# Patient Record
Sex: MALE | Race: WHITE | NOT HISPANIC OR LATINO | Employment: OTHER | URBAN - METROPOLITAN AREA
[De-identification: names, ages, dates, MRNs, and addresses within clinical notes are randomized per-mention and may not be internally consistent; named-entity substitution may affect disease eponyms.]

---

## 2017-01-03 ENCOUNTER — ALLSCRIPTS OFFICE VISIT (OUTPATIENT)
Dept: OTHER | Facility: OTHER | Age: 67
End: 2017-01-03

## 2017-01-03 DIAGNOSIS — N18.30 CHRONIC KIDNEY DISEASE, STAGE III (MODERATE) (HCC): ICD-10-CM

## 2017-01-13 ENCOUNTER — TRANSCRIBE ORDERS (OUTPATIENT)
Dept: LAB | Facility: CLINIC | Age: 67
End: 2017-01-13

## 2017-01-13 ENCOUNTER — APPOINTMENT (OUTPATIENT)
Dept: LAB | Facility: CLINIC | Age: 67
End: 2017-01-13
Payer: MEDICARE

## 2017-01-13 DIAGNOSIS — Z94.0 KIDNEY REPLACED BY TRANSPLANT: Primary | ICD-10-CM

## 2017-01-13 LAB
ANION GAP SERPL CALCULATED.3IONS-SCNC: 7 MMOL/L (ref 4–13)
BUN SERPL-MCNC: 30 MG/DL (ref 5–25)
CALCIUM SERPL-MCNC: 9.2 MG/DL (ref 8.3–10.1)
CHLORIDE SERPL-SCNC: 102 MMOL/L (ref 100–108)
CO2 SERPL-SCNC: 27 MMOL/L (ref 21–32)
CREAT SERPL-MCNC: 1.58 MG/DL (ref 0.6–1.3)
GFR SERPL CREATININE-BSD FRML MDRD: 44.2 ML/MIN/1.73SQ M
GLUCOSE SERPL-MCNC: 90 MG/DL (ref 65–140)
POTASSIUM SERPL-SCNC: 4.5 MMOL/L (ref 3.5–5.3)
SODIUM SERPL-SCNC: 136 MMOL/L (ref 136–145)

## 2017-01-13 PROCEDURE — 80048 BASIC METABOLIC PNL TOTAL CA: CPT

## 2017-01-13 PROCEDURE — 36415 COLL VENOUS BLD VENIPUNCTURE: CPT

## 2017-01-13 PROCEDURE — 80197 ASSAY OF TACROLIMUS: CPT

## 2017-01-15 LAB — TACROLIMUS BLD LC/MS/MS-MCNC: 6.5 NG/ML (ref 2–20)

## 2017-02-02 ENCOUNTER — ALLSCRIPTS OFFICE VISIT (OUTPATIENT)
Dept: OTHER | Facility: OTHER | Age: 67
End: 2017-02-02

## 2017-02-07 ENCOUNTER — GENERIC CONVERSION - ENCOUNTER (OUTPATIENT)
Dept: OTHER | Facility: OTHER | Age: 67
End: 2017-02-07

## 2017-02-07 LAB — COLOGUARD (HISTORICAL): NEGATIVE

## 2017-03-17 ENCOUNTER — GENERIC CONVERSION - ENCOUNTER (OUTPATIENT)
Dept: OTHER | Facility: OTHER | Age: 67
End: 2017-03-17

## 2017-03-30 ENCOUNTER — ALLSCRIPTS OFFICE VISIT (OUTPATIENT)
Dept: OTHER | Facility: OTHER | Age: 67
End: 2017-03-30

## 2017-03-30 DIAGNOSIS — Z94.0 HISTORY OF KIDNEY TRANSPLANT: ICD-10-CM

## 2017-03-30 DIAGNOSIS — N18.30 CHRONIC KIDNEY DISEASE, STAGE III (MODERATE) (HCC): ICD-10-CM

## 2017-03-31 ENCOUNTER — ALLSCRIPTS OFFICE VISIT (OUTPATIENT)
Dept: OTHER | Facility: OTHER | Age: 67
End: 2017-03-31

## 2017-04-03 ENCOUNTER — HOSPITAL ENCOUNTER (INPATIENT)
Facility: HOSPITAL | Age: 67
LOS: 4 days | Discharge: HOME | DRG: 178 | End: 2017-04-07
Attending: EMERGENCY MEDICINE | Admitting: INTERNAL MEDICINE
Payer: MEDICARE

## 2017-04-03 ENCOUNTER — APPOINTMENT (EMERGENCY)
Dept: RADIOLOGY | Facility: HOSPITAL | Age: 67
DRG: 178 | End: 2017-04-03
Payer: MEDICARE

## 2017-04-03 DIAGNOSIS — D84.9 IMMUNOSUPPRESSION (HCC): ICD-10-CM

## 2017-04-03 DIAGNOSIS — J18.9 PNEUMONIA: Primary | ICD-10-CM

## 2017-04-03 DIAGNOSIS — Z94.0 H/O KIDNEY TRANSPLANT: Chronic | ICD-10-CM

## 2017-04-03 DIAGNOSIS — N17.9 AKI (ACUTE KIDNEY INJURY) (HCC): ICD-10-CM

## 2017-04-03 LAB
ALBUMIN SERPL BCP-MCNC: 3.6 G/DL (ref 3.5–5)
ALP SERPL-CCNC: 72 U/L (ref 46–116)
ALT SERPL W P-5'-P-CCNC: 25 U/L (ref 12–78)
ANION GAP SERPL CALCULATED.3IONS-SCNC: 7 MMOL/L (ref 4–13)
APTT PPP: 25 SECONDS (ref 24–36)
AST SERPL W P-5'-P-CCNC: 26 U/L (ref 5–45)
BACTERIA UR QL AUTO: ABNORMAL /HPF
BASOPHILS # BLD AUTO: 0.04 THOUSANDS/ΜL (ref 0–0.1)
BASOPHILS NFR BLD AUTO: 1 % (ref 0–1)
BILIRUB SERPL-MCNC: 0.7 MG/DL (ref 0.2–1)
BILIRUB UR QL STRIP: NEGATIVE
BUN SERPL-MCNC: 23 MG/DL (ref 5–25)
CALCIUM SERPL-MCNC: 9.3 MG/DL (ref 8.3–10.1)
CHLORIDE SERPL-SCNC: 102 MMOL/L (ref 100–108)
CLARITY UR: CLEAR
CO2 SERPL-SCNC: 28 MMOL/L (ref 21–32)
COLOR UR: YELLOW
CREAT SERPL-MCNC: 1.39 MG/DL (ref 0.6–1.3)
EOSINOPHIL # BLD AUTO: 0.28 THOUSAND/ΜL (ref 0–0.61)
EOSINOPHIL NFR BLD AUTO: 4 % (ref 0–6)
ERYTHROCYTE [DISTWIDTH] IN BLOOD BY AUTOMATED COUNT: 13.2 % (ref 11.6–15.1)
GFR SERPL CREATININE-BSD FRML MDRD: 51.1 ML/MIN/1.73SQ M
GLUCOSE SERPL-MCNC: 137 MG/DL (ref 65–140)
GLUCOSE UR STRIP-MCNC: NEGATIVE MG/DL
HCT VFR BLD AUTO: 52.4 % (ref 36.5–49.3)
HGB BLD-MCNC: 16.8 G/DL (ref 12–17)
HGB UR QL STRIP.AUTO: ABNORMAL
INR PPP: 1.04 (ref 0.86–1.16)
KETONES UR STRIP-MCNC: NEGATIVE MG/DL
LACTATE SERPL-SCNC: 1.7 MMOL/L (ref 0.5–2)
LEUKOCYTE ESTERASE UR QL STRIP: NEGATIVE
LYMPHOCYTES # BLD AUTO: 1.53 THOUSANDS/ΜL (ref 0.6–4.47)
LYMPHOCYTES NFR BLD AUTO: 20 % (ref 14–44)
MCH RBC QN AUTO: 29.5 PG (ref 26.8–34.3)
MCHC RBC AUTO-ENTMCNC: 32.1 G/DL (ref 31.4–37.4)
MCV RBC AUTO: 92 FL (ref 82–98)
MONOCYTES # BLD AUTO: 0.8 THOUSAND/ΜL (ref 0.17–1.22)
MONOCYTES NFR BLD AUTO: 10 % (ref 4–12)
NEUTROPHILS # BLD AUTO: 4.92 THOUSANDS/ΜL (ref 1.85–7.62)
NEUTS SEG NFR BLD AUTO: 64 % (ref 43–75)
NITRITE UR QL STRIP: NEGATIVE
NON-SQ EPI CELLS URNS QL MICRO: ABNORMAL /HPF
NRBC BLD AUTO-RTO: 0 /100 WBCS
NT-PROBNP SERPL-MCNC: 265 PG/ML
PH UR STRIP.AUTO: 6 [PH] (ref 4.5–8)
PLATELET # BLD AUTO: 272 THOUSANDS/UL (ref 149–390)
PMV BLD AUTO: 9.1 FL (ref 8.9–12.7)
POTASSIUM SERPL-SCNC: 4.8 MMOL/L (ref 3.5–5.3)
PROT SERPL-MCNC: 8.7 G/DL (ref 6.4–8.2)
PROT UR STRIP-MCNC: NEGATIVE MG/DL
PROTHROMBIN TIME: 13.5 SECONDS (ref 12–14.3)
RBC # BLD AUTO: 5.7 MILLION/UL (ref 3.88–5.62)
RBC #/AREA URNS AUTO: ABNORMAL /HPF
SODIUM SERPL-SCNC: 137 MMOL/L (ref 136–145)
SP GR UR STRIP.AUTO: 1.02 (ref 1–1.03)
TROPONIN I SERPL-MCNC: <0.02 NG/ML
UROBILINOGEN UR QL STRIP.AUTO: 0.2 E.U./DL
WBC # BLD AUTO: 7.66 THOUSAND/UL (ref 4.31–10.16)
WBC #/AREA URNS AUTO: ABNORMAL /HPF

## 2017-04-03 PROCEDURE — 85025 COMPLETE CBC W/AUTO DIFF WBC: CPT | Performed by: EMERGENCY MEDICINE

## 2017-04-03 PROCEDURE — 85610 PROTHROMBIN TIME: CPT | Performed by: EMERGENCY MEDICINE

## 2017-04-03 PROCEDURE — 87040 BLOOD CULTURE FOR BACTERIA: CPT | Performed by: EMERGENCY MEDICINE

## 2017-04-03 PROCEDURE — 71020 HB CHEST X-RAY 2VW FRONTAL&LATL: CPT

## 2017-04-03 PROCEDURE — 80197 ASSAY OF TACROLIMUS: CPT | Performed by: EMERGENCY MEDICINE

## 2017-04-03 PROCEDURE — 93005 ELECTROCARDIOGRAM TRACING: CPT | Performed by: EMERGENCY MEDICINE

## 2017-04-03 PROCEDURE — 36415 COLL VENOUS BLD VENIPUNCTURE: CPT | Performed by: EMERGENCY MEDICINE

## 2017-04-03 PROCEDURE — 85730 THROMBOPLASTIN TIME PARTIAL: CPT | Performed by: EMERGENCY MEDICINE

## 2017-04-03 PROCEDURE — 84484 ASSAY OF TROPONIN QUANT: CPT | Performed by: EMERGENCY MEDICINE

## 2017-04-03 PROCEDURE — 81001 URINALYSIS AUTO W/SCOPE: CPT | Performed by: EMERGENCY MEDICINE

## 2017-04-03 PROCEDURE — 87086 URINE CULTURE/COLONY COUNT: CPT | Performed by: EMERGENCY MEDICINE

## 2017-04-03 PROCEDURE — 83880 ASSAY OF NATRIURETIC PEPTIDE: CPT | Performed by: EMERGENCY MEDICINE

## 2017-04-03 PROCEDURE — 83605 ASSAY OF LACTIC ACID: CPT | Performed by: EMERGENCY MEDICINE

## 2017-04-03 PROCEDURE — 80053 COMPREHEN METABOLIC PANEL: CPT | Performed by: EMERGENCY MEDICINE

## 2017-04-04 ENCOUNTER — APPOINTMENT (INPATIENT)
Dept: CT IMAGING | Facility: HOSPITAL | Age: 67
DRG: 178 | End: 2017-04-04
Payer: MEDICARE

## 2017-04-04 PROBLEM — Z94.0 RENAL TRANSPLANT RECIPIENT: Status: ACTIVE | Noted: 2017-04-04

## 2017-04-04 PROBLEM — J18.9 PNEUMONIA DUE TO INFECTIOUS ORGANISM: Status: ACTIVE | Noted: 2017-04-04

## 2017-04-04 PROBLEM — I10 ESSENTIAL HYPERTENSION: Status: ACTIVE | Noted: 2017-04-04

## 2017-04-04 PROBLEM — D84.9 IMMUNOSUPPRESSION (HCC): Status: ACTIVE | Noted: 2017-04-04

## 2017-04-04 PROBLEM — R79.89 ELEVATED BRAIN NATRIURETIC PEPTIDE (BNP) LEVEL: Status: ACTIVE | Noted: 2017-04-04

## 2017-04-04 LAB
ANION GAP SERPL CALCULATED.3IONS-SCNC: 8 MMOL/L (ref 4–13)
ATRIAL RATE: 74 BPM
BUN SERPL-MCNC: 23 MG/DL (ref 5–25)
CALCIUM SERPL-MCNC: 8.6 MG/DL (ref 8.3–10.1)
CHLORIDE SERPL-SCNC: 105 MMOL/L (ref 100–108)
CO2 SERPL-SCNC: 24 MMOL/L (ref 21–32)
CREAT SERPL-MCNC: 1.15 MG/DL (ref 0.6–1.3)
ERYTHROCYTE [DISTWIDTH] IN BLOOD BY AUTOMATED COUNT: 13.2 % (ref 11.6–15.1)
FLUAV AG SPEC QL: NORMAL
FLUBV AG SPEC QL: NORMAL
GFR SERPL CREATININE-BSD FRML MDRD: >60 ML/MIN/1.73SQ M
GLUCOSE SERPL-MCNC: 129 MG/DL (ref 65–140)
HCT VFR BLD AUTO: 46.2 % (ref 36.5–49.3)
HGB BLD-MCNC: 14.9 G/DL (ref 12–17)
L PNEUMO1 AG UR QL IA.RAPID: NEGATIVE
MAGNESIUM SERPL-MCNC: 1.8 MG/DL (ref 1.6–2.6)
MCH RBC QN AUTO: 29.6 PG (ref 26.8–34.3)
MCHC RBC AUTO-ENTMCNC: 32.3 G/DL (ref 31.4–37.4)
MCV RBC AUTO: 92 FL (ref 82–98)
P AXIS: 36 DEGREES
PLATELET # BLD AUTO: 229 THOUSANDS/UL (ref 149–390)
PLATELET # BLD AUTO: 242 THOUSANDS/UL (ref 149–390)
PMV BLD AUTO: 8.8 FL (ref 8.9–12.7)
PMV BLD AUTO: 9 FL (ref 8.9–12.7)
POTASSIUM SERPL-SCNC: 3.5 MMOL/L (ref 3.5–5.3)
PR INTERVAL: 184 MS
QRS AXIS: -2 DEGREES
QRSD INTERVAL: 142 MS
QT INTERVAL: 406 MS
QTC INTERVAL: 450 MS
RBC # BLD AUTO: 5.04 MILLION/UL (ref 3.88–5.62)
RSV B RNA SPEC QL NAA+PROBE: NORMAL
S PNEUM AG UR QL: NEGATIVE
SODIUM SERPL-SCNC: 137 MMOL/L (ref 136–145)
T WAVE AXIS: 47 DEGREES
VENTRICULAR RATE: 74 BPM
WBC # BLD AUTO: 7.68 THOUSAND/UL (ref 4.31–10.16)

## 2017-04-04 PROCEDURE — 99284 EMERGENCY DEPT VISIT MOD MDM: CPT

## 2017-04-04 PROCEDURE — 71250 CT THORAX DX C-: CPT

## 2017-04-04 PROCEDURE — 80048 BASIC METABOLIC PNL TOTAL CA: CPT | Performed by: PHYSICIAN ASSISTANT

## 2017-04-04 PROCEDURE — 87070 CULTURE OTHR SPECIMN AEROBIC: CPT | Performed by: PHYSICIAN ASSISTANT

## 2017-04-04 PROCEDURE — 83735 ASSAY OF MAGNESIUM: CPT | Performed by: PHYSICIAN ASSISTANT

## 2017-04-04 PROCEDURE — 87205 SMEAR GRAM STAIN: CPT | Performed by: PHYSICIAN ASSISTANT

## 2017-04-04 PROCEDURE — 85027 COMPLETE CBC AUTOMATED: CPT | Performed by: PHYSICIAN ASSISTANT

## 2017-04-04 PROCEDURE — 87449 NOS EACH ORGANISM AG IA: CPT | Performed by: PHYSICIAN ASSISTANT

## 2017-04-04 PROCEDURE — 94640 AIRWAY INHALATION TREATMENT: CPT

## 2017-04-04 PROCEDURE — 87798 DETECT AGENT NOS DNA AMP: CPT | Performed by: PHYSICIAN ASSISTANT

## 2017-04-04 PROCEDURE — 94760 N-INVAS EAR/PLS OXIMETRY 1: CPT

## 2017-04-04 PROCEDURE — 94664 DEMO&/EVAL PT USE INHALER: CPT

## 2017-04-04 PROCEDURE — 85049 AUTOMATED PLATELET COUNT: CPT | Performed by: PHYSICIAN ASSISTANT

## 2017-04-04 RX ORDER — MYCOPHENOLIC ACID 180 MG/1
360 TABLET, DELAYED RELEASE ORAL 2 TIMES DAILY
Status: DISCONTINUED | OUTPATIENT
Start: 2017-04-04 | End: 2017-04-07 | Stop reason: HOSPADM

## 2017-04-04 RX ORDER — VANCOMYCIN HYDROCHLORIDE 1 G/200ML
15 INJECTION, SOLUTION INTRAVENOUS EVERY 12 HOURS
Status: DISCONTINUED | OUTPATIENT
Start: 2017-04-04 | End: 2017-04-04

## 2017-04-04 RX ORDER — ATORVASTATIN CALCIUM 10 MG/1
10 TABLET, FILM COATED ORAL
Status: DISCONTINUED | OUTPATIENT
Start: 2017-04-04 | End: 2017-04-07 | Stop reason: HOSPADM

## 2017-04-04 RX ORDER — TAMSULOSIN HYDROCHLORIDE 0.4 MG/1
0.4 CAPSULE ORAL
Status: DISCONTINUED | OUTPATIENT
Start: 2017-04-04 | End: 2017-04-07 | Stop reason: HOSPADM

## 2017-04-04 RX ORDER — SODIUM CHLORIDE 9 MG/ML
75 INJECTION, SOLUTION INTRAVENOUS CONTINUOUS
Status: DISCONTINUED | OUTPATIENT
Start: 2017-04-04 | End: 2017-04-04

## 2017-04-04 RX ORDER — VANCOMYCIN HYDROCHLORIDE 1 G/200ML
15 INJECTION, SOLUTION INTRAVENOUS EVERY 12 HOURS
Status: DISCONTINUED | OUTPATIENT
Start: 2017-04-04 | End: 2017-04-04 | Stop reason: DRUGHIGH

## 2017-04-04 RX ORDER — HYDRALAZINE HYDROCHLORIDE 20 MG/ML
20 INJECTION INTRAMUSCULAR; INTRAVENOUS EVERY 6 HOURS PRN
Status: DISCONTINUED | OUTPATIENT
Start: 2017-04-04 | End: 2017-04-07 | Stop reason: HOSPADM

## 2017-04-04 RX ORDER — ACETAMINOPHEN 325 MG/1
650 TABLET ORAL EVERY 6 HOURS PRN
Status: DISCONTINUED | OUTPATIENT
Start: 2017-04-04 | End: 2017-04-07 | Stop reason: HOSPADM

## 2017-04-04 RX ORDER — HYDRALAZINE HYDROCHLORIDE 20 MG/ML
10 INJECTION INTRAMUSCULAR; INTRAVENOUS EVERY 6 HOURS PRN
Status: DISCONTINUED | OUTPATIENT
Start: 2017-04-04 | End: 2017-04-07 | Stop reason: HOSPADM

## 2017-04-04 RX ORDER — ECHINACEA PURPUREA EXTRACT 125 MG
1 TABLET ORAL AS NEEDED
Status: DISCONTINUED | OUTPATIENT
Start: 2017-04-04 | End: 2017-04-07 | Stop reason: HOSPADM

## 2017-04-04 RX ORDER — TACROLIMUS 0.5 MG/1
1 CAPSULE ORAL 2 TIMES DAILY
Status: DISCONTINUED | OUTPATIENT
Start: 2017-04-04 | End: 2017-04-07 | Stop reason: HOSPADM

## 2017-04-04 RX ORDER — AMIODARONE HYDROCHLORIDE 200 MG/1
200 TABLET ORAL
Status: DISCONTINUED | OUTPATIENT
Start: 2017-04-05 | End: 2017-04-04

## 2017-04-04 RX ORDER — VANCOMYCIN HYDROCHLORIDE 1 G/200ML
15 INJECTION, SOLUTION INTRAVENOUS EVERY 24 HOURS
Status: DISCONTINUED | OUTPATIENT
Start: 2017-04-04 | End: 2017-04-04 | Stop reason: DRUGHIGH

## 2017-04-04 RX ORDER — ONDANSETRON 2 MG/ML
4 INJECTION INTRAMUSCULAR; INTRAVENOUS EVERY 6 HOURS PRN
Status: DISCONTINUED | OUTPATIENT
Start: 2017-04-04 | End: 2017-04-07 | Stop reason: HOSPADM

## 2017-04-04 RX ORDER — HEPARIN SODIUM 5000 [USP'U]/ML
5000 INJECTION, SOLUTION INTRAVENOUS; SUBCUTANEOUS EVERY 8 HOURS SCHEDULED
Status: DISCONTINUED | OUTPATIENT
Start: 2017-04-04 | End: 2017-04-07 | Stop reason: HOSPADM

## 2017-04-04 RX ORDER — MAGNESIUM SULFATE HEPTAHYDRATE 40 MG/ML
2 INJECTION, SOLUTION INTRAVENOUS ONCE
Status: COMPLETED | OUTPATIENT
Start: 2017-04-04 | End: 2017-04-04

## 2017-04-04 RX ORDER — ALBUTEROL SULFATE 2.5 MG/3ML
2.5 SOLUTION RESPIRATORY (INHALATION) EVERY 6 HOURS PRN
Status: DISCONTINUED | OUTPATIENT
Start: 2017-04-04 | End: 2017-04-07 | Stop reason: HOSPADM

## 2017-04-04 RX ORDER — VANCOMYCIN HYDROCHLORIDE 1 G/200ML
15 INJECTION, SOLUTION INTRAVENOUS ONCE
Status: DISCONTINUED | OUTPATIENT
Start: 2017-04-04 | End: 2017-04-04

## 2017-04-04 RX ORDER — GUAIFENESIN 600 MG
600 TABLET, EXTENDED RELEASE 12 HR ORAL 2 TIMES DAILY PRN
Status: DISCONTINUED | OUTPATIENT
Start: 2017-04-04 | End: 2017-04-05

## 2017-04-04 RX ORDER — FAMOTIDINE 20 MG/1
20 TABLET, FILM COATED ORAL 2 TIMES DAILY
Status: DISCONTINUED | OUTPATIENT
Start: 2017-04-04 | End: 2017-04-07 | Stop reason: HOSPADM

## 2017-04-04 RX ORDER — ASPIRIN 81 MG/1
81 TABLET, CHEWABLE ORAL DAILY
Status: DISCONTINUED | OUTPATIENT
Start: 2017-04-04 | End: 2017-04-07 | Stop reason: HOSPADM

## 2017-04-04 RX ORDER — PREDNISONE 1 MG/1
5 TABLET ORAL DAILY
Status: DISCONTINUED | OUTPATIENT
Start: 2017-04-04 | End: 2017-04-07 | Stop reason: HOSPADM

## 2017-04-04 RX ADMIN — CEFEPIME 1000 MG: 1 INJECTION, POWDER, FOR SOLUTION INTRAMUSCULAR; INTRAVENOUS at 14:05

## 2017-04-04 RX ADMIN — MYCOPHENOLIC ACID 360 MG: 180 TABLET, DELAYED RELEASE ORAL at 09:29

## 2017-04-04 RX ADMIN — ASPIRIN 81 MG CHEWABLE TABLET 81 MG: 81 TABLET CHEWABLE at 09:22

## 2017-04-04 RX ADMIN — METOPROLOL TARTRATE 2.5 MG: 5 INJECTION, SOLUTION INTRAVENOUS at 06:18

## 2017-04-04 RX ADMIN — PREDNISONE 5 MG: 10 TABLET ORAL at 09:22

## 2017-04-04 RX ADMIN — HEPARIN SODIUM 5000 UNITS: 5000 INJECTION, SOLUTION INTRAVENOUS; SUBCUTANEOUS at 21:59

## 2017-04-04 RX ADMIN — ATORVASTATIN CALCIUM 10 MG: 10 TABLET, FILM COATED ORAL at 15:36

## 2017-04-04 RX ADMIN — MAGNESIUM SULFATE HEPTAHYDRATE 2 G: 40 INJECTION, SOLUTION INTRAVENOUS at 06:22

## 2017-04-04 RX ADMIN — TACROLIMUS 1 MG: 0.5 CAPSULE ORAL at 09:29

## 2017-04-04 RX ADMIN — METOPROLOL TARTRATE 25 MG: 25 TABLET ORAL at 21:59

## 2017-04-04 RX ADMIN — CEFEPIME 1000 MG: 1 INJECTION, POWDER, FOR SOLUTION INTRAMUSCULAR; INTRAVENOUS at 02:11

## 2017-04-04 RX ADMIN — MYCOPHENOLIC ACID 360 MG: 180 TABLET, DELAYED RELEASE ORAL at 17:27

## 2017-04-04 RX ADMIN — VANCOMYCIN HYDROCHLORIDE 1000 MG: 1 INJECTION, SOLUTION INTRAVENOUS at 03:01

## 2017-04-04 RX ADMIN — ALBUTEROL SULFATE 2.5 MG: 2.5 SOLUTION RESPIRATORY (INHALATION) at 21:09

## 2017-04-04 RX ADMIN — FAMOTIDINE 20 MG: 20 TABLET, FILM COATED ORAL at 09:22

## 2017-04-04 RX ADMIN — HEPARIN SODIUM 5000 UNITS: 5000 INJECTION, SOLUTION INTRAVENOUS; SUBCUTANEOUS at 17:27

## 2017-04-04 RX ADMIN — GUAIFENESIN 600 MG: 600 TABLET, EXTENDED RELEASE ORAL at 15:29

## 2017-04-04 RX ADMIN — ALBUTEROL SULFATE 2.5 MG: 2.5 SOLUTION RESPIRATORY (INHALATION) at 15:33

## 2017-04-04 RX ADMIN — FAMOTIDINE 20 MG: 20 TABLET, FILM COATED ORAL at 17:28

## 2017-04-04 RX ADMIN — TAMSULOSIN HYDROCHLORIDE 0.4 MG: 0.4 CAPSULE ORAL at 17:28

## 2017-04-04 RX ADMIN — TACROLIMUS 1 MG: 0.5 CAPSULE ORAL at 17:29

## 2017-04-04 RX ADMIN — ENOXAPARIN SODIUM 40 MG: 40 INJECTION SUBCUTANEOUS at 09:28

## 2017-04-04 RX ADMIN — VANCOMYCIN HYDROCHLORIDE 750 MG: 750 INJECTION, SOLUTION INTRAVENOUS at 14:34

## 2017-04-04 RX ADMIN — SODIUM CHLORIDE 75 ML/HR: 0.9 INJECTION, SOLUTION INTRAVENOUS at 02:13

## 2017-04-05 LAB
ANION GAP SERPL CALCULATED.3IONS-SCNC: 6 MMOL/L (ref 4–13)
BACTERIA UR CULT: NORMAL
BUN SERPL-MCNC: 22 MG/DL (ref 5–25)
CALCIUM SERPL-MCNC: 8.8 MG/DL (ref 8.3–10.1)
CHLORIDE SERPL-SCNC: 105 MMOL/L (ref 100–108)
CO2 SERPL-SCNC: 24 MMOL/L (ref 21–32)
CREAT SERPL-MCNC: 1.15 MG/DL (ref 0.6–1.3)
ERYTHROCYTE [DISTWIDTH] IN BLOOD BY AUTOMATED COUNT: 13.2 % (ref 11.6–15.1)
GFR SERPL CREATININE-BSD FRML MDRD: >60 ML/MIN/1.73SQ M
GLUCOSE SERPL-MCNC: 101 MG/DL (ref 65–140)
HCT VFR BLD AUTO: 45.9 % (ref 36.5–49.3)
HGB BLD-MCNC: 14.9 G/DL (ref 12–17)
MAGNESIUM SERPL-MCNC: 2.3 MG/DL (ref 1.6–2.6)
MCH RBC QN AUTO: 29.7 PG (ref 26.8–34.3)
MCHC RBC AUTO-ENTMCNC: 32.5 G/DL (ref 31.4–37.4)
MCV RBC AUTO: 92 FL (ref 82–98)
PHOSPHATE SERPL-MCNC: 3.3 MG/DL (ref 2.3–4.1)
PLATELET # BLD AUTO: 232 THOUSANDS/UL (ref 149–390)
PMV BLD AUTO: 8.9 FL (ref 8.9–12.7)
POTASSIUM SERPL-SCNC: 4.2 MMOL/L (ref 3.5–5.3)
RBC # BLD AUTO: 5.01 MILLION/UL (ref 3.88–5.62)
SODIUM SERPL-SCNC: 135 MMOL/L (ref 136–145)
WBC # BLD AUTO: 7.48 THOUSAND/UL (ref 4.31–10.16)

## 2017-04-05 PROCEDURE — 94640 AIRWAY INHALATION TREATMENT: CPT

## 2017-04-05 PROCEDURE — 83735 ASSAY OF MAGNESIUM: CPT | Performed by: NURSE PRACTITIONER

## 2017-04-05 PROCEDURE — 85027 COMPLETE CBC AUTOMATED: CPT | Performed by: NURSE PRACTITIONER

## 2017-04-05 PROCEDURE — 94760 N-INVAS EAR/PLS OXIMETRY 1: CPT

## 2017-04-05 PROCEDURE — 80048 BASIC METABOLIC PNL TOTAL CA: CPT | Performed by: NURSE PRACTITIONER

## 2017-04-05 PROCEDURE — 84100 ASSAY OF PHOSPHORUS: CPT | Performed by: NURSE PRACTITIONER

## 2017-04-05 RX ORDER — GUAIFENESIN 600 MG
600 TABLET, EXTENDED RELEASE 12 HR ORAL EVERY 12 HOURS SCHEDULED
Status: DISCONTINUED | OUTPATIENT
Start: 2017-04-05 | End: 2017-04-07 | Stop reason: HOSPADM

## 2017-04-05 RX ADMIN — MYCOPHENOLIC ACID 360 MG: 180 TABLET, DELAYED RELEASE ORAL at 17:22

## 2017-04-05 RX ADMIN — ALBUTEROL SULFATE 2.5 MG: 2.5 SOLUTION RESPIRATORY (INHALATION) at 21:36

## 2017-04-05 RX ADMIN — HEPARIN SODIUM 5000 UNITS: 5000 INJECTION, SOLUTION INTRAVENOUS; SUBCUTANEOUS at 06:35

## 2017-04-05 RX ADMIN — FAMOTIDINE 20 MG: 20 TABLET, FILM COATED ORAL at 09:11

## 2017-04-05 RX ADMIN — TAMSULOSIN HYDROCHLORIDE 0.4 MG: 0.4 CAPSULE ORAL at 15:31

## 2017-04-05 RX ADMIN — METOPROLOL TARTRATE 25 MG: 25 TABLET ORAL at 21:47

## 2017-04-05 RX ADMIN — MYCOPHENOLIC ACID 360 MG: 180 TABLET, DELAYED RELEASE ORAL at 09:12

## 2017-04-05 RX ADMIN — ALBUTEROL SULFATE 2.5 MG: 2.5 SOLUTION RESPIRATORY (INHALATION) at 08:16

## 2017-04-05 RX ADMIN — VANCOMYCIN HYDROCHLORIDE 750 MG: 750 INJECTION, SOLUTION INTRAVENOUS at 01:58

## 2017-04-05 RX ADMIN — FAMOTIDINE 20 MG: 20 TABLET, FILM COATED ORAL at 17:22

## 2017-04-05 RX ADMIN — PREDNISONE 5 MG: 10 TABLET ORAL at 09:11

## 2017-04-05 RX ADMIN — METOPROLOL TARTRATE 25 MG: 25 TABLET ORAL at 09:11

## 2017-04-05 RX ADMIN — CEFEPIME 1000 MG: 1 INJECTION, POWDER, FOR SOLUTION INTRAMUSCULAR; INTRAVENOUS at 01:25

## 2017-04-05 RX ADMIN — VANCOMYCIN HYDROCHLORIDE 750 MG: 750 INJECTION, SOLUTION INTRAVENOUS at 13:49

## 2017-04-05 RX ADMIN — ATORVASTATIN CALCIUM 10 MG: 10 TABLET, FILM COATED ORAL at 15:31

## 2017-04-05 RX ADMIN — HEPARIN SODIUM 5000 UNITS: 5000 INJECTION, SOLUTION INTRAVENOUS; SUBCUTANEOUS at 13:48

## 2017-04-05 RX ADMIN — HEPARIN SODIUM 5000 UNITS: 5000 INJECTION, SOLUTION INTRAVENOUS; SUBCUTANEOUS at 21:46

## 2017-04-05 RX ADMIN — TACROLIMUS 1 MG: 0.5 CAPSULE ORAL at 17:22

## 2017-04-05 RX ADMIN — ASPIRIN 81 MG CHEWABLE TABLET 81 MG: 81 TABLET CHEWABLE at 09:11

## 2017-04-05 RX ADMIN — TACROLIMUS 1 MG: 0.5 CAPSULE ORAL at 09:12

## 2017-04-05 RX ADMIN — GUAIFENESIN 600 MG: 600 TABLET, EXTENDED RELEASE ORAL at 21:47

## 2017-04-05 RX ADMIN — CEFEPIME 1000 MG: 1 INJECTION, POWDER, FOR SOLUTION INTRAMUSCULAR; INTRAVENOUS at 13:48

## 2017-04-06 LAB
ANION GAP SERPL CALCULATED.3IONS-SCNC: 7 MMOL/L (ref 4–13)
BACTERIA SPT RESP CULT: NORMAL
BASOPHILS # BLD AUTO: 0.04 THOUSANDS/ΜL (ref 0–0.1)
BASOPHILS NFR BLD AUTO: 1 % (ref 0–1)
BUN SERPL-MCNC: 19 MG/DL (ref 5–25)
CALCIUM SERPL-MCNC: 8.7 MG/DL (ref 8.3–10.1)
CHLORIDE SERPL-SCNC: 103 MMOL/L (ref 100–108)
CO2 SERPL-SCNC: 24 MMOL/L (ref 21–32)
CREAT SERPL-MCNC: 1.12 MG/DL (ref 0.6–1.3)
EOSINOPHIL # BLD AUTO: 0.37 THOUSAND/ΜL (ref 0–0.61)
EOSINOPHIL NFR BLD AUTO: 5 % (ref 0–6)
ERYTHROCYTE [DISTWIDTH] IN BLOOD BY AUTOMATED COUNT: 13.2 % (ref 11.6–15.1)
GFR SERPL CREATININE-BSD FRML MDRD: >60 ML/MIN/1.73SQ M
GLUCOSE SERPL-MCNC: 91 MG/DL (ref 65–140)
GRAM STN SPEC: NORMAL
HCT VFR BLD AUTO: 47.5 % (ref 36.5–49.3)
HGB BLD-MCNC: 15.2 G/DL (ref 12–17)
LYMPHOCYTES # BLD AUTO: 1.96 THOUSANDS/ΜL (ref 0.6–4.47)
LYMPHOCYTES NFR BLD AUTO: 29 % (ref 14–44)
MAGNESIUM SERPL-MCNC: 2 MG/DL (ref 1.6–2.6)
MCH RBC QN AUTO: 29.5 PG (ref 26.8–34.3)
MCHC RBC AUTO-ENTMCNC: 32 G/DL (ref 31.4–37.4)
MCV RBC AUTO: 92 FL (ref 82–98)
MONOCYTES # BLD AUTO: 0.76 THOUSAND/ΜL (ref 0.17–1.22)
MONOCYTES NFR BLD AUTO: 11 % (ref 4–12)
NEUTROPHILS # BLD AUTO: 3.61 THOUSANDS/ΜL (ref 1.85–7.62)
NEUTS SEG NFR BLD AUTO: 53 % (ref 43–75)
NRBC BLD AUTO-RTO: 0 /100 WBCS
PLATELET # BLD AUTO: 234 THOUSANDS/UL (ref 149–390)
PMV BLD AUTO: 9.1 FL (ref 8.9–12.7)
POTASSIUM SERPL-SCNC: 4.3 MMOL/L (ref 3.5–5.3)
RBC # BLD AUTO: 5.16 MILLION/UL (ref 3.88–5.62)
SODIUM SERPL-SCNC: 134 MMOL/L (ref 136–145)
VANCOMYCIN TROUGH SERPL-MCNC: 14.3 UG/ML (ref 10–20)
WBC # BLD AUTO: 6.86 THOUSAND/UL (ref 4.31–10.16)

## 2017-04-06 PROCEDURE — 83735 ASSAY OF MAGNESIUM: CPT | Performed by: PHYSICIAN ASSISTANT

## 2017-04-06 PROCEDURE — 94640 AIRWAY INHALATION TREATMENT: CPT

## 2017-04-06 PROCEDURE — 94760 N-INVAS EAR/PLS OXIMETRY 1: CPT

## 2017-04-06 PROCEDURE — 80202 ASSAY OF VANCOMYCIN: CPT | Performed by: NURSE PRACTITIONER

## 2017-04-06 PROCEDURE — 85025 COMPLETE CBC W/AUTO DIFF WBC: CPT | Performed by: PHYSICIAN ASSISTANT

## 2017-04-06 PROCEDURE — 94668 MNPJ CHEST WALL SBSQ: CPT

## 2017-04-06 PROCEDURE — 80048 BASIC METABOLIC PNL TOTAL CA: CPT | Performed by: PHYSICIAN ASSISTANT

## 2017-04-06 RX ORDER — VANCOMYCIN HYDROCHLORIDE 1 G/200ML
1000 INJECTION, SOLUTION INTRAVENOUS EVERY 12 HOURS
Status: DISCONTINUED | OUTPATIENT
Start: 2017-04-06 | End: 2017-04-07 | Stop reason: HOSPADM

## 2017-04-06 RX ADMIN — TACROLIMUS 1 MG: 0.5 CAPSULE ORAL at 08:21

## 2017-04-06 RX ADMIN — METOPROLOL TARTRATE 25 MG: 25 TABLET ORAL at 21:03

## 2017-04-06 RX ADMIN — PREDNISONE 5 MG: 10 TABLET ORAL at 08:21

## 2017-04-06 RX ADMIN — TACROLIMUS 1 MG: 0.5 CAPSULE ORAL at 17:10

## 2017-04-06 RX ADMIN — TAMSULOSIN HYDROCHLORIDE 0.4 MG: 0.4 CAPSULE ORAL at 16:25

## 2017-04-06 RX ADMIN — FAMOTIDINE 20 MG: 20 TABLET, FILM COATED ORAL at 17:10

## 2017-04-06 RX ADMIN — GUAIFENESIN 600 MG: 600 TABLET, EXTENDED RELEASE ORAL at 08:21

## 2017-04-06 RX ADMIN — CEFEPIME 1000 MG: 1 INJECTION, POWDER, FOR SOLUTION INTRAMUSCULAR; INTRAVENOUS at 00:43

## 2017-04-06 RX ADMIN — CEFEPIME 1000 MG: 1 INJECTION, POWDER, FOR SOLUTION INTRAMUSCULAR; INTRAVENOUS at 13:40

## 2017-04-06 RX ADMIN — ALBUTEROL SULFATE 2.5 MG: 2.5 SOLUTION RESPIRATORY (INHALATION) at 09:35

## 2017-04-06 RX ADMIN — VANCOMYCIN HYDROCHLORIDE 1000 MG: 1 INJECTION, SOLUTION INTRAVENOUS at 14:01

## 2017-04-06 RX ADMIN — HEPARIN SODIUM 5000 UNITS: 5000 INJECTION, SOLUTION INTRAVENOUS; SUBCUTANEOUS at 05:05

## 2017-04-06 RX ADMIN — ASPIRIN 81 MG CHEWABLE TABLET 81 MG: 81 TABLET CHEWABLE at 08:21

## 2017-04-06 RX ADMIN — METOPROLOL TARTRATE 25 MG: 25 TABLET ORAL at 08:21

## 2017-04-06 RX ADMIN — GUAIFENESIN 600 MG: 600 TABLET, EXTENDED RELEASE ORAL at 21:03

## 2017-04-06 RX ADMIN — MYCOPHENOLIC ACID 360 MG: 180 TABLET, DELAYED RELEASE ORAL at 09:23

## 2017-04-06 RX ADMIN — MYCOPHENOLIC ACID 360 MG: 180 TABLET, DELAYED RELEASE ORAL at 17:10

## 2017-04-06 RX ADMIN — VANCOMYCIN HYDROCHLORIDE 750 MG: 750 INJECTION, SOLUTION INTRAVENOUS at 03:26

## 2017-04-06 RX ADMIN — HEPARIN SODIUM 5000 UNITS: 5000 INJECTION, SOLUTION INTRAVENOUS; SUBCUTANEOUS at 14:01

## 2017-04-06 RX ADMIN — HEPARIN SODIUM 5000 UNITS: 5000 INJECTION, SOLUTION INTRAVENOUS; SUBCUTANEOUS at 21:03

## 2017-04-06 RX ADMIN — ATORVASTATIN CALCIUM 10 MG: 10 TABLET, FILM COATED ORAL at 16:25

## 2017-04-06 RX ADMIN — FAMOTIDINE 20 MG: 20 TABLET, FILM COATED ORAL at 08:21

## 2017-04-06 RX ADMIN — ALBUTEROL SULFATE 2.5 MG: 2.5 SOLUTION RESPIRATORY (INHALATION) at 20:43

## 2017-04-07 VITALS
HEIGHT: 64 IN | OXYGEN SATURATION: 93 % | HEART RATE: 63 BPM | WEIGHT: 155 LBS | SYSTOLIC BLOOD PRESSURE: 128 MMHG | RESPIRATION RATE: 19 BRPM | TEMPERATURE: 97.6 F | DIASTOLIC BLOOD PRESSURE: 69 MMHG | BODY MASS INDEX: 26.46 KG/M2

## 2017-04-07 PROBLEM — J15.6 GRAM-NEGATIVE PNEUMONIA (HCC): Status: ACTIVE | Noted: 2017-04-07

## 2017-04-07 LAB
ANION GAP SERPL CALCULATED.3IONS-SCNC: 7 MMOL/L (ref 4–13)
BUN SERPL-MCNC: 21 MG/DL (ref 5–25)
CALCIUM SERPL-MCNC: 9.2 MG/DL (ref 8.3–10.1)
CHLORIDE SERPL-SCNC: 102 MMOL/L (ref 100–108)
CO2 SERPL-SCNC: 24 MMOL/L (ref 21–32)
CREAT SERPL-MCNC: 1.12 MG/DL (ref 0.6–1.3)
GFR SERPL CREATININE-BSD FRML MDRD: >60 ML/MIN/1.73SQ M
GLUCOSE SERPL-MCNC: 89 MG/DL (ref 65–140)
POTASSIUM SERPL-SCNC: 4.3 MMOL/L (ref 3.5–5.3)
SODIUM SERPL-SCNC: 133 MMOL/L (ref 136–145)

## 2017-04-07 PROCEDURE — 80048 BASIC METABOLIC PNL TOTAL CA: CPT | Performed by: INTERNAL MEDICINE

## 2017-04-07 RX ORDER — BENZONATATE 100 MG/1
100 CAPSULE ORAL 3 TIMES DAILY
Qty: 30 CAPSULE | Refills: 0 | Status: SHIPPED | OUTPATIENT
Start: 2017-04-07 | End: 2017-04-17

## 2017-04-07 RX ORDER — GUAIFENESIN/DEXTROMETHORPHAN 100-10MG/5
10 SYRUP ORAL EVERY 4 HOURS PRN
Qty: 118 ML | Refills: 0 | Status: SHIPPED | OUTPATIENT
Start: 2017-04-07 | End: 2017-05-07

## 2017-04-07 RX ORDER — ECHINACEA PURPUREA EXTRACT 125 MG
1 TABLET ORAL AS NEEDED
Qty: 30 ML | Refills: 0 | Status: SHIPPED | OUTPATIENT
Start: 2017-04-07 | End: 2018-01-30 | Stop reason: CLARIF

## 2017-04-07 RX ORDER — SACCHAROMYCES BOULARDII 250 MG
250 CAPSULE ORAL 2 TIMES DAILY
Qty: 30 CAPSULE | Refills: 0 | Status: SHIPPED | OUTPATIENT
Start: 2017-04-07 | End: 2017-04-22

## 2017-04-07 RX ORDER — LEVOFLOXACIN 750 MG/1
750 TABLET ORAL DAILY
Qty: 7 TABLET | Refills: 0 | Status: SHIPPED | OUTPATIENT
Start: 2017-04-07 | End: 2017-04-14

## 2017-04-07 RX ORDER — ALBUTEROL SULFATE 90 UG/1
2 AEROSOL, METERED RESPIRATORY (INHALATION) EVERY 6 HOURS PRN
Qty: 1 INHALER | Refills: 0 | Status: SHIPPED | OUTPATIENT
Start: 2017-04-07 | End: 2017-05-07

## 2017-04-07 RX ORDER — DOXYCYCLINE 100 MG/1
100 TABLET ORAL 2 TIMES DAILY
Qty: 14 TABLET | Refills: 0 | Status: SHIPPED | OUTPATIENT
Start: 2017-04-07 | End: 2017-04-14

## 2017-04-07 RX ORDER — GUAIFENESIN/DEXTROMETHORPHAN 100-10MG/5
10 SYRUP ORAL EVERY 4 HOURS PRN
Status: DISCONTINUED | OUTPATIENT
Start: 2017-04-07 | End: 2017-04-07 | Stop reason: HOSPADM

## 2017-04-07 RX ADMIN — METOPROLOL TARTRATE 25 MG: 25 TABLET ORAL at 10:03

## 2017-04-07 RX ADMIN — ASPIRIN 81 MG CHEWABLE TABLET 81 MG: 81 TABLET CHEWABLE at 10:04

## 2017-04-07 RX ADMIN — CEFEPIME 1000 MG: 1 INJECTION, POWDER, FOR SOLUTION INTRAMUSCULAR; INTRAVENOUS at 13:07

## 2017-04-07 RX ADMIN — HEPARIN SODIUM 5000 UNITS: 5000 INJECTION, SOLUTION INTRAVENOUS; SUBCUTANEOUS at 05:49

## 2017-04-07 RX ADMIN — PREDNISONE 5 MG: 10 TABLET ORAL at 10:04

## 2017-04-07 RX ADMIN — FAMOTIDINE 20 MG: 20 TABLET, FILM COATED ORAL at 10:04

## 2017-04-07 RX ADMIN — MYCOPHENOLIC ACID 360 MG: 180 TABLET, DELAYED RELEASE ORAL at 10:05

## 2017-04-07 RX ADMIN — VANCOMYCIN HYDROCHLORIDE 1000 MG: 1 INJECTION, SOLUTION INTRAVENOUS at 02:24

## 2017-04-07 RX ADMIN — CEFEPIME 1000 MG: 1 INJECTION, POWDER, FOR SOLUTION INTRAMUSCULAR; INTRAVENOUS at 01:07

## 2017-04-07 RX ADMIN — TACROLIMUS 1 MG: 0.5 CAPSULE ORAL at 10:04

## 2017-04-07 RX ADMIN — GUAIFENESIN 600 MG: 600 TABLET, EXTENDED RELEASE ORAL at 10:04

## 2017-04-07 RX ADMIN — VANCOMYCIN HYDROCHLORIDE 1000 MG: 1 INJECTION, SOLUTION INTRAVENOUS at 14:03

## 2017-04-08 LAB — TACROLIMUS BLD LC/MS/MS-MCNC: 3.8 NG/ML (ref 2–20)

## 2017-04-09 LAB
BACTERIA BLD CULT: NORMAL
BACTERIA BLD CULT: NORMAL

## 2017-04-11 ENCOUNTER — GENERIC CONVERSION - ENCOUNTER (OUTPATIENT)
Dept: OTHER | Facility: OTHER | Age: 67
End: 2017-04-11

## 2017-04-13 ENCOUNTER — ALLSCRIPTS OFFICE VISIT (OUTPATIENT)
Dept: OTHER | Facility: OTHER | Age: 67
End: 2017-04-13

## 2017-04-27 ENCOUNTER — ALLSCRIPTS OFFICE VISIT (OUTPATIENT)
Dept: OTHER | Facility: OTHER | Age: 67
End: 2017-04-27

## 2017-05-15 DIAGNOSIS — J18.9 PNEUMONIA: ICD-10-CM

## 2017-05-16 ENCOUNTER — HOSPITAL ENCOUNTER (OUTPATIENT)
Dept: CT IMAGING | Facility: HOSPITAL | Age: 67
Discharge: HOME | End: 2017-05-16
Payer: MEDICARE

## 2017-05-16 DIAGNOSIS — J18.9 PNEUMONIA: ICD-10-CM

## 2017-05-16 PROCEDURE — 71250 CT THORAX DX C-: CPT

## 2017-05-23 ENCOUNTER — ALLSCRIPTS OFFICE VISIT (OUTPATIENT)
Dept: OTHER | Facility: OTHER | Age: 67
End: 2017-05-23

## 2017-05-23 LAB — S PYO AG THROAT QL: NEGATIVE

## 2017-06-19 ENCOUNTER — ALLSCRIPTS OFFICE VISIT (OUTPATIENT)
Dept: OTHER | Facility: OTHER | Age: 67
End: 2017-06-19

## 2017-06-19 DIAGNOSIS — Z94.0 HISTORY OF KIDNEY TRANSPLANT: ICD-10-CM

## 2017-06-19 DIAGNOSIS — R05.9 COUGH: ICD-10-CM

## 2017-06-19 DIAGNOSIS — N18.30 CHRONIC KIDNEY DISEASE, STAGE III (MODERATE) (HCC): ICD-10-CM

## 2017-06-20 ENCOUNTER — TRANSCRIBE ORDERS (OUTPATIENT)
Dept: ADMINISTRATIVE | Facility: HOSPITAL | Age: 67
End: 2017-06-20

## 2017-06-20 DIAGNOSIS — N18.30 CHRONIC KIDNEY DISEASE, STAGE III (MODERATE) (HCC): ICD-10-CM

## 2017-06-20 DIAGNOSIS — R05.9 COUGH: Primary | ICD-10-CM

## 2017-06-20 DIAGNOSIS — Z94.0 HISTORY OF KIDNEY TRANSPLANT: ICD-10-CM

## 2017-07-03 ENCOUNTER — APPOINTMENT (OUTPATIENT)
Dept: LAB | Facility: CLINIC | Age: 67
End: 2017-07-03
Payer: MEDICARE

## 2017-07-03 DIAGNOSIS — R05.9 COUGH: ICD-10-CM

## 2017-07-03 DIAGNOSIS — Z94.0 HISTORY OF KIDNEY TRANSPLANT: ICD-10-CM

## 2017-07-03 DIAGNOSIS — N18.30 CHRONIC KIDNEY DISEASE, STAGE III (MODERATE) (HCC): ICD-10-CM

## 2017-07-03 LAB
ANION GAP SERPL CALCULATED.3IONS-SCNC: 4 MMOL/L (ref 4–13)
BASOPHILS # BLD AUTO: 0.03 THOUSANDS/ΜL (ref 0–0.1)
BASOPHILS NFR BLD AUTO: 0 % (ref 0–1)
BILIRUB UR QL STRIP: NEGATIVE
BUN SERPL-MCNC: 35 MG/DL (ref 5–25)
CALCIUM SERPL-MCNC: 9.1 MG/DL (ref 8.3–10.1)
CHLORIDE SERPL-SCNC: 104 MMOL/L (ref 100–108)
CLARITY UR: CLEAR
CO2 SERPL-SCNC: 26 MMOL/L (ref 21–32)
COLOR UR: YELLOW
CREAT SERPL-MCNC: 1.37 MG/DL (ref 0.6–1.3)
CREAT UR-MCNC: 99 MG/DL
EOSINOPHIL # BLD AUTO: 0.23 THOUSAND/ΜL (ref 0–0.61)
EOSINOPHIL NFR BLD AUTO: 3 % (ref 0–6)
ERYTHROCYTE [DISTWIDTH] IN BLOOD BY AUTOMATED COUNT: 14.1 % (ref 11.6–15.1)
ERYTHROCYTE [DISTWIDTH] IN BLOOD BY AUTOMATED COUNT: 14.2 % (ref 11.6–15.1)
GFR SERPL CREATININE-BSD FRML MDRD: 52 ML/MIN/1.73SQ M
GLUCOSE P FAST SERPL-MCNC: 93 MG/DL (ref 65–99)
GLUCOSE UR STRIP-MCNC: NEGATIVE MG/DL
HCT VFR BLD AUTO: 51.2 % (ref 36.5–49.3)
HCT VFR BLD AUTO: 51.8 % (ref 36.5–49.3)
HGB BLD-MCNC: 16.4 G/DL (ref 12–17)
HGB BLD-MCNC: 16.5 G/DL (ref 12–17)
HGB UR QL STRIP.AUTO: NEGATIVE
KETONES UR STRIP-MCNC: NEGATIVE MG/DL
LEUKOCYTE ESTERASE UR QL STRIP: NEGATIVE
LYMPHOCYTES # BLD AUTO: 1.6 THOUSANDS/ΜL (ref 0.6–4.47)
LYMPHOCYTES NFR BLD AUTO: 24 % (ref 14–44)
MCH RBC QN AUTO: 29.1 PG (ref 26.8–34.3)
MCH RBC QN AUTO: 29.5 PG (ref 26.8–34.3)
MCHC RBC AUTO-ENTMCNC: 31.7 G/DL (ref 31.4–37.4)
MCHC RBC AUTO-ENTMCNC: 32.2 G/DL (ref 31.4–37.4)
MCV RBC AUTO: 91 FL (ref 82–98)
MCV RBC AUTO: 92 FL (ref 82–98)
MONOCYTES # BLD AUTO: 0.91 THOUSAND/ΜL (ref 0.17–1.22)
MONOCYTES NFR BLD AUTO: 14 % (ref 4–12)
NEUTROPHILS # BLD AUTO: 3.92 THOUSANDS/ΜL (ref 1.85–7.62)
NEUTS SEG NFR BLD AUTO: 59 % (ref 43–75)
NITRITE UR QL STRIP: NEGATIVE
NRBC BLD AUTO-RTO: 0 /100 WBCS
PH UR STRIP.AUTO: 6 [PH] (ref 4.5–8)
PHOSPHATE SERPL-MCNC: 2.7 MG/DL (ref 2.3–4.1)
PLATELET # BLD AUTO: 244 THOUSANDS/UL (ref 149–390)
PLATELET # BLD AUTO: 252 THOUSANDS/UL (ref 149–390)
PMV BLD AUTO: 9.7 FL (ref 8.9–12.7)
PMV BLD AUTO: 9.9 FL (ref 8.9–12.7)
POTASSIUM SERPL-SCNC: 4.6 MMOL/L (ref 3.5–5.3)
PROT UR STRIP-MCNC: NEGATIVE MG/DL
PROT UR-MCNC: 19 MG/DL
PROT/CREAT UR: 0.19 MG/G{CREAT} (ref 0–0.1)
RBC # BLD AUTO: 5.6 MILLION/UL (ref 3.88–5.62)
RBC # BLD AUTO: 5.63 MILLION/UL (ref 3.88–5.62)
SODIUM SERPL-SCNC: 134 MMOL/L (ref 136–145)
SP GR UR STRIP.AUTO: 1.02 (ref 1–1.03)
UROBILINOGEN UR QL STRIP.AUTO: 1 E.U./DL
WBC # BLD AUTO: 6.54 THOUSAND/UL (ref 4.31–10.16)
WBC # BLD AUTO: 6.74 THOUSAND/UL (ref 4.31–10.16)

## 2017-07-03 PROCEDURE — 82570 ASSAY OF URINE CREATININE: CPT | Performed by: PHYSICIAN ASSISTANT

## 2017-07-03 PROCEDURE — 84156 ASSAY OF PROTEIN URINE: CPT | Performed by: PHYSICIAN ASSISTANT

## 2017-07-03 PROCEDURE — 80048 BASIC METABOLIC PNL TOTAL CA: CPT | Performed by: PHYSICIAN ASSISTANT

## 2017-07-03 PROCEDURE — 81003 URINALYSIS AUTO W/O SCOPE: CPT | Performed by: PHYSICIAN ASSISTANT

## 2017-07-03 PROCEDURE — 36415 COLL VENOUS BLD VENIPUNCTURE: CPT | Performed by: PHYSICIAN ASSISTANT

## 2017-07-03 PROCEDURE — 85027 COMPLETE CBC AUTOMATED: CPT | Performed by: PHYSICIAN ASSISTANT

## 2017-07-03 PROCEDURE — 85025 COMPLETE CBC W/AUTO DIFF WBC: CPT

## 2017-07-03 PROCEDURE — 84100 ASSAY OF PHOSPHORUS: CPT | Performed by: PHYSICIAN ASSISTANT

## 2017-07-03 PROCEDURE — 80197 ASSAY OF TACROLIMUS: CPT | Performed by: PHYSICIAN ASSISTANT

## 2017-07-06 ENCOUNTER — ALLSCRIPTS OFFICE VISIT (OUTPATIENT)
Dept: OTHER | Facility: OTHER | Age: 67
End: 2017-07-06

## 2017-07-06 LAB — TACROLIMUS BLD LC/MS/MS-MCNC: 5.6 NG/ML (ref 2–20)

## 2017-07-25 ENCOUNTER — GENERIC CONVERSION - ENCOUNTER (OUTPATIENT)
Dept: OTHER | Facility: OTHER | Age: 67
End: 2017-07-25

## 2017-08-09 ENCOUNTER — GENERIC CONVERSION - ENCOUNTER (OUTPATIENT)
Dept: OTHER | Facility: OTHER | Age: 67
End: 2017-08-09

## 2017-08-11 ENCOUNTER — ALLSCRIPTS OFFICE VISIT (OUTPATIENT)
Dept: OTHER | Facility: OTHER | Age: 67
End: 2017-08-11

## 2017-08-21 ENCOUNTER — HOSPITAL ENCOUNTER (OUTPATIENT)
Dept: PULMONOLOGY | Facility: HOSPITAL | Age: 67
Discharge: HOME | End: 2017-08-21
Payer: MEDICARE

## 2017-08-21 DIAGNOSIS — R05.9 COUGH: ICD-10-CM

## 2017-08-21 PROCEDURE — 94727 GAS DIL/WSHOT DETER LNG VOL: CPT

## 2017-08-21 PROCEDURE — 94760 N-INVAS EAR/PLS OXIMETRY 1: CPT

## 2017-08-21 PROCEDURE — 94729 DIFFUSING CAPACITY: CPT

## 2017-08-21 PROCEDURE — 94060 EVALUATION OF WHEEZING: CPT

## 2017-08-21 RX ORDER — ALBUTEROL SULFATE 2.5 MG/3ML
2.5 SOLUTION RESPIRATORY (INHALATION) ONCE
Status: COMPLETED | OUTPATIENT
Start: 2017-08-21 | End: 2017-08-21

## 2017-08-21 RX ADMIN — ALBUTEROL SULFATE 2.5 MG: 2.5 SOLUTION RESPIRATORY (INHALATION) at 09:32

## 2017-08-31 ENCOUNTER — GENERIC CONVERSION - ENCOUNTER (OUTPATIENT)
Dept: OTHER | Facility: OTHER | Age: 67
End: 2017-08-31

## 2017-10-09 ENCOUNTER — GENERIC CONVERSION - ENCOUNTER (OUTPATIENT)
Dept: OTHER | Facility: OTHER | Age: 67
End: 2017-10-09

## 2017-10-25 ENCOUNTER — GENERIC CONVERSION - ENCOUNTER (OUTPATIENT)
Dept: OTHER | Facility: OTHER | Age: 67
End: 2017-10-25

## 2017-12-19 ENCOUNTER — TRANSCRIBE ORDERS (OUTPATIENT)
Dept: ADMINISTRATIVE | Facility: HOSPITAL | Age: 67
End: 2017-12-19

## 2017-12-19 ENCOUNTER — GENERIC CONVERSION - ENCOUNTER (OUTPATIENT)
Dept: OTHER | Facility: OTHER | Age: 67
End: 2017-12-19

## 2017-12-19 ENCOUNTER — ALLSCRIPTS OFFICE VISIT (OUTPATIENT)
Dept: OTHER | Facility: OTHER | Age: 67
End: 2017-12-19

## 2017-12-19 ENCOUNTER — HOSPITAL ENCOUNTER (OUTPATIENT)
Dept: RADIOLOGY | Facility: HOSPITAL | Age: 67
Discharge: HOME | End: 2017-12-22
Payer: MEDICARE

## 2017-12-19 DIAGNOSIS — R05.9 COUGH: ICD-10-CM

## 2017-12-19 DIAGNOSIS — R50.9 FEVER: ICD-10-CM

## 2017-12-19 DIAGNOSIS — D84.9 IMMUNODEFICIENCY (HCC): ICD-10-CM

## 2017-12-19 DIAGNOSIS — Z94.0 HISTORY OF KIDNEY TRANSPLANT: ICD-10-CM

## 2017-12-19 DIAGNOSIS — Z87.01 HISTORY OF RECURRENT PNEUMONIA: ICD-10-CM

## 2017-12-19 PROCEDURE — 71020 HB CHEST X-RAY 2VW FRONTAL&LATL: CPT

## 2017-12-20 NOTE — PROGRESS NOTES
Assessment  1  History of pneumonia (V12 61) (Z87 01)   2  Immunocompromised (279 3) (D84 9)   3  Fever and chills (780 60) (R50 9)   4  History of cough   5  Cough (786 2) (R05)    Plan  Cough, Fever and chills, History of pneumonia, Immunocompromised    · LevoFLOXacin 250 MG Oral Tablet; Take 1 tablet daily   · * XR CHEST PA & LATERAL; Status:Active; Requested for:29Sgr1778;   Fever and chills    · Follow-up visit in 1 week Evaluation and Treatment  Follow-up  Status: Hold For -Scheduling  Requested for: 38BOK5079    Discussion/Summary    Will have CXR done  Will start Levaquin  Discussed case with patient's nephrologist  Will discuss results when available  Follow up in one week,sooner as needed  Concern for this patient as he is immunocompromised and had difficulty last year with his pneumonia  The patient, patient's family was counseled regarding diagnostic results,-- instructions for management,-- risk factor reductions,-- impressions,-- risks and benefits of treatment options,-- importance of compliance with treatment  Possible side effects of new medications were reviewed with the patient/guardian today  The treatment plan was reviewed with the patient/guardian  The patient/guardian understands and agrees with the treatment plan     Self Referrals: No      Chief Complaint  Patient here for coughing, congestion, fever  History of Present Illness  HPI: Acute visitFor the past 48-72 hours patient has developed a cough for which he is bringing up some yellow sputum  He has had low-grade fever and chills  Last year up sleep this time he was hospitalized with a pneumonia, and rehospitalized in April with another pneumonia  He is immunocompromised with multiple medical problems  He states he is feeling tired, he admits he is not hydrating well  Review of Systems   Constitutional: fever,-- feeling poorly,-- chills-- and-- feeling tired    ENT: no complaints of earache, no loss of hearing, no nosebleeds or nasal discharge, no sore throat or hoarseness  Cardiovascular: no complaints of slow or fast heart rate, no chest pain, no palpitations, no leg claudication or lower extremity edema  Respiratory: cough-- and-- shortness of breath during exertion, but-- no orthopnea,-- no wheezing-- and-- no PND  Gastrointestinal: no abdominal pain  Genitourinary: no dysuria  Musculoskeletal: no arthralgias-- and-- no myalgias  Integumentary: no rashes  Neurological: no headache,-- no dizziness-- and-- no fainting  Active Problems  1  Acute non-recurrent maxillary sinusitis (461 0) (J01 00)   2  Anemia (285 9) (D64 9)   3  Anemia of chronic renal failure (285 21,585 9) (N18 9,D63 1)   4  BPH without urinary obstruction (600 00) (N40 0)   5  Contusion of great toe, right (924 3) (S90 111A)   6  Fracture of humerus, right, closed (812 20) (S42 301A)   7  History of pneumonia (V12 61) (Z87 01)   8  Hyperlipidemia (272 4) (E78 5)   9  Hypertension (401 9) (I10)   10  Immunocompromised (279 3) (D84 9)   11  Kidney transplanted (V42 0) (Z94 0)   12  Mononeuritis (355 9) (G58 9)   13  Multiple abrasions (919 0) (T07  XXXA)   14  Multiple nevi (216 9) (D22 9)   15  Need for pneumococcal vaccination (V03 82) (Z23)   16  Pain in toe (729 5) (M79 676)   17  Polycystic kidney (753 12) (Q61 3)   18  Screening for colon cancer (V76 51) (Z12 11)   19  Screening for skin condition (V82 0) (Z13 89)   20  Screening PSA (prostate specific antigen) (V76 44) (Z12 5)   21  Secondary polycythemia (289 0) (D75 1)   22  Shortness of breath (786 05) (R06 02)   23  Stage III chronic kidney disease (585 3) (N18 3)   24  Superficial phlebitis (451 9) (I80 9)   25  Vitamin D deficiency (268 9) (E55 9)    Past Medical History  Active Problems And Past Medical History Reviewed: The active problems and past medical history were reviewed and updated today  Surgical History  Surgical History Reviewed:    The surgical history was reviewed and updated today  Social History   · Active advance directive (V49 89) (Z78 9)   · Yes   · Employed   · No   · Exercises occasionally (V49 89) (Z78 9)   · Four children   · Denied: History of High risk sexual behavior   · Denied: History of Illicit drug use   ·    · Never a smoker   · Never used chewing tobacco (V49 89) (Z78 9)   · No alcohol use   · Retired   ·   The social history was reviewed and updated today  The social history was reviewed and is unchanged  Family History  Family History Reviewed: The family history was reviewed and updated today  Current Meds   1  5 Series BP Monitor Device; Therapy: 85KWH7678 to Recorded   2  Aspirin 81 MG Oral Tablet Chewable; Therapy: (MPNNTMZJ:29EYO5883) to Recorded   3  Atorvastatin Calcium 10 MG Oral Tablet; TAKE 1 TABLET DAILY  Requested for: 28HKG0374; Last Rx:24Apr2017 Ordered   4  Famotidine 20 MG Oral Tablet; TAKE 1 TABLET TWICE DAILY; Therapy: (VJTTKJDZ:77UBH6748) to Recorded   5  Fish Oil Concentrate 300 MG Oral Capsule; Take 1 capsule twice daily; Therapy: (LSATGEXT:13MXD8326) to Recorded   6  Flomax 0 4 MG Oral Capsule; take 1 capsule daily; Therapy: (HARSXUJU:88XMB3559) to Recorded   7  Metoprolol Tartrate 25 MG Oral Tablet; TAKE 1 TABLET DAILY; Therapy: 57IRB9662 to (Reida Ice)  Requested for: 17YIM4141; Last Rx:16Ype3174 Ordered   8  Mycophenolate Sodium 360 MG Oral Tablet Delayed Release; 1 PO BID; Therapy: 07PQA7033 to (Evaluate:08Jan2018)  Requested for: 82YEZ8712; Last Rx:09Nov2017 Ordered   9  PredniSONE 5 MG Oral Tablet; TAKE 1 TABLET AS DIRECTED; Therapy: 40GET8922 to (Evaluate:12Oct2018)  Requested for: 17Oct2017; Last Rx:17Oct2017 Ordered   10  Sulfamethoxazole-Trimethoprim 400-80 MG Oral Tablet; TAKE 1 TABLET DAILY   Requested for: 10RBW5480; Last Rx:08Oct2017 Ordered   11  Tacrolimus 1 MG Oral Capsule;  Take 1 capsule twice daily  Requested for: 80TTC4975;  Last Rx:13Nov2017 Ordered    The medication list was reviewed and updated today  Allergies  1  No Known Drug Allergies  2  Seasonal    Vitals   Recorded: 25MHC6181 10:13AM   Temperature 99 7 F   Heart Rate 80   Systolic 94 mm Hg   Diastolic 64 mm Hg   Height 5 ft 5 in   Weight 157 lb    BMI Calculated 26 13 kg/m2   BSA Calculated 1 78 m2   O2 Saturation 94     Physical Exam   Constitutional  General appearance: Abnormal   acutely ill  Eyes  Conjunctiva and lids: No swelling, erythema, or discharge  Pupils and irises: Equal, round and reactive to light  Ears, Nose, Mouth, and Throat  External inspection of ears and nose: Normal    Otoscopic examination: Tympanic membrance translucent with normal light reflex  Canals patent without erythema  Nasal mucosa, septum, and turbinates: Normal without edema or erythema  Oropharynx: Normal with no erythema, edema, exudate or lesions  Pulmonary  Respiratory effort: No increased work of breathing or signs of respiratory distress  Auscultation of lungs: Abnormal  -- Decreased breath sounds are present in the right base with coarse breath sounds  No definite crackles, no wheezes  Cardiovascular  Auscultation of heart: Normal rate and rhythm, normal S1 and S2, without murmurs  Examination of extremities for edema and/or varicosities: Normal    Carotid pulses: Normal    Abdomen  Abdomen: Non-tender, no masses  Liver and spleen: No hepatomegaly or splenomegaly  Lymphatic  Palpation of lymph nodes in neck: No lymphadenopathy  Musculoskeletal  Gait and station: Normal    Skin  Skin and subcutaneous tissue: Normal without rashes or lesions  Psychiatric  Mood and affect: Normal          Future Appointments    Date/Time Provider Specialty Site   01/08/2018 10:45 AM TAMIKO Morales  Nephrology [address]   02/15/2018 10:30 AM Loy Velarde [facility] Pulmonary Medicine [facility] Dina Last   09/10/2018 11:15 AM TAMIKO Gonzalez   Dermatology [facility]  [facility]     Signatures   Electronically signed by : John Anderson, [facility]; Dec 19 2017 10:58AM EST                       (Author)    Electronically signed by : Donald Mercer MD; Dec 19 2017 11:54AM EST

## 2017-12-28 ENCOUNTER — ALLSCRIPTS OFFICE VISIT (OUTPATIENT)
Dept: OTHER | Facility: OTHER | Age: 67
End: 2017-12-28

## 2018-01-05 ENCOUNTER — APPOINTMENT (OUTPATIENT)
Dept: LAB | Facility: HOSPITAL | Age: 68
End: 2018-01-05
Attending: INTERNAL MEDICINE
Payer: MEDICARE

## 2018-01-05 DIAGNOSIS — Z94.0 HISTORY OF KIDNEY TRANSPLANT: ICD-10-CM

## 2018-01-05 DIAGNOSIS — N18.30 CHRONIC KIDNEY DISEASE, STAGE III (MODERATE) (HCC): ICD-10-CM

## 2018-01-05 LAB
ANION GAP SERPL CALCULATED.3IONS-SCNC: 8 MMOL/L (ref 4–13)
BILIRUB UR QL STRIP: NEGATIVE
BUN SERPL-MCNC: 28 MG/DL (ref 5–25)
CALCIUM SERPL-MCNC: 8.9 MG/DL (ref 8.3–10.1)
CHLORIDE SERPL-SCNC: 104 MMOL/L (ref 100–108)
CLARITY UR: CLEAR
CO2 SERPL-SCNC: 26 MMOL/L (ref 21–32)
COLOR UR: YELLOW
CREAT SERPL-MCNC: 1.39 MG/DL (ref 0.6–1.3)
CREAT UR-MCNC: 48.2 MG/DL
ERYTHROCYTE [DISTWIDTH] IN BLOOD BY AUTOMATED COUNT: 13.3 % (ref 11.6–15.1)
GFR SERPL CREATININE-BSD FRML MDRD: 52 ML/MIN/1.73SQ M
GLUCOSE P FAST SERPL-MCNC: 87 MG/DL (ref 65–99)
GLUCOSE UR STRIP-MCNC: NEGATIVE MG/DL
HCT VFR BLD AUTO: 48.2 % (ref 36.5–49.3)
HGB BLD-MCNC: 14.8 G/DL (ref 12–17)
HGB UR QL STRIP.AUTO: NEGATIVE
KETONES UR STRIP-MCNC: NEGATIVE MG/DL
LEUKOCYTE ESTERASE UR QL STRIP: NEGATIVE
MCH RBC QN AUTO: 26.6 PG (ref 26.8–34.3)
MCHC RBC AUTO-ENTMCNC: 30.7 G/DL (ref 31.4–37.4)
MCV RBC AUTO: 87 FL (ref 82–98)
NITRITE UR QL STRIP: NEGATIVE
PH UR STRIP.AUTO: 6 [PH] (ref 4.5–8)
PHOSPHATE SERPL-MCNC: 3.2 MG/DL (ref 2.3–4.1)
PLATELET # BLD AUTO: 300 THOUSANDS/UL (ref 149–390)
PMV BLD AUTO: 9.2 FL (ref 8.9–12.7)
POTASSIUM SERPL-SCNC: 4.2 MMOL/L (ref 3.5–5.3)
PROT UR STRIP-MCNC: NEGATIVE MG/DL
PROT UR-MCNC: 7 MG/DL
PROT/CREAT UR: 0.15 MG/G{CREAT} (ref 0–0.1)
PTH-INTACT SERPL-MCNC: 99.9 PG/ML (ref 14–72)
RBC # BLD AUTO: 5.57 MILLION/UL (ref 3.88–5.62)
SODIUM SERPL-SCNC: 138 MMOL/L (ref 136–145)
SP GR UR STRIP.AUTO: <=1.005 (ref 1–1.03)
UROBILINOGEN UR QL STRIP.AUTO: 0.2 E.U./DL
WBC # BLD AUTO: 6.51 THOUSAND/UL (ref 4.31–10.16)

## 2018-01-05 PROCEDURE — 84100 ASSAY OF PHOSPHORUS: CPT

## 2018-01-05 PROCEDURE — 85027 COMPLETE CBC AUTOMATED: CPT

## 2018-01-05 PROCEDURE — 82570 ASSAY OF URINE CREATININE: CPT

## 2018-01-05 PROCEDURE — 81003 URINALYSIS AUTO W/O SCOPE: CPT

## 2018-01-05 PROCEDURE — 83970 ASSAY OF PARATHORMONE: CPT

## 2018-01-05 PROCEDURE — 84156 ASSAY OF PROTEIN URINE: CPT

## 2018-01-05 PROCEDURE — 80048 BASIC METABOLIC PNL TOTAL CA: CPT

## 2018-01-05 PROCEDURE — 80197 ASSAY OF TACROLIMUS: CPT

## 2018-01-05 PROCEDURE — 36415 COLL VENOUS BLD VENIPUNCTURE: CPT

## 2018-01-06 LAB — TACROLIMUS BLD-MCNC: 6.7 NG/ML (ref 2–20)

## 2018-01-08 ENCOUNTER — ALLSCRIPTS OFFICE VISIT (OUTPATIENT)
Dept: OTHER | Facility: OTHER | Age: 68
End: 2018-01-08

## 2018-01-09 NOTE — PROGRESS NOTES
Assessment   1  Kidney transplanted (V42 0) (Z94 0)   2  Stage III chronic kidney disease (585 3) (N18 3)   3  Hypertension (401 9) (I10)    Plan   Kidney transplanted    · (1) [place]; Status:Active; Requested BOC:04UER8020; Perform:[facility]; QS11CNR0048;WQVVLIP; For:Kidney transplanted; Ordered By:Kushal Wayne;   · (1) CBC/ PLT (NO DIFF); Status:Active; Requested WYJ:43YGW0310; Perform:[facility]; HXN:32WXR9468;YEEXZKS; For:Kidney transplanted; Ordered By:Kushal Wayne;   · (1)  TACROLIMUS; Status:Active; Requested WJR:29NUQ7141; Perform:[facility]; MDI:95TMJ6732;FRMLLBH; For:Kidney transplanted; Ordered By:Kushal Wayne;   · (1) PHOSPHORUS; Status:Active; Requested PFR:94OUB3833; Perform:[facility]; OSW:78VUK9093;NHWXXQW; For:Kidney transplanted; Ordered By:Kushal Wayne;   · (1) PTH N-TERMINAL (INTACT); Status:Active; Requested NQ31KDV5794; Perform:[facility]; RBR:66NLT8140;UEZEVWP; For:Kidney transplanted; Ordered By:Kushal Wayne;   · (1) URINALYSIS (will reflex a microscopy if leukocytes, occult blood, protein or nitrites are    not within normal limits); Status:Active; Requested RFB:56LZE8570; Perform:[facility]; TPO:38XKN9704;INGASFY; For:Kidney transplanted; Ordered By:Kushal Wayne;   · (1) URINE PROTEIN CREATININE RATIO; Status:Active; Requested ETT:35AZA5098; Perform:[facility]; NWK:83WFE6940;AMUQKAG; For:Kidney transplanted; Ordered By:Kushal Wayne;   · Follow-up visit in 6 months Evaluation and Treatment  Follow-up  Status: Hold For -    Scheduling  Requested for: 06IQK0055   Ordered; For: Kidney transplanted; Ordered By: Keya Contreras Performed:  Due: 31QBI6136    Discussion/Summary      Kidney transplant: Kidney function is stable at creatinine 1 35   Taking medicine regularly and still being followed by transplant center every 6 month   Does have recurrent problem and will be seeing pulmonologist   type 2: Being followed by primary care   will see him back in 6 month  The patient was counseled regarding diagnostic results,-- instructions for management,-- risk factor reductions,-- prognosis  The patient has the current Goals: Keep kidney function stable  The patent has the current Barriers: None  Patient is able to Self-Care  Possible side effects of new medications were reviewed with the patient/guardian today  Indication for Services: CKD Stage 3  CKD Teaching includes hypertension management, Avoid nephrotoxic medication, dietician counseling, sodium restriction and fluid management  Reason For Visit   Massiel Petersen came in today for follow-up is kidney transplant      History of Present Illness   He is feeling quite well  Recovering from bronchitis with antibiotic  No acute complaint today      Review of Systems        Constitutional: No complaints of fever, no chills, no anorexia, no tiredness, no recent weight gain or weight loss  Integumentary: No complaints of skin rash  Gastrointestinal: No complains of abdominal pain, no constipation or diarrhea, no nausea or vomiting  Respiratory: No complaints of shortness of breath, no cough, no productive sputum  Cardiovascular: No complaints of orthopnea, no PND, no chest pain, no palpitations, no lower extremity edema  Musculoskeletal: No complaints of joint pain or swelling  Neurological: No complaints of headache, no lightheadedness or dizziness  Genitourinary: No dysuria, no hematuria, no nocturia, no urinary frequency, no incomplete emptying of bladder, no foamy urine  Eyes: No complaints of eyesight problems or dryness of eyes  ENT: no complaints of hearing loss, no nasal discharge  Psychiatric: Not suicidal, no sleep disturbance, no anxiety or depression, no change in personality, no emotional problems  ROS reviewed        Past Medical History      The active problems and past medical history were reviewed and updated today  Surgical History      The surgical history was reviewed and updated today  Family History      The family history was reviewed and updated today  Social History   The social history was reviewed and updated today  Current Meds    1  5 Series BP Monitor Device; Therapy: 28HSG7937 to Recorded   2  Aspirin 81 MG Oral Tablet Chewable; Therapy: (TXOIKWQU:03BAX0632) to Recorded   3  Atorvastatin Calcium 10 MG Oral Tablet; TAKE 1 TABLET DAILY  Requested for:     62TJF1802; Last Rx:24Apr2017 Ordered   4  Famotidine 20 MG Oral Tablet; TAKE 1 TABLET TWICE DAILY; Therapy: (BICJITNY:87RYO5147) to Recorded   5  Fish Oil Concentrate 300 MG Oral Capsule; Take 1 capsule twice daily; Therapy: (PFDLWGQR:33AGM9206) to Recorded   6  Flomax 0 4 MG Oral Capsule; take 1 capsule daily; Therapy: (HNXTIVRU:65BKJ5451) to Recorded   7  Metoprolol Tartrate 25 MG Oral Tablet; TAKE 1 TABLET DAILY; Therapy: 72RSQ8493 to ([facility])  Requested for: 51INU3016; Last     Rx:91Rll3075 Ordered   8  Mycophenolate Sodium 360 MG Oral Tablet Delayed Release; 1 PO BID; Therapy: 89RRX5820 to (Evaluate:08Jan2018)  Requested for: 37VTR5715; Last     Rx:09Nov2017 Ordered   9  PredniSONE 5 MG Oral Tablet; TAKE 1 TABLET AS DIRECTED; Therapy: 50GOG3093 to (Evaluate:12Oct2018)  Requested for: 17Oct2017; Last     Rx:17Oct2017 Ordered   10  Sulfamethoxazole-Trimethoprim 400-80 MG Oral Tablet; TAKE 1 TABLET DAILY; Therapy: ([phone] 3477 4764) to  Requested for: 13ISF7427 Recorded   11  Tacrolimus 1 MG Oral Capsule; Take 1 capsule twice daily  Requested for: 52GIS4406;      Last Rx:13Nov2017 Ordered     The medication list was reviewed and updated today  Allergies   1  No Known Drug Allergies  2   Seasonal    Vitals   Vital Signs    Recorded: 12EAK4131 10:56AM Recorded: 18RGQ2897 10:40AM   Temperature  97 5 F   Heart Rate 80, Apical    Pulse Quality Normal, Apical    Respiration Quality Normal    Respiration 16    Systolic 227, RUE, Sitting    Diastolic 70, RUE, Sitting    Weight  156 lb    BMI Calculated  25 96   BSA Calculated  1 78     Physical Exam        Constitutional: General appearance: No acute distress, well appearing and well nourished  ENT: External ears and nose appear normal          Eyes: Anicteric sclerae  Neck: No bruit heard over either carotid  JVD:  No JVD present  Pulmonary: Respiratory effort: No increased work of breathing or signs of respiratory distress  -- Auscultation of lungs: Clear to auscultation  Cardiovascular: Auscultation of heart: Normal rate and rhythm, normal S1 and S2, without murmurs  Abdomen: Non-tender, no masses  Extremities: No cyanosis, clubbing or edema  Pulses: Dorsalis Pedis and Posterior Tibial pulses normal       Neurologic: Non Focal          Psychiatric: Orientation to person, place, and time: Normal        Results/Data   (1) BASIC METABOLIC PROFILE 93SII2594 09:19AM Buffy Montgomery    Order Number: [id number]      Test Name Result Flag Reference   SODIUM 138 mmol/L  136-145   POTASSIUM 4 2 mmol/L  3 5-5 3   CHLORIDE 104 mmol/L  100-108   CARBON DIOXIDE 26 mmol/L  21-32   ANION GAP (CALC) 8 mmol/L  4-13   BLOOD UREA NITROGEN 28 mg/dL H 5-25   CREATININE 1 39 mg/dL H 0 60-1 30   Standardized to IDMS reference method   CALCIUM 8 9 mg/dL  8 3-10 1   eGFR 52 ml/min/1 73sq m     National Kidney Disease Education Program recommendations are as follows:     GFR calculation is accurate only with a steady state creatinine     Chronic Kidney disease less than 60 ml/min/1 73 sq  meters     Kidney failure less than 15 ml/min/1 73 sq  meters  GLUCOSE FASTING 87 mg/dL  65-99   Specimen collection should occur prior to Sulfasalazine administration due to the potential for falsely depressed results   Specimen collection should occur prior to Sulfapyridine administration due to the potential for falsely elevated results        (1) CBC/ PLT (NO DIFF) 29MSF0869 09:19AM Reina Clamp Order Number: [id number]      Test Name Result Flag Reference   HEMATOCRIT 48 2 %  36 5-49 3   HEMOGLOBIN 14 8 g/dL  12 0-17 0   MCHC 30 7 g/dL L 31 4-37 4   MCH 26 6 pg L 26 8-34 3   MCV 87 fL  82-98   PLATELET COUNT 413 Thousands/uL  149-390   RBC COUNT 5 57 Million/uL  3 88-5 62   RDW 13 3 %  11 6-15 1   WBC COUNT 6 51 Thousand/uL  4 31-10 16   MPV 9 2 fL  8 9-12 7      (1) PHOSPHORUS 46PLU5263 09:19AM Reina Clamp Order Number: [id number]      Test Name Result Flag Reference   PHOSPHORUS 3 2 mg/dL  2 3-4 1      (1) PTH N-TERMINAL (INTACT) 45YHX3729 09:19AM Buffy Montgomery   TW Order Number: [id number]      Test Name Result Flag Reference   PARATHYROID HORMONE INTACT 99 9 pg/mL H 14 0-72 0      (1) URINALYSIS (will reflex a microscopy if leukocytes, occult blood, protein or nitrites are not within normal limits) 39GIB8284 09:19AM Reina Clamp Order Number: [id number]      Test Name Result Flag Reference   COLOR Yellow     CLARITY Clear     SPECIFIC GRAVITY UA <=1 005  1 003-1 030   PH UA 6 0  4 5-8 0   LEUKOCYTE ESTERASE UA Negative  Negative   NITRITE UA Negative  Negative   PROTEIN UA Negative mg/dl  Negative   GLUCOSE UA Negative mg/dl  Negative   KETONES UA Negative mg/dl  Negative   UROBILINOGEN UA 0 2 E U /dl  0 2, 1 0 E U /dl   BILIRUBIN UA Negative  Negative   BLOOD UA Negative  Negative      (1) URINE PROTEIN CREATININE RATIO 05Jan2018 09:19AM Buffy Valentina   TW Order Number: [id number]      Test Name Result Flag Reference   CREATININE URINE 48 2 mg/dL     URINE PROTEIN:CREATININE RATIO 0 15 H 0 00-0 10   URINE PROTEIN 7 mg/dL        (1)  TACROLIMUS 32GKT5069 09:19AM Reina Clamp Order Number: [id number]      Test Name Result Flag Reference    6 7 ng/mL  2 0-20 0   Trough: Immediately following transplant 15 0 ng/mL     Trough: Steady state, 2 weeks or more after transplant 3 0-8 0 ng/mL      *VB - Urinary Incontinence Screen (Dx Z13 89 Screen for UI) 14JQH8857 12:00AM Deborah Records      Test Name Result Flag Reference   Urinary Incontinence Assessment 20Gjy1466          Health Management   Screening PSA (prostate specific antigen)   (1) PSA (SCREEN) (Dx V76 44 Screen for Prostate Cancer); every 1 year; Last 17MNE2025; Next    Due: 45OTP2823; Overdue    Future Appointments      Date/Time Provider Specialty Site   07/12/2018 10:30 AM TAMIKO Nur  Nephrology [address]   02/15/2018 10:30 AM Santiago Rivas [facility] Pulmonary Medicine [facility]   09/10/2018 11:15 AM TAMIKO Lewis   Dermatology [facility]     Signatures    Electronically signed by : TAMIKO Gordon ; Jan 8 2018 11:29AM EST                       (Author)

## 2018-01-10 ENCOUNTER — GENERIC CONVERSION - ENCOUNTER (OUTPATIENT)
Dept: OTHER | Facility: OTHER | Age: 68
End: 2018-01-10

## 2018-01-11 NOTE — MISCELLANEOUS
Message   Recorded as Task   Date: 12/21/2016 11:49 AM, Created By: Karma White   Task Name: Call Back   Assigned To: Kushal Wayne   Regarding Patient: Fletcher Dutton, Status: Active   Comment:    Tamia Rucker - 21 Dec 2016 11:49 AM     TASK CREATED  Caller: Self; [phone] (Mobile Phone)  HOA, CALLED SAID [facility] IN [place] STOP HOA FROM TAKING BACTRIM WHICH HE HAS BEEN ON IT FOR 5 YEARS  HOA CALLED [facility] IN [place] AND THEY WANT TO KNOW WHY THE BACTRIM WAS STOP THEY TOLD HOA THAT [facility] SHOULD NOT HAVE STOP THE BACTRIM BECAUSE THEY DON'T KNOW [address] [place] [address]  HOA WANTS YOU TO CALL HIM  Message Free Text Note Form:  Advise to resume Bactrim      Signatures   Electronically signed by : TAMIKO Brown ; Dec 21 2016  1:36PM EST                       (Author)

## 2018-01-11 NOTE — PROCEDURES
Procedures by Norm Liz MD at 12/8/2016  11:28 PM      Author:  Norm Liz MD Service:  Critical Care/ICU Author Type:  Resident    Filed:  12/8/2016 11:31 PM Date of Service:  12/8/2016 11:28 PM Status:  Attested    :  Norm Liz MD (Resident)  Cosigner:  Juanpablo Carmona DO at 12/9/2016 12:03 AM      Procedure Orders:       1  CENTRAL LINE [[id number]] ordered by Norm Liz MD at 12/08/16 2328                 Post-procedure Diagnoses:       1  Bilateral pneumonia [J18 9]       2  Respiratory failure with hypoxia [J96 91]              Attestation signed by Juanpablo Carmona DO at 12/9/2016 12:03 AM           As the critical care attending, I was present and supervised the entire procedure  Time out called and procedure excludes critical care time  Date of service 12/8/2016                                           Central Line Insertion  Date/Time: 12/8/2016 11:28 PM  Performed by: Allie Souza by: Marcie Parham     Patient location:  Bedside  Consent:     Consent obtained:  Emergent situation    Consent given by:  Spouse    Risks discussed:  Arterial puncture and bleeding    Alternatives discussed:  No treatment and delayed treatment  Universal protocol:     Procedure explained and questions answered to patient or proxy's satisfaction: yes      Relevant documents present and verified: yes      Test results available and properly labeled: yes       Imaging studies available: yes      Required blood products, implants, devices, and special equipment available: yes      Site/side marked: yes      Immediately prior to procedure, a time out was called: yes      Patient identity confirmed:  Arm band and hospital-assigned identification number  Pre-procedure details:     Hand hygiene: Hand hygiene performed prior to insertion      Sterile barrier technique:  All elements of maximal sterile technique followed      Skin preparation:  2% chlorhexidine    Skin preparation agent: Skin preparation agent completely dried prior to procedure    Sedation:     Sedation type: Moderate (conscious) sedation (See separate ED Procedural Sedation form) (propofol)  Anesthesia (see MAR for exact dosages): Anesthesia method:  Local infiltration    Local anesthetic:  Lidocaine 1% w/o epi  Procedure details:     Location:  Right internal jugular    Vessel type: vein      Laterality:  Right    Approach: percutaneous technique used      Patient position:  Flat    Catheter type:  Triple lumen 16cm    Catheter size:  7 Fr    Landmarks identified: yes      Ultrasound guidance: yes      Sterile ultrasound techniques: Sterile gel and sterile probe covers were used      Number of attempts:  1  Post-procedure details:     Post-procedure:  Dressing applied and line sutured    Assessment:  Blood return through all ports    Post-procedure complications: none      Patient tolerance of procedure:   Tolerated well, no immediate complications                     Received for:Provider  EPIC   Dec  9 2016 12:12AM [facility] Standard Time

## 2018-01-12 VITALS
HEIGHT: 65 IN | WEIGHT: 157.13 LBS | SYSTOLIC BLOOD PRESSURE: 98 MMHG | BODY MASS INDEX: 26.18 KG/M2 | OXYGEN SATURATION: 98 % | DIASTOLIC BLOOD PRESSURE: 60 MMHG | TEMPERATURE: 98.7 F | HEART RATE: 66 BPM

## 2018-01-13 VITALS
SYSTOLIC BLOOD PRESSURE: 80 MMHG | BODY MASS INDEX: 25.39 KG/M2 | WEIGHT: 152.38 LBS | RESPIRATION RATE: 16 BRPM | DIASTOLIC BLOOD PRESSURE: 60 MMHG | TEMPERATURE: 97.8 F | HEIGHT: 65 IN | HEART RATE: 60 BPM

## 2018-01-13 VITALS
HEIGHT: 65 IN | TEMPERATURE: 97.4 F | HEART RATE: 88 BPM | SYSTOLIC BLOOD PRESSURE: 120 MMHG | DIASTOLIC BLOOD PRESSURE: 70 MMHG | RESPIRATION RATE: 16 BRPM | BODY MASS INDEX: 26.53 KG/M2 | WEIGHT: 159.25 LBS

## 2018-01-13 VITALS
OXYGEN SATURATION: 99 % | SYSTOLIC BLOOD PRESSURE: 106 MMHG | WEIGHT: 154.25 LBS | HEIGHT: 65 IN | BODY MASS INDEX: 25.7 KG/M2 | HEART RATE: 95 BPM | DIASTOLIC BLOOD PRESSURE: 66 MMHG

## 2018-01-13 VITALS
WEIGHT: 157.13 LBS | RESPIRATION RATE: 16 BRPM | HEIGHT: 65 IN | BODY MASS INDEX: 26.18 KG/M2 | DIASTOLIC BLOOD PRESSURE: 70 MMHG | HEART RATE: 80 BPM | TEMPERATURE: 97.7 F | SYSTOLIC BLOOD PRESSURE: 120 MMHG

## 2018-01-14 VITALS
HEIGHT: 65 IN | SYSTOLIC BLOOD PRESSURE: 112 MMHG | DIASTOLIC BLOOD PRESSURE: 78 MMHG | TEMPERATURE: 98.7 F | HEART RATE: 55 BPM | BODY MASS INDEX: 26.16 KG/M2 | WEIGHT: 157 LBS | OXYGEN SATURATION: 97 %

## 2018-01-14 VITALS
HEART RATE: 68 BPM | BODY MASS INDEX: 27.33 KG/M2 | WEIGHT: 159.25 LBS | DIASTOLIC BLOOD PRESSURE: 62 MMHG | SYSTOLIC BLOOD PRESSURE: 112 MMHG | OXYGEN SATURATION: 97 %

## 2018-01-14 VITALS
HEART RATE: 70 BPM | OXYGEN SATURATION: 94 % | DIASTOLIC BLOOD PRESSURE: 68 MMHG | HEIGHT: 65 IN | TEMPERATURE: 99.1 F | SYSTOLIC BLOOD PRESSURE: 110 MMHG

## 2018-01-14 VITALS
RESPIRATION RATE: 18 BRPM | SYSTOLIC BLOOD PRESSURE: 112 MMHG | OXYGEN SATURATION: 97 % | DIASTOLIC BLOOD PRESSURE: 62 MMHG | HEIGHT: 65 IN | HEART RATE: 74 BPM | BODY MASS INDEX: 26.16 KG/M2 | WEIGHT: 157 LBS

## 2018-01-14 NOTE — PROCEDURES
Procedures by Kim Cox DO at 12/9/2016 12:03 AM      Author:  Kim Cox DO  Service:  Critical Care/ICU Author Type:  Physician     Filed:  12/9/2016 12:10 AM  Date of Service:  12/9/2016 12:03 AM Status:  Addendum     :  Kim Cox DO (Physician)        Related Notes: Original Note by Kim Cox DO (Physician) filed at 12/9/2016 12:09 AM         Procedure Orders:       1  Bronchoscopy [[id number]] ordered by Kim Cox DO at 12/09/16 0003                 Post-procedure Diagnoses:       1  CAP (community acquired pneumonia) [J18 9]                    Bronchoscopy  Date/Time: 12/8/2016 10:03 PM  Performed by: Holly Swanson  Authorized by: Holly Swanson     Patient location:  Bedside  Consent:     Consent obtained:  Verbal    Consent given by:  Spouse    Alternatives discussed:  Observation  Universal protocol:     Procedure explained and questions answered to patient or proxy's satisfaction: yes      Immediately prior to procedure a time out was called: yes      Patient identity confirmed:  Arm band  Indications:     Procedure Purpose: diagnostic and therapeutic      Indications: pneumonia/infiltrate    Sedation:     Sedation type: Propofol and fentanyl on ventilator  Upper Airway:     Trachea comment:  Large amount of thick yellow secretions and endotracheal tube and trachea    : Sharp demarcation large amount of thick yellow secretions visualized  Procedure details:     Description:  Bronchoscope introduced into patient's 8 endotracheal tube for which a large amount of thick secretions are immediately visualized and aspirated  Bronchoscope advanced forward to visualize ponce and again a large amount of secretions  were visualized  Secretions were aspirated and multiple flushes were administered to assist with aspiration of secretions  Right lung appeared to have moderate amount of secretions right lower lobe and right middle lobe   Left mainstem bronchus as well  as left lower lobe with significant amount of thick secretions requiring Mucomyst administration to assist with aspiration  Secretions sent for culture and sensitivity  Post-procedure details:     Chest x-ray performed: yes      Patient tolerance of procedure: Tolerated well, no immediate complications  Final Diagnosis/Findings:      Procedure does not include critical care time    Significant amount of thick yellow secretions with mucus plugging throughout left lung and right lower lobe                         Received for:Provider  EPIC   Dec  9 2016 12:12AM [facility] Standard Time

## 2018-01-14 NOTE — MISCELLANEOUS
Chief Complaint  Chief Complaint Free Text Note Form: Patient cancelled      History of Present Illness  TCM Communication  Luke: The patient is being contacted for follow-up after hospitalization  He was hospitalized at [facility]  The dates of hospitalization: 4/3/2017-4/7/2017, date of admission: 4/3/2017, date of discharge: 4/7/2017  Diagnosis: PNEUMONIA  He was discharged to home  Medications reviewed and updated today  He scheduled a follow up appointment  Symptoms: cough, but no fever, no weakness, no dizziness, no headache, no shortness of breath, no chest pain, no back pain on left side, no back pain on right side, no arm pain left side, no arm pain on right side, no leg pain on left side, no leg pain on right side, no upper abdominal pain, no middle abdominal pain, no lower abdominal pain, no rash:, no anorexia, no nausea, no vomiting, no loose stools, no constipation, no pain with urinating, no incisional pain, no wound drainage and no swelling  Counseling was provided to the patient  Communication performed and completed by DHRUV CHAMPAGNE      Active Problems     1  Anemia (285 9) (D64 9)   2  Anemia of chronic renal failure (285 21,585 9) (N18 9,D63 1)   3  BPH without urinary obstruction (600 00) (N40 0)   4  CKD (chronic kidney disease), stage 3 (moderate) (585 3) (N18 3)   5  Contusion of great toe, right (924 3) (S90 111A)   6  Fracture of humerus, right, closed (812 20) (S42 301A)   7  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) (T82 868A)   8  Hyperlipidemia (272 4) (E78 5)   9  Kidney transplanted (V42 0) (Z94 0)   10  Mononeuritis (355 9) (G58 9)   11  Multiple abrasions (919 0) (T14 8)   12  Multiple nevi (216 9) (D22 9)   13  Need for pneumococcal vaccination (V03 82) (Z23)   14  Pain in toe (729 5) (M79 676)   15  Polycystic kidney (753 12) (Q61 3)   16  Screening for colon cancer (V76 51) (Z12 11)   17  Screening for skin condition (V82 0) (Z13 89)   18   Screening PSA (prostate specific antigen) (V76 44) (Z12 5)   19  Secondary polycythemia (289 0) (D75 1)   20  Shortness of breath (786 05) (R06 02)   21  Superficial phlebitis (451 9) (I80 9)   22  Vitamin D deficiency (268 9) (E55 9)    Immunocompromised (279 3) (D84 9)          Past Medical History    1  Acute bronchitis (466 0) (J20 9)   2  History of Acute sinus infection (461 9) (J01 90)   3  Bacterial pneumonia (482 9) (J15 9)   4  Cough (786 2) (R05)   5  History of Cough (786 2) (R05)   6  Gross hematuria (599 71) (R31 0)   7  H/O immunosuppressive therapy (V87 46) (Z92 25)   8  History of acute bronchitis (V12 69) (Z87 09)   9  History of benign neoplasm of skin (V13 3) (Z87 2)   10  History of cardiac disorder (V12 50) (Z86 79)   11  History of hypercholesterolemia (V12 29) (Z86 39)   12  History of hypertension (V12 59) (Z86 79)   13  History of kidney disease (V13 09) (Z87 448)   14  Denied: History of mental disorder   15  History of osteoporosis (V13 59) (Z87 39)   16  History of pneumonia (V12 61) (Z87 01)   17  History of seborrheic keratosis (V13 3) (Z87 2)   18  History of viral warts (V12 09) (Z86 19)   19  History of Need for influenza vaccination (V04 81) (Z23)   20  Personal history of immunosupression therapy (V87 46) (Z92 25)   21  Sepsis (038 9,995 91) (A41 9)    Surgical History    1  History of Arteriovenous Surgery Creation Of A-V Fistula   2  History of Cardiac Cath Procedure Outcome:   3  History of Colon Surgery   4  History of Hemodialysis Access Type Arteriovenous Fistula Left Arm   5  History Of Prior Surgery   6  History of Mitral Valve Repair   7  History of Renal Transplant   8  History of Renal Transplant   9  History of Shaving Of Lesion Shoulders   10  History of Tonsillectomy    Family History  Mother    1  Family history of   Father    2  Family history of kidney disease (V18 69) (Z84 1)   3  Family history of Polycystic kidney disease  Family History    4   Denied: Family history of mental disorder   5  Denied: Family history of substance abuse    Social History    · Active advance directive (V49 89) (Z78 9)   · Employed   · Exercises occasionally (V49 89) (Z78 9)   · Four children   · Denied: History of High risk sexual behavior   · Denied: History of Illicit drug use   ·    · Never a smoker   · Never used chewing tobacco (V49 89) (Z78 9)   · No alcohol use   · Retired    Current Meds   1  5 Series BP Monitor Device; Therapy: 25EGX1837 to Recorded   2  Acetaminophen 325 MG Oral Tablet; TAKE 2 TABLET Every 4 hours PRN; Therapy: (ZGWQZCWS:94GXV3055) to Recorded   3  Aspirin 81 MG Oral Tablet Chewable; Therapy: (YEOSHMLH:90VVS2822) to Recorded   4  Atorvastatin Calcium 10 MG Oral Tablet; TAKE 1 TABLET DAILY; Therapy: (KREORQRL:11BQO3349) to Recorded   5  Bactrim 400-80 MG Oral Tablet; TAKE 1 TABLET DAILY; Therapy: (Recorded:53Cdi8484) to Recorded   6  Famotidine 20 MG Oral Tablet; TAKE 1 TABLET TWICE DAILY; Therapy: (UGTGKNUS:34DFA6336) to Recorded   7  Fish Oil Concentrate 300 MG Oral Capsule; Take 1 capsule twice daily; Therapy: (RUIEWFGI:81XXW7150) to Recorded   8  Flomax 0 4 MG Oral Capsule; take 1 capsule daily; Therapy: (BIGFIEOF:17TYD7438) to Recorded   9  Mycophenolate Sodium 360 MG Oral Tablet Delayed Release; TAKE 2 TABLETS TWICE   DAILY ON AN EMPTY STOMACH  Requested for: 20VAC1068; Last Rx:04Jan2017   Ordered   10  PredniSONE 5 MG Oral Tablet; TAKE 1 TABLET AS DIRECTED; Therapy: 99LVS1186 to (GAXWGRSY:41UHB3675)  Requested for: 44NJW2113; Last    Rx:16Jan2017 Ordered   11  Prograf 1 MG Oral Capsule; Take 1 capsule twice daily; Therapy: 32ZKJ3558 to (Last Rx:03Jan2017)  Requested for: 41CBI2790 Ordered    Allergies    1  No Known Drug Allergies    2  Seasonal    Health Management  Screening PSA (prostate specific antigen)   (1) PSA (SCREEN) (Dx V76 44 Screen for Prostate Cancer); every 1 year; Last 26GDP1918; Next  Due: 22NWW3647;  Overdue    Future Appointments    Date/Time Provider Specialty Site   07/06/2017 09:15 AM TAMIKO Staley  Nephrology ST [address] [place] [address]   06/13/2017 09:00 AM Ren Reyes MD Internal Medicine [facility] INTERNAL MED   06/19/2017 11:00 AM Janis Bergman [facility] Pulmonary Medicine [address]     Signatures   Electronically signed by : Nan Manley [facility];  Apr 27 2017 12:08PM EST                       (Author)    Electronically signed by : Chiqui Mccarty MD; Apr 27 2017 12:13PM EST

## 2018-01-14 NOTE — MISCELLANEOUS
Assessment    1  Bacterial pneumonia (482 9) (J15 9)   2  Sepsis (038 9,995 91) (A41 9)    Plan  Sepsis    · Follow-up visit in 1 month Evaluation and Treatment  Follow-up  Status: Complete  Done:  43GJR3729   Ordered; For: Sepsis; Ordered By: Rahul Luis Performed:  Due: 55KIZ1423; Last Updated By: Lenka Van; 12/28/2016 11:34:45 AM    Discussion/Summary  Discussion Summary:   Sepsis and pneumonia are resolved  The atrial fibrillation is being assessed by his cardiologist  Continue present medications as planned  Continue follow-up with his transplant specialist and nephrologist  Hospital records were reviewed  Chief Complaint  Chief Complaint Free Text Note Form: Patient seen in office today for a recent discharge from [facility] with Pneumonia and AFIB  Patient stated that he is feeling better since he has been home, discharged on the 19th of December  History of Present Illness  TCM Communication [facility]: Diagnosis: PNEUMONIA  He was discharged to home  The patient is currently asymptomatic  Counseling was provided to the patient  Communication performed and completed by Adry Adair 12/20/2016   HPI: PATIENT WAS DISCHARGED FROM [facility] AFTER BEING TRANSPORT FROM [facility]  PATIENT WAS IN THE HOSPITAL FOR PNEUMONIA AND FOR NEW ON SET A-FIB  PATIENT STATES THAT THEY STOP HIS BACTRIM THAT HE HAS BEEN ON FOR 5 YEARS  PATIENT ALSO HAD SOME CHANGES TO HIS MEDICATION LIST  HE IS NOW ON ALBUTEROL, AMIODARONE 200 MG BID, METOPROLOL TARTRATE 50 BID, XARELTO 20 MG, PROGRAF 0 5 MG ONE DAY AND 1 MG THE NEXT DAY  MEDICATION LIST WILL BE UPDATED  APPT MADE FOR NEXT WEEK  Patient was in today for hospital follow-up  He has done well since discharge  We reviewed his records from the hospitalization  He was very sick this time  Intubated for a few days  He developed atrial fibrillation while in the hospital  He has already seen his cardiologist  Holter monitor has been ordered   He's presently on amiodarone but that is scheduled to be stopped after 14 days  There was also question of his Bactrim  They contacted his nephrologist and his transplant team  That medicine has been resumed  It was suggested that he have home visiting nurses and physical therapy but he is doing very well  Ambulating without any difficulty  No assist devices required  Review of Systems  Complete-Male:   Cardiovascular: no chest pain  Respiratory: no shortness of breath  Active Problems    1  Abrasion of arm, right (912 0) (S40 811A)   2  Abrasion of hand, right (914 0) (S60 511A)   3  Abrasion of left hand (914 0) (S60 512A)   4  Anemia (285 9) (D64 9)   5  Anemia of chronic renal failure (285 21,585 9) (N18 9,D63 1)   6  BPH without urinary obstruction (600 00) (N40 0)   7  Chronic kidney disease, stage 4 (severe) (585 4) (N18 4)   8  Chronic kidney disease, stage 5 (585 5) (N18 5)   9  Contusion of great toe, right (924 3) (S90 111A)   10  Fracture of humerus, right, closed (812 20) (S42 301A)   11  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) (T82 868A)   12  Hyperlipidemia (272 4) (E78 5)   13  Jaundice (782 4) (R17)   14  Kidney transplanted (V42 0) (Z94 0)   15  Laceration of eyebrow (873 42) (S01 119A)   16  Mononeuritis (355 9) (G58 9)   17  Multiple abrasions (919 0) (T14 8)   18  Multiple contusions (924 8) (T14 8)   19  Multiple nevi (216 9) (D22 9)   20  Need for influenza vaccination (V04 81) (Z23)   21  Need for pneumococcal vaccination (V03 82) (Z23)   22  Polycystic kidney (753 12) (Q61 3)   23  Screening for skin condition (V82 0) (Z13 89)   24  Screening PSA (prostate specific antigen) (V76 44) (Z12 5)   25  Secondary polycythemia (289 0) (D75 1)   26  Superficial phlebitis (451 9) (I80 9)   27  Vitamin D deficiency (268 9) (E55 9)    Past Medical History    1  Acute bronchitis (466 0) (J20 9)   2  History of Acute sinus infection (461 9) (J01 90)   3  Cough (786 2) (R05)   4   History of Cough (786 2) (R05)   5  Gross hematuria (599 71) (R31 0)   6  H/O immunosuppressive therapy (V87 46) (Z92 25)   7  History of acute bronchitis (V12 69) (Z87 09)   8  History of benign neoplasm of skin (V13 3) (Z87 2)   9  History of cardiac disorder (V12 50) (Z86 79)   10  History of hypercholesterolemia (V12 29) (Z86 39)   11  History of hypertension (V12 59) (Z86 79)   12  History of kidney disease (V13 09) (Z87 448)   13  Denied: History of mental disorder   14  History of osteoporosis (V13 59) (Z87 39)   15  History of pneumonia (V12 61) (Z87 01)   16  History of seborrheic keratosis (V13 3) (Z87 2)   17  History of viral warts (V12 09) (Z86 19)   18  History of Need for influenza vaccination (V04 81) (Z23)   19  Personal history of immunosupression therapy (V87 46) (Z92 25)    Surgical History    1  History of Arteriovenous Surgery Creation Of A-V Fistula   2  History of Cardiac Cath Procedure Outcome:   3  History of Colon Surgery   4  History of Hemodialysis Access Type Arteriovenous Fistula Left Arm   5  History Of Prior Surgery   6  History of Mitral Valve Repair   7  History of Renal Transplant   8  History of Renal Transplant   9  History of Shaving Of Lesion Shoulders   10  History of Tonsillectomy    Family History  Mother    1  Family history of   Father    2  Family history of kidney disease (V18 69) (Z84 1)   3  Family history of Polycystic kidney disease  Family History    4  Denied: Family history of mental disorder   5  Denied: Family history of substance abuse    Social History    · Active advance directive (V49 89) (Z78 9)   · Employed   · Exercises occasionally (V49 89) (Z78 9)   · Four children   · Denied: History of High risk sexual behavior   · Denied: History of Illicit drug use   ·    · Never a smoker   · Never used chewing tobacco (V49 89) (Z78 9)   · No alcohol use   · Retired    Current Meds   1  5 Series BP Monitor Device; Therapy: 73RKW5966 to Recorded   2  Amiodarone HCl - 200 MG Oral Tablet; TAKE 1 TABLET TWICE DAILY; Therapy: (Recorded:92Tie3813) to Recorded   3  Aspirin 325 MG Oral Tablet; Therapy: (Recorded:04Jun2014) to Recorded   4  Atorvastatin Calcium 10 MG Oral Tablet; TAKE 1 TABLET DAILY; Therapy: (Recorded:17Oct2016) to Recorded   5  Bactrim 400-80 MG Oral Tablet; Therapy: (Recorded:04Jun2014) to Recorded   6  Famotidine 20 MG Oral Tablet; Therapy: (Recorded:85Eam5116) to Recorded   7  Fish Oil 300 MG CAPS; Therapy: (Recorded:04Jun2014) to Recorded   8  Flomax 0 4 MG Oral Capsule; Therapy: (Recorded:04Jun2014) to Recorded   9  Lipitor 10 MG Oral Tablet; Therapy: (Recorded:04Jun2014) to Recorded   10  Myfortic 360 MG Oral Tablet Delayed Release; Therapy: (Recorded:04Jun2014) to Recorded   11  Nitrostat 0 4 MG Sublingual Tablet Sublingual;    Therapy: (Recorded:17Oct2016) to Recorded   12  Omega 3 1000 MG Oral Capsule; Take 1 capsule twice daily; Therapy: (Recorded:40Gic5877) to Recorded   13  Pepcid 20 MG Oral Tablet; Therapy: (Recorded:04Jun2014) to Recorded   14  PredniSONE 5 MG Oral Tablet; TAKE 1 TABLET AS DIRECTED; Therapy: 36NGJ1960 to (Evaluate:44Blj5024)  Requested for: 74WYE8234; Last    Rx:66Zyi0713 Ordered   15  ProAir  (90 Base) MCG/ACT Inhalation Aerosol Solution; INHALE 1 PUFF EVERY    4 HOURS AS NEEDED; Therapy: 02KJH1056 to ([facility])  Requested for: 69DIP1781; Last    Rx:81Ktu8153 Ordered   16  Prograf 0 5 MG Oral Capsule; Therapy: (Recorded:04Jun2014) to Recorded   17  Prograf 1 MG Oral Capsule; Therapy: 32WKO6826 to ([phone]) Recorded   18  Proventil 90 MCG/ACT AERS; Therapy: (Recorded:17Oct2016) to Recorded   19  Sulfamethoxazole-Trimethoprim 400-80 MG Oral Tablet; TAKE 1 TABLET DAILY; Therapy: (Recorded:17Oct2016) to Recorded   20  Vitamin D (Ergocalciferol) 55643 UNIT Oral Capsule; Therapy: 86DFC1797 to ([phone]) Recorded   21   Voltaren 1 % Transdermal Gel;    Therapy: (Recorded:73Ejb0019) to Recorded   22  Xarelto 20 MG Oral Tablet; Therapy: (Recorded:00Cwd7115) to Recorded  Medication List Reviewed: The medication list was reviewed and updated today  Allergies    1  No Known Drug Allergies    2  Seasonal    Vitals  Signs   Recorded: 34Ene3102 10:56AM   Temperature: 97 9 F  Heart Rate: 55  Systolic: 98  Diastolic: 62  Height: 5 ft 5 in  Weight: 147 lb 6 08 oz  BMI Calculated: 24 53  BSA Calculated: 1 74  O2 Saturation: 98    Physical Exam    Constitutional   General appearance: No acute distress, well appearing and well nourished  Pulmonary   Respiratory effort: No increased work of breathing or signs of respiratory distress  Auscultation of lungs: Clear to auscultation, equal breath sounds bilaterally, no wheezes, no rales, no rhonci  Cardiovascular   Auscultation of heart: Normal rate and rhythm, normal S1 and S2, without murmurs  Examination of extremities for edema and/or varicosities: Normal     Psychiatric   Orientation to person, place and time: Normal     Mood and affect: Normal          Health Management  Screening PSA (prostate specific antigen)   (1) PSA (SCREEN) (Dx V76 44 Screen for Prostate Cancer); every 1 year; Last 11AQS2014; Next  Due: 70RNL6335; Overdue    Future Appointments    Date/Time Provider Specialty Site   01/09/2017 09:45 AM TAMIKO Dominguez   Nephrology [address]   02/02/2017 11:00 AM Bonita Guardado MD Internal Medicine [facility] INTERNAL MED     Signatures   Electronically signed by : Vincent Rock MD; Dec 28 2016 11:54AM EST                       (Author)

## 2018-01-15 ENCOUNTER — GENERIC CONVERSION - ENCOUNTER (OUTPATIENT)
Dept: INTERNAL MEDICINE CLINIC | Facility: CLINIC | Age: 68
End: 2018-01-15

## 2018-01-17 NOTE — PROCEDURES
Procedures by Je Neri MD at 12/8/2016   9:45 PM      Author:  Je Neri MD Service:  Critical Care/ICU Author Type:  Resident    Filed:  12/8/2016  9:46 PM Date of Service:  12/8/2016  9:45 PM Status:  Attested    :  Je Neri MD (Resident)  Cosigner:  Smita Barboza DO at 12/9/2016 12:03 AM      Procedure Orders:       1  INTUBATION [[id number]] ordered by Je Neri MD at 12/08/16 2145                 Post-procedure Diagnoses:       1  Bilateral pneumonia [J18 9]              Attestation signed by Smita Barboza DO at 12/9/2016 12:03 AM (Updated)           As the critical care attending, I was present and supervised the entire procedure  Time out called and procedure excludes critical care time                                               Intubation  Date/Time: 12/8/2016 9:45 PM  Performed by: Samantha Sesay by: [employer]      Patient location:  Bedside  Consent:     Consent obtained:  Emergent situation    Consent given by:  Spouse and patient    Risks discussed:  Bleeding, death and brain injury    Alternatives discussed:  No treatment  Universal protocol:     Procedure explained and questions answered to patient or proxy's satisfaction: yes      Relevant documents present and verified: yes      Test results available and properly labeled: yes       Imaging studies available: yes      Required blood products, implants, devices, and special equipment available: yes      Site/side marked: yes      Immediately prior to procedure, a time out was called: yes      Patient identity confirmed:  Arm band  Pre-procedure details:     Patient status:  Altered mental status    Mallampati score:  2    Pretreatment medications:  Etomidate    Paralytics:  Succinylcholine  Indications:     Indications for intubation: respiratory distress and hypercapnia    Procedure details:     Preoxygenation:  BiPAP    CPR in progress: no      Intubation method:  Oral    Oral intubation technique: Glidescope    Laryngoscope blade: Mac 4    Tube size (mm):  8 0    Tube type:  Cuffed    Number of attempts:  1    Ventilation between attempts: no      Cricoid pressure: no      Tube visualized through cords: yes    Placement assessment:     ETT to lip:  24    Tube secured with:  ETT lizama    Breath sounds:  Equal    Placement verification: chest rise      Placement verification comment:  Bronch  Post-procedure details:     Patient tolerance of procedure:   Tolerated well, no immediate complications                     Received for:Provider  EPIC   Dec  9 2016 12:12AM [facility] Standard Time

## 2018-01-22 VITALS
HEIGHT: 65 IN | DIASTOLIC BLOOD PRESSURE: 70 MMHG | HEART RATE: 86 BPM | BODY MASS INDEX: 26.16 KG/M2 | SYSTOLIC BLOOD PRESSURE: 108 MMHG | WEIGHT: 157 LBS | OXYGEN SATURATION: 95 %

## 2018-01-22 VITALS
SYSTOLIC BLOOD PRESSURE: 102 MMHG | WEIGHT: 155 LBS | TEMPERATURE: 98.4 F | HEIGHT: 65 IN | DIASTOLIC BLOOD PRESSURE: 62 MMHG | OXYGEN SATURATION: 98 % | BODY MASS INDEX: 25.83 KG/M2 | HEART RATE: 84 BPM

## 2018-01-22 VITALS
TEMPERATURE: 99.7 F | HEART RATE: 80 BPM | BODY MASS INDEX: 26.16 KG/M2 | HEIGHT: 65 IN | SYSTOLIC BLOOD PRESSURE: 94 MMHG | WEIGHT: 157 LBS | OXYGEN SATURATION: 94 % | DIASTOLIC BLOOD PRESSURE: 64 MMHG

## 2018-01-23 VITALS
WEIGHT: 156 LBS | TEMPERATURE: 97.5 F | BODY MASS INDEX: 25.96 KG/M2 | SYSTOLIC BLOOD PRESSURE: 100 MMHG | HEART RATE: 80 BPM | DIASTOLIC BLOOD PRESSURE: 70 MMHG | RESPIRATION RATE: 16 BRPM

## 2018-01-23 NOTE — RESULT NOTES
Verified Results  * XR CHEST PA & LATERAL 03XZQ4748 11:32AM Ari Werner Order Number: [id number]     Test Name Result Flag Reference   XR CHEST PA & LATERAL (Report)     CHEST      INDICATION: Immunodeficiency     COMPARISON: April 3, 2017     VIEWS: Frontal and lateral projections     IMAGES: 2     FINDINGS:        Mild cardiomegaly seen  A mitral annular ring is seen     No acute consolidation seen   No congestion seen   Mild blunting of the right CP angle noted   Visualized osseous structures appear within normal limits for the patient's age         IMPRESSION:     No acute consolidation   No congestion seen   Mild blunting of the right CP angle may be due to pleural thickening or minimal effusion       Workstation performed: [id number]     Signed by:   Sherry Houser MD   12/19/17

## 2018-01-30 ENCOUNTER — OFFICE VISIT (OUTPATIENT)
Dept: INTERNAL MEDICINE CLINIC | Facility: CLINIC | Age: 68
End: 2018-01-30
Payer: MEDICARE

## 2018-01-30 VITALS
OXYGEN SATURATION: 95 % | SYSTOLIC BLOOD PRESSURE: 118 MMHG | TEMPERATURE: 98.9 F | HEART RATE: 82 BPM | HEIGHT: 64 IN | WEIGHT: 154 LBS | BODY MASS INDEX: 26.29 KG/M2 | DIASTOLIC BLOOD PRESSURE: 82 MMHG

## 2018-01-30 DIAGNOSIS — D84.9 IMMUNOSUPPRESSION (HCC): ICD-10-CM

## 2018-01-30 DIAGNOSIS — J20.9 ACUTE BRONCHITIS, UNSPECIFIED ORGANISM: Primary | ICD-10-CM

## 2018-01-30 PROBLEM — J18.9 PNEUMONIA DUE TO INFECTIOUS ORGANISM: Status: RESOLVED | Noted: 2017-04-04 | Resolved: 2018-01-30

## 2018-01-30 PROBLEM — J15.6 GRAM-NEGATIVE PNEUMONIA (HCC): Status: RESOLVED | Noted: 2017-04-07 | Resolved: 2018-01-30

## 2018-01-30 PROBLEM — I10 ESSENTIAL HYPERTENSION: Chronic | Status: ACTIVE | Noted: 2017-04-04

## 2018-01-30 PROCEDURE — 99213 OFFICE O/P EST LOW 20 MIN: CPT | Performed by: INTERNAL MEDICINE

## 2018-01-30 RX ORDER — CEFUROXIME AXETIL 500 MG/1
500 TABLET ORAL EVERY 12 HOURS SCHEDULED
Qty: 20 TABLET | Refills: 0 | Status: SHIPPED | OUTPATIENT
Start: 2018-01-30 | End: 2018-02-09

## 2018-01-30 RX ORDER — METOPROLOL SUCCINATE 25 MG/1
25 TABLET, EXTENDED RELEASE ORAL
COMMUNITY
Start: 2017-08-30 | End: 2018-08-29

## 2018-01-30 NOTE — PROGRESS NOTES
Assessment/Plan:    Patient is relatively immunosuppressed because of his kidney transplant medication  Will start antibiotics  Continue over-the-counter remedies  His wife will watch him closely because he has developed pneumonia quite quickly for us recently  No problem-specific Assessment & Plan notes found for this encounter  Diagnoses and all orders for this visit:    Acute bronchitis, unspecified organism  -     cefuroxime (CEFTIN) 500 mg tablet; Take 1 tablet (500 mg total) by mouth every 12 (twelve) hours for 10 days    Immunosuppression ([address] )    Other orders  -     metoprolol succinate (TOPROL-XL) 25 mg 24 hr tablet; Take 25 mg by mouth          Subjective:      Patient ID: Bre Smith is a 78 y o  male  Patient comes in today with his wife complaining of 3 days of worsening respiratory symptoms  Productive cough  No definite fevers  Fatigue  No chills  The following portions of the patient's history were reviewed and updated as appropriate: allergies, current medications, past family history, past medical history, past social history, past surgical history and problem list     Review of Systems   Constitutional: Negative for fever  HENT: Positive for congestion  Respiratory: Positive for cough  Negative for shortness of breath  Cardiovascular: Negative for chest pain  Gastrointestinal: Negative for abdominal pain  Objective:     Physical Exam   Constitutional: He is oriented to person, place, and time  He appears well-developed and well-nourished  HENT:   Right Ear: External ear normal    Left Ear: External ear normal    Nose: Nose normal    Mouth/Throat: Oropharynx is clear and moist  No oropharyngeal exudate  Eyes: Conjunctivae are normal    Cardiovascular: Normal rate, regular rhythm and normal heart sounds  Pulmonary/Chest: Effort normal and breath sounds normal    Lymphadenopathy:     He has no cervical adenopathy     Neurological: He is alert and oriented to person, place, and time  Skin: He is not diaphoretic  Nursing note and vitals reviewed

## 2018-02-09 ENCOUNTER — TELEPHONE (OUTPATIENT)
Dept: INTERNAL MEDICINE CLINIC | Facility: CLINIC | Age: 68
End: 2018-02-09

## 2018-02-09 NOTE — TELEPHONE ENCOUNTER
Patient called and said that he was done with the 10 day supply of Cefuroxime-axetio 500mg  He is still bringing up yellow mucus, not coughing or bringing up as much as before but still has mucus  He had this same medication from last year March 31st 2017  He wondered if he could just take that or see what you think

## 2018-02-15 ENCOUNTER — OFFICE VISIT (OUTPATIENT)
Dept: PULMONOLOGY | Facility: CLINIC | Age: 68
End: 2018-02-15
Payer: MEDICARE

## 2018-02-15 VITALS
WEIGHT: 157 LBS | BODY MASS INDEX: 26.8 KG/M2 | SYSTOLIC BLOOD PRESSURE: 126 MMHG | HEIGHT: 64 IN | DIASTOLIC BLOOD PRESSURE: 72 MMHG | OXYGEN SATURATION: 98 % | HEART RATE: 76 BPM

## 2018-02-15 DIAGNOSIS — Z87.01 H/O RECURRENT PNEUMONIA: ICD-10-CM

## 2018-02-15 DIAGNOSIS — D84.9 IMMUNOSUPPRESSION (HCC): ICD-10-CM

## 2018-02-15 DIAGNOSIS — J01.00 ACUTE NON-RECURRENT MAXILLARY SINUSITIS: Primary | ICD-10-CM

## 2018-02-15 PROCEDURE — 99213 OFFICE O/P EST LOW 20 MIN: CPT | Performed by: PHYSICIAN ASSISTANT

## 2018-02-15 NOTE — PROGRESS NOTES
Assessment:    1  Acute non-recurrent maxillary sinusitis     2  H/O recurrent pneumonia     3  Immunosuppression ([address] )           Plan:     Patient just completed treatment for sinusitis still with nasal congestion but overall improved  No need for further antibiotics  Can try using saline nasal spray to help with congestion  History of recurrent pneumonia due to immunosuppression from kidney transplant, has been doing well  Up to date with vaccinations, needs pneumovax next year  Follow up in 4 months or sooner if necessary  Subjective:     Patient ID: Renetta Saunders is a 78 y o  male  Chief Complaint:  Patient is a 78year old male with PMH of renal transplant x 2 on immunosuppressants, pneumonia in December 2016 and April 2017  He had PFTs done which showed some evidence of air trapping with normal spirometry  Allergy testing showed allergy to [place] grass  He is here today for follow up  He is currently recovering from a sinus infection  He was treated with cefuroxime by Dr Elena Laird  He still has some sinus congestion but has less cough, less nasal discharge  No fever or chills, no shortness of breath  Review of Systems   Constitutional: Negative  HENT: Negative  Respiratory: Negative  Cardiovascular: Negative  Gastrointestinal: Negative  Genitourinary: Negative  Musculoskeletal: Negative  Skin: Negative  Allergic/Immunologic: Negative  Neurological: Negative  Psychiatric/Behavioral: Negative  Objective:    Physical Exam   Constitutional: He is oriented to person, place, and time  He appears well-developed and well-nourished  No distress  HENT:   Mouth/Throat: Oropharynx is clear and moist    Neck: Normal range of motion  Neck supple  Cardiovascular: Normal rate, regular rhythm and normal heart sounds  Pulmonary/Chest: Effort normal and breath sounds normal  No respiratory distress  He has no wheezes  He has no rales     Musculoskeletal: Normal range of motion  Neurological: He is alert and oriented to person, place, and time  Psychiatric: He has a normal mood and affect   His behavior is normal  Judgment and thought content normal

## 2018-02-18 DIAGNOSIS — Z94.0 KIDNEY TRANSPLANTED: Primary | ICD-10-CM

## 2018-02-19 DIAGNOSIS — Z94.0 KIDNEY TRANSPLANTED: ICD-10-CM

## 2018-02-19 RX ORDER — TACROLIMUS 1 MG/1
1 CAPSULE ORAL 2 TIMES DAILY
Qty: 180 CAPSULE | Refills: 0 | Status: SHIPPED | OUTPATIENT
Start: 2018-02-19 | End: 2018-05-18 | Stop reason: SDUPTHER

## 2018-02-19 RX ORDER — TACROLIMUS 1 MG/1
CAPSULE ORAL
Qty: 180 CAPSULE | Refills: 0 | Status: SHIPPED | OUTPATIENT
Start: 2018-02-19 | End: 2018-02-19 | Stop reason: SDUPTHER

## 2018-03-05 DIAGNOSIS — Z94.0 KIDNEY TRANSPLANTED: Primary | ICD-10-CM

## 2018-03-06 DIAGNOSIS — Z94.0 KIDNEY TRANSPLANTED: ICD-10-CM

## 2018-03-06 RX ORDER — MYCOPHENOLIC ACID 360 MG/1
360 TABLET, DELAYED RELEASE ORAL 2 TIMES DAILY
Qty: 90 TABLET | Refills: 0 | Status: SHIPPED | OUTPATIENT
Start: 2018-03-06 | End: 2018-03-08 | Stop reason: SDUPTHER

## 2018-03-06 RX ORDER — MYCOPHENOLIC ACID 360 MG/1
360 TABLET, DELAYED RELEASE ORAL 2 TIMES DAILY
Qty: 90 TABLET | Refills: 1 | Status: SHIPPED | OUTPATIENT
Start: 2018-03-06 | End: 2018-03-06 | Stop reason: SDUPTHER

## 2018-03-08 DIAGNOSIS — Z94.0 KIDNEY TRANSPLANTED: ICD-10-CM

## 2018-03-09 RX ORDER — MYCOPHENOLIC ACID 360 MG/1
360 TABLET, DELAYED RELEASE ORAL 2 TIMES DAILY
Qty: 90 TABLET | Refills: 0 | Status: SHIPPED | OUTPATIENT
Start: 2018-03-09 | End: 2019-01-25 | Stop reason: SDUPTHER

## 2018-04-16 ENCOUNTER — TELEPHONE (OUTPATIENT)
Dept: NEPHROLOGY | Facility: CLINIC | Age: 68
End: 2018-04-16

## 2018-04-16 DIAGNOSIS — Z94.0 RENAL TRANSPLANT, STATUS POST: Primary | ICD-10-CM

## 2018-04-16 RX ORDER — MYCOPHENOLIC ACID 360 MG/1
TABLET, DELAYED RELEASE ORAL 2 TIMES DAILY
COMMUNITY
Start: 2017-08-16 | End: 2018-06-21 | Stop reason: SDUPTHER

## 2018-04-16 RX ORDER — MYCOPHENOLIC ACID 360 MG/1
360 TABLET, DELAYED RELEASE ORAL 2 TIMES DAILY
Qty: 180 TABLET | Refills: 2 | Status: SHIPPED | OUTPATIENT
Start: 2018-04-16 | End: 2018-06-21 | Stop reason: SDUPTHER

## 2018-04-16 RX ORDER — SULFAMETHOXAZOLE AND TRIMETHOPRIM 400; 80 MG/1; MG/1
1 TABLET ORAL EVERY 12 HOURS SCHEDULED
Qty: 90 TABLET | Refills: 2 | Status: SHIPPED | OUTPATIENT
Start: 2018-04-16 | End: 2018-04-30 | Stop reason: SDUPTHER

## 2018-04-16 NOTE — TELEPHONE ENCOUNTER
Called and left a message on Tomás's cell phone stating that Dr Carlos Herrera called in the RX for his Mycophenolate Sodium, and if he had any question, that he can give us a call  back

## 2018-04-30 DIAGNOSIS — Z94.0 RENAL TRANSPLANT, STATUS POST: ICD-10-CM

## 2018-04-30 RX ORDER — SULFAMETHOXAZOLE AND TRIMETHOPRIM 400; 80 MG/1; MG/1
1 TABLET ORAL EVERY 12 HOURS SCHEDULED
Qty: 180 TABLET | Refills: 0 | Status: SHIPPED | OUTPATIENT
Start: 2018-04-30 | End: 2018-07-29

## 2018-04-30 NOTE — TELEPHONE ENCOUNTER
[facility] NEEDS A REFILL FOR; SULFAMETHOXAZOLE-TRIMETHOPRIM 400-80 MG ORAL TABLET; TAKE 1 TABLET DAILY; 90 DAY SUPPLY; SEND TO [facility]

## 2018-05-18 DIAGNOSIS — Z94.0 KIDNEY TRANSPLANTED: ICD-10-CM

## 2018-05-18 NOTE — TELEPHONE ENCOUNTER
[facility]A NEEDS A REFILL FOR; TACROLIMUS 1 MG TAKE 1 CAPSULE TWICE DAILY   DAY 30;  #60; SEND TO [facility]

## 2018-05-19 RX ORDER — TACROLIMUS 1 MG/1
1 CAPSULE ORAL 2 TIMES DAILY
Qty: 180 CAPSULE | Refills: 0 | Status: SHIPPED | OUTPATIENT
Start: 2018-05-19 | End: 2018-08-16 | Stop reason: SDUPTHER

## 2018-06-19 ENCOUNTER — OFFICE VISIT (OUTPATIENT)
Dept: INTERNAL MEDICINE CLINIC | Facility: CLINIC | Age: 68
End: 2018-06-19
Payer: MEDICARE

## 2018-06-19 ENCOUNTER — HOSPITAL ENCOUNTER (OUTPATIENT)
Dept: RADIOLOGY | Facility: HOSPITAL | Age: 68
Discharge: HOME | End: 2018-06-19
Attending: INTERNAL MEDICINE
Payer: MEDICARE

## 2018-06-19 VITALS
WEIGHT: 152.4 LBS | OXYGEN SATURATION: 96 % | HEART RATE: 74 BPM | HEIGHT: 64 IN | SYSTOLIC BLOOD PRESSURE: 124 MMHG | TEMPERATURE: 97.2 F | BODY MASS INDEX: 26.02 KG/M2 | RESPIRATION RATE: 20 BRPM | DIASTOLIC BLOOD PRESSURE: 82 MMHG

## 2018-06-19 DIAGNOSIS — S61.209A FINGER WOUND, SIMPLE, OPEN, INITIAL ENCOUNTER: Primary | ICD-10-CM

## 2018-06-19 DIAGNOSIS — S61.209A FINGER WOUND, SIMPLE, OPEN, INITIAL ENCOUNTER: ICD-10-CM

## 2018-06-19 DIAGNOSIS — D84.9 IMMUNOSUPPRESSION (HCC): ICD-10-CM

## 2018-06-19 PROCEDURE — 99213 OFFICE O/P EST LOW 20 MIN: CPT | Performed by: INTERNAL MEDICINE

## 2018-06-19 PROCEDURE — 90714 TD VACC NO PRESV 7 YRS+ IM: CPT

## 2018-06-19 PROCEDURE — 90471 IMMUNIZATION ADMIN: CPT

## 2018-06-19 PROCEDURE — 73140 X-RAY EXAM OF FINGER(S): CPT

## 2018-06-19 RX ORDER — CEPHALEXIN 250 MG/1
250 CAPSULE ORAL EVERY 8 HOURS SCHEDULED
Qty: 28 CAPSULE | Refills: 0 | Status: SHIPPED | OUTPATIENT
Start: 2018-06-19 | End: 2018-06-26

## 2018-06-19 NOTE — PROGRESS NOTES
Assessment/Plan:      I could not see any foreign body  Will order an x-ray to make sure there is nothing still in there  Tetanus shot given today  Also will empirically place on an antibiotic given his immunosuppressed state  No Follow-up on file  No problem-specific Assessment & Plan notes found for this encounter  Diagnoses and all orders for this visit:    Finger wound, simple, open, initial encounter  -     XR finger right second digit-index; Future  -     cephalexin (KEFLEX) 250 mg capsule; Take 1 capsule (250 mg total) by mouth every 8 (eight) hours for 7 days  -     TD VACCINE GREATER THAN OR EQUAL TO 8YO PRESERVATIVE FREE IM          Subjective:      Patient ID: Roxana Jones is a 80 y o  male  Patient comes in today because he injured his right index finger when he was cleaning the pool  He states he tripped while using the skin her pole and sliced his finger with a screw in the deck  He did not see any wooden splinters  His wife had done wound care as a nurse's aide in the past so she removed what she could see but she feels there is still a more in there  No pain          ALLERGIES:  No Known Allergies    CURRENT MEDICATIONS:    Current Outpatient Prescriptions:     aspirin 81 mg chewable tablet, Chew, Disp: , Rfl:     atorvastatin (LIPITOR) 10 mg tablet, Take 10 mg by mouth daily, Disp: , Rfl:     cephalexin (KEFLEX) 250 mg capsule, Take 1 capsule (250 mg total) by mouth every 8 (eight) hours for 7 days, Disp: 28 capsule, Rfl: 0    famotidine (PEPCID) 20 mg tablet, Take 1 tablet by mouth 2 (two) times a day for 30 days, Disp: 60 tablet, Rfl: 0    metoprolol succinate (TOPROL-XL) 25 mg 24 hr tablet, Take 25 mg by mouth, Disp: , Rfl:     mycophenolate (MYFORTIC) 360 MG TBEC, Take 1 tablet (360 mg total) by mouth 2 (two) times a day, Disp: 90 tablet, Rfl: 0    mycophenolate (MYFORTIC) 360 MG TBEC, Take by mouth 2 (two) times a day, Disp: , Rfl:     mycophenolate (MYFORTIC) 360 MG TBEC, Take 1 tablet (360 mg total) by mouth 2 (two) times a day, Disp: 180 tablet, Rfl: 2    Omega-3 Fatty Acids (FISH OIL CONCENTRATE) 300 MG CAPS, Take 1 capsule by mouth 2 (two) times a day, Disp: , Rfl:     predniSONE 5 mg tablet, Take 5 mg by mouth daily, Disp: , Rfl:     sulfamethoxazole-trimethoprim (BACTRIM) 400-80 mg per tablet, Take 1 tablet by mouth every 12 (twelve) hours for 90 days, Disp: 180 tablet, Rfl: 0    tacrolimus (PROGRAF) 1 mg capsule, Take 1 capsule (1 mg total) by mouth 2 (two) times a day, Disp: 180 capsule, Rfl: 0    tamsulosin (FLOMAX) 0 4 mg, Take by mouth daily, Disp: , Rfl:     ACTIVE PROBLEM LIST:  Patient Active Problem List   Diagnosis    [facility] ([facility]    Hypoalbuminemia    CAD (coronary artery disease)    H/O kidney transplant    [facility]    HLD (hyperlipidemia)    S/P mitral valve repair    Polycystic kidney disease    CKD (chronic kidney disease)    Essential hypertension    Elevated brain natriuretic peptide (BNP) level    Immunosuppression (HCC)    Renal transplant recipient    Acute non-recurrent maxillary sinusitis    H/O recurrent pneumonia       PAST MEDICAL HISTORY:  Past Medical History:   Diagnosis Date    GERD (gastroesophageal reflux disease)     Kidney transplanted     x 2, 2001 and 2012    Pneumonia     Sinusitis        PAST SURGICAL HISTORY:  Past Surgical History:   Procedure Laterality Date    COLON SURGERY      CORONARY STENT PLACEMENT      MITRAL VALVE REPAIR  2006    NEPHRECTOMY TRANSPLANTED ORGAN         FAMILY HISTORY:  Family History   Problem Relation Age of Onset    Polycystic kidney disease Father        SOCIAL HISTORY:  Social History     Social History    Marital status: /Civil Union     Spouse name: N/A    Number of children: N/A    Years of education: N/A     Occupational History    Not on file       Social History Main Topics    Smoking status: Never Smoker    Smokeless tobacco: Never Used    Alcohol use No    Drug use: No    Sexual activity: Yes     Partners: Female     Other Topics Concern    Not on file     Social History Narrative    No narrative on file       Review of Systems   Constitutional: Negative for fever  Objective:  Vitals:    06/19/18 1520   BP: 124/82   BP Location: Right arm   Patient Position: Sitting   Cuff Size: Adult   Pulse: 74   Resp: 20   Temp: (!) 97 2 °F (36 2 °C)   TempSrc: Temporal   SpO2: 96%   Weight: 69 1 kg (152 lb 6 4 oz)   Height: 5' 4" (1 626 m)        Physical Exam   Constitutional: He appears well-developed and well-nourished  Skin:   There is a puncture wound on the lateral aspect of the right index finger between the DIP and MCP joint  Wound appears clean at this point  No surrounding erythema  No obvious foreign body  Transillumination revealed no obvious foreign body  Nursing note and vitals reviewed  RESULTS:    No results found for this or any previous visit (from the past 1008 hour(s))  This note was created with voice recognition software  Phonic, grammatical and spelling errors may be present within the note as a result

## 2018-06-20 ENCOUNTER — TELEPHONE (OUTPATIENT)
Dept: INTERNAL MEDICINE CLINIC | Facility: CLINIC | Age: 68
End: 2018-06-20

## 2018-06-21 ENCOUNTER — OFFICE VISIT (OUTPATIENT)
Dept: PULMONOLOGY | Facility: CLINIC | Age: 68
End: 2018-06-21
Payer: MEDICARE

## 2018-06-21 VITALS
WEIGHT: 153 LBS | DIASTOLIC BLOOD PRESSURE: 68 MMHG | HEART RATE: 72 BPM | OXYGEN SATURATION: 98 % | HEIGHT: 64 IN | SYSTOLIC BLOOD PRESSURE: 118 MMHG | BODY MASS INDEX: 26.12 KG/M2

## 2018-06-21 DIAGNOSIS — Z87.01 H/O RECURRENT PNEUMONIA: Primary | ICD-10-CM

## 2018-06-21 DIAGNOSIS — Z94.0 H/O KIDNEY TRANSPLANT: Chronic | ICD-10-CM

## 2018-06-21 PROCEDURE — 99213 OFFICE O/P EST LOW 20 MIN: CPT | Performed by: PHYSICIAN ASSISTANT

## 2018-06-21 NOTE — PROGRESS NOTES
Assessment:    1  H/O recurrent pneumonia     2  H/O kidney transplant         Plan:     Patient has been doing well, had pneumonia 12/2016 requiring intubation and 4/2017, has been doing well since  Last CT scan 5/17 showed resolution of infiltrates  PFTs showed some air trapping with normal spirometry  Follow up in 1 year or sooner if necessary  Subjective:     Patient ID: Colletta Au is a 80 y o  male  Chief Complaint:  Patient is a 80year old male with PMH of renal transplant x 2 on immunosuppressants, pneumonia in December 2016 and April 2017  He had PFTs done which showed some evidence of air trapping with normal spirometry  Allergy testing showed allergy to [place] grass  He is here today for follow up  He has been feeling well  Has not had any episodes of pneumonia or bronchitis since his last visit  The following portions of the patient's history were reviewed in this encounter and updated as appropriate:   Review of Systems   Constitutional: Negative  HENT: Negative  Respiratory: Negative  Cardiovascular: Negative  Gastrointestinal: Negative  Genitourinary: Negative  Musculoskeletal: Negative  Skin: Negative  Allergic/Immunologic: Negative  Neurological: Negative  Psychiatric/Behavioral: Negative  Objective:    Physical Exam   Constitutional: He is oriented to person, place, and time  He appears well-developed and well-nourished  No distress  HENT:   Mouth/Throat: Oropharynx is clear and moist    Eyes: Pupils are equal, round, and reactive to light  Cardiovascular: Normal rate and regular rhythm  No murmur heard  Pulmonary/Chest: Effort normal and breath sounds normal  No respiratory distress  He has no wheezes  He has no rales  Abdominal: Soft  Musculoskeletal: Normal range of motion  Neurological: He is alert and oriented to person, place, and time  Skin: Skin is warm and dry  Psychiatric: He has a normal mood and affect  His behavior is normal  Judgment and thought content normal

## 2018-07-08 DIAGNOSIS — E78.2 MIXED HYPERLIPIDEMIA: Primary | ICD-10-CM

## 2018-07-09 RX ORDER — ATORVASTATIN CALCIUM 10 MG/1
TABLET, FILM COATED ORAL
Qty: 90 TABLET | Refills: 1 | Status: SHIPPED | OUTPATIENT
Start: 2018-07-09 | End: 2018-07-14 | Stop reason: SDUPTHER

## 2018-07-10 ENCOUNTER — APPOINTMENT (OUTPATIENT)
Dept: LAB | Facility: HOSPITAL | Age: 68
End: 2018-07-10
Attending: INTERNAL MEDICINE
Payer: MEDICARE

## 2018-07-10 DIAGNOSIS — Z94.0 HISTORY OF KIDNEY TRANSPLANT: ICD-10-CM

## 2018-07-10 LAB
ANION GAP SERPL CALCULATED.3IONS-SCNC: 9 MMOL/L (ref 4–13)
BILIRUB UR QL STRIP: NEGATIVE
BUN SERPL-MCNC: 24 MG/DL (ref 5–25)
CALCIUM SERPL-MCNC: 8.7 MG/DL (ref 8.3–10.1)
CHLORIDE SERPL-SCNC: 104 MMOL/L (ref 100–108)
CLARITY UR: CLEAR
CO2 SERPL-SCNC: 26 MMOL/L (ref 21–32)
COLOR UR: YELLOW
CREAT SERPL-MCNC: 1.49 MG/DL (ref 0.6–1.3)
CREAT UR-MCNC: 80.7 MG/DL
ERYTHROCYTE [DISTWIDTH] IN BLOOD BY AUTOMATED COUNT: 13.4 % (ref 11.6–15.1)
GFR SERPL CREATININE-BSD FRML MDRD: 48 ML/MIN/1.73SQ M
GLUCOSE P FAST SERPL-MCNC: 91 MG/DL (ref 65–99)
GLUCOSE UR STRIP-MCNC: NEGATIVE MG/DL
HCT VFR BLD AUTO: 50.2 % (ref 36.5–49.3)
HGB BLD-MCNC: 16 G/DL (ref 12–17)
HGB UR QL STRIP.AUTO: NEGATIVE
KETONES UR STRIP-MCNC: NEGATIVE MG/DL
LEUKOCYTE ESTERASE UR QL STRIP: NEGATIVE
MCH RBC QN AUTO: 28.5 PG (ref 26.8–34.3)
MCHC RBC AUTO-ENTMCNC: 31.9 G/DL (ref 31.4–37.4)
MCV RBC AUTO: 89 FL (ref 82–98)
NITRITE UR QL STRIP: NEGATIVE
PH UR STRIP.AUTO: 6 [PH] (ref 4.5–8)
PHOSPHATE SERPL-MCNC: 3.3 MG/DL (ref 2.3–4.1)
PLATELET # BLD AUTO: 243 THOUSANDS/UL (ref 149–390)
PMV BLD AUTO: 8.9 FL (ref 8.9–12.7)
POTASSIUM SERPL-SCNC: 4.8 MMOL/L (ref 3.5–5.3)
PROT UR STRIP-MCNC: NEGATIVE MG/DL
PROT UR-MCNC: 10 MG/DL
PROT/CREAT UR: 0.12 MG/G{CREAT} (ref 0–0.1)
PTH-INTACT SERPL-MCNC: 88.9 PG/ML (ref 18.4–80.1)
RBC # BLD AUTO: 5.62 MILLION/UL (ref 3.88–5.62)
SODIUM SERPL-SCNC: 139 MMOL/L (ref 136–145)
SP GR UR STRIP.AUTO: 1.01 (ref 1–1.03)
TACROLIMUS BLD-MCNC: 7.6 NG/ML (ref 2–20)
UROBILINOGEN UR QL STRIP.AUTO: 0.2 E.U./DL
WBC # BLD AUTO: 6.07 THOUSAND/UL (ref 4.31–10.16)

## 2018-07-10 PROCEDURE — 83970 ASSAY OF PARATHORMONE: CPT

## 2018-07-10 PROCEDURE — 36415 COLL VENOUS BLD VENIPUNCTURE: CPT

## 2018-07-10 PROCEDURE — 81003 URINALYSIS AUTO W/O SCOPE: CPT

## 2018-07-10 PROCEDURE — 82570 ASSAY OF URINE CREATININE: CPT

## 2018-07-10 PROCEDURE — 84100 ASSAY OF PHOSPHORUS: CPT

## 2018-07-10 PROCEDURE — 84156 ASSAY OF PROTEIN URINE: CPT

## 2018-07-10 PROCEDURE — 85027 COMPLETE CBC AUTOMATED: CPT

## 2018-07-10 PROCEDURE — 80048 BASIC METABOLIC PNL TOTAL CA: CPT

## 2018-07-10 PROCEDURE — 80197 ASSAY OF TACROLIMUS: CPT

## 2018-07-12 ENCOUNTER — OFFICE VISIT (OUTPATIENT)
Dept: NEPHROLOGY | Facility: CLINIC | Age: 68
End: 2018-07-12
Payer: MEDICARE

## 2018-07-12 VITALS
WEIGHT: 153.6 LBS | HEART RATE: 80 BPM | DIASTOLIC BLOOD PRESSURE: 70 MMHG | BODY MASS INDEX: 26.37 KG/M2 | TEMPERATURE: 97.7 F | SYSTOLIC BLOOD PRESSURE: 100 MMHG | RESPIRATION RATE: 16 BRPM

## 2018-07-12 DIAGNOSIS — I25.10 CORONARY ARTERY DISEASE INVOLVING NATIVE CORONARY ARTERY OF NATIVE HEART WITHOUT ANGINA PECTORIS: Chronic | ICD-10-CM

## 2018-07-12 DIAGNOSIS — Z87.01 H/O RECURRENT PNEUMONIA: ICD-10-CM

## 2018-07-12 DIAGNOSIS — I10 ESSENTIAL HYPERTENSION: Chronic | ICD-10-CM

## 2018-07-12 DIAGNOSIS — Z94.0 H/O KIDNEY TRANSPLANT: Primary | Chronic | ICD-10-CM

## 2018-07-12 DIAGNOSIS — N18.5 STAGE 5 CHRONIC KIDNEY DISEASE NOT ON CHRONIC DIALYSIS (HCC): Chronic | ICD-10-CM

## 2018-07-12 PROCEDURE — 99213 OFFICE O/P EST LOW 20 MIN: CPT | Performed by: INTERNAL MEDICINE

## 2018-07-12 NOTE — PROGRESS NOTES
NEPHROLOGY OFFICE FOLLOW UP  Lindsey Ruiz 80 y o  male MRN: [id number]    Encounter: [id number] 7/12/2018    REASON FOR VISIT: Lindsey Ruiz is a 80 y o  male who is here on 7/12/2018 for Follow-up and Chronic Kidney Disease    HPI:    Bhavesh Aguirre came in today for follow-up of his kidney transplant  He is feeling quite well  No acute complaint  REVIEW OF SYSTEMS:    Review of Systems   Constitutional: Negative for activity change and fatigue  HENT: Negative for congestion and ear discharge  Eyes: Negative for photophobia and pain  Respiratory: Negative for apnea and choking  Cardiovascular: Negative for chest pain and palpitations  Gastrointestinal: Negative for abdominal distention and blood in stool  Endocrine: Negative for heat intolerance and polyphagia  Genitourinary: Negative for flank pain and urgency  Musculoskeletal: Negative for neck pain and neck stiffness  Skin: Negative for color change and wound  Allergic/Immunologic: Negative for food allergies and immunocompromised state  Neurological: Negative for seizures and facial asymmetry  Hematological: Negative for adenopathy  Does not bruise/bleed easily  Psychiatric/Behavioral: Negative for self-injury and suicidal ideas           PAST MEDICAL HISTORY:  Past Medical History:   Diagnosis Date    Bacterial pneumonia     last assessed: 2/2/2017    Benign neoplasm of skin     Cardiac disorder     GERD (gastroesophageal reflux disease)     Gross hematuria     last assessed: 12/5/2016    Hypercholesterolemia     Hypertension     Kidney disease     Kidney transplanted     x 2, 2001 and 2012    Osteoporosis     Pneumonia     last assessed: 10/9/2017    Seborrheic keratosis     Sinusitis     Viral warts        PAST SURGICAL HISTORY:  Past Surgical History:   Procedure Laterality Date    AV FISTULA PLACEMENT Right 2001    arteriovenous surgery creation of A-V fistula, right arm radiocephalic-Dr Gibran Flanagan  AV FISTULA PLACEMENT Left 2011    hemodialysis acess type arteriovenous fistula, left brachiocepalic AVF [id number]  [facility]  2012    CORONARY STENT PLACEMENT  09/15/2011    PTA stenting-[facility]/M Dr Arthur Moctezuma  2006    10/[facility]   [place] OTHER SURGICAL HISTORY  01/2012    fluids from transplant    OTHER SURGICAL HISTORY Left 07/08/2013    shaving of lesion shoulders, skin left shoulder combined compound and blue nevus    TONSILLECTOMY      TRANSPLANTATION RENAL Left [facility], [facility] [address] TRANSPLANTATION RENAL Right 12/08/2011    [place], [place]       SOCIAL HISTORY:  History   Alcohol Use No     History   Drug Use No     History   Smoking Status    Never Smoker   Smokeless Tobacco    Never Used       FAMILY HISTORY:  Family History   Problem Relation Age of Onset    Polycystic kidney disease Father     Kidney disease Father        MEDICATIONS:    Current Outpatient Prescriptions:     aspirin 81 mg chewable tablet, Chew, Disp: , Rfl:     atorvastatin (LIPITOR) 10 mg tablet, TAKE 1 TABLET BY MOUTH EVERY DAY, Disp: 90 tablet, Rfl: 1    metoprolol succinate (TOPROL-XL) 25 mg 24 hr tablet, Take 25 mg by mouth, Disp: , Rfl:     mycophenolate (MYFORTIC) 360 MG TBEC, Take 1 tablet (360 mg total) by mouth 2 (two) times a day, Disp: 90 tablet, Rfl: 0    Omega-3 Fatty Acids (FISH OIL CONCENTRATE) 300 MG CAPS, Take 1 capsule by mouth 2 (two) times a day, Disp: , Rfl:     predniSONE 5 mg tablet, Take 5 mg by mouth daily, Disp: , Rfl:     sulfamethoxazole-trimethoprim (BACTRIM) 400-80 mg per tablet, Take 1 tablet by mouth every 12 (twelve) hours for 90 days, Disp: 180 tablet, Rfl: 0    tacrolimus (PROGRAF) 1 mg capsule, Take 1 capsule (1 mg total) by mouth 2 (two) times a day, Disp: 180 capsule, Rfl: 0    tamsulosin (FLOMAX) 0 4 mg, Take by mouth daily, Disp: , Rfl:     famotidine (PEPCID) 20 mg tablet, Take 1 tablet by mouth 2 (two) times a day for 30 days, Disp: 60 tablet, Rfl: 0    PHYSICAL EXAM:  Vitals:    07/12/18 1128   BP: 100/70   BP Location: Right arm   Patient Position: Sitting   Pulse: 80   Resp: 16   Temp: 97 7 °F (36 5 °C)   TempSrc: Oral   Weight: 69 7 kg (153 lb 9 6 oz)     Body mass index is 26 37 kg/m²  Physical Exam   Constitutional: He is oriented to person, place, and time  He appears well-developed and well-nourished  No distress  HENT:   Head: Normocephalic and atraumatic  Mouth/Throat: Oropharynx is clear and moist    Eyes: Conjunctivae and EOM are normal  Pupils are equal, round, and reactive to light  No scleral icterus  Neck: Normal range of motion  Neck supple  No JVD present  Cardiovascular: Normal rate, regular rhythm, normal heart sounds and intact distal pulses  No murmur heard  Pulmonary/Chest: Effort normal and breath sounds normal  No respiratory distress  He has no wheezes  Abdominal: Soft  Bowel sounds are normal  He exhibits no distension  There is no tenderness  Musculoskeletal: Normal range of motion  He exhibits no edema  Neurological: He is alert and oriented to person, place, and time  Skin: Skin is warm  No rash noted  Psychiatric: He has a normal mood and affect   His behavior is normal        LAB RESULTS:  Results for orders placed or performed in visit on 94/52/10   Basic metabolic panel   Result Value Ref Range    Sodium 139 136 - 145 mmol/L    Potassium 4 8 3 5 - 5 3 mmol/L    Chloride 104 100 - 108 mmol/L    CO2 26 21 - 32 mmol/L    Anion Gap 9 4 - 13 mmol/L    BUN 24 5 - 25 mg/dL    Creatinine 1 49 (H) 0 60 - 1 30 mg/dL    Glucose, Fasting 91 65 - 99 mg/dL    Calcium 8 7 8 3 - 10 1 mg/dL    eGFR 48 ml/min/1 73sq m   CBC   Result Value Ref Range    WBC 6 07 4 31 - 10 16 Thousand/uL    RBC 5 62 3 88 - 5 62 Million/uL    Hemoglobin 16 0 12 0 - 17 0 g/dL    Hematocrit 50 2 (H) 36 5 - 49 3 %    MCV 89 82 - 98 fL    MCH 28 5 26 8 - 34 3 pg    MCHC 31 9 31 4 - 37 4 g/dL    RDW 13 4 11 6 - 15 1 %    Platelets 707 227 - 613 Thousands/uL    MPV 8 9 8 9 - 12 7 fL   Phosphorus   Result Value Ref Range    Phosphorus 3 3 2 3 - 4 1 mg/dL   PTH, intact   Result Value Ref Range    PTH 88 9 (H) 18 4 - 80 1 pg/mL   Urinalysis with reflex to microscopic   Result Value Ref Range    Color, UA Yellow     Clarity, UA Clear     Specific [place], UA 1 010 1 003 - 1 030    pH, UA 6 0 4 5 - 8 0    Leukocytes, UA Negative Negative    Nitrite, UA Negative Negative    Protein, UA Negative Negative mg/dl    Glucose, UA Negative Negative mg/dl    Ketones, UA Negative Negative mg/dl    Urobilinogen, UA 0 2 0 2, 1 0 E U /dl E U /dl    Bilirubin, UA Negative Negative    Blood, UA Negative Negative   Protein / creatinine ratio, urine   Result Value Ref Range    Creatinine, Ur 80 7 mg/dL    Protein Urine Random 10 mg/dL    Prot/Creat Ratio, Ur 0 12 (H) 0 00 - 0 10   Tacrolimus level   Result Value Ref Range    TACROLIMUS 7 6 2 0 - 20 0 ng/mL       ASSESSMENT and PLAN:      H/O kidney transplant  Kidney function is slightly worse at creatinine 1 34  I discussed with him  Advised to drink lots of liquid  I will repeat blood work in 1 month and if it continued to deteriorate will advised to see transplant doctor immediately  He has appointment to see them in October any way    Essential hypertension  Very well control    CAD (coronary artery disease)  Being monitored by cardiologist      I will see him back in 11 month  In between he will be seen by transplant nephrologist   He will get blood test in 1 month and in 6 month        Portions of the record may have been created with voice recognition software  Occasional wrong word or "sound a like" substitutions may have occurred due to the inherent limitations of voice recognition software  Read the chart carefully and recognize, using context, where substitutions have occurred  If you have any questions, please contact the dictating provider

## 2018-07-12 NOTE — LETTER
July 12, 2018     Dona Millan MD  [address]  [address] [address]    Patient: Laurel Bustamante   YOB: 1950   Date of Visit: 7/12/2018       Dear Dr Jaleesa Fay: Thank you for referring Rosa Morel to me for evaluation  Below are my notes for this consultation  If you have questions, please do not hesitate to call me  I look forward to following your patient along with you  Sincerely,        Gio Ward MD        CC: No Recipients  Gio Ward MD  7/12/2018  5:00 PM  Sign at close encounter  [address] [place] [address] 80 y o  male MRN: [id number]    Encounter: [id number] 7/12/2018    REASON FOR VISIT: Laurel Bustamante is a 80 y o  male who is here on 7/12/2018 for Follow-up and Chronic Kidney Disease    HPI:    Louann Christie came in today for follow-up of his kidney transplant  He is feeling quite well  No acute complaint  REVIEW OF SYSTEMS:    Review of Systems   Constitutional: Negative for activity change and fatigue  HENT: Negative for congestion and ear discharge  Eyes: Negative for photophobia and pain  Respiratory: Negative for apnea and choking  Cardiovascular: Negative for chest pain and palpitations  Gastrointestinal: Negative for abdominal distention and blood in stool  Endocrine: Negative for heat intolerance and polyphagia  Genitourinary: Negative for flank pain and urgency  Musculoskeletal: Negative for neck pain and neck stiffness  Skin: Negative for color change and wound  Allergic/Immunologic: Negative for food allergies and immunocompromised state  Neurological: Negative for seizures and facial asymmetry  Hematological: Negative for adenopathy  Does not bruise/bleed easily  Psychiatric/Behavioral: Negative for self-injury and suicidal ideas           PAST MEDICAL HISTORY:  Past Medical History:   Diagnosis Date    Bacterial pneumonia     last assessed: 2/2/2017    Benign neoplasm of skin     Cardiac disorder     GERD (gastroesophageal reflux disease)     Gross hematuria     last assessed: 12/5/2016    Hypercholesterolemia     Hypertension     Kidney disease     Kidney transplanted     x 2, 2001 and 2012    Osteoporosis     Pneumonia     last assessed: 10/9/2017    Seborrheic keratosis     Sinusitis     Viral warts        PAST SURGICAL HISTORY:  Past Surgical History:   Procedure Laterality Date    AV FISTULA PLACEMENT Right 2001    arteriovenous surgery creation of A-V fistula, right arm radiocephalic-Dr Kristi Ha    AV FISTULA PLACEMENT Left 2011    hemodialysis acess type arteriovenous fistula, left brachiocepalic AVF [id number]  [facility]  2012    CORONARY STENT PLACEMENT  09/15/2011    PTA stenting-[facility]/M Dr Ml Banerjee  2006    [facility] OTHER SURGICAL HISTORY  01/2012    fluids from transplant    OTHER SURGICAL HISTORY Left 07/08/2013    shaving of lesion shoulders, skin left shoulder combined compound and blue nevus    TONSILLECTOMY      TRANSPLANTATION RENAL Left [facility], [facility] [address] TRANSPLANTATION RENAL Right 12/08/2011    [facility] [place], [place]       SOCIAL HISTORY:  History   Alcohol Use No     History   Drug Use No     History   Smoking Status    Never Smoker   Smokeless Tobacco    Never Used       FAMILY HISTORY:  Family History   Problem Relation Age of Onset    Polycystic kidney disease Father     Kidney disease Father        MEDICATIONS:    Current Outpatient Prescriptions:     aspirin 81 mg chewable tablet, Chew, Disp: , Rfl:     atorvastatin (LIPITOR) 10 mg tablet, TAKE 1 TABLET BY MOUTH EVERY DAY, Disp: 90 tablet, Rfl: 1    metoprolol succinate (TOPROL-XL) 25 mg 24 hr tablet, Take 25 mg by mouth, Disp: , Rfl:     mycophenolate (MYFORTIC) 360 MG TBEC, Take 1 tablet (360 mg total) by mouth 2 (two) times a day, Disp: 90 tablet, Rfl: 0    Omega-3 Fatty Acids (FISH OIL CONCENTRATE) 300 MG CAPS, Take 1 capsule by mouth 2 (two) times a day, Disp: , Rfl:     predniSONE 5 mg tablet, Take 5 mg by mouth daily, Disp: , Rfl:     sulfamethoxazole-trimethoprim (BACTRIM) 400-80 mg per tablet, Take 1 tablet by mouth every 12 (twelve) hours for 90 days, Disp: 180 tablet, Rfl: 0    tacrolimus (PROGRAF) 1 mg capsule, Take 1 capsule (1 mg total) by mouth 2 (two) times a day, Disp: 180 capsule, Rfl: 0    tamsulosin (FLOMAX) 0 4 mg, Take by mouth daily, Disp: , Rfl:     famotidine (PEPCID) 20 mg tablet, Take 1 tablet by mouth 2 (two) times a day for 30 days, Disp: 60 tablet, Rfl: 0    PHYSICAL EXAM:  Vitals:    07/12/18 1128   BP: 100/70   BP Location: Right arm   Patient Position: Sitting   Pulse: 80   Resp: 16   Temp: 97 7 °F (36 5 °C)   TempSrc: Oral   Weight: 69 7 kg (153 lb 9 6 oz)     Body mass index is 26 37 kg/m²  Physical Exam   Constitutional: He is oriented to person, place, and time  He appears well-developed and well-nourished  No distress  HENT:   Head: Normocephalic and atraumatic  Mouth/Throat: Oropharynx is clear and moist    Eyes: Conjunctivae and EOM are normal  Pupils are equal, round, and reactive to light  No scleral icterus  Neck: Normal range of motion  Neck supple  No JVD present  Cardiovascular: Normal rate, regular rhythm, normal heart sounds and intact distal pulses  No murmur heard  Pulmonary/Chest: Effort normal and breath sounds normal  No respiratory distress  He has no wheezes  Abdominal: Soft  Bowel sounds are normal  He exhibits no distension  There is no tenderness  Musculoskeletal: Normal range of motion  He exhibits no edema  Neurological: He is alert and oriented to person, place, and time  Skin: Skin is warm  No rash noted  Psychiatric: He has a normal mood and affect   His behavior is normal        LAB RESULTS:  Results for orders placed or performed in visit on 41/12/83   Basic metabolic panel   Result Value Ref Range    Sodium 139 136 - 145 mmol/L    Potassium 4 8 3 5 - 5 3 mmol/L    Chloride 104 100 - 108 mmol/L    CO2 26 21 - 32 mmol/L    Anion Gap 9 4 - 13 mmol/L    BUN 24 5 - 25 mg/dL    Creatinine 1 49 (H) 0 60 - 1 30 mg/dL    Glucose, Fasting 91 65 - 99 mg/dL    Calcium 8 7 8 3 - 10 1 mg/dL    eGFR 48 ml/min/1 73sq m   CBC   Result Value Ref Range    WBC 6 07 4 31 - 10 16 Thousand/uL    RBC 5 62 3 88 - 5 62 Million/uL    Hemoglobin 16 0 12 0 - 17 0 g/dL    Hematocrit 50 2 (H) 36 5 - 49 3 %    MCV 89 82 - 98 fL    MCH 28 5 26 8 - 34 3 pg    MCHC 31 9 31 4 - 37 4 g/dL    RDW 13 4 11 6 - 15 1 %    Platelets 968 239 - 770 Thousands/uL    MPV 8 9 8 9 - 12 7 fL   Phosphorus   Result Value Ref Range    Phosphorus 3 3 2 3 - 4 1 mg/dL   PTH, intact   Result Value Ref Range    PTH 88 9 (H) 18 4 - 80 1 pg/mL   Urinalysis with reflex to microscopic   Result Value Ref Range    Color, UA Yellow     Clarity, UA Clear     Specific Craig, UA 1 010 1 003 - 1 030    pH, UA 6 0 4 5 - 8 0    Leukocytes, UA Negative Negative    Nitrite, UA Negative Negative    Protein, UA Negative Negative mg/dl    Glucose, UA Negative Negative mg/dl    Ketones, UA Negative Negative mg/dl    Urobilinogen, UA 0 2 0 2, 1 0 E U /dl E U /dl    Bilirubin, UA Negative Negative    Blood, UA Negative Negative   Protein / creatinine ratio, urine   Result Value Ref Range    Creatinine, Ur 80 7 mg/dL    Protein Urine Random 10 mg/dL    Prot/Creat Ratio, Ur 0 12 (H) 0 00 - 0 10   Tacrolimus level   Result Value Ref Range    TACROLIMUS 7 6 2 0 - 20 0 ng/mL       ASSESSMENT and PLAN:      H/O kidney transplant  Kidney function is slightly worse at creatinine 1 34  I discussed with him  Advised to drink lots of liquid  I will repeat blood work in 1 month and if it continued to deteriorate will advised to see transplant doctor immediately    He has appointment to see them in October any way    Essential hypertension  Very well control    CAD (coronary artery disease)  Being monitored by cardiologist      I will see him back in 6 month  In between he will be seen by transplant nephrologist   He will get blood test in 1 month and in 6 month        Portions of the record may have been created with voice recognition software  Occasional wrong word or "sound a like" substitutions may have occurred due to the inherent limitations of voice recognition software  Read the chart carefully and recognize, using context, where substitutions have occurred  If you have any questions, please contact the dictating provider

## 2018-07-12 NOTE — ASSESSMENT & PLAN NOTE
Kidney function is slightly worse at creatinine 1 34  I discussed with him  Advised to drink lots of liquid  I will repeat blood work in 1 month and if it continued to deteriorate will advised to see transplant doctor immediately    He has appointment to see them in October any way

## 2018-07-12 NOTE — PATIENT INSTRUCTIONS
Chronic Kidney Disease   AMBULATORY CARE:   Chronic kidney disease (CKD)  is the gradual and permanent loss of kidney function  Normally, the kidneys remove fluid, chemicals, and waste from your blood  These wastes are turned into urine by your kidneys  CKD may worsen over time and lead to kidney failure  Common symptoms include the following:   · Changes in how often you need to urinate    · Swelling in your arms, legs, or feet    · Shortness of breath    · Fatigue or weakness    · Bad or bitter taste in your mouth    · Nausea, vomiting, or loss of appetite  Seek care immediately if:   · You are confused and very drowsy  · You have a seizure  · You have shortness of breath  Contact your healthcare provider if:   · You suddenly gain or lose more weight than your healthcare provider has told you is okay  · You have itchy skin or a rash  · You urinate more or less than you normally do  · You have blood in your urine  · You have nausea and repeated vomiting  · You have fatigue or muscle weakness  · You have hiccups that will not stop  · You have questions or concerns about your condition or care  Treatment for CKD:  Medicines may be given to decrease blood pressure and get rid of extra fluid  You may also receive medicine to manage health conditions that may occur with CKD  Dialysis is a treatment to remove chemicals and waste from your blood when your kidneys can no longer do this  Surgery may be needed to create an arteriovenous fistula (AVF) in your arm or insert a catheter into your abdomen so that you can receive dialysis  A kidney transplant may be done if your CKD becomes severe  Manage CKD:   · Maintain a healthy weight  Ask your healthcare provider how much you should weigh  Ask him to help you create a weight loss plan if you are overweight  · Exercise 30 to 60 minutes a day, 4 to 7 times a week, or as directed  Ask about the best exercise plan for you   Regular exercise can help you manage CKD, high blood pressure, and diabetes  · Follow your healthcare provider's advice about what to eat and drink  He may tell you to eat food low in sodium (salt), potassium, phosphorus, or protein  You may need to see a dietitian if you need help planning meals  Ask how much liquid to drink each day and which liquids are best for you  · Limit alcohol  Ask how much alcohol is safe for you to drink  A drink of alcohol is 12 ounces of beer, 5 ounces of wine, or 1½ ounces of liquor  · Do not smoke  Nicotine and other chemicals in cigarettes and cigars can cause lung and kidney damage  Ask your healthcare provider for information if you currently smoke and need help to quit  E-cigarettes or smokeless tobacco still contain nicotine  Talk to your healthcare provider before you use these products  · Ask your healthcare provider if you need vaccines  Infections such as pneumonia, influenza, and hepatitis can be more harmful or more likely to occur in a person who has CKD  Vaccines reduce your risk of infection with these viruses  Follow up with your healthcare provider as directed:  Write down your questions so you remember to ask them during your visits  © 2017 [address] [employer] Information is for End User's use only and may not be sold, redistributed or otherwise used for commercial purposes  All illustrations and images included in CareNotes® are the copyrighted property of [employer]  or Russell Curry  The above information is an  only  It is not intended as medical advice for individual conditions or treatments  Talk to your doctor, nurse or pharmacist before following any medical regimen to see if it is safe and effective for you

## 2018-07-14 DIAGNOSIS — E78.2 MIXED HYPERLIPIDEMIA: ICD-10-CM

## 2018-07-16 RX ORDER — ATORVASTATIN CALCIUM 10 MG/1
TABLET, FILM COATED ORAL
Qty: 90 TABLET | Refills: 1 | Status: SHIPPED | OUTPATIENT
Start: 2018-07-16 | End: 2019-06-14 | Stop reason: SDUPTHER

## 2018-08-15 LAB
BUN SERPL-MCNC: 29 MG/DL (ref 5–25)
CO2 SERPL-SCNC: 22 MMOL/L (ref 21–32)
CREAT SERPL-MCNC: 1.38 MG/DL (ref 0.6–1.3)
EXT GLUCOSE BLD: 78
EXTERNAL ANION GAP: 9
EXTERNAL CALCIUM: 8.8
EXTERNAL CHLORIDE: 106
EXTERNAL EGFR: 53
EXTERNAL POTASSIUM: 4.4
EXTERNAL SODIUM: 137

## 2018-08-16 DIAGNOSIS — Z94.0 KIDNEY TRANSPLANTED: ICD-10-CM

## 2018-08-16 RX ORDER — TACROLIMUS 1 MG/1
1 CAPSULE ORAL 2 TIMES DAILY
Qty: 180 CAPSULE | Refills: 0 | Status: SHIPPED | OUTPATIENT
Start: 2018-08-16 | End: 2018-08-17 | Stop reason: SDUPTHER

## 2018-08-16 RX ORDER — TACROLIMUS 1 MG/1
1 CAPSULE ORAL 2 TIMES DAILY
Qty: 180 CAPSULE | Refills: 1 | Status: SHIPPED | OUTPATIENT
Start: 2018-08-16 | End: 2018-08-16 | Stop reason: SDUPTHER

## 2018-08-16 NOTE — TELEPHONE ENCOUNTER
Emily Schwartz needs a refill for Tacrolimus 1 mg; take 1 capsule 2 times a day; qty 61; Emily Foleyreji also wants the results of his creatinine level he had done on 8/13/18

## 2018-08-17 DIAGNOSIS — Z94.0 KIDNEY TRANSPLANTED: ICD-10-CM

## 2018-08-17 RX ORDER — TACROLIMUS 1 MG/1
1 CAPSULE ORAL 2 TIMES DAILY
Qty: 180 CAPSULE | Refills: 0 | Status: SHIPPED | OUTPATIENT
Start: 2018-08-17 | End: 2018-11-14 | Stop reason: SDUPTHER

## 2018-08-17 NOTE — TELEPHONE ENCOUNTER
Patient called and he actually needed his Tacrol[facility]mg [facility] [place], it was sent to the wrong pharmacy

## 2018-08-29 DIAGNOSIS — I10 ESSENTIAL HYPERTENSION: ICD-10-CM

## 2018-08-29 NOTE — TELEPHONE ENCOUNTER
Please contact patient, his chart lists metoprolol succinate but this is requesting metoprolol tartrate

## 2018-09-05 ENCOUNTER — OFFICE VISIT (OUTPATIENT)
Dept: DERMATOLOGY | Facility: CLINIC | Age: 68
End: 2018-09-05
Payer: MEDICARE

## 2018-09-05 DIAGNOSIS — D22.9 MULTIPLE NEVI: ICD-10-CM

## 2018-09-05 DIAGNOSIS — Z13.89 SCREENING FOR SKIN CONDITION: ICD-10-CM

## 2018-09-05 DIAGNOSIS — Z92.25 HISTORY OF IMMUNOSUPPRESSIVE THERAPY: Primary | ICD-10-CM

## 2018-09-05 PROCEDURE — 99213 OFFICE O/P EST LOW 20 MIN: CPT | Performed by: DERMATOLOGY

## 2018-09-05 NOTE — PROGRESS NOTES
[facility]  [address]  [facility] [address]  [phone]  [phone]     MRN: [id number] : 1950  Encounter: [id number]  Patient Information: Mai Velasquez  Chief complaint:Yearly checkup    History of present illness:  46-year-old male with history of renal transplant presents for overall checkup and concerned regarding potential skin cancer no specific concerns noted  Past Medical History:   Diagnosis Date    Bacterial pneumonia     last assessed: 2017    Benign neoplasm of skin     Cardiac disorder     GERD (gastroesophageal reflux disease)     Gross hematuria     last assessed: 2016    Hypercholesterolemia     Hypertension     Kidney disease     Kidney transplanted     x 2001 and     Osteoporosis     Pneumonia     last assessed: 10/9/2017    Seborrheic keratosis     Sinusitis     Viral warts      Past Surgical History:   Procedure Laterality Date    AV FISTULA PLACEMENT Right     arteriovenous surgery creation of A-V fistula, right arm radiocephalic-Dr Haney Power    AV FISTULA PLACEMENT Left     hemodialysis acess type arteriovenous fistula, left brachiocepalic AVF [id number]  [facility]      CORONARY STENT PLACEMENT  09/15/2011    PTA stenting-[facility]H/M Dr Freddie Hemphill  2006    10/2006-[facility]   [place] OTHER SURGICAL HISTORY  2012    fluids from transplant    OTHER SURGICAL HISTORY Left 2013    shaving of lesion shoulders, skin left shoulder combined compound and blue nevus    TONSILLECTOMY      TRANSPLANTATION RENAL Left [place]    TRANSPLANTATION RENAL Right 2011    [facility]     Social History   History   Alcohol Use No     History   Drug Use No     History   Smoking Status    Never Smoker   Smokeless Tobacco    Never Used     Family History   Problem Relation Age of Onset  Polycystic kidney disease Father     Kidney disease Father      Meds/Allergies   No Known Allergies    Meds:  Prior to Admission medications    Medication Sig Start Date End Date Taking?  Authorizing Provider   aspirin 81 mg chewable tablet Chew   Yes Historical Provider, MD   atorvastatin (LIPITOR) 10 mg tablet TAKE 1 TABLET BY MOUTH EVERY DAY 7/16/18  Yes Irineo Lawler PA-C   metoprolol tartrate (LOPRESSOR) 25 mg tablet TAKE 1 TABLET BY MOUTH ONCE DAILY 8/29/18  Yes Heide Granados MD   mycophenolate (MYFORTIC) 360 MG TBEC Take 1 tablet (360 mg total) by mouth 2 (two) times a day 3/9/18  Yes Angel Simeon MD   Omega-3 Fatty Acids (FISH OIL CONCENTRATE) 300 MG CAPS Take 1 capsule by mouth 2 (two) times a day   Yes Historical Provider, MD   predniSONE 5 mg tablet Take 5 mg by mouth daily 3/29/14  Yes Historical Provider, MD   tacrolimus (PROGRAF) 1 mg capsule Take 1 capsule (1 mg total) by mouth 2 (two) times a day 8/17/18  Yes Angel Simeon MD   tamsulosin (FLOMAX) 0 4 mg Take by mouth daily   Yes Historical Provider, MD   famotidine (PEPCID) 20 mg tablet Take 1 tablet by mouth 2 (two) times a day for 30 days 12/19/16 1/30/18  Andres Mcarthur DO       Subjective:     Review of Systems:    General: negative for - chills, fatigue, fever,  weight gain or weight loss  Psychological: negative for - anxiety, behavioral disorder, concentration difficulties, decreased libido, depression, irritability, memory difficulties, mood swings, sleep disturbances or suicidal ideation  ENT: negative for - hearing difficulties , nasal congestion, nasal discharge, oral lesions, sinus pain, sneezing, sore throat  Allergy and Immunology: negative for - hives, insect bite sensitivity,  Hematological and Lymphatic: negative for - bleeding problems, blood clots,bruising, swollen lymph nodes  Endocrine: negative for - hair pattern changes, hot flashes, malaise/lethargy, mood swings, palpitations, polydipsia/polyuria, skin changes, temperature intolerance or unexpected weight change  Respiratory: negative for - cough, hemoptysis, orthopnea, shortness of breath, or wheezing  Cardiovascular: negative for - chest pain, dyspnea on exertion, edema,  Gastrointestinal: negative for - abdominal pain, nausea/vomiting  Genito-Urinary: negative for - dysuria, incontinence, irregular/heavy menses or urinary frequency/urgency  Musculoskeletal: negative for - gait disturbance, joint pain, joint stiffness, joint swelling, muscle pain, muscular weakness  Dermatological:  As in HPI  Neurological: negative for confusion, dizziness, headaches, impaired coordination/balance, memory loss, numbness/tingling, seizures, speech problems, tremors or weakness       Objective: There were no vitals taken for this visit  Physical Exam:    General Appearance:    Alert, cooperative, no distress   Head:    Normocephalic, without obvious abnormality, atraumatic           Skin:   A full skin exam was performed including scalp, head scalp, eyes, ears, nose, lips, neck, chest, axilla, abdomen, back, buttocks, bilateral upper extremities, bilateral lower extremities, hands, feet, fingers, toes, fingernails, and toenails  Normal pigmented lesions regular shape and color nothing else remarkable noted on complete exam     Assessment:     1  History of immunosuppressive therapy     2  Screening for skin condition     3  Multiple nevi           Plan:   Nevi reviewed the concept of ABCDE and ugly duckling nothing markedly atypical patient reassured  History of immunosuppressive therapy patient advised cause of the medications that he is on he is at higher risk for potential skin cancers  If any changes growths have occurred patient should notify us  Also patient should be extremely careful with sun exposure  Yearly follow-up recommended    Screening for Dermatologic Disorders: Nothing else of concern noted on complete exam follow up in 1 year       Liana Gitelman, MD  9/5/2018,1:06 PM    Portions of the record may have been created with voice recognition software   Occasional wrong word or "sound a like" substitutions may have occurred due to the inherent limitations of voice recognition software   Read the chart carefully and recognize, using context, where substitutions have occurred

## 2018-09-05 NOTE — PATIENT INSTRUCTIONS
Nevi reviewed the concept of ABCDE and ugly duckling nothing markedly atypical patient reassured  History of immunosuppressive therapy patient advised cause of the medications that he is on he is at higher risk for potential skin cancers  If any changes growths have occurred patient should notify us  Also patient should be extremely careful with sun exposure  Yearly follow-up recommended    Screening for Dermatologic Disorders: Nothing else of concern noted on complete exam follow up in 1 year

## 2018-09-28 ENCOUNTER — TELEPHONE (OUTPATIENT)
Dept: NEPHROLOGY | Facility: CLINIC | Age: 68
End: 2018-09-28

## 2018-11-13 ENCOUNTER — IMMUNIZATION (OUTPATIENT)
Dept: INTERNAL MEDICINE CLINIC | Facility: CLINIC | Age: 68
End: 2018-11-13
Payer: MEDICARE

## 2018-11-13 DIAGNOSIS — Z23 ENCOUNTER FOR IMMUNIZATION: ICD-10-CM

## 2018-11-13 PROCEDURE — 90471 IMMUNIZATION ADMIN: CPT

## 2018-11-13 PROCEDURE — 90682 RIV4 VACC RECOMBINANT DNA IM: CPT

## 2018-11-14 DIAGNOSIS — Z94.0 KIDNEY TRANSPLANTED: ICD-10-CM

## 2018-11-14 RX ORDER — TACROLIMUS 1 MG/1
1 CAPSULE ORAL 2 TIMES DAILY
Qty: 180 CAPSULE | Refills: 3 | Status: SHIPPED | OUTPATIENT
Start: 2018-11-14 | End: 2019-12-11 | Stop reason: SDUPTHER

## 2018-11-14 NOTE — TELEPHONE ENCOUNTER
Isi Ortiz called stating that Sabrina Plummer needs a refill for his Tacrolimus 1mg he take it 2x's a day w/180qu  Please call [facility] @ [phone]

## 2018-11-17 NOTE — PROGRESS NOTES
Problem List Items Addressed This Visit     Benign localized prostatic hyperplasia with lower urinary tract symptoms (LUTS) - Primary     Anirudh and I had a productive consultation today regarding his lower urinary tract symptoms  We discussed the possible sources of these symptoms including failure to properly store urine and failure to properly empty urine  We discussed the normal anatomy of the bladder, prostate, bladder neck, and urethra, as well as the sphincter mechanism and the normal sequence of relaxation of the external urinary sphincter followed by contraction of the detrusor muscle and evacuation of urinary bladder  We discussed use of the AUA symptom score as a validated benefit of tracking lower urinary tract symptoms over time  We also discussed the role of a uroflow determination as well as a postvoid residual determination as well as the normal findings of both of these tests  We then discussed treatment of lower urinary tract symptoms in the form of medical therapy, behavioral changes and fluid modification to decrease symptoms, the use of minimally invasive therapies for bladder outlet obstruction such as the Urolift procedure, and the use of Transurethral resection of prostate and simple prostatectomy in patients with severe bladder outlet obstruction  He tends to prefer nonsurgical approaches given his kidney disease history  With regard to medical therapies we discussed alpha blockers such as tamsulosin as well as 5 alpha reductase inhibitors such as finasteride  We discussed the mechanism of action of each and the potential risks and side effects including dizziness, weakness, hypotension, retrograde ejaculation, potential for allergic reaction, decreased in semen volume, decreased male pattern baldness for 5 alpha reductase inhibitors, and potential for decrease in libido or erectile dysfunction in less than 10% of men taking 5 alpha reductase inhibitors    We talked about the to decrease in the AUA symptom score for medical therapy in the range of 5-7 units on the AUA symptom score  In men who wish to not take medical therapy and in those men who desire a more definitive procedure while avoiding the risks of a more invasive therapy for bladder outlet obstruction, the Urolift procedure is a useful option  The mechanics of this operation and the pre, vinita, and postoperative care were discussed with the patient and the patient was given literature on this procedure  The expected decrease in AUA symptom score of around 11 units after this procedure was also discussed with them  Finally, we reviewed the indications for more invasive therapies such as TURP and simple prostatectomy including gross hematuria from benign prostatic enlargement, recurrent urinary tract infections from bladder outlet obstruction, bladder stone formation, urinary retention from benign prostatic enlargement, and on managed symptoms in men taking medical therapy for their LUTS  The role of transrectal ultrasound and potential prostate biopsy for determination of prostatic volume and risk for prostate cancer prior to the above therapies was also discussed with the patient  Plan:  He is interested Uro lift, we will proceed to cystoscopy and transrectal ultrasound with uroflow PVR at his next visit    If he does wish to undergo Uro lift, he will need transplant team clearance prior to undergoing this operation as well as cardiac clearance               Incomplete bladder emptying     Patient with difficulty initiating and maintaining urinary flow,  milliliters, AUA symptom score is 28 with a bother score of 5 indicating high bother and severe urinary symptoms                     Assessment and plan:     Please see problem oriented charting for the assessment plan of today's urological complaints    Jose Tidwell MD      Chief Complaint     Chief Complaint   Patient presents with   Clara Cm Urinary Frequency    Urinary Urgency    Incomplete bladder emptying      History of Present Illness     Narda Moctezuma is a 80 y o  gentleman with a history of polycystic kidney disease, he is status post a  donor renal transplant in , he is then status post a 2nd, living donor renal transplant in 2011  He is doing well from a transplant surgery perspective  The patient is referred in consultation by Dr Ar Patton against this urologic background  A copy of today's consultation will be sent to the referring provider in the name of continuity of care    For the last few years he has been noting increasing lower urinary tract symptoms, currently his AUA symptom score is 28 with a bother score of 5  He has been taking Flomax, with poor resolution of his symptoms up to this point  He previously saw Dr Christiana Xiao for his lower urinary tract symptoms and dysfunction  His postvoid residual urine volume today is 333 milliliters indicating incomplete bladder emptying  On urinalysis today he has no leukocytes, nitrites, blood, protein, he has trace ketones, and no glucose  On review of systems he denies dysuria, he does have hesitancy of urination along with intermittency, he has urgency, frequency, and he gets up 2-3 times at night to void  He is interested in further therapies to get him urinating better as he has severe bother from his symptoms  The following portions of the patient's history were reviewed and updated as appropriate: allergies, current medications, past family history, past medical history, past social history, past surgical history and problem list       Detailed Urologic History     - please refer to HPI    Review of Systems     Review of Systems   Constitutional: Negative  HENT: Negative  Eyes: Negative  Respiratory: Negative  Cardiovascular: Negative  Gastrointestinal: Negative  Endocrine: Negative      Genitourinary: Positive for decreased urine volume, difficulty urinating and frequency  Musculoskeletal: Negative  Skin: Negative  Allergic/Immunologic: Negative  Neurological: Negative  Hematological: Negative  Psychiatric/Behavioral: Negative  Allergies     Allergies   Allergen Reactions    Other        Physical Exam     Physical Exam   Constitutional: He is oriented to person, place, and time  He appears well-developed and well-nourished  No distress  HENT:   Head: Normocephalic and atraumatic  Right Ear: External ear normal    Left Ear: External ear normal    Nose: Nose normal    Eyes: Pupils are equal, round, and reactive to light  Conjunctivae and EOM are normal  Right eye exhibits no discharge  Left eye exhibits no discharge  No scleral icterus  Neck: Normal range of motion  Neck supple  Cardiovascular: Regular rhythm and intact distal pulses  Pulmonary/Chest: Effort normal  No stridor  No respiratory distress  He has no wheezes  Abdominal: Soft  He exhibits no distension and no mass  There is no tenderness  There is no rebound and no guarding  No hernia  Hernia confirmed negative in the right inguinal area and confirmed negative in the left inguinal area  Scars from previous surgeries are noted   Genitourinary: Rectal exam shows no external hemorrhoid, no internal hemorrhoid, no fissure, no mass, no tenderness and anal tone normal  Prostate is enlarged (55 grams, smooth, no nodules)  Prostate is not tender  Right testis shows no mass, no swelling and no tenderness  Right testis is descended  Cremasteric reflex is not absent on the right side  Left testis shows no mass, no swelling and no tenderness  Left testis is descended  Cremasteric reflex is not absent on the left side  Circumcised  No hypospadias, penile erythema or penile tenderness  No discharge found  Musculoskeletal: Normal range of motion  He exhibits no edema, tenderness or deformity     Lymphadenopathy: No inguinal adenopathy noted on the right or left side  Neurological: He is alert and oriented to person, place, and time  No cranial nerve deficit or sensory deficit  He exhibits normal muscle tone  Coordination normal    Skin: Skin is warm and dry  No rash noted  He is not diaphoretic  No erythema  No pallor  Psychiatric: He has a normal mood and affect  His behavior is normal  Judgment and thought content normal    Nursing note and vitals reviewed            Vital Signs  Vitals:    11/20/18 1005   BP: 118/78   BP Location: Right arm   Patient Position: Sitting   Cuff Size: Adult   Pulse: 76   Weight: 71 kg (156 lb 9 6 oz)   Height: 5' 4" (1 626 m)         Current Medications       Current Outpatient Prescriptions:     aspirin 81 mg chewable tablet, Chew, Disp: , Rfl:     atorvastatin (LIPITOR) 10 mg tablet, TAKE 1 TABLET BY MOUTH EVERY DAY, Disp: 90 tablet, Rfl: 1    metoprolol tartrate (LOPRESSOR) 25 mg tablet, TAKE 1 TABLET BY MOUTH ONCE DAILY, Disp: 90 tablet, Rfl: 1    mycophenolate (MYFORTIC) 360 MG TBEC, Take 1 tablet (360 mg total) by mouth 2 (two) times a day, Disp: 90 tablet, Rfl: 0    Omega-3 Fatty Acids (FISH OIL CONCENTRATE) 300 MG CAPS, Take 1 capsule by mouth 2 (two) times a day, Disp: , Rfl:     predniSONE 5 mg tablet, Take 5 mg by mouth daily, Disp: , Rfl:     tacrolimus (PROGRAF) 1 mg capsule, Take 1 capsule (1 mg total) by mouth 2 (two) times a day, Disp: 180 capsule, Rfl: 3    tamsulosin (FLOMAX) 0 4 mg, Take by mouth daily, Disp: , Rfl:     famotidine (PEPCID) 20 mg tablet, Take 1 tablet by mouth 2 (two) times a day for 30 days, Disp: 60 tablet, Rfl: 0      Active Problems     Patient Active Problem List   Diagnosis    [facility]    Hypoalbuminemia    CAD (coronary artery disease)    H/O kidney transplant    [facility]    HLD (hyperlipidemia)    S/P mitral valve repair    Polycystic kidney disease    CKD (chronic kidney disease)    Essential hypertension    Elevated brain natriuretic peptide (BNP) level    Immunosuppression (HCC)    Renal transplant recipient    Acute non-recurrent maxillary sinusitis    H/O recurrent pneumonia    Multiple nevi    Benign localized prostatic hyperplasia with lower urinary tract symptoms (LUTS)    Incomplete bladder emptying         Past Medical History     Past Medical History:   Diagnosis Date    Bacterial pneumonia     last assessed: 2/2/2017    Benign neoplasm of skin     Cardiac disorder     GERD (gastroesophageal reflux disease)     Gross hematuria     last assessed: 12/5/2016    Hypercholesterolemia     Hypertension     Kidney disease     Kidney transplanted     x 2, 2001 and 2012    Osteoporosis     Pneumonia     last assessed: 10/9/2017    Seborrheic keratosis     Sinusitis     Viral warts          Surgical History     Past Surgical History:   Procedure Laterality Date    AV FISTULA PLACEMENT Right 2001    arteriovenous surgery creation of A-V fistula, right arm radiocephalic-Dr Moraima Harden    AV FISTULA PLACEMENT Left 2011    hemodialysis acess type arteriovenous fistula, left brachiocepalic AVF [id number]  [address] [place] [address]  2012    CORONARY STENT PLACEMENT  09/15/2011    PTA stenting-[facility]/M Dr Shira Chawla  2006    10/2006-[facility]    NEPHRECTOMY TRANSPLANTED ORGAN      OTHER SURGICAL HISTORY  01/2012    fluids from transplant    OTHER SURGICAL HISTORY Left 07/08/2013    shaving of lesion shoulders, skin left shoulder combined compound and blue nevus    TONSILLECTOMY      TRANSPLANTATION RENAL Left [facility] [place]    TRANSPLANTATION RENAL Right 12/08/2011    [facility] Cu[place]         Family History     Family History   Problem Relation Age of Onset    Polycystic kidney disease Father     Kidney disease Father          Social History     Social History     History   Smoking Status    Never Smoker   Smokeless Tobacco    Never Used         Pertinent Lab Values     Lab Results   Component Value Date    CREATININE 1 38 (A) 08/13/2018       Lab Results   Component Value Date    PSA 2 1 12/29/2014     The patient's PSA is 3 71 as of March 2018   Based on examination this is likely due to benign prostatic enlargement        Pertinent Imaging      There is no pertinent urological imaging for my review

## 2018-11-17 NOTE — PATIENT INSTRUCTIONS
Decision Aid for Benign Prostatic Hyperplasia   WHAT YOU NEED TO KNOW:   What do I need to know about decisions for benign prostatic hyperplasia (BPH)? You can work with your healthcare provider to make decisions about being screened or treated for BPH  Screening is a test done to find BPH early  Screening is different from diagnosis because screening is used before you first start to have signs or symptoms  This means management or treatment can start early  You can also help plan treatment if BPH is found with screening, or you develop it later on  Your treatment choices include nonsurgical options and surgery  Your healthcare provider may recommend nonsurgical treatments first  Learn about the benefits and risks of nonsurgical treatment and surgery so you can make an informed choice  What do I need to know about BPH?   · BPH is an enlarged prostate  The prostate is a small gland that is part of the reproductive system  It sits around your urethra (tube that carries urine out of your bladder)  The prostate is usually about the size of a walnut  An enlarged prostate will press on the urethra  This can cause problems with storing urine or emptying your bladder completely  · BPH is common in men older than 40 years  The risk increases with age  · Benign means it is not cancer  BPH is not a life-threatening condition, but it can cause problems with your daily activities  BPH usually gets worse over time  Left untreated, BPH can also lead to blood in your urine, bladder stones, or kidney failure  · Talk to your healthcare provider if you think you have signs or symptoms of BPH  Examples include problems starting your urine to flow, or an urgent need to urinate  You may be woken from sleep by the need to urinate  Am I a good candidate for BPH screening? Screening may be helpful for you if any of the following is true:  · You are 40 years or older      · You have a family history of BPH or other prostate problems  · You have symptoms of BPH that cause problems with your daily activities or quality of life  · You want to have treatment as early as possible if needed  · You have heart disease or take a beta-blocker medicine  How is screening done? · The International Prostate Symptom Score  is a set of questions about your ability to urinate over the past month  You will be asked how often you have any of the following:     ¨ A feeling of not fully emptying your bladder when you urinate    ¨ A need to urinate again within 2 hours after you last urinated    ¨ Urine that stops and starts several times when you urinate    ¨ An urgent need to urinate that you could not put off    ¨ A weak urine stream    ¨ Trouble starting your urine flow, or a need to push or strain to get it to start    ¨ Being woken from sleep because you needed to urinate    · A digital rectal exam  is used to check the size of your prostate  Your healthcare provider will insert a gloved finger into your rectum  The provider will be able to feel your prostate  The exam may be repeated over time to check the prostate size  · A PSA test  is used to measure the amount of a protein made by your prostate gland  A blood sample is taken for this test  A high PSA level can increase your risk for more severe urination problems or the need for surgery  What are the benefits and risks of screening? Talk with your healthcare provider about the risks and benefits of screening:  · Benefits  include finding BPH early  This means you can make more decisions about treatments  The PSA test can also find prostate cancer early  Treatment of prostate cancer is more successful when it starts early  · Risks  include a false belief that you will not develop BPH if your screening result is negative  Even though your screening result is negative, you may still develop it later on   You may also need more tests if you have problems urinating but screening shows you do not have BPH  What questions should I ask my healthcare provider to help me make decisions about screening? · How high is my risk for BPH? · How often do I need to have screening? · Where is the screening done? · Do I need to do anything to get ready to have screening? What happens after I have screening? You will meet with your healthcare provider to go over the results of your screening  You may need more tests to diagnose anything that showed up on the screening test  Common tests include a urine test to check for an infection or a biopsy (tissue sample) to check for cancer  You may also need tests to measure the amount of urine left in your bladder after you urinate  The force of your urine flow may also be measured  You and your healthcare provider can talk about your treatment options  Together you can decide which treatment is right for you  How is BPH treated, and what are the benefits of treatment? · Watchful waiting  means you do not receive treatment right away  Your signs and symptoms will be monitored over time to see if they get worse  Your healthcare provider may recommend ways to improve or track your symptoms during watchful waiting  Examples include drinking less liquid or urinating on a regular schedule  Your provider may also recommend lifestyle changes  For example, your symptoms may improve if you lose weight or have less caffeine if needed  You may be asked to keep a record of your symptoms and when you urinate  The record will include when you urinate, how easy or difficult it was, and any changes in urination  You will bring the record to follow-up visits to help you and your provider decide on treatment you may want to try  · Medicines  may be given to help your symptoms and to prevent BPH from getting worse  Medicines may help relax certain muscles to make it easier for you to urinate   You may also need medicine to make your prostate smaller or to relieve an overactive bladder  Medicines may start to relieve your symptoms quickly  Medicines can improve your quality of life  You may also be able to manage your symptoms without surgery if medicine keeps your BPH from getting worse  · Surgery  may be used to relieve your symptoms if other treatments do not work  An ablation is surgery that uses a needle to destroy extra tissue that is causing your symptoms  A laser may instead be used to destroy the tissue  Your prostate may be heated  A tool that gives off heat is inserted into your urethra  Prostate tissue is destroyed, and no other tissues are damaged  Parts of your prostate may be removed during another type of surgery  What are the risks of BPH treatment? · Watchful waiting  may allow BPH to get worse and be more difficult to treat than at an early stage  If you have a high PSA level, you may need to have BPH treated right away  · Medicines  can cause certain side effects, such as erectile dysfunction (ED) or a lowered sex drive  You may also develop urinary retention (trouble starting your urine to flow)  Some medicines can cause hypotension (low blood pressure) when you stand, or dizziness  · Surgery  can damage tissue around your prostate  Surgery can also increase your risk for trouble urinating, incontinence (leaking), or urinary retention  You may bleed more than expected during surgery or develop an infection  You may also need to be treated again if the surgery you have does not relieve your symptoms  What questions should I ask my provider to help me make decisions about treatment? · How will I feel if I continue to have these symptoms for the rest of my life? · Which medicines may work best to treat my symptoms? · What other steps can I take to decrease my symptoms? · Will I have to take medicine for the rest of my life? · What side effects might happen with each medicine I can try?     · Am I a good candidate for surgery? · Which surgery may work best to treat my symptoms? · Where is the surgery done? · How long is recovery after surgery? · What are the possible side effects of surgery? CARE AGREEMENT:   You have the right to help plan your care  Learn about your health condition and how it may be treated  Discuss treatment options with your caregivers to decide what care you want to receive  You always have the right to refuse treatment  The above information is an  only  It is not intended as medical advice for individual conditions or treatments  Talk to your doctor, nurse or pharmacist before following any medical regimen to see if it is safe and effective for you  © 2017 [facility] Information is for End User's use only and may not be sold, redistributed or otherwise used for commercial purposes  All illustrations and images included in CareNotes® are the copyrighted property of [employer]  or Russell Curry  Prostatic Urethral Lift Procedure: Urolift    Benign Prostatic enlargement, also sometimes called benign prostatic hyperplasia with lower urinary tract symptoms (BPH with LUTS), is a condition in which the prostate enlarges as men age  This disease process is very common, affecting nearly 40 million Americans and 500 million men worldwide  More than 40% of men in their 46s and more than 70% of men in their 62s have an enlarged prostate  While enlargement of the prostate is often a benign condition and unrelated to prostate cancer, it can greatly affect a man's quality of life  Just because the man has a larger prostate does not necessarily mean he has more symptoms than someone with a smaller prostate  The opposite is also true  The UroLift® System offers rapid, lasting relief from benign prostatic enlargement causing lower urinary tract symptoms  This procedure is also known is a prostatic urethral lift procedure    Small implants are used to hold open the obstructed pathway that blocks urine flow  This channel is made through the anterior prostate and essentially acts as a mold of a Manrique catheter creating a lasting passage through the prostate and decreasing bladder outlet resistance  Unlike medications, the UroLift System treatment addresses the blockage directly, offering a mechanical solution to a mechanical problem  In such a fashion, men that are taking medications for benign prostatic enlargement with lower urinary tract symptoms can often be taken off of these medicines  Decreasing medication burden has real benefits in terms of decreasing polypharmacy, and decreasing the burden of having to take multiple pills, every day  Many men living with BPH symptoms take prescription medications after they are diagnosed, although these often do not provide adequate relief and may cause dizziness, fatigue, and sexual dysfunction  The [address] is a treatment option for men looking for relief from BPH symptoms  It does not require ongoing medication, heating, cutting or removal of prostate tissue, and the [address] treatment typically takes less than one hour  In fact, placement of the device within the urethra usually takes around 10 minutes (with the patient requiring some time at the beginning and the end of the procedure to undergo sedation or anesthesia and to emerge from this)  Finally, placement of the Uro lift system does not preclude a patient from undergoing further therapies for prostatic enlargement in the event that the prostate continues to grow and obstruct urine thai over time  The long-term data (5 year results) suggests a lasting treatment benefit after placement of the Urolift device (most men receive 4 implants, sometimes more and sometimes less depending on prostatic anatomy)      To ensure that you are a good candidate for Uro lift you will undergo some testing as the Urolift procedure does not work particularly well for men with very large prostates (greater than 80 grams in size), or in men with a median lobe (a 3rd part of the prostate that grows into the lumen of the urinary bladder like a cobra head or ball valve that blocks the bladder outlet)  These tests include determination of a postvoid residual urine volume (the urine remaining in your bladder after you urinate), completion of a symptom score survey (this helps us to track your symptoms over time as well as to look at your symptomatic improvement after the procedure), and cystoscopy to look inside your prostate and your bladder, as well as a transrectal ultrasound of the prostate for volume determination purposes  If your prostate specific antigen (or PSA) is elevated, you may require prostate biopsy prior to proceeding to the Urolift procedure  One final benefit of the Urolift procedure is that in men with prostate cancer that also have lower urinary tract symptoms or benign prostatic enlargement, the metal end-pieces of the implant also work as fiducial markers to viviane the location of the prostate such that the radiation oncologist may effectively radiate it for treatment of prostate cancer  Side effect of this procedure are usually mild and include blood in the urine, frequency of urination, urgency of urination, sensation of incomplete bladder emptying directly after placement of the device, and pain at the tip of the penis and in the pelvis  The symptoms are, admittedly, annoying in the vinita procedural time frame, but they do tend to improve and then disappear over a number of days to weeks  These symptoms tend to be less than in other prostate surgeries for benign prostatic enlargement with LUTS  You will be seen in follow-up in 4 weeks after the procedure at which time discussion is had about stopping prostate medications and at which time your postprocedural symptoms are re-evaluated      In summary, the Urolift system offers lasting results for men seeking a minimally invasive approach to the treatment of their lower urinary tract symptoms and in men desiring a long-term solution to the symptoms without the need for multiple medical therapies going forward  Dr Ebony Dillon is experienced in the placement of the Urolift system and is endorsed by the [employer] (developers of the Urolift system) in the completion of this minimally invasive surgery  Dr Ebony Dillon does not have a financial relationship with this company and he also offers other treatments for benign prostatic enlargement in men that are not good candidates for the Urolift procedure  Thank you for trusting your urologic health and care with the [facility]

## 2018-11-20 ENCOUNTER — OFFICE VISIT (OUTPATIENT)
Dept: UROLOGY | Facility: CLINIC | Age: 68
End: 2018-11-20
Payer: MEDICARE

## 2018-11-20 ENCOUNTER — TELEPHONE (OUTPATIENT)
Dept: UROLOGY | Facility: CLINIC | Age: 68
End: 2018-11-20

## 2018-11-20 VITALS
SYSTOLIC BLOOD PRESSURE: 118 MMHG | WEIGHT: 156.6 LBS | DIASTOLIC BLOOD PRESSURE: 78 MMHG | BODY MASS INDEX: 26.73 KG/M2 | HEART RATE: 76 BPM | HEIGHT: 64 IN

## 2018-11-20 DIAGNOSIS — N40.1 BENIGN LOCALIZED PROSTATIC HYPERPLASIA WITH LOWER URINARY TRACT SYMPTOMS (LUTS): Primary | ICD-10-CM

## 2018-11-20 DIAGNOSIS — R33.9 INCOMPLETE BLADDER EMPTYING: ICD-10-CM

## 2018-11-20 PROCEDURE — 51798 US URINE CAPACITY MEASURE: CPT | Performed by: UROLOGY

## 2018-11-20 PROCEDURE — 99204 OFFICE O/P NEW MOD 45 MIN: CPT | Performed by: UROLOGY

## 2018-11-20 NOTE — ASSESSMENT & PLAN NOTE
Patient with difficulty initiating and maintaining urinary flow,  milliliters, AUA symptom score is 28 with a bother score of 5 indicating high bother and severe urinary symptoms

## 2018-11-20 NOTE — LETTER
November 20, 2018     Tracy Bravo MD  [address]  [address] [employer]  [address] [place] [address]    Patient: Karol Watts   YOB: 1950   Date of Visit: 11/20/2018       Dear Dr Paul Villalobos: Thank you for referring Sarah Fall to me for evaluation  Below are my notes for this consultation  If you have questions, please do not hesitate to call me  I look forward to following your patient along with you  Sincerely,        Luz Elena Long MD        CC: MD Luz Elena Muñoz MD  11/20/2018 10:44 AM  Sign at close encounter       Problem List Items Addressed This Visit     Benign localized prostatic hyperplasia with lower urinary tract symptoms (LUTS) - Primary     Kristy Valdez and I had a productive consultation today regarding his lower urinary tract symptoms  We discussed the possible sources of these symptoms including failure to properly store urine and failure to properly empty urine  We discussed the normal anatomy of the bladder, prostate, bladder neck, and urethra, as well as the sphincter mechanism and the normal sequence of relaxation of the external urinary sphincter followed by contraction of the detrusor muscle and evacuation of urinary bladder  We discussed use of the AUA symptom score as a validated benefit of tracking lower urinary tract symptoms over time  We also discussed the role of a uroflow determination as well as a postvoid residual determination as well as the normal findings of both of these tests  We then discussed treatment of lower urinary tract symptoms in the form of medical therapy, behavioral changes and fluid modification to decrease symptoms, the use of minimally invasive therapies for bladder outlet obstruction such as the Urolift procedure, and the use of Transurethral resection of prostate and simple prostatectomy in patients with severe bladder outlet obstruction    He tends to prefer nonsurgical approaches given his kidney disease history  With regard to medical therapies we discussed alpha blockers such as tamsulosin as well as 5 alpha reductase inhibitors such as finasteride  We discussed the mechanism of action of each and the potential risks and side effects including dizziness, weakness, hypotension, retrograde ejaculation, potential for allergic reaction, decreased in semen volume, decreased male pattern baldness for 5 alpha reductase inhibitors, and potential for decrease in libido or erectile dysfunction in less than 10% of men taking 5 alpha reductase inhibitors  We talked about the to decrease in the AUA symptom score for medical therapy in the range of 5-7 units on the AUA symptom score  In men who wish to not take medical therapy and in those men who desire a more definitive procedure while avoiding the risks of a more invasive therapy for bladder outlet obstruction, the Urolift procedure is a useful option  The mechanics of this operation and the pre, vinita, and postoperative care were discussed with the patient and the patient was given literature on this procedure  The expected decrease in AUA symptom score of around 11 units after this procedure was also discussed with them  Finally, we reviewed the indications for more invasive therapies such as TURP and simple prostatectomy including gross hematuria from benign prostatic enlargement, recurrent urinary tract infections from bladder outlet obstruction, bladder stone formation, urinary retention from benign prostatic enlargement, and on managed symptoms in men taking medical therapy for their LUTS  The role of transrectal ultrasound and potential prostate biopsy for determination of prostatic volume and risk for prostate cancer prior to the above therapies was also discussed with the patient  Plan:  He is interested Uro lift, we will proceed to cystoscopy and transrectal ultrasound with uroflow PVR at his next visit    If he does wish to undergo Uro lift, he will need transplant team clearance prior to undergoing this operation as well as cardiac clearance               Incomplete bladder emptying     Patient with difficulty initiating and maintaining urinary flow,  milliliters, AUA symptom score is 28 with a bother score of 5 indicating high bother and severe urinary symptoms                     Assessment and plan:     Please see problem oriented charting for the assessment plan of today's urological complaints    Madelyn Crabtree MD      Chief Complaint     Chief Complaint   Patient presents with    Urinary Frequency    Urinary Urgency    Incomplete bladder emptying      History of Present Illness     Star Rodgers is a 80 y o  gentleman with a history of polycystic kidney disease, he is status post a  donor renal transplant in , he is then status post a 2nd, living donor renal transplant in 2011  He is doing well from a transplant surgery perspective  The patient is referred in consultation by Dr Helio Kramer against this urologic background  A copy of today's consultation will be sent to the referring provider in the name of continuity of care    For the last few years he has been noting increasing lower urinary tract symptoms, currently his AUA symptom score is 28 with a bother score of 5  He has been taking Flomax, with poor resolution of his symptoms up to this point  He previously saw Dr Gage Dixon for his lower urinary tract symptoms and dysfunction  His postvoid residual urine volume today is 333 milliliters indicating incomplete bladder emptying  On urinalysis today he has no leukocytes, nitrites, blood, protein, he has trace ketones, and no glucose  On review of systems he denies dysuria, he does have hesitancy of urination along with intermittency, he has urgency, frequency, and he gets up 2-3 times at night to void      He is interested in further therapies to get him urinating better as he has severe bother from his symptoms  The following portions of the patient's history were reviewed and updated as appropriate: allergies, current medications, past family history, past medical history, past social history, past surgical history and problem list       Detailed Urologic History     - please refer to HPI    Review of Systems     Review of Systems   Constitutional: Negative  HENT: Negative  Eyes: Negative  Respiratory: Negative  Cardiovascular: Negative  Gastrointestinal: Negative  Endocrine: Negative  Genitourinary: Positive for decreased urine volume, difficulty urinating and frequency  Musculoskeletal: Negative  Skin: Negative  Allergic/Immunologic: Negative  Neurological: Negative  Hematological: Negative  Psychiatric/Behavioral: Negative  Allergies     Allergies   Allergen Reactions    Other        Physical Exam     Physical Exam   Constitutional: He is oriented to person, place, and time  He appears well-developed and well-nourished  No distress  HENT:   Head: Normocephalic and atraumatic  Right Ear: External ear normal    Left Ear: External ear normal    Nose: Nose normal    Eyes: Pupils are equal, round, and reactive to light  Conjunctivae and EOM are normal  Right eye exhibits no discharge  Left eye exhibits no discharge  No scleral icterus  Neck: Normal range of motion  Neck supple  Cardiovascular: Regular rhythm and intact distal pulses  Pulmonary/Chest: Effort normal  No stridor  No respiratory distress  He has no wheezes  Abdominal: Soft  He exhibits no distension and no mass  There is no tenderness  There is no rebound and no guarding  No hernia  Hernia confirmed negative in the right inguinal area and confirmed negative in the left inguinal area     Scars from previous surgeries are noted   Genitourinary: Rectal exam shows no external hemorrhoid, no internal hemorrhoid, no fissure, no mass, no tenderness and anal tone normal  Prostate is enlarged (55 grams, smooth, no nodules)  Prostate is not tender  Right testis shows no mass, no swelling and no tenderness  Right testis is descended  Cremasteric reflex is not absent on the right side  Left testis shows no mass, no swelling and no tenderness  Left testis is descended  Cremasteric reflex is not absent on the left side  Circumcised  No hypospadias, penile erythema or penile tenderness  No discharge found  Musculoskeletal: Normal range of motion  He exhibits no edema, tenderness or deformity  Lymphadenopathy: No inguinal adenopathy noted on the right or left side  Neurological: He is alert and oriented to person, place, and time  No cranial nerve deficit or sensory deficit  He exhibits normal muscle tone  Coordination normal    Skin: Skin is warm and dry  No rash noted  He is not diaphoretic  No erythema  No pallor  Psychiatric: He has a normal mood and affect  His behavior is normal  Judgment and thought content normal    Nursing note and vitals reviewed            Vital Signs  Vitals:    11/20/18 1005   BP: 118/78   BP Location: Right arm   Patient Position: Sitting   Cuff Size: Adult   Pulse: 76   Weight: 71 kg (156 lb 9 6 oz)   Height: 5' 4" (1 626 m)         Current Medications       Current Outpatient Prescriptions:     aspirin 81 mg chewable tablet, Chew, Disp: , Rfl:     atorvastatin (LIPITOR) 10 mg tablet, TAKE 1 TABLET BY MOUTH EVERY DAY, Disp: 90 tablet, Rfl: 1    metoprolol tartrate (LOPRESSOR) 25 mg tablet, TAKE 1 TABLET BY MOUTH ONCE DAILY, Disp: 90 tablet, Rfl: 1    mycophenolate (MYFORTIC) 360 MG TBEC, Take 1 tablet (360 mg total) by mouth 2 (two) times a day, Disp: 90 tablet, Rfl: 0    Omega-3 Fatty Acids (FISH OIL CONCENTRATE) 300 MG CAPS, Take 1 capsule by mouth 2 (two) times a day, Disp: , Rfl:     predniSONE 5 mg tablet, Take 5 mg by mouth daily, Disp: , Rfl:     tacrolimus (PROGRAF) 1 mg capsule, Take 1 capsule (1 mg total) by mouth 2 (two) times a day, Disp: 180 capsule, Rfl: 3    tamsulosin (FLOMAX) 0 4 mg, Take by mouth daily, Disp: , Rfl:     famotidine (PEPCID) 20 mg tablet, Take 1 tablet by mouth 2 (two) times a day for 30 days, Disp: 60 tablet, Rfl: 0      Active Problems     Patient Active Problem List   Diagnosis    [facility] ([facility] )    Hypoalbuminemia    CAD (coronary artery disease)    H/O kidney transplant    New onset a-fib ([facility] )    HLD (hyperlipidemia)    S/P mitral valve repair    Polycystic kidney disease    CKD (chronic kidney disease)    Essential hypertension    Elevated brain natriuretic peptide (BNP) level    Immunosuppression ([place] [address] )    Renal transplant recipient    Acute non-recurrent maxillary sinusitis    H/O recurrent pneumonia    Multiple nevi    Benign localized prostatic hyperplasia with lower urinary tract symptoms (LUTS)    Incomplete bladder emptying         Past Medical History     Past Medical History:   Diagnosis Date    Bacterial pneumonia     last assessed: 2/2/2017    Benign neoplasm of skin     Cardiac disorder     GERD (gastroesophageal reflux disease)     Gross hematuria     last assessed: 12/5/2016    Hypercholesterolemia     Hypertension     Kidney disease     Kidney transplanted     x 2, 2001 and 2012    Osteoporosis     Pneumonia     last assessed: 10/9/2017    Seborrheic keratosis     Sinusitis     Viral warts          Surgical History     Past Surgical History:   Procedure Laterality Date    AV FISTULA PLACEMENT Right 2001    arteriovenous surgery creation of A-V fistula, right arm radiocephalic-Dr Laurita Hay    AV FISTULA PLACEMENT Left 2011    hemodialysis acess type arteriovenous fistula, left brachiocepalic AVF 4522-  [facility]  2012    CORONARY STENT PLACEMENT  09/15/2011    PTA stenting-[facility]/M Dr Elsi Calzada  2006    [facility]    NEPHRECTOMY TRANSPLANTED ORGAN      OTHER SURGICAL HISTORY  01/2012    fluids from transplant    OTHER SURGICAL HISTORY Left 07/08/2013    shaving of lesion shoulders, skin left shoulder combined compound and blue nevus    TONSILLECTOMY      TRANSPLANTATION RENAL Left [facility] YenPineda, [facility] [address] TRANSPLANTATION RENAL Right 12/08/2011    Pineda Carbajal, [place]         Family History     Family History   Problem Relation Age of Onset    Polycystic kidney disease Father     Kidney disease Father          Social History     Social History     History   Smoking Status    Never Smoker   Smokeless Tobacco    Never Used         Pertinent Lab Values     Lab Results   Component Value Date    CREATININE 1 38 (A) 08/13/2018       Lab Results   Component Value Date    PSA 2 1 12/29/2014     The patient's PSA is 3 71 as of March 2018   Based on examination this is likely due to benign prostatic enlargement        Pertinent Imaging      There is no pertinent urological imaging for my review

## 2018-11-20 NOTE — ASSESSMENT & PLAN NOTE
Patrice Catalan and I had a productive consultation today regarding his lower urinary tract symptoms  We discussed the possible sources of these symptoms including failure to properly store urine and failure to properly empty urine  We discussed the normal anatomy of the bladder, prostate, bladder neck, and urethra, as well as the sphincter mechanism and the normal sequence of relaxation of the external urinary sphincter followed by contraction of the detrusor muscle and evacuation of urinary bladder  We discussed use of the AUA symptom score as a validated benefit of tracking lower urinary tract symptoms over time  We also discussed the role of a uroflow determination as well as a postvoid residual determination as well as the normal findings of both of these tests  We then discussed treatment of lower urinary tract symptoms in the form of medical therapy, behavioral changes and fluid modification to decrease symptoms, the use of minimally invasive therapies for bladder outlet obstruction such as the Urolift procedure, and the use of Transurethral resection of prostate and simple prostatectomy in patients with severe bladder outlet obstruction  He tends to prefer nonsurgical approaches given his kidney disease history  With regard to medical therapies we discussed alpha blockers such as tamsulosin as well as 5 alpha reductase inhibitors such as finasteride  We discussed the mechanism of action of each and the potential risks and side effects including dizziness, weakness, hypotension, retrograde ejaculation, potential for allergic reaction, decreased in semen volume, decreased male pattern baldness for 5 alpha reductase inhibitors, and potential for decrease in libido or erectile dysfunction in less than 10% of men taking 5 alpha reductase inhibitors  We talked about the to decrease in the AUA symptom score for medical therapy in the range of 5-7 units on the AUA symptom score      In men who wish to not take medical therapy and in those men who desire a more definitive procedure while avoiding the risks of a more invasive therapy for bladder outlet obstruction, the Urolift procedure is a useful option  The mechanics of this operation and the pre, vinita, and postoperative care were discussed with the patient and the patient was given literature on this procedure  The expected decrease in AUA symptom score of around 11 units after this procedure was also discussed with them  Finally, we reviewed the indications for more invasive therapies such as TURP and simple prostatectomy including gross hematuria from benign prostatic enlargement, recurrent urinary tract infections from bladder outlet obstruction, bladder stone formation, urinary retention from benign prostatic enlargement, and on managed symptoms in men taking medical therapy for their LUTS  The role of transrectal ultrasound and potential prostate biopsy for determination of prostatic volume and risk for prostate cancer prior to the above therapies was also discussed with the patient  Plan:  He is interested Uro lift, we will proceed to cystoscopy and transrectal ultrasound with uroflow PVR at his next visit    If he does wish to undergo Uro lift, he will need transplant team clearance prior to undergoing this operation as well as cardiac clearance

## 2018-11-20 NOTE — TELEPHONE ENCOUNTER
Return in about 4 weeks (around 12/18/2018) for schedule cysto and TRUS, [place] office  Patient prefers after the holidays middle of January or later

## 2018-11-26 ENCOUNTER — TELEPHONE (OUTPATIENT)
Dept: UROLOGY | Facility: CLINIC | Age: 68
End: 2018-11-26

## 2018-11-26 NOTE — TELEPHONE ENCOUNTER
Spoke with patient  He wants to hold off on scheduling the procedure  States he is not sure about the urolift  He cancelled the cysto/trus stating he will call back

## 2018-11-27 DIAGNOSIS — I10 ESSENTIAL HYPERTENSION: ICD-10-CM

## 2018-11-27 NOTE — TELEPHONE ENCOUNTER
Dr Crys Romero,    Please send to [facility] on file  Patient is requesting a 90 day supply  RX has been prepped

## 2018-12-03 ENCOUNTER — OFFICE VISIT (OUTPATIENT)
Dept: PULMONOLOGY | Facility: CLINIC | Age: 68
End: 2018-12-03
Payer: MEDICARE

## 2018-12-03 VITALS
DIASTOLIC BLOOD PRESSURE: 70 MMHG | OXYGEN SATURATION: 96 % | SYSTOLIC BLOOD PRESSURE: 100 MMHG | WEIGHT: 156 LBS | HEIGHT: 64 IN | BODY MASS INDEX: 26.63 KG/M2 | HEART RATE: 67 BPM

## 2018-12-03 DIAGNOSIS — Z94.0 H/O KIDNEY TRANSPLANT: ICD-10-CM

## 2018-12-03 DIAGNOSIS — D84.9 IMMUNOSUPPRESSION (HCC): ICD-10-CM

## 2018-12-03 DIAGNOSIS — Z87.01 H/O RECURRENT PNEUMONIA: Primary | ICD-10-CM

## 2018-12-03 PROCEDURE — 99214 OFFICE O/P EST MOD 30 MIN: CPT | Performed by: INTERNAL MEDICINE

## 2018-12-03 NOTE — PROGRESS NOTES
Assessment/Plan:   Diagnoses and all orders for this visit:    H/O recurrent pneumonia    H/O kidney transplant    Immunosuppression ([facility] )        History of recurrent pneumonias in 2016 and 2017, CT of the chest in May 2017 with resolution of the infiltrates  PFTs in 2017 normal spirometry, mild air trapping is present  Currently doing well no recent ER visits or need for antibiotics or patient  He is currently up-to-date with all his vaccinations  Booster dose for the pneumococcal vaccine next year October/November 2019  Follow-up in 6 months or when necessary earlier as needed  Return in about 6 months (around 6/3/2019)  All questions are answered to the patient's satisfaction and understanding  He verbalizes understanding  He is encouraged to call with any further questions or concerns  Portions of the record may have been created with voice recognition software  Occasional wrong word or "sound a like" substitutions may have occurred due to the inherent limitations of voice recognition software  Read the chart carefully and recognize, using context, where substitutions have occurred  Electronically Signed by Sierra Perdue MD    ______________________________________________________________________    Chief Complaint:   Chief Complaint   Patient presents with    Recurrent Pneumonia     fup       Patient ID: Pablito Vera is a 80 y o  y o  male has a past medical history of Bacterial pneumonia; Benign neoplasm of skin; Cardiac disorder; GERD (gastroesophageal reflux disease); Gross hematuria; Hypercholesterolemia; Hypertension; Kidney disease; Kidney transplanted; Osteoporosis; Pneumonia; Seborrheic keratosis; Sinusitis; and Viral warts  12/3/2018  Patient presents today for follow-up visit  Patient is a 80year old male with PMH of renal transplant x 2 on immunosuppressants, pneumonia in December 2016 and April 2017   He had PFTs done which showed some evidence of air trapping with normal spirometry  Allergy testing showed allergy to [place] grass            Review of Systems   Constitutional: Negative for appetite change, chills, diaphoresis, fatigue, fever and unexpected weight change  HENT: Negative for congestion, ear discharge, ear pain, nosebleeds, postnasal drip, rhinorrhea, sinus pain, sore throat and voice change  Eyes: Negative for pain, discharge and visual disturbance  Respiratory: Negative for apnea, cough, choking, chest tightness, shortness of breath, wheezing and stridor  Cardiovascular: Negative for chest pain, palpitations and leg swelling  Gastrointestinal: Negative for abdominal pain, blood in stool, constipation, diarrhea and vomiting  Endocrine: Negative for cold intolerance, heat intolerance, polydipsia, polyphagia and polyuria  Genitourinary: Negative for difficulty urinating and dysuria  Musculoskeletal: Negative for arthralgias and neck pain  Skin: Negative for pallor and rash  Allergic/Immunologic: Negative for environmental allergies and food allergies  Neurological: Negative for dizziness, speech difficulty, weakness and light-headedness  Hematological: Negative for adenopathy  Does not bruise/bleed easily  Psychiatric/Behavioral: Negative for agitation, confusion and sleep disturbance  The patient is not nervous/anxious  Smoking history: He reports that he has never smoked   He has never used smokeless tobacco     The following portions of the patient's history were reviewed and updated as appropriate: allergies, current medications, past family history, past medical history, past social history, past surgical history and problem list     Immunization History   Administered Date(s) Administered    Influenza 11/22/2011, 10/05/2015, 10/27/2016, 10/05/2017    Influenza Quadrivalent, 6-35 Months IM 10/05/2015    Influenza Split High Dose Preservative Free IM 10/27/2016    Influenza TIV (IM) 11/22/2011, 10/28/2014, 10/05/2015, 10/05/2017  Influenza, recombinant, quadrivalent,injectable, preservative free 11/13/2018, 11/13/2018    Pneumococcal Conjugate 13-Valent 10/09/2017    Pneumococcal Polysaccharide PPV23 10/28/2014    TD (adult) Preservative Free 06/19/2018     Current Outpatient Prescriptions   Medication Sig Dispense Refill    aspirin 81 mg chewable tablet Chew      atorvastatin (LIPITOR) 10 mg tablet TAKE 1 TABLET BY MOUTH EVERY DAY 90 tablet 1    metoprolol tartrate (LOPRESSOR) 25 mg tablet Take 1 tablet (25 mg total) by mouth daily 90 tablet 3    mycophenolate (MYFORTIC) 360 MG TBEC Take 1 tablet (360 mg total) by mouth 2 (two) times a day 90 tablet 0    Omega-3 Fatty Acids (FISH OIL CONCENTRATE) 300 MG CAPS Take 1 capsule by mouth 2 (two) times a day      predniSONE 5 mg tablet Take 5 mg by mouth daily      tacrolimus (PROGRAF) 1 mg capsule Take 1 capsule (1 mg total) by mouth 2 (two) times a day 180 capsule 3    tamsulosin (FLOMAX) 0 4 mg Take by mouth daily      famotidine (PEPCID) 20 mg tablet Take 1 tablet by mouth 2 (two) times a day for 30 days 60 tablet 0     No current facility-administered medications for this visit  Allergies: Other    Objective:  Vitals:    12/03/18 1106 12/03/18 1112   BP:  100/70   Pulse:  67   SpO2:  96%   Weight:  70 8 kg (156 lb)   Height: 5' 4" (1 626 m) 5' 4" (1 626 m)   Oxygen Therapy  SpO2: 96 %    Wt Readings from Last 3 Encounters:   12/03/18 70 8 kg (156 lb)   11/20/18 71 kg (156 lb 9 6 oz)   07/12/18 69 7 kg (153 lb 9 6 oz)     Body mass index is 26 78 kg/m²  Physical Exam   Constitutional: He is oriented to person, place, and time  He appears well-developed and well-nourished  HENT:   Head: Normocephalic and atraumatic  Eyes: Pupils are equal, round, and reactive to light  Conjunctivae are normal    Neck: Normal range of motion  Neck supple  No JVD present  No thyromegaly present  Cardiovascular: Normal rate, regular rhythm and normal heart sounds    Exam reveals no gallop and no friction rub  No murmur heard  Pulmonary/Chest: Effort normal and breath sounds normal  No respiratory distress  He has no wheezes  He has no rales  He exhibits no tenderness  Abdominal: Soft  Bowel sounds are normal    Musculoskeletal: Normal range of motion  He exhibits no edema, tenderness or deformity  Lymphadenopathy:     He has no cervical adenopathy  Neurological: He is alert and oriented to person, place, and time  Skin: Skin is warm and dry  Psychiatric: He has a normal mood and affect  Nursing note and vitals reviewed

## 2018-12-31 ENCOUNTER — TELEPHONE (OUTPATIENT)
Dept: INTERNAL MEDICINE CLINIC | Facility: CLINIC | Age: 68
End: 2018-12-31

## 2018-12-31 DIAGNOSIS — R05.9 COUGH: Primary | ICD-10-CM

## 2018-12-31 RX ORDER — CEPHALEXIN 500 MG/1
500 CAPSULE ORAL 3 TIMES DAILY
Qty: 21 CAPSULE | Refills: 0 | Status: SHIPPED | OUTPATIENT
Start: 2018-12-31 | End: 2019-01-07

## 2018-12-31 NOTE — TELEPHONE ENCOUNTER
PT would like medication sent to his pharmacy thinks he has croup cough     Pt wanted to notify Has had a kidney transplant

## 2019-01-08 ENCOUNTER — TELEPHONE (OUTPATIENT)
Dept: INTERNAL MEDICINE CLINIC | Facility: CLINIC | Age: 69
End: 2019-01-08

## 2019-01-08 NOTE — TELEPHONE ENCOUNTER
PT took medication cephalexin (KEFLEX) 500 mg for 7 days, he stated he still has a bad cough and has mucus  He would like to know if he can extend the medication a few more days and see if he feels better

## 2019-01-09 ENCOUNTER — OFFICE VISIT (OUTPATIENT)
Dept: INTERNAL MEDICINE CLINIC | Facility: CLINIC | Age: 69
End: 2019-01-09
Payer: MEDICARE

## 2019-01-09 VITALS
RESPIRATION RATE: 22 BRPM | SYSTOLIC BLOOD PRESSURE: 102 MMHG | HEIGHT: 64 IN | BODY MASS INDEX: 26.56 KG/M2 | HEART RATE: 70 BPM | WEIGHT: 155.6 LBS | OXYGEN SATURATION: 96 % | DIASTOLIC BLOOD PRESSURE: 60 MMHG

## 2019-01-09 DIAGNOSIS — Z94.0 RENAL TRANSPLANT RECIPIENT: Primary | ICD-10-CM

## 2019-01-09 DIAGNOSIS — D84.9 IMMUNOSUPPRESSION (HCC): ICD-10-CM

## 2019-01-09 DIAGNOSIS — Z94.0 H/O KIDNEY TRANSPLANT: Chronic | ICD-10-CM

## 2019-01-09 DIAGNOSIS — J20.9 ACUTE BRONCHITIS, UNSPECIFIED ORGANISM: Primary | ICD-10-CM

## 2019-01-09 PROBLEM — R05.9 COUGH: Status: RESOLVED | Noted: 2018-12-31 | Resolved: 2019-01-09

## 2019-01-09 PROCEDURE — 99213 OFFICE O/P EST LOW 20 MIN: CPT | Performed by: INTERNAL MEDICINE

## 2019-01-09 RX ORDER — CEPHALEXIN 250 MG/1
500 CAPSULE ORAL 3 TIMES DAILY
Qty: 21 CAPSULE | Refills: 0 | Status: SHIPPED | OUTPATIENT
Start: 2019-01-09 | End: 2019-01-16

## 2019-01-09 RX ORDER — PREDNISONE 1 MG/1
5 TABLET ORAL DAILY
Qty: 90 TABLET | Refills: 0 | Status: SHIPPED | OUTPATIENT
Start: 2019-01-09 | End: 2019-04-07 | Stop reason: SDUPTHER

## 2019-01-09 NOTE — PROGRESS NOTES
Assessment/Plan:     No evidence of pneumonia  We can extend the antibiotic course to 10 days, 14 if needed  BMI Counseling: Body mass index is 26 71 kg/m²  Discussed the patient's BMI with him  The BMI is above average  BMI counseling and education was provided to the patient  Exercise recommendations include exercising 3-5 times per week  Return if symptoms worsen or fail to improve  No problem-specific Assessment & Plan notes found for this encounter  Diagnoses and all orders for this visit:    Acute bronchitis, unspecified organism  -     cephalexin (KEFLEX) 250 mg capsule; Take 2 capsules (500 mg total) by mouth 3 (three) times a day for 7 days    Immunosuppression ([address] )    H/O kidney transplant          Subjective:      Patient ID: Nahomi Adler is a 80 y o  male  Patient comes in today because he had called stating that his cough was still lingering and actually becoming more productive  He is on immunosuppressants because of his kidney transplant and has had pneumonia before so we wanted to check him  He has had his flu shot  Numerous family members were sick and he saw his grandchildren  This is how it usually starts          ALLERGIES:  Allergies   Allergen Reactions    Other        CURRENT MEDICATIONS:    Current Outpatient Prescriptions:     aspirin 81 mg chewable tablet, Chew, Disp: , Rfl:     atorvastatin (LIPITOR) 10 mg tablet, TAKE 1 TABLET BY MOUTH EVERY DAY, Disp: 90 tablet, Rfl: 1    famotidine (PEPCID) 20 mg tablet, Take 1 tablet by mouth 2 (two) times a day for 30 days, Disp: 60 tablet, Rfl: 0    metoprolol tartrate (LOPRESSOR) 25 mg tablet, Take 1 tablet (25 mg total) by mouth daily, Disp: 90 tablet, Rfl: 3    mycophenolate (MYFORTIC) 360 MG TBEC, Take 1 tablet (360 mg total) by mouth 2 (two) times a day, Disp: 90 tablet, Rfl: 0    Omega-3 Fatty Acids (FISH OIL CONCENTRATE) 300 MG CAPS, Take 1 capsule by mouth 2 (two) times a day, Disp: , Rfl:     predniSONE 5 mg tablet, Take 5 mg by mouth daily, Disp: , Rfl:     tacrolimus (PROGRAF) 1 mg capsule, Take 1 capsule (1 mg total) by mouth 2 (two) times a day, Disp: 180 capsule, Rfl: 3    tamsulosin (FLOMAX) 0 4 mg, Take by mouth daily, Disp: , Rfl:     cephalexin (KEFLEX) 250 mg capsule, Take 2 capsules (500 mg total) by mouth 3 (three) times a day for 7 days, Disp: 21 capsule, Rfl: 0    ACTIVE PROBLEM LIST:  Patient Active Problem List   Diagnosis    Hypoalbuminemia    CAD (coronary artery disease)    H/O kidney transplant    New onset a-fib ([address] )    HLD (hyperlipidemia)    S/P mitral valve repair    Polycystic kidney disease    CKD (chronic kidney disease)    Essential hypertension    Elevated brain natriuretic peptide (BNP) level    Immunosuppression (HCC)    Renal transplant recipient    Acute non-recurrent maxillary sinusitis    H/O recurrent pneumonia    Multiple nevi    Benign localized prostatic hyperplasia with lower urinary tract symptoms (LUTS)    Incomplete bladder emptying       PAST MEDICAL HISTORY:  Past Medical History:   Diagnosis Date    Bacterial pneumonia     last assessed: 2/2/2017    Benign neoplasm of skin     Cardiac disorder     GERD (gastroesophageal reflux disease)     Gross hematuria     last assessed: 12/5/2016    Hypercholesterolemia     Hypertension     Kidney disease     Kidney transplanted     x 2, 2001 and 2012    Osteoporosis     Pneumonia     last assessed: 10/9/2017    Seborrheic keratosis     Sinusitis     Viral warts        PAST SURGICAL HISTORY:  Past Surgical History:   Procedure Laterality Date    AV FISTULA PLACEMENT Right 2001    arteriovenous surgery creation of A-V fistula, right arm radiocephalic-Dr Ozuna Sensing    AV FISTULA PLACEMENT Left 2011    hemodialysis acess type arteriovenous fistula, left brachiocepalic AVF 3497-CF  Sulma Chinchilla   [facility] COLON SURGERY  2012    CORONARY STENT PLACEMENT  09/15/2011    PTA stenting-[facility]/M Dr Madhuri Greer VALVE REPAIR  2006    10/2006-[facility]    NEPHRECTOMY TRANSPLANTED ORGAN      OTHER SURGICAL HISTORY  01/2012    fluids from transplant    OTHER SURGICAL HISTORY Left 07/08/2013    shaving of lesion shoulders, skin left shoulder combined compound and blue nevus    TONSILLECTOMY      TRANSPLANTATION RENAL Left [facility] Pined[facility] JOSE, [address] TRANSPLANTATION RENAL Right 12/08/2011    Pineda [place], [facility]       FAMILY HISTORY:  Family History   Problem Relation Age of Onset    Polycystic kidney disease Father     Kidney disease Father        SOCIAL HISTORY:  Social History     Social History    Marital status: /Civil Union     Spouse name: N/A    Number of children: 4    Years of education: N/A     Occupational History    retired      , not employed     Social History Main Topics    Smoking status: Never Smoker    Smokeless tobacco: Never Used    Alcohol use No    Drug use: No    Sexual activity: Yes     Partners: Female      Comment: denied: history of high risk sexual behavior     Other Topics Concern    Not on file     Social History Narrative    Active advance directive-yes    Exercises occasionally           Review of Systems   Constitutional: Negative for fever  Respiratory: Positive for cough  Negative for shortness of breath  Cardiovascular: Negative for chest pain  Gastrointestinal: Negative for abdominal pain  Objective:  Vitals:    01/09/19 0835   BP: 102/60   BP Location: Left arm   Patient Position: Sitting   Pulse: 70   Resp: 22   SpO2: 96%   Weight: 70 6 kg (155 lb 9 6 oz)   Height: 5' 4" (1 626 m)     Body mass index is 26 71 kg/m²  Physical Exam   Constitutional: He is oriented to person, place, and time  He appears well-developed and well-nourished  HENT:   Right Ear: External ear normal    Left Ear: External ear normal    Nose: Nose normal    Mouth/Throat: Oropharynx is clear and moist  No oropharyngeal exudate     Eyes: Conjunctivae are normal    Cardiovascular: Normal rate, regular rhythm and normal heart sounds  Pulmonary/Chest: Effort normal and breath sounds normal    Lymphadenopathy:     He has no cervical adenopathy  Neurological: He is alert and oriented to person, place, and time  Skin: He is not diaphoretic  Nursing note and vitals reviewed  RESULTS:    No results found for this or any previous visit (from the past 1008 hour(s))  This note was created with voice recognition software  Phonic, grammatical and spelling errors may be present within the note as a result

## 2019-01-25 DIAGNOSIS — Z94.0 KIDNEY TRANSPLANTED: ICD-10-CM

## 2019-01-25 RX ORDER — MYCOPHENOLIC ACID 360 MG/1
360 TABLET, DELAYED RELEASE ORAL 2 TIMES DAILY
Qty: 90 TABLET | Refills: 3 | Status: SHIPPED | OUTPATIENT
Start: 2019-01-25 | End: 2019-07-27 | Stop reason: SDUPTHER

## 2019-02-26 ENCOUNTER — OFFICE VISIT (OUTPATIENT)
Dept: NEPHROLOGY | Facility: CLINIC | Age: 69
End: 2019-02-26
Payer: MEDICARE

## 2019-02-26 VITALS
TEMPERATURE: 98.1 F | WEIGHT: 156 LBS | DIASTOLIC BLOOD PRESSURE: 70 MMHG | RESPIRATION RATE: 16 BRPM | BODY MASS INDEX: 26.63 KG/M2 | SYSTOLIC BLOOD PRESSURE: 130 MMHG | HEIGHT: 64 IN | HEART RATE: 78 BPM

## 2019-02-26 DIAGNOSIS — N18.30 STAGE 3 CHRONIC KIDNEY DISEASE (HCC): Primary | Chronic | ICD-10-CM

## 2019-02-26 DIAGNOSIS — N40.1 BENIGN LOCALIZED PROSTATIC HYPERPLASIA WITH LOWER URINARY TRACT SYMPTOMS (LUTS): ICD-10-CM

## 2019-02-26 DIAGNOSIS — Z94.0 RENAL TRANSPLANT RECIPIENT: ICD-10-CM

## 2019-02-26 DIAGNOSIS — I10 ESSENTIAL HYPERTENSION: Chronic | ICD-10-CM

## 2019-02-26 DIAGNOSIS — I25.10 CORONARY ARTERY DISEASE INVOLVING NATIVE CORONARY ARTERY OF NATIVE HEART WITHOUT ANGINA PECTORIS: Chronic | ICD-10-CM

## 2019-02-26 PROCEDURE — 99213 OFFICE O/P EST LOW 20 MIN: CPT | Performed by: INTERNAL MEDICINE

## 2019-02-26 NOTE — PATIENT INSTRUCTIONS
Chronic Kidney Disease   AMBULATORY CARE:   Chronic kidney disease (CKD)  is the gradual and permanent loss of kidney function  Normally, the kidneys remove fluid, chemicals, and waste from your blood  These wastes are turned into urine by your kidneys  CKD may worsen over time and lead to kidney failure  Common symptoms include the following:   · Changes in how often you need to urinate    · Swelling in your arms, legs, or feet    · Shortness of breath    · Fatigue or weakness    · Bad or bitter taste in your mouth    · Nausea, vomiting, or loss of appetite  Seek care immediately if:   · You are confused and very drowsy  · You have a seizure  · You have shortness of breath  Contact your healthcare provider if:   · You suddenly gain or lose more weight than your healthcare provider has told you is okay  · You have itchy skin or a rash  · You urinate more or less than you normally do  · You have blood in your urine  · You have nausea and repeated vomiting  · You have fatigue or muscle weakness  · You have hiccups that will not stop  · You have questions or concerns about your condition or care  Treatment for CKD:  Medicines may be given to decrease blood pressure and get rid of extra fluid  You may also receive medicine to manage health conditions that may occur with CKD  Dialysis is a treatment to remove chemicals and waste from your blood when your kidneys can no longer do this  Surgery may be needed to create an arteriovenous fistula (AVF) in your arm or insert a catheter into your abdomen so that you can receive dialysis  A kidney transplant may be done if your CKD becomes severe  Manage CKD:   · Maintain a healthy weight  Ask your healthcare provider how much you should weigh  Ask him to help you create a weight loss plan if you are overweight  · Exercise 30 to 60 minutes a day, 4 to 7 times a week, or as directed  Ask about the best exercise plan for you   Regular exercise can help you manage CKD, high blood pressure, and diabetes  · Follow your healthcare provider's advice about what to eat and drink  He may tell you to eat food low in sodium (salt), potassium, phosphorus, or protein  You may need to see a dietitian if you need help planning meals  Ask how much liquid to drink each day and which liquids are best for you  · Limit alcohol  Ask how much alcohol is safe for you to drink  A drink of alcohol is 12 ounces of beer, 5 ounces of wine, or 1½ ounces of liquor  · Do not smoke  Nicotine and other chemicals in cigarettes and cigars can cause lung and kidney damage  Ask your healthcare provider for information if you currently smoke and need help to quit  E-cigarettes or smokeless tobacco still contain nicotine  Talk to your healthcare provider before you use these products  · Ask your healthcare provider if you need vaccines  Infections such as pneumonia, influenza, and hepatitis can be more harmful or more likely to occur in a person who has CKD  Vaccines reduce your risk of infection with these viruses  Follow up with your healthcare provider as directed:  Write down your questions so you remember to ask them during your visits  © 2017 [address] Information is for End User's use only and may not be sold, redistributed or otherwise used for commercial purposes  All illustrations and images included in CareNotes® are the copyrighted property of [employer]  or [employer]  The above information is an  only  It is not intended as medical advice for individual conditions or treatments  Talk to your doctor, nurse or pharmacist before following any medical regimen to see if it is safe and effective for you

## 2019-02-26 NOTE — ASSESSMENT & PLAN NOTE
Kidney function is stable  Tacrolimus level is also on therapeutic range  Again like I mentioned before he still being monitored by transplant center

## 2019-02-26 NOTE — PROGRESS NOTES
NEPHROLOGY OFFICE FOLLOW UP  Nahun Wyman 80 y o  male MRN: [id number]    Encounter: [id number] 2/26/2019    REASON FOR VISIT: Nahun Wyman is a 80 y o  male who is here on 2/26/2019 for Kidney Transplant    HPI:    Andres Hutton came in today for follow-up of kidney transplant  He is doing quite well  Denies any complaint  Nothing much new happen since his last visit  He still being monitored by Brianna Fernández:    Review of Systems   Constitutional: Negative for activity change and fatigue  HENT: Negative for congestion and ear discharge  Eyes: Negative for photophobia, pain and visual disturbance  Respiratory: Negative for apnea, cough, choking, chest tightness and wheezing  Cardiovascular: Negative for chest pain, palpitations and leg swelling  Gastrointestinal: Negative for abdominal distention, abdominal pain, blood in stool and diarrhea  Endocrine: Negative for heat intolerance and polyphagia  Genitourinary: Negative for difficulty urinating, flank pain and urgency  Musculoskeletal: Negative for neck pain and neck stiffness  Skin: Negative for color change and wound  Allergic/Immunologic: Negative for food allergies and immunocompromised state  Neurological: Negative for dizziness, seizures, facial asymmetry and weakness  Hematological: Negative for adenopathy  Does not bruise/bleed easily  Psychiatric/Behavioral: Negative for self-injury and suicidal ideas           PAST MEDICAL HISTORY:  Past Medical History:   Diagnosis Date    Bacterial pneumonia     last assessed: 2/2/2017    Benign neoplasm of skin     Benign prostatic hyperplasia     Cardiac disorder     GERD (gastroesophageal reflux disease)     Gross hematuria     last assessed: 12/5/2016    Hypercholesterolemia     Hypertension     Kidney disease     Kidney transplanted     x 2, 2001 and 2012    Osteoporosis     Pneumonia     last assessed: 10/9/2017    Seborrheic keratosis     Sinusitis     Viral warts        PAST SURGICAL HISTORY:  Past Surgical History:   Procedure Laterality Date    AV FISTULA PLACEMENT Right 2001    arteriovenous surgery creation of A-V fistula, right arm radiocephalic-Dr Karyn Fall    AV FISTULA PLACEMENT Left 2011    hemodialysis acess type arteriovenous fistula, left brachiocepalic AVF 6102-NA  [facility]  2012    CORONARY STENT PLACEMENT  09/15/2011    PTA stenting-[facility]/M Dr Edgar Price  2006    10/[facility]   [place] OTHER SURGICAL HISTORY  01/2012    fluids from transplant    OTHER SURGICAL HISTORY Left 07/08/2013    shaving of lesion shoulders, skin left shoulder combined compound and blue nevus    TONSILLECTOMY      TRANSPLANTATION RENAL Left Pineda Higgins, [facility] [address] TRANSPLANTATION RENAL Right 12/08/2011    Pineda Moon, [place]       SOCIAL HISTORY:  Social History     Substance and Sexual Activity   Alcohol Use No     Social History     Substance and Sexual Activity   Drug Use No     Social History     Tobacco Use   Smoking Status Never Smoker   Smokeless Tobacco Never Used       FAMILY HISTORY:  Family History   Problem Relation Age of Onset    Polycystic kidney disease Father     Kidney disease Father        MEDICATIONS:    Current Outpatient Medications:     aspirin 81 mg chewable tablet, Chew 81 mg daily , Disp: , Rfl:     atorvastatin (LIPITOR) 10 mg tablet, TAKE 1 TABLET BY MOUTH EVERY DAY, Disp: 90 tablet, Rfl: 1    metoprolol tartrate (LOPRESSOR) 25 mg tablet, Take 1 tablet (25 mg total) by mouth daily, Disp: 90 tablet, Rfl: 3    mycophenolate (MYFORTIC) 360 MG TBEC, Take 1 tablet (360 mg total) by mouth 2 (two) times a day, Disp: 90 tablet, Rfl: 3    Omega-3 Fatty Acids (FISH OIL CONCENTRATE) 300 MG CAPS, Take 1 capsule by mouth daily , Disp: , Rfl:     predniSONE 5 mg tablet, Take 1 tablet (5 mg total) by mouth daily, Disp: 90 tablet, Rfl: 0    tacrolimus (PROGRAF) 1 mg capsule, Take 1 capsule (1 mg total) by mouth 2 (two) times a day, Disp: 180 capsule, Rfl: 3    tamsulosin (FLOMAX) 0 4 mg, Take by mouth daily, Disp: , Rfl:     PHYSICAL EXAM:  Vitals:    02/26/19 0957   BP: 130/70   BP Location: Right arm   Patient Position: Sitting   Pulse: 78   Resp: 16   Temp: 98 1 °F (36 7 °C)   TempSrc: Oral   Weight: 70 8 kg (156 lb)   Height: 5' 4" (1 626 m)     Body mass index is 26 78 kg/m²  Physical Exam   Constitutional: He is oriented to person, place, and time  He appears well-developed  No distress  HENT:   Head: Normocephalic  Mouth/Throat: Oropharynx is clear and moist    Eyes: Pupils are equal, round, and reactive to light  Conjunctivae are normal  No scleral icterus  Neck: Normal range of motion  Neck supple  No JVD present  Cardiovascular: Normal rate, normal heart sounds and intact distal pulses  Pulmonary/Chest: Effort normal and breath sounds normal  He has no wheezes  Abdominal: Soft  Bowel sounds are normal  There is no tenderness  Musculoskeletal: Normal range of motion  He exhibits no edema  Neurological: He is alert and oriented to person, place, and time  Skin: Skin is warm  No rash noted  Psychiatric: He has a normal mood and affect  His behavior is normal        LAB RESULTS:  Results for orders placed or performed in visit on 77/50/91   Basic metabolic panel   Result Value Ref Range    SODIUM 137     POTASSIUM 4 4     CHLORIDE 106     CO2 22 21 - 32 mmol/L    BUN 29 (A) 5 - 25 mg/dL    Creatinine 1 38 (A) 0 60 - 1 30 mg/dL    Glucose 78     EXTERNAL CALCIUM 8 8     ANION GAP 9     EXTERNAL EGFR 53        ASSESSMENT and PLAN:      CKD (chronic kidney disease)  Creatinine is quite 1 38  Advised to continue what is doing with good hydration and avoidance of any nephrotoxic medicine    Renal transplant recipient  Kidney function is stable  Tacrolimus level is also on therapeutic range    Again like I mentioned before he still being monitored by transplant center  I will see him back in 6 months again in between he will be seen by transplant center        Portions of the record may have been created with voice recognition software  Occasional wrong word or "sound a like" substitutions may have occurred due to the inherent limitations of voice recognition software  Read the chart carefully and recognize, using context, where substitutions have occurred  If you have any questions, please contact the dictating provider

## 2019-02-26 NOTE — ASSESSMENT & PLAN NOTE
Creatinine is quite 1 38    Advised to continue what is doing with good hydration and avoidance of any nephrotoxic medicine

## 2019-04-07 DIAGNOSIS — Z94.0 RENAL TRANSPLANT RECIPIENT: ICD-10-CM

## 2019-04-07 RX ORDER — PREDNISONE 1 MG/1
TABLET ORAL
Qty: 90 TABLET | Refills: 0 | Status: SHIPPED | OUTPATIENT
Start: 2019-04-07 | End: 2019-06-25 | Stop reason: SDUPTHER

## 2019-05-24 DIAGNOSIS — N40.0 BENIGN PROSTATIC HYPERPLASIA WITHOUT LOWER URINARY TRACT SYMPTOMS: Primary | ICD-10-CM

## 2019-05-24 DIAGNOSIS — N40.0 BENIGN PROSTATIC HYPERPLASIA WITHOUT LOWER URINARY TRACT SYMPTOMS: ICD-10-CM

## 2019-05-24 RX ORDER — TAMSULOSIN HYDROCHLORIDE 0.4 MG/1
0.4 CAPSULE ORAL DAILY
Qty: 30 CAPSULE | Refills: 0 | Status: SHIPPED | OUTPATIENT
Start: 2019-05-24 | End: 2019-05-28 | Stop reason: SDUPTHER

## 2019-05-28 RX ORDER — TAMSULOSIN HYDROCHLORIDE 0.4 MG/1
0.4 CAPSULE ORAL DAILY
Qty: 30 CAPSULE | Refills: 0 | Status: SHIPPED | OUTPATIENT
Start: 2019-05-28 | End: 2019-06-07 | Stop reason: SDUPTHER

## 2019-06-04 ENCOUNTER — HOSPITAL ENCOUNTER (OUTPATIENT)
Dept: RADIOLOGY | Facility: HOSPITAL | Age: 69
Discharge: HOME | End: 2019-06-04
Payer: MEDICARE

## 2019-06-04 ENCOUNTER — OFFICE VISIT (OUTPATIENT)
Dept: PULMONOLOGY | Facility: CLINIC | Age: 69
End: 2019-06-04
Payer: MEDICARE

## 2019-06-04 VITALS
WEIGHT: 157 LBS | HEART RATE: 68 BPM | SYSTOLIC BLOOD PRESSURE: 110 MMHG | DIASTOLIC BLOOD PRESSURE: 70 MMHG | BODY MASS INDEX: 26.8 KG/M2 | OXYGEN SATURATION: 98 % | HEIGHT: 64 IN

## 2019-06-04 DIAGNOSIS — R05.9 COUGH: ICD-10-CM

## 2019-06-04 DIAGNOSIS — Z87.01 H/O RECURRENT PNEUMONIA: ICD-10-CM

## 2019-06-04 DIAGNOSIS — R05.9 COUGH: Primary | ICD-10-CM

## 2019-06-04 PROCEDURE — 71046 X-RAY EXAM CHEST 2 VIEWS: CPT

## 2019-06-04 PROCEDURE — 99213 OFFICE O/P EST LOW 20 MIN: CPT | Performed by: PHYSICIAN ASSISTANT

## 2019-06-07 DIAGNOSIS — N40.0 BENIGN PROSTATIC HYPERPLASIA WITHOUT LOWER URINARY TRACT SYMPTOMS: ICD-10-CM

## 2019-06-07 RX ORDER — TAMSULOSIN HYDROCHLORIDE 0.4 MG/1
0.4 CAPSULE ORAL DAILY
Qty: 90 CAPSULE | Refills: 1 | Status: SHIPPED | OUTPATIENT
Start: 2019-06-07 | End: 2019-12-27 | Stop reason: SDUPTHER

## 2019-06-14 DIAGNOSIS — E78.2 MIXED HYPERLIPIDEMIA: ICD-10-CM

## 2019-06-17 RX ORDER — ATORVASTATIN CALCIUM 10 MG/1
TABLET, FILM COATED ORAL
Qty: 90 TABLET | Refills: 1 | Status: SHIPPED | OUTPATIENT
Start: 2019-06-17 | End: 2020-01-09

## 2019-06-25 DIAGNOSIS — Z94.0 RENAL TRANSPLANT RECIPIENT: ICD-10-CM

## 2019-06-25 RX ORDER — PREDNISONE 1 MG/1
5 TABLET ORAL DAILY
Qty: 90 TABLET | Refills: 0 | Status: SHIPPED | OUTPATIENT
Start: 2019-06-25 | End: 2019-09-23 | Stop reason: SDUPTHER

## 2019-07-27 DIAGNOSIS — Z94.0 KIDNEY TRANSPLANTED: ICD-10-CM

## 2019-07-30 RX ORDER — MYCOPHENOLIC ACID 360 MG/1
360 TABLET, DELAYED RELEASE ORAL 2 TIMES DAILY
Qty: 60 TABLET | Refills: 5 | Status: SHIPPED | OUTPATIENT
Start: 2019-07-30 | End: 2020-02-05

## 2019-07-31 ENCOUNTER — OFFICE VISIT (OUTPATIENT)
Dept: INTERNAL MEDICINE CLINIC | Facility: CLINIC | Age: 69
End: 2019-07-31
Payer: MEDICARE

## 2019-07-31 ENCOUNTER — APPOINTMENT (EMERGENCY)
Dept: CT IMAGING | Facility: HOSPITAL | Age: 69
DRG: 414 | End: 2019-07-31
Payer: MEDICARE

## 2019-07-31 ENCOUNTER — APPOINTMENT (EMERGENCY)
Dept: ULTRASOUND IMAGING | Facility: HOSPITAL | Age: 69
DRG: 414 | End: 2019-07-31
Payer: MEDICARE

## 2019-07-31 ENCOUNTER — APPOINTMENT (OUTPATIENT)
Dept: LAB | Facility: HOSPITAL | Age: 69
DRG: 414 | End: 2019-07-31
Attending: INTERNAL MEDICINE
Payer: MEDICARE

## 2019-07-31 ENCOUNTER — HOSPITAL ENCOUNTER (OUTPATIENT)
Dept: RADIOLOGY | Facility: HOSPITAL | Age: 69
Discharge: HOME | DRG: 414 | End: 2019-07-31
Attending: INTERNAL MEDICINE
Payer: MEDICARE

## 2019-07-31 ENCOUNTER — HOSPITAL ENCOUNTER (INPATIENT)
Facility: HOSPITAL | Age: 69
LOS: 7 days | Discharge: ANOTHER HOSPITAL | DRG: 414 | End: 2019-08-07
Attending: EMERGENCY MEDICINE | Admitting: ANESTHESIOLOGY
Payer: MEDICARE

## 2019-07-31 VITALS
SYSTOLIC BLOOD PRESSURE: 114 MMHG | HEART RATE: 72 BPM | DIASTOLIC BLOOD PRESSURE: 62 MMHG | OXYGEN SATURATION: 96 % | TEMPERATURE: 98.5 F | RESPIRATION RATE: 18 BRPM | HEIGHT: 64 IN | BODY MASS INDEX: 25.61 KG/M2 | WEIGHT: 150 LBS

## 2019-07-31 DIAGNOSIS — I48.91 NEW ONSET A-FIB (HCC): Chronic | ICD-10-CM

## 2019-07-31 DIAGNOSIS — I34.2 NON-RHEUMATIC MITRAL VALVE STENOSIS: ICD-10-CM

## 2019-07-31 DIAGNOSIS — R33.9 URINARY RETENTION: ICD-10-CM

## 2019-07-31 DIAGNOSIS — Z94.0 HISTORY OF RENAL TRANSPLANT: ICD-10-CM

## 2019-07-31 DIAGNOSIS — R10.84 GENERALIZED ABDOMINAL PAIN: ICD-10-CM

## 2019-07-31 DIAGNOSIS — I05.0 MITRAL VALVE STENOSIS, UNSPECIFIED ETIOLOGY: ICD-10-CM

## 2019-07-31 DIAGNOSIS — I10 ESSENTIAL HYPERTENSION: Chronic | ICD-10-CM

## 2019-07-31 DIAGNOSIS — I25.10 CORONARY ARTERY DISEASE INVOLVING NATIVE CORONARY ARTERY OF NATIVE HEART WITHOUT ANGINA PECTORIS: Chronic | ICD-10-CM

## 2019-07-31 DIAGNOSIS — Z94.0 H/O KIDNEY TRANSPLANT: Chronic | ICD-10-CM

## 2019-07-31 DIAGNOSIS — R10.84 GENERALIZED ABDOMINAL PAIN: Primary | ICD-10-CM

## 2019-07-31 DIAGNOSIS — J98.4 ACUTE PULMONARY INSUFFICIENCY: ICD-10-CM

## 2019-07-31 DIAGNOSIS — N18.30 STAGE 3 CHRONIC KIDNEY DISEASE (HCC): Chronic | ICD-10-CM

## 2019-07-31 DIAGNOSIS — K81.0 ACUTE CHOLECYSTITIS: Primary | ICD-10-CM

## 2019-07-31 DIAGNOSIS — Z98.890 S/P MITRAL VALVE REPAIR: Chronic | ICD-10-CM

## 2019-07-31 PROBLEM — K80.00 ACUTE CALCULOUS CHOLECYSTITIS: Status: ACTIVE | Noted: 2019-07-31

## 2019-07-31 LAB
ALBUMIN SERPL BCP-MCNC: 3.8 G/DL (ref 3.5–5)
ALP SERPL-CCNC: 61 U/L (ref 46–116)
ALT SERPL W P-5'-P-CCNC: 19 U/L (ref 12–78)
AMYLASE SERPL-CCNC: 115 IU/L (ref 25–115)
ANION GAP SERPL CALCULATED.3IONS-SCNC: 8 MMOL/L (ref 4–13)
AST SERPL W P-5'-P-CCNC: 17 U/L (ref 5–45)
BASOPHILS # BLD AUTO: 0.02 THOUSANDS/ΜL (ref 0–0.1)
BASOPHILS NFR BLD AUTO: 0 % (ref 0–1)
BILIRUB SERPL-MCNC: 1.9 MG/DL (ref 0.2–1)
BUN SERPL-MCNC: 27 MG/DL (ref 5–25)
CALCIUM SERPL-MCNC: 9.5 MG/DL (ref 8.3–10.1)
CHLORIDE SERPL-SCNC: 103 MMOL/L (ref 100–108)
CO2 SERPL-SCNC: 26 MMOL/L (ref 21–32)
CREAT SERPL-MCNC: 1.36 MG/DL (ref 0.6–1.3)
EOSINOPHIL # BLD AUTO: 0 THOUSAND/ΜL (ref 0–0.61)
EOSINOPHIL NFR BLD AUTO: 0 % (ref 0–6)
ERYTHROCYTE [DISTWIDTH] IN BLOOD BY AUTOMATED COUNT: 13.3 % (ref 11.6–15.1)
GFR SERPL CREATININE-BSD FRML MDRD: 53 ML/MIN/1.73SQ M
GLUCOSE SERPL-MCNC: 135 MG/DL (ref 65–140)
HCT VFR BLD AUTO: 53.2 % (ref 36.5–49.3)
HGB BLD-MCNC: 16.8 G/DL (ref 12–17)
IMM GRANULOCYTES # BLD AUTO: 0.08 THOUSAND/UL (ref 0–0.2)
IMM GRANULOCYTES NFR BLD AUTO: 1 % (ref 0–2)
LIPASE SERPL-CCNC: 453 U/L (ref 73–393)
LYMPHOCYTES # BLD AUTO: 0.44 THOUSANDS/ΜL (ref 0.6–4.47)
LYMPHOCYTES NFR BLD AUTO: 3 % (ref 14–44)
MCH RBC QN AUTO: 28.9 PG (ref 26.8–34.3)
MCHC RBC AUTO-ENTMCNC: 31.6 G/DL (ref 31.4–37.4)
MCV RBC AUTO: 91 FL (ref 82–98)
MONOCYTES # BLD AUTO: 1.39 THOUSAND/ΜL (ref 0.17–1.22)
MONOCYTES NFR BLD AUTO: 9 % (ref 4–12)
NEUTROPHILS # BLD AUTO: 14.28 THOUSANDS/ΜL (ref 1.85–7.62)
NEUTS SEG NFR BLD AUTO: 87 % (ref 43–75)
NRBC BLD AUTO-RTO: 0 /100 WBCS
PLATELET # BLD AUTO: 252 THOUSANDS/UL (ref 149–390)
PMV BLD AUTO: 9.1 FL (ref 8.9–12.7)
POTASSIUM SERPL-SCNC: 4.6 MMOL/L (ref 3.5–5.3)
PROT SERPL-MCNC: 8.7 G/DL (ref 6.4–8.2)
RBC # BLD AUTO: 5.82 MILLION/UL (ref 3.88–5.62)
SODIUM SERPL-SCNC: 137 MMOL/L (ref 136–145)
TROPONIN I SERPL-MCNC: <0.02 NG/ML
WBC # BLD AUTO: 16.21 THOUSAND/UL (ref 4.31–10.16)

## 2019-07-31 PROCEDURE — 1123F ACP DISCUSS/DSCN MKR DOCD: CPT | Performed by: PHYSICIAN ASSISTANT

## 2019-07-31 PROCEDURE — 87040 BLOOD CULTURE FOR BACTERIA: CPT | Performed by: EMERGENCY MEDICINE

## 2019-07-31 PROCEDURE — 82150 ASSAY OF AMYLASE: CPT

## 2019-07-31 PROCEDURE — 76705 ECHO EXAM OF ABDOMEN: CPT

## 2019-07-31 PROCEDURE — 99214 OFFICE O/P EST MOD 30 MIN: CPT | Performed by: INTERNAL MEDICINE

## 2019-07-31 PROCEDURE — 80053 COMPREHEN METABOLIC PANEL: CPT

## 2019-07-31 PROCEDURE — 74176 CT ABD & PELVIS W/O CONTRAST: CPT

## 2019-07-31 PROCEDURE — 99285 EMERGENCY DEPT VISIT HI MDM: CPT

## 2019-07-31 PROCEDURE — 85025 COMPLETE CBC W/AUTO DIFF WBC: CPT

## 2019-07-31 PROCEDURE — 99284 EMERGENCY DEPT VISIT MOD MDM: CPT | Performed by: EMERGENCY MEDICINE

## 2019-07-31 PROCEDURE — 93005 ELECTROCARDIOGRAM TRACING: CPT

## 2019-07-31 PROCEDURE — 36415 COLL VENOUS BLD VENIPUNCTURE: CPT

## 2019-07-31 PROCEDURE — 84484 ASSAY OF TROPONIN QUANT: CPT | Performed by: EMERGENCY MEDICINE

## 2019-07-31 PROCEDURE — 96374 THER/PROPH/DIAG INJ IV PUSH: CPT

## 2019-07-31 PROCEDURE — 74019 RADEX ABDOMEN 2 VIEWS: CPT

## 2019-07-31 PROCEDURE — 83690 ASSAY OF LIPASE: CPT

## 2019-07-31 PROCEDURE — 80197 ASSAY OF TACROLIMUS: CPT | Performed by: EMERGENCY MEDICINE

## 2019-07-31 RX ORDER — TACROLIMUS 1 MG/1
1 CAPSULE ORAL 2 TIMES DAILY
Status: DISCONTINUED | OUTPATIENT
Start: 2019-07-31 | End: 2019-08-06

## 2019-07-31 RX ORDER — MYCOPHENOLIC ACID 180 MG/1
360 TABLET, DELAYED RELEASE ORAL 2 TIMES DAILY
Status: DISCONTINUED | OUTPATIENT
Start: 2019-07-31 | End: 2019-08-07 | Stop reason: HOSPADM

## 2019-07-31 RX ORDER — HEPARIN SODIUM 5000 [USP'U]/ML
5000 INJECTION, SOLUTION INTRAVENOUS; SUBCUTANEOUS EVERY 8 HOURS SCHEDULED
Status: DISCONTINUED | OUTPATIENT
Start: 2019-07-31 | End: 2019-08-06 | Stop reason: SDUPTHER

## 2019-07-31 RX ORDER — ONDANSETRON 2 MG/ML
4 INJECTION INTRAMUSCULAR; INTRAVENOUS EVERY 6 HOURS PRN
Status: DISCONTINUED | OUTPATIENT
Start: 2019-07-31 | End: 2019-08-07 | Stop reason: HOSPADM

## 2019-07-31 RX ORDER — CEFAZOLIN SODIUM 2 G/50ML
2000 SOLUTION INTRAVENOUS EVERY 8 HOURS
Status: DISCONTINUED | OUTPATIENT
Start: 2019-08-01 | End: 2019-08-05

## 2019-07-31 RX ORDER — SULFAMETHOXAZOLE AND TRIMETHOPRIM 400; 80 MG/1; MG/1
1 TABLET ORAL DAILY
Status: DISCONTINUED | OUTPATIENT
Start: 2019-08-01 | End: 2019-08-07 | Stop reason: HOSPADM

## 2019-07-31 RX ORDER — SULFAMETHOXAZOLE AND TRIMETHOPRIM 400; 80 MG/1; MG/1
1 TABLET ORAL DAILY
COMMUNITY
End: 2019-11-06 | Stop reason: SDUPTHER

## 2019-07-31 RX ORDER — SODIUM CHLORIDE 9 MG/ML
100 INJECTION, SOLUTION INTRAVENOUS CONTINUOUS
Status: DISCONTINUED | OUTPATIENT
Start: 2019-07-31 | End: 2019-08-04

## 2019-07-31 RX ADMIN — TACROLIMUS 1 MG: 1 CAPSULE ORAL at 23:07

## 2019-07-31 RX ADMIN — SODIUM CHLORIDE 100 ML/HR: 0.9 INJECTION, SOLUTION INTRAVENOUS at 23:21

## 2019-07-31 RX ADMIN — ONDANSETRON 4 MG: 2 INJECTION INTRAMUSCULAR; INTRAVENOUS at 23:33

## 2019-07-31 RX ADMIN — MORPHINE SULFATE 2 MG: 2 INJECTION, SOLUTION INTRAMUSCULAR; INTRAVENOUS at 23:27

## 2019-07-31 RX ADMIN — MYCOPHENOLIC ACID 360 MG: 180 TABLET, DELAYED RELEASE ORAL at 23:07

## 2019-07-31 RX ADMIN — SODIUM CHLORIDE 1000 ML: 0.9 INJECTION, SOLUTION INTRAVENOUS at 21:44

## 2019-07-31 RX ADMIN — PIPERACILLIN SODIUM,TAZOBACTAM SODIUM 3.38 G: 3; .375 INJECTION, POWDER, FOR SOLUTION INTRAVENOUS at 21:43

## 2019-07-31 NOTE — ED PROVIDER NOTES
History  Chief Complaint   Patient presents with    Abnormal Lab     Pt is having pain across the abdomen  Pt vomitied today twice  Pt reports his WBC is elevated  Pt has a kidney transplant  Sent to ED by PCP for leukocytosis  Pt reports 1-2 days of constant aching fullness and distension in his upper b/l abdomen which is associated w 2 episodes of nonbloody nonbilious emesis  It occurs in context of chronic immunosuppression and prior renal transplant, on tacrolimus  He denies fever, dyspnea, cough, presyncope, weakness, and CP  He denies back pain  He denies weakness  He denies recent trauma  He does have a h/o CAD s/p cardiac stent but denies anginal sxs  He had a partial colectomy previously but denies h/o bowel obstruction  He denies urinary sxs  His wife provides additional history  I reviewed labs in Epic today which are significant for mildly elevated lipase in the 400's and wbc of 16 with left shift  Prior to Admission Medications   Prescriptions Last Dose Informant Patient Reported? Taking?    Omega-3 Fatty Acids (FISH OIL CONCENTRATE) 300 MG CAPS  Self Yes No   Sig: Take 1 capsule by mouth daily    aspirin 81 mg chewable tablet  Self Yes No   Sig: Chew 81 mg daily    atorvastatin (LIPITOR) 10 mg tablet   No No   Sig: TAKE 1 TABLET BY MOUTH EVERY DAY   metoprolol tartrate (LOPRESSOR) 25 mg tablet  Self No No   Sig: Take 1 tablet (25 mg total) by mouth daily   Patient taking differently: Take 25 mg by mouth daily at bedtime    mycophenolate (MYFORTIC) 360 MG TBEC   No No   Sig: TAKE 1 TABLET (360 MG TOTAL) BY MOUTH 2 (TWO) TIMES A DAY   predniSONE 5 mg tablet   No No   Sig: Take 1 tablet (5 mg total) by mouth daily   tacrolimus (PROGRAF) 1 mg capsule  Self No No   Sig: Take 1 capsule (1 mg total) by mouth 2 (two) times a day   tamsulosin (FLOMAX) 0 4 mg   No No   Sig: Take 1 capsule (0 4 mg total) by mouth daily      Facility-Administered Medications: None       Past Medical History: Diagnosis Date    Bacterial pneumonia     last assessed: 2/2/2017    Benign neoplasm of skin     Benign prostatic hyperplasia     Cardiac disorder     GERD (gastroesophageal reflux disease)     Gross hematuria     last assessed: 12/5/2016    Hypercholesterolemia     Hypertension     Kidney disease     Kidney transplanted     x 2, 2001 and 2012    Osteoporosis     Pneumonia     last assessed: 10/9/2017    Seborrheic keratosis     Sinusitis     Viral warts        Past Surgical History:   Procedure Laterality Date    AV FISTULA PLACEMENT Right 2001    arteriovenous surgery creation of A-V fistula, right arm radiocephalic-  Heriberto Diver    AV FISTULA PLACEMENT Left 2011    hemodialysis acess type arteriovenous fistula, left brachiocepalic AVF [id number]  [facility]  2012    CORONARY STENT PLACEMENT  09/15/2011    PTA stenting-[facility]/M Dr Ronnie Ruby  2006    10/2006-[facility]   [place] OTHER SURGICAL HISTORY  01/2012    fluids from transplant    OTHER SURGICAL HISTORY Left 07/08/2013    shaving of lesion shoulders, skin left shoulder combined compound and blue nevus    TONSILLECTOMY      TRANSPLANTATION RENAL Left Pineda Higgins, [address] TRANSPLANTATION RENAL Right 12/08/2011    Pined[place], [place]       Family History   Problem Relation Age of Onset    Polycystic kidney disease Father     Kidney disease Father      I have reviewed and agree with the history as documented  Social History     Tobacco Use    Smoking status: Never Smoker    Smokeless tobacco: Never Used   Substance Use Topics    Alcohol use: No    Drug use: No        Review of Systems   Constitutional: Negative for chills and fever  Respiratory: Negative for chest tightness and shortness of breath  Gastrointestinal: Positive for abdominal distention, abdominal pain and vomiting  Negative for constipation, diarrhea and rectal pain     Genitourinary: Negative for dysuria, flank pain and frequency  All other systems reviewed and are negative  Physical Exam  Physical Exam   Constitutional: He is oriented to person, place, and time  He appears well-developed and well-nourished  No distress  HENT:   Head: Normocephalic and atraumatic  Eyes: Pupils are equal, round, and reactive to light  Conjunctivae and EOM are normal    Neck: Normal range of motion  Neck supple  No JVD present  Cardiovascular: Normal rate, regular rhythm, normal heart sounds and intact distal pulses  Exam reveals no gallop and no friction rub  No murmur heard  Pulmonary/Chest: Effort normal and breath sounds normal  No stridor  No respiratory distress  He has no wheezes  He has no rales  Abdominal: Soft  He exhibits distension  He exhibits no mass  There is no tenderness  There is no guarding  Musculoskeletal: Normal range of motion  He exhibits no edema, tenderness or deformity  Neurological: He is alert and oriented to person, place, and time  No cranial nerve deficit or sensory deficit  He exhibits normal muscle tone  Coordination normal    Skin: Skin is warm and dry  Capillary refill takes less than 2 seconds  He is not diaphoretic  Nursing note and vitals reviewed        Vital Signs  ED Triage Vitals [07/31/19 1955]   Temperature Pulse Respirations Blood Pressure SpO2   99 3 °F (37 4 °C) 83 19 127/64 96 %      Temp Source Heart Rate Source Patient Position - Orthostatic VS BP Location FiO2 (%)   Oral Monitor Lying Right arm --      Pain Score       8           Vitals:    07/31/19 1955 07/31/19 2030   BP: 127/64 116/65   Pulse: 83 78   Patient Position - Orthostatic VS: Lying Lying         Visual Acuity      ED Medications  Medications   sodium chloride 0 9 % bolus 1,000 mL (1,000 mL Intravenous New Bag 7/31/19 2141)   sodium chloride 0 9 % infusion (has no administration in time range)   ondansetron (ZOFRAN) injection 4 mg (has no administration in time range)   heparin (porcine) subcutaneous injection 5,000 Units (has no administration in time range)   ceFAZolin (ANCEF) IVPB (premix) 2,000 mg (has no administration in time range)   famotidine (PEPCID) injection 20 mg (has no administration in time range)   morphine injection 2 mg (has no administration in time range)   tacrolimus (PROGRAF) capsule 1 mg (has no administration in time range)   mycophenolic acid (MYFORTIC) EC tablet 360 mg (has no administration in time range)   piperacillin-tazobactam (ZOSYN) 3 375 g in sodium chloride 0 9 % 50 mL IVPB (0 g Intravenous Stopped 7/31/19 2240)       Diagnostic Studies  Results Reviewed     Procedure Component Value Units Date/Time    Platelet count [[id number]]     Lab Status:  No result Specimen:  Blood     Troponin I [[id number]]  (Normal) Collected:  07/31/19 2016    Lab Status:  Final result Specimen:  Blood from Arm, Right Updated:  07/31/19 2050     Troponin I <0 02 ng/mL     Blood culture #1 [[id number]] Collected:  07/31/19 2016    Lab Status: In process Specimen:  Blood from Arm, Right Updated:  07/31/19 2030    Blood culture #2 [[id number]] Collected:  07/31/19 2016    Lab Status: In process Specimen:  Blood from Hand, Right Updated:  07/31/19 2030    Tacrolimus level [[id number]] Collected:  07/31/19 2024    Lab Status: In process Specimen:  Blood from Arm, Right Updated:  07/31/19 2027                 US gallbladder   Final Result by Max Mcfarlane DO (07/31 2230)      Gallbladder sludge with mild to moderate gallbladder distention, gallbladder wall thickening as well as positive sonographic Randall sign, and pericholecystic fluid; findings taken together are suspicious for acute cholecystitis in the appropriate    clinical setting  Correlation with the patient's symptoms and laboratory values recommended  Pancreas essentially obscured by overlying bowel gas  Severe right renal atrophy  Other findings as above        Workstation performed: [id number] CT abdomen pelvis wo contrast   Final Result by Lopez Prabhakar MD (07/31 2050)      Distended gallbladder with wall thickening and surrounding fat stranding may indicate cholecystitis  No radiopaque gallstones  Follow-up with right upper quadrant ultrasound  The study was marked in [facility] for immediate notification  Workstation performed: [id number]                    Procedures  ECG 12 Lead Documentation Only  Date/Time: 7/31/2019 8:29 PM  Performed by: Samir Mendez MD  Authorized by: Samir Mendez MD     Indications / Diagnosis:  Epigastric pain  ECG reviewed by me, the ED Provider: yes    Patient location:  ED  Previous ECG:     Previous ECG:  Compared to current    Comparison ECG info:  3 apr 2017    Similarity:  No change  Interpretation:     Interpretation: normal    Rate:     ECG rate:  81    ECG rate assessment: normal    Rhythm:     Rhythm: sinus rhythm             ED Course           Identification of Seniors at Risk      Most Recent Value   (ISAR) Identification of Seniors at Risk   Before the illness or injury that brought you to the Emergency, did you need someone to help you on a regular basis? 0 Filed at: 07/31/2019 2002   In the last 24 hours, have you needed more help than usual?  0 Filed at: 07/31/2019 2002   Have you been hospitalized for one or more nights during the past 6 months? 0 Filed at: 07/31/2019 2002   In general, do you see well? 1 Filed at: 07/31/2019 2002   In general, do you have serious problems with your memory? 0 Filed at: 07/31/2019 2002   Do you take more than three different medications every day?   1 Filed at: 07/31/2019 2002   ISAR Score  2 Filed at: 07/31/2019 2002                          [facility]    Disposition  Final diagnoses:   Acute cholecystitis   History of renal transplant     Time reflects when diagnosis was documented in both MDM as applicable and the Disposition within this note     Time User Action Codes Description Comment    7/31/2019  9:49 PM Mark Thrasher Add [K81 0] Acute cholecystitis     7/31/2019  9:49 PM Oleksandr Montoya Add [Z94 0] History of renal transplant     7/31/2019 10:18 PM Mary Martinet Add [I25 10] Coronary artery disease involving native coronary artery of native heart without angina pectoris     7/31/2019 10:18 PM Mary Martinet Modify [I25 10] Coronary artery disease involving native coronary artery of native heart without angina pectoris     7/31/2019 10:18 PM Mary Martinet Add [I10] Essential hypertension     7/31/2019 10:18 PM Valeriy [address] Essential hypertension     7/31/2019 10:24 PM Mary Martinet Add [Z94 0] H/O kidney transplant     7/31/2019 10:24 PM Mary Martinet Modify [Z94 0] H/O kidney transplant     7/31/2019 10:24 PM Ismael Frankel M Add [Q61 3] Polycystic kidney disease     7/31/2019 10:24 PM Mary Martinet Remove [Q61 3] Polycystic kidney disease     7/31/2019 10:24 PM Mary Martinet Add [N18 3] Stage 3 chronic kidney disease ([facility] [address] )     7/31/2019 10:24 PM Mary Martinet Modify [N18 3] Stage 3 chronic kidney disease [facility])       ED Disposition     ED Disposition Condition Date/Time Comment    Admit Stable Wed Jul 31, 2019  9:49 PM Case was discussed with Dr Ruiz Saunders and the patient's admission status was agreed to be Admission Status: inpatient status to the service of Dr Ruiz Saunders            Follow-up Information    None       Date, Time and Cause of Death    Preliminary Cause of Death:  Septic shock ([facility] [address] )       Current Discharge Medication List      CONTINUE these medications which have NOT CHANGED    Details   aspirin 81 mg chewable tablet Chew 81 mg daily       atorvastatin (LIPITOR) 10 mg tablet TAKE 1 TABLET BY MOUTH EVERY DAY  Qty: 90 tablet, Refills: 1    Associated Diagnoses: Mixed hyperlipidemia      metoprolol tartrate (LOPRESSOR) 25 mg tablet Take 1 tablet (25 mg total) by mouth daily  Qty: 90 tablet, Refills: 3    Associated Diagnoses: Essential hypertension mycophenolate (MYFORTIC) 360 MG TBEC TAKE 1 TABLET (360 MG TOTAL) BY MOUTH 2 (TWO) TIMES A DAY  Qty: 60 tablet, Refills: 5    Associated Diagnoses: Kidney transplanted      Omega-3 Fatty Acids (FISH OIL CONCENTRATE) 300 MG CAPS Take 1 capsule by mouth daily       predniSONE 5 mg tablet Take 1 tablet (5 mg total) by mouth daily  Qty: 90 tablet, Refills: 0    Associated Diagnoses: Renal transplant recipient      tacrolimus (PROGRAF) 1 mg capsule Take 1 capsule (1 mg total) by mouth 2 (two) times a day  Qty: 180 capsule, Refills: 3    Associated Diagnoses: Kidney transplanted      tamsulosin (FLOMAX) 0 4 mg Take 1 capsule (0 4 mg total) by mouth daily  Qty: 90 capsule, Refills: 1    Associated Diagnoses: Benign prostatic hyperplasia without lower urinary tract symptoms           No discharge procedures on file      ED Provider  Electronically Signed by           Thelma Cain MD  07/31/19 7182

## 2019-07-31 NOTE — PROGRESS NOTES
Assessment/Plan:     Unclear etiology  Could just be the constipation  Could be gastroenteritis  Ordered labs and a flat plate of the abdomen  Told the patient and his wife if he has any worsening, especially if his abdomen becomes distended or hard, she should take him to the emergency room  They may try and take MiraLax for the constipation  BMI Counseling: Body mass index is 25 75 kg/m²  Discussed the patient's BMI with him  The BMI is above average  No BMI follow-up plan is appropriate  Patient is in an urgent or emergent medical situation  Return if symptoms worsen or fail to improve  No problem-specific Assessment & Plan notes found for this encounter  Diagnoses and all orders for this visit:    Generalized abdominal pain  -     CBC and differential; Future  -     Comprehensive metabolic panel; Future  -     Amylase; Future  -     Lipase; Future  -     XR abdomen 1 view kub; Future          Subjective:      Patient ID: Mary Mcadams is a 77 y o  male  Patient comes in today with his wife complaining of 2 days of abdominal pain  His wife states that he had some trouble with constipation and then diarrhea last week  But then she thought he was better  Yesterday, he had abdominal pain  One episode of vomiting  Started to move his bowels and felt a little better but the pain returned  No one else is sick at home  No blood        ALLERGIES:  Allergies   Allergen Reactions    Other        CURRENT MEDICATIONS:    Current Outpatient Medications:     aspirin 81 mg chewable tablet, Chew 81 mg daily , Disp: , Rfl:     atorvastatin (LIPITOR) 10 mg tablet, TAKE 1 TABLET BY MOUTH EVERY DAY, Disp: 90 tablet, Rfl: 1    metoprolol tartrate (LOPRESSOR) 25 mg tablet, Take 1 tablet (25 mg total) by mouth daily, Disp: 90 tablet, Rfl: 3    mycophenolate (MYFORTIC) 360 MG TBEC, TAKE 1 TABLET (360 MG TOTAL) BY MOUTH 2 (TWO) TIMES A DAY, Disp: 60 tablet, Rfl: 5    Omega-3 Fatty Acids (FISH OIL CONCENTRATE) 300 MG CAPS, Take 1 capsule by mouth daily , Disp: , Rfl:     predniSONE 5 mg tablet, Take 1 tablet (5 mg total) by mouth daily, Disp: 90 tablet, Rfl: 0    tacrolimus (PROGRAF) 1 mg capsule, Take 1 capsule (1 mg total) by mouth 2 (two) times a day, Disp: 180 capsule, Rfl: 3    tamsulosin (FLOMAX) 0 4 mg, Take 1 capsule (0 4 mg total) by mouth daily, Disp: 90 capsule, Rfl: 1    ACTIVE PROBLEM LIST:  Patient Active Problem List   Diagnosis    Hypoalbuminemia    CAD (coronary artery disease)    H/O kidney transplant    [facility] [address] [facility]    HLD (hyperlipidemia)    S/P mitral valve repair    Polycystic kidney disease    CKD (chronic kidney disease)    Essential hypertension    Elevated brain natriuretic peptide (BNP) level    Immunosuppression (HCC)    Renal transplant recipient    Acute non-recurrent maxillary sinusitis    H/O recurrent pneumonia    Multiple nevi    Benign localized prostatic hyperplasia with lower urinary tract symptoms (LUTS)    Incomplete bladder emptying       PAST MEDICAL HISTORY:  Past Medical History:   Diagnosis Date    Bacterial pneumonia     last assessed: 2/2/2017    Benign neoplasm of skin     Benign prostatic hyperplasia     Cardiac disorder     GERD (gastroesophageal reflux disease)     Gross hematuria     last assessed: 12/5/2016    Hypercholesterolemia     Hypertension     Kidney disease     Kidney transplanted     x 2, 2001 and 2012    Osteoporosis     Pneumonia     last assessed: 10/9/2017    Seborrheic keratosis     Sinusitis     Viral warts        PAST SURGICAL HISTORY:  Past Surgical History:   Procedure Laterality Date    AV FISTULA PLACEMENT Right 2001    arteriovenous surgery creation of A-V fistula, right arm radiocephalic-Dr Duke Renteria    AV FISTULA PLACEMENT Left 2011    hemodialysis acess type arteriovenous fistula, left brachiocepalic AVF 7310-[id number]  [facility]  2012   [address] [place] [address] 09/15/2011    PTA stenting-LVH/M Dr Violeta Jefferson  2006    10/2006[facility]   [place] OTHER SURGICAL HISTORY  01/2012    fluids from transplant    OTHER SURGICAL HISTORY Left 07/08/2013    shaving of lesion shoulders, skin left shoulder combined compound and blue nevus    TONSILLECTOMY      TRANSPLANTATION RENAL Left Allen Pineda Yen, [address] TRANSPLANTATION RENAL Right 12/08/2011    [place] Pined[place] Yen, [place]       FAMILY HISTORY:  Family History   Problem Relation Age of Onset    Polycystic kidney disease Father     Kidney disease Father        SOCIAL HISTORY:  Social History     Socioeconomic History    Marital status: /Civil Union     Spouse name: Not on file    Number of children: 3    Years of education: Not on file    Highest education level: Not on file   Occupational History    Occupation: retired     Comment: , not employed   Social Needs    Financial resource strain: Not on file    Food insecurity:     Worry: Not on file     Inability: Not on file   Gojimo needs:     Medical: Not on file     Non-medical: Not on file   Tobacco Use    Smoking status: Never Smoker    Smokeless tobacco: Never Used   Substance and Sexual Activity    Alcohol use: No    Drug use: No    Sexual activity: Yes     Partners: Female     Comment: denied: history of high risk sexual behavior   Lifestyle    Physical activity:     Days per week: Not on file     Minutes per session: Not on file    Stress: Not on file   Relationships    Social connections:     Talks on phone: Not on file     Gets together: Not on file     Attends Religion service: Not on file     Active member of club or organization: Not on file     Attends meetings of clubs or organizations: Not on file     Relationship status: Not on file    Intimate partner violence:     Fear of current or ex partner: Not on file     Emotionally abused: Not on file     Physically abused: Not on file     Forced sexual activity: Not on file   Other Topics Concern    Not on file   Social History Narrative    Active advance directive-yes    Exercises occasionally       Review of Systems   Constitutional: Negative for fever  Gastrointestinal: Positive for abdominal pain and constipation  Negative for blood in stool  Objective:  Vitals:    07/31/19 1454   BP: 114/62   BP Location: Left arm   Patient Position: Sitting   Cuff Size: Adult   Pulse: 72   Resp: 18   Temp: 98 5 °F (36 9 °C)   SpO2: 96%   Weight: 68 kg (150 lb)   Height: 5' 4" (1 626 m)     Body mass index is 25 75 kg/m²  Physical Exam   Constitutional:  Non-toxic appearance  Looks uncomfortable   Abdominal: Soft  Normal appearance and bowel sounds are normal  He exhibits no distension  There is no tenderness  There is no rigidity, no rebound and no guarding  Nursing note and vitals reviewed  RESULTS:    No results found for this or any previous visit (from the past 1008 hour(s))  This note was created with voice recognition software  Phonic, grammatical and spelling errors may be present within the note as a result

## 2019-08-01 ENCOUNTER — APPOINTMENT (INPATIENT)
Dept: RADIOLOGY | Facility: HOSPITAL | Age: 69
DRG: 414 | End: 2019-08-01
Payer: MEDICARE

## 2019-08-01 PROBLEM — K81.0 ACUTE CHOLECYSTITIS: Status: ACTIVE | Noted: 2019-07-31

## 2019-08-01 LAB
ABO GROUP BLD: NORMAL
ALBUMIN SERPL BCP-MCNC: 2.7 G/DL (ref 3.5–5)
ALP SERPL-CCNC: 52 U/L (ref 46–116)
ALT SERPL W P-5'-P-CCNC: 48 U/L (ref 12–78)
ANION GAP SERPL CALCULATED.3IONS-SCNC: 11 MMOL/L (ref 4–13)
AST SERPL W P-5'-P-CCNC: 42 U/L (ref 5–45)
ATRIAL RATE: 71 BPM
ATRIAL RATE: 81 BPM
BASOPHILS # BLD AUTO: 0.02 THOUSANDS/ΜL (ref 0–0.1)
BASOPHILS NFR BLD AUTO: 0 % (ref 0–1)
BILIRUB SERPL-MCNC: 2.2 MG/DL (ref 0.2–1)
BLD GP AB SCN SERPL QL: NEGATIVE
BUN SERPL-MCNC: 21 MG/DL (ref 5–25)
CALCIUM SERPL-MCNC: 8.5 MG/DL (ref 8.3–10.1)
CHLORIDE SERPL-SCNC: 107 MMOL/L (ref 100–108)
CO2 SERPL-SCNC: 21 MMOL/L (ref 21–32)
CREAT SERPL-MCNC: 1.25 MG/DL (ref 0.6–1.3)
EOSINOPHIL # BLD AUTO: 0.02 THOUSAND/ΜL (ref 0–0.61)
EOSINOPHIL NFR BLD AUTO: 0 % (ref 0–6)
ERYTHROCYTE [DISTWIDTH] IN BLOOD BY AUTOMATED COUNT: 13.5 % (ref 11.6–15.1)
GFR SERPL CREATININE-BSD FRML MDRD: 59 ML/MIN/1.73SQ M
GLUCOSE SERPL-MCNC: 99 MG/DL (ref 65–140)
HCT VFR BLD AUTO: 46.5 % (ref 36.5–49.3)
HGB BLD-MCNC: 14.7 G/DL (ref 12–17)
IMM GRANULOCYTES # BLD AUTO: 0.07 THOUSAND/UL (ref 0–0.2)
IMM GRANULOCYTES NFR BLD AUTO: 1 % (ref 0–2)
LYMPHOCYTES # BLD AUTO: 0.91 THOUSANDS/ΜL (ref 0.6–4.47)
LYMPHOCYTES NFR BLD AUTO: 7 % (ref 14–44)
MAGNESIUM SERPL-MCNC: 1.9 MG/DL (ref 1.6–2.6)
MCH RBC QN AUTO: 29.3 PG (ref 26.8–34.3)
MCHC RBC AUTO-ENTMCNC: 31.6 G/DL (ref 31.4–37.4)
MCV RBC AUTO: 93 FL (ref 82–98)
MONOCYTES # BLD AUTO: 1.2 THOUSAND/ΜL (ref 0.17–1.22)
MONOCYTES NFR BLD AUTO: 9 % (ref 4–12)
NEUTROPHILS # BLD AUTO: 10.94 THOUSANDS/ΜL (ref 1.85–7.62)
NEUTS SEG NFR BLD AUTO: 83 % (ref 43–75)
NRBC BLD AUTO-RTO: 0 /100 WBCS
P AXIS: -13 DEGREES
P AXIS: -2 DEGREES
PHOSPHATE SERPL-MCNC: 3 MG/DL (ref 2.3–4.1)
PLATELET # BLD AUTO: 204 THOUSANDS/UL (ref 149–390)
PMV BLD AUTO: 9.1 FL (ref 8.9–12.7)
POTASSIUM SERPL-SCNC: 4.1 MMOL/L (ref 3.5–5.3)
PR INTERVAL: 170 MS
PR INTERVAL: 174 MS
PROT SERPL-MCNC: 6.8 G/DL (ref 6.4–8.2)
QRS AXIS: 102 DEGREES
QRS AXIS: 105 DEGREES
QRSD INTERVAL: 132 MS
QRSD INTERVAL: 140 MS
QT INTERVAL: 398 MS
QT INTERVAL: 434 MS
QTC INTERVAL: 462 MS
QTC INTERVAL: 471 MS
RBC # BLD AUTO: 5.02 MILLION/UL (ref 3.88–5.62)
RH BLD: POSITIVE
SODIUM SERPL-SCNC: 139 MMOL/L (ref 136–145)
SPECIMEN EXPIRATION DATE: NORMAL
T WAVE AXIS: 29 DEGREES
T WAVE AXIS: 35 DEGREES
TACROLIMUS BLD-MCNC: 2.8 NG/ML (ref 2–20)
VENTRICULAR RATE: 71 BPM
VENTRICULAR RATE: 81 BPM
WBC # BLD AUTO: 13.16 THOUSAND/UL (ref 4.31–10.16)

## 2019-08-01 PROCEDURE — 86900 BLOOD TYPING SEROLOGIC ABO: CPT | Performed by: PHYSICIAN ASSISTANT

## 2019-08-01 PROCEDURE — 93005 ELECTROCARDIOGRAM TRACING: CPT

## 2019-08-01 PROCEDURE — 83735 ASSAY OF MAGNESIUM: CPT | Performed by: SURGERY

## 2019-08-01 PROCEDURE — 86850 RBC ANTIBODY SCREEN: CPT | Performed by: PHYSICIAN ASSISTANT

## 2019-08-01 PROCEDURE — 99223 1ST HOSP IP/OBS HIGH 75: CPT | Performed by: INTERNAL MEDICINE

## 2019-08-01 PROCEDURE — 93010 ELECTROCARDIOGRAM REPORT: CPT | Performed by: INTERNAL MEDICINE

## 2019-08-01 PROCEDURE — 86901 BLOOD TYPING SEROLOGIC RH(D): CPT | Performed by: PHYSICIAN ASSISTANT

## 2019-08-01 PROCEDURE — 84100 ASSAY OF PHOSPHORUS: CPT | Performed by: SURGERY

## 2019-08-01 PROCEDURE — 99222 1ST HOSP IP/OBS MODERATE 55: CPT | Performed by: SURGERY

## 2019-08-01 PROCEDURE — 71046 X-RAY EXAM CHEST 2 VIEWS: CPT

## 2019-08-01 PROCEDURE — 85025 COMPLETE CBC W/AUTO DIFF WBC: CPT | Performed by: SURGERY

## 2019-08-01 PROCEDURE — 99223 1ST HOSP IP/OBS HIGH 75: CPT | Performed by: PHYSICIAN ASSISTANT

## 2019-08-01 PROCEDURE — 80053 COMPREHEN METABOLIC PANEL: CPT | Performed by: SURGERY

## 2019-08-01 RX ORDER — PREDNISONE 10 MG/1
10 TABLET ORAL DAILY
Status: DISCONTINUED | OUTPATIENT
Start: 2019-08-01 | End: 2019-08-01

## 2019-08-01 RX ORDER — ATORVASTATIN CALCIUM 10 MG/1
10 TABLET, FILM COATED ORAL
Status: DISCONTINUED | OUTPATIENT
Start: 2019-08-01 | End: 2019-08-07 | Stop reason: HOSPADM

## 2019-08-01 RX ORDER — METHYLPREDNISOLONE SODIUM SUCCINATE 40 MG/ML
40 INJECTION, POWDER, LYOPHILIZED, FOR SOLUTION INTRAMUSCULAR; INTRAVENOUS EVERY 12 HOURS SCHEDULED
Status: DISCONTINUED | OUTPATIENT
Start: 2019-08-01 | End: 2019-08-03

## 2019-08-01 RX ORDER — TAMSULOSIN HYDROCHLORIDE 0.4 MG/1
0.4 CAPSULE ORAL
Status: DISCONTINUED | OUTPATIENT
Start: 2019-08-01 | End: 2019-08-07 | Stop reason: HOSPADM

## 2019-08-01 RX ORDER — FAMOTIDINE 20 MG/1
20 TABLET, FILM COATED ORAL DAILY
Status: DISCONTINUED | OUTPATIENT
Start: 2019-08-01 | End: 2019-08-07

## 2019-08-01 RX ADMIN — HEPARIN SODIUM 5000 UNITS: 5000 INJECTION INTRAVENOUS; SUBCUTANEOUS at 22:17

## 2019-08-01 RX ADMIN — CEFAZOLIN SODIUM 2000 MG: 2 SOLUTION INTRAVENOUS at 13:53

## 2019-08-01 RX ADMIN — MYCOPHENOLIC ACID 360 MG: 180 TABLET, DELAYED RELEASE ORAL at 08:59

## 2019-08-01 RX ADMIN — MYCOPHENOLIC ACID 360 MG: 180 TABLET, DELAYED RELEASE ORAL at 18:07

## 2019-08-01 RX ADMIN — CEFAZOLIN SODIUM 2000 MG: 2 SOLUTION INTRAVENOUS at 22:15

## 2019-08-01 RX ADMIN — METOPROLOL TARTRATE 25 MG: 25 TABLET, FILM COATED ORAL at 22:16

## 2019-08-01 RX ADMIN — TACROLIMUS 1 MG: 1 CAPSULE ORAL at 08:58

## 2019-08-01 RX ADMIN — SODIUM CHLORIDE 100 ML/HR: 0.9 INJECTION, SOLUTION INTRAVENOUS at 13:51

## 2019-08-01 RX ADMIN — PREDNISONE 10 MG: 10 TABLET ORAL at 08:58

## 2019-08-01 RX ADMIN — CEFAZOLIN SODIUM 2000 MG: 2 SOLUTION INTRAVENOUS at 06:00

## 2019-08-01 RX ADMIN — FAMOTIDINE 20 MG: 20 TABLET ORAL at 08:58

## 2019-08-01 RX ADMIN — SULFAMETHOXAZOLE AND TRIMETHOPRIM 1 TABLET: 400; 80 TABLET ORAL at 08:58

## 2019-08-01 RX ADMIN — HEPARIN SODIUM 5000 UNITS: 5000 INJECTION INTRAVENOUS; SUBCUTANEOUS at 13:54

## 2019-08-01 RX ADMIN — METHYLPREDNISOLONE SODIUM SUCCINATE 40 MG: 40 INJECTION, POWDER, FOR SOLUTION INTRAMUSCULAR; INTRAVENOUS at 22:15

## 2019-08-01 RX ADMIN — TACROLIMUS 1 MG: 1 CAPSULE ORAL at 18:07

## 2019-08-01 RX ADMIN — TAMSULOSIN HYDROCHLORIDE 0.4 MG: 0.4 CAPSULE ORAL at 18:07

## 2019-08-01 RX ADMIN — ATORVASTATIN CALCIUM 10 MG: 10 TABLET, FILM COATED ORAL at 18:07

## 2019-08-01 NOTE — ASSESSMENT & PLAN NOTE
Postop day 3  Still not passing a whole lot of flatus  As per patient, he had couple of episodes today  Continue with current other treatment  He is burping

## 2019-08-01 NOTE — ASSESSMENT & PLAN NOTE
· Patient has a history of CAD and cardiac stenting in 2010  He also has a history of a MVR in 2006  · He is on Asa 81mg which is on hold at present  · He is also on Metoprolol 25mg po daily and Statin  Continue BB perioperatively  · Follow up EKG  Cardiology consult regarding cardiac clearance  He follows with a cardiologist at [facility] and did have a Echo in April through [facility] which showed an EF of 70% and at least moderate mitral stenosis

## 2019-08-01 NOTE — CONSULTS
Consultation - Cardiology   Laurel Jerez 77 y o  male MRN: [id number]  Unit/Bed#: -01 Encounter: [id number]  08/01/19  9:13 AM    Assessment/ Plan:  Abdominal pain likely acute cholecystitis  Coronary artery disease history of PCI  Mitral valve repair  History of renal transplant for possible polycystic kidney disorder   Hyperlipidemia  Recommendations; From cardiac standpoint patient is completely asymptomatic, he has been fairly active riding his bike regularly several miles without any symptoms  He had a recent echocardiogram done no alarming findings were noted  I will check EKG  Reviewed the echocardiogram done recently at [facility]  The patient is to go for surgery he is okay to proceed with surgery at intermediate risk from cardiac standpoint no additional cardiac testing is required and a unless patient starts having cardiac symptoms    History of Present Illness      Physician Requesting Consult: Maury Ruiz MD  Reason for Consult / Principal Problem: CAD  HPI: Laurel Jerez is a 77y o  year old male who presents with abdominal pain    Patient's past medical history includes coronary artery disease history of PCI in 2011 history of kidney transplant for possible polycystic kidney disease in 2011 subsequently in 2012 he had surgery for fluid around the kidney transplant which was drained in 2012 he also had colon surgery for colon perforation history of mitral valve repair in the hospital CAG in 2006 followed by cardiologist at [facility] urine system most recent echocardiogram was in April for 2019 she normal ejection fraction speech coming to the hospital because of abdominal pain which is located in the upper part of the abdomen associated nausea and vomiting going on for 2 days history since he received morphine his chest pain has got better cardiology consultation is requested because of his cardiac history and possible need for preoperative clearance patient denies any chest pain shortness of palpitation and he was very active until this month when he went on vacation since then he has not tried his bike normally he rides his bike several miles without any chest pain shortness of breath I have reviewed all the imaging and laboratory studies  The remarkable elevated WBC sodium is also slightly elevated 40 minutes 30 minutes all bowel ultrasound of the gallbladder suggestive of acute cholecystitis multiple findings including Randall's sign       EKG:  Sinus rhythm nonspecific ST-T changes no acute ST-T changes no arrhythmias    Review of Systems: all other systems reviewed and are negative    Historical Information   Past Medical History:   Diagnosis Date    Bacterial pneumonia     last assessed: 2/2/2017    Benign neoplasm of skin     Benign prostatic hyperplasia     Cardiac disorder     GERD (gastroesophageal reflux disease)     Gross hematuria     last assessed: 12/5/2016    Hypercholesterolemia     Hypertension     Kidney disease     Kidney transplanted     x 2, 2001 and 2012    Osteoporosis     Pneumonia     last assessed: 10/9/2017    Seborrheic keratosis     Sinusitis     Viral warts      Past Surgical History:   Procedure Laterality Date    AV FISTULA PLACEMENT Right 2001    arteriovenous surgery creation of A-V fistula, right arm radiocephalic-Dr Gibran Flanagan    AV FISTULA PLACEMENT Left 2011    hemodialysis acess type arteriovenous fistula, left brachiocepalic AVF [id number]  [facility]  2012    CORONARY STENT PLACEMENT  09/15/2011    PTA stenting-[facility]/M Dr Ra Rajan  2006    10/2006-[facility]   [place] OTHER SURGICAL HISTORY  01/2012    fluids from transplant    OTHER SURGICAL HISTORY Left 07/08/2013    shaving of lesion shoulders, skin left shoulder combined compound and blue nevus    TONSILLECTOMY      TRANSPLANTATION RENAL Left [place]    TRANSPLANTATION RENAL Right 12/08/2011 [address], [place], [place]     Social History     Substance and Sexual Activity   Alcohol Use Never    Frequency: Never     Social History     Substance and Sexual Activity   Drug Use No     Social History     Tobacco Use   Smoking Status Never Smoker   Smokeless Tobacco Never Used       Family History:   Family History   Problem Relation Age of Onset    Polycystic kidney disease Father     Kidney disease Father        Meds/Allergies   current meds:   Current Facility-Administered Medications   Medication Dose Route Frequency    atorvastatin (LIPITOR) tablet 10 mg  10 mg Oral Daily With Dinner    ceFAZolin (ANCEF) IVPB (premix) 2,000 mg  2,000 mg Intravenous Q8H    famotidine (PEPCID) tablet 20 mg  20 mg Oral Daily    heparin (porcine) subcutaneous injection 5,000 Units  5,000 Units Subcutaneous Q8H [address]    metoprolol tartrate (LOPRESSOR) tablet 25 mg  25 mg Oral HS    morphine injection 2 mg  2 mg Intravenous Q1H PRN    mycophenolic acid (MYFORTIC) EC tablet 360 mg  360 mg Oral BID    ondansetron (ZOFRAN) injection 4 mg  4 mg Intravenous Q6H PRN    predniSONE tablet 10 mg  10 mg Oral Daily    sodium chloride 0 9 % infusion  100 mL/hr Intravenous Continuous    sulfamethoxazole-trimethoprim (BACTRIM) 400-80 mg per tablet 1 tablet  1 tablet Oral Daily    tacrolimus (PROGRAF) capsule 1 mg  1 mg Oral BID    tamsulosin (FLOMAX) capsule 0 4 mg  0 4 mg Oral Daily With Dinner     Allergies   Allergen Reactions    Other        Objective   Vitals: Blood pressure 104/57, pulse 70, temperature 98 2 °F (36 8 °C), resp   rate 18, height 5' 4" (1 626 m), weight 68 4 kg (150 lb 12 7 oz), SpO2 91 % , Body mass index is 25 88 kg/m² ,   Orthostatic Blood Pressures      Most Recent Value   Blood Pressure  104/57 filed at 08/01/2019 0655   Patient Position - Orthostatic VS  Lying filed at 07/31/2019 0901          Systolic (95KKK), BRO:683 , Min:96 , CPJ:594     Diastolic (64NPU), QOO:31, Min:55, Max:65        Intake/Output Summary (Last 24 hours) at 8/1/2019 0913  Last data filed at 7/31/2019 2321  Gross per 24 hour   Intake 1000 ml   Output    Net 1000 ml       Invasive Devices     Peripheral Intravenous Line            Peripheral IV 04/03/17 Right Antecubital 849 days    Peripheral IV 07/31/19 Right Antecubital less than 1 day                    Physical Exam:  GEN: Alert and oriented x 3, in no acute distress  Well appearing and well nourished  HEENT: Sclera anicteric, conjunctivae pink, mucous membranes moist  Oropharynx clear  NECK: Supple, no carotid bruits, no significant JVD  Trachea midline, no thyromegaly  HEART: Regular rhythm, normal S1 and S2, no murmurs, clicks, gallops or rubs  PMI nondisplaced, no thrills  LUNGS: Clear to auscultation bilaterally; no wheezes, rales, or rhonchi  No increased work of breathing or signs of respiratory distress  ABDOMEN: Soft, nontender, nondistended, normoactive bowel sounds  EXTREMITIES: Skin warm and well perfused, no clubbing, cyanosis, or edema  NEURO: No focal findings  Normal speech  Mood and affect normal    SKIN: Normal without suspicious lesions on exposed skin        Lab Results:     Troponins:   Results from last 7 days   Lab Units 07/31/19  2016   TROPONIN I ng/mL <0 02       CBC with diff:   Results from last 7 days   Lab Units 08/01/19  0554 07/31/19  1542   WBC Thousand/uL 13 16* 16 21*   HEMOGLOBIN g/dL 14 7 16 8   HEMATOCRIT % 46 5 53 2*   MCV fL 93 91   PLATELETS Thousands/uL 204 252   MCH pg 29 3 28 9   MCHC g/dL 31 6 31 6   RDW % 13 5 13 3   MPV fL 9 1 9 1   NRBC AUTO /100 WBCs 0 0         CMP:   Results from last 7 days   Lab Units 08/01/19  0554 07/31/19  1542   POTASSIUM mmol/L 4 1 4 6   CHLORIDE mmol/L 107 103   CO2 mmol/L 21 26   BUN mg/dL 21 27*   CREATININE mg/dL 1 25 1 36*   CALCIUM mg/dL 8 5 9 5   AST U/L 42 17   ALT U/L 48 19   ALK PHOS U/L 52 61   EGFR ml/min/1 73sq m 59 48    [place]une for elective report:  The chest immediately after

## 2019-08-01 NOTE — ASSESSMENT & PLAN NOTE
· Patient has a history of Polycystic Kidney Disease and had a renal transplant in 2001 and again in 2011  Cr 1 25 today  · He follows with Dr Karlene Segal   · He is on Myfortic, Tacrolimus, Prednisone, and Bactrim  Stress dosing of Prednisone recommended at this time and dose increased to 10mg  · Check Tacrolimus level  · Nephrology consult

## 2019-08-01 NOTE — H&P
GENERAL SURGERY HISTORY AND PHYSICAL      Jodie Lujan 77 y o  male MRN: [id number]  Unit/Bed#: -01 Encounter: [id number]      Assessment/Plan   Acute Cholecystitis with cholelithiasis  H/O kidney transplant x 2,   H/o 2016 P  Afib due to pneumonia was on Xarelto and now off  H/o Mitral Valve replacement  No AC    -cardiology consult  -nephrology consult  -CLD today  -NPO MN  -plan OR tomorrow if cleared by consults      Chief Complaint:  I started with upper abdominal pain 2 days ago and vomiting green bile  The pain got worse and I came here  HPI: Jodie Lujan is a 77y o  year old male H/O kidney transplant for prob PCD  x2, failed 2011 then living donor 2011, on immunosuppressants, remote  bowel resection for perforation and E-coli sepsis on long term antibiotics, h/o paroxysmal Afib 3 years ago due to pneumonia on Xarelto short term, MVR not on AC, BPH, h/o bilateral AVF used in the past,  who presents with 2 days of upper abdominal pain and vomiting green bile  The pain got worse and he reported to the ED where CT revealed distended gall bladder, US confirmed distention, wall thickening 5 mm, and sludge  No CBD stone  Pt complained of constipation over the last few days  He had diarrhea a few weeks ago which resolved  He denies f/c, urinary symptoms or sick contacts  PSH: kidney transplant x 2, bowel resection for unknown reason  Pt thinks it was a perforation and had E-coli sepsis on long term antibiotics  T Bili 2 20,  Lipase 450  WBC 16   -cont Ancef Zosyn Bactrim   -IVF  -CLD today  -Cardiology consult  -Nephrology consult  -NPO MN  -plan for OR if cleared by consults     -pain control  -cont immunosuppressants       MVR  CKD  Kidney transplant  Immunosuppressed  BPH  -cont meds and SLIM management       Historical Information   Past Medical History:   Diagnosis Date    Bacterial pneumonia     last assessed: 2/2/2017    Benign neoplasm of skin     Benign prostatic hyperplasia     Cardiac disorder     GERD (gastroesophageal reflux disease)     Gross hematuria     last assessed: 12/5/2016    Hypercholesterolemia     Hypertension     Kidney disease     Kidney transplanted     x 2, 2001 and 2012    Osteoporosis     Pneumonia     last assessed: 10/9/2017    Seborrheic keratosis     Sinusitis     Viral warts      Past Surgical History:   Procedure Laterality Date    AV FISTULA PLACEMENT Right 2001    arteriovenous surgery creation of A-V fistula, right arm radiocephalic-Dr Arnaldo Smith    AV FISTULA PLACEMENT Left 2011    hemodialysis acess type arteriovenous fistula, left brachiocepalic AVF [id number] [facility]  2012    CORONARY STENT PLACEMENT  09/15/2011    PTA stenting-[facility]/M Dr Vonnie Wilson  2006    10/2006-[facility]   [address]      OTHER SURGICAL HISTORY  01/2012    fluids from transplant    OTHER SURGICAL HISTORY Left 07/08/2013    shaving of lesion shoulders, skin left shoulder combined compound and blue nevus    TONSILLECTOMY      TRANSPLANTATION RENAL Left [place], [facility] [address] TRANSPLANTATION RENAL Right 12/08/2011    [place], [place]     Social History   Social History     Substance and Sexual Activity   Alcohol Use Never    Frequency: Never     Social History     Substance and Sexual Activity   Drug Use No     Social History     Tobacco Use   Smoking Status Never Smoker   Smokeless Tobacco Never Used     Family History: no pertinent family history  Allergies   Allergen Reactions    Other      Meds/Allergies   current meds:   Current Facility-Administered Medications   Medication Dose Route Frequency    atorvastatin (LIPITOR) tablet 10 mg  10 mg Oral Daily With Dinner    ceFAZolin (ANCEF) IVPB (premix) 2,000 mg  2,000 mg Intravenous Q8H    famotidine (PEPCID) tablet 20 mg  20 mg Oral Daily    heparin (porcine) subcutaneous injection 5,000 Units  5,000 Units Subcutaneous Q8H [address]    metoprolol tartrate (LOPRESSOR) tablet 25 mg  25 mg Oral HS    morphine injection 2 mg  2 mg Intravenous Q1H PRN    mycophenolic acid (MYFORTIC) EC tablet 360 mg  360 mg Oral BID    ondansetron (ZOFRAN) injection 4 mg  4 mg Intravenous Q6H PRN    predniSONE tablet 10 mg  10 mg Oral Daily    sodium chloride 0 9 % infusion  100 mL/hr Intravenous Continuous    sulfamethoxazole-trimethoprim (BACTRIM) 400-80 mg per tablet 1 tablet  1 tablet Oral Daily    tacrolimus (PROGRAF) capsule 1 mg  1 mg Oral BID    tamsulosin (FLOMAX) capsule 0 4 mg  0 4 mg Oral Daily With Dinner         Objective   Vitals: Blood pressure 104/57, pulse 70, temperature 98 2 °F (36 8 °C), resp  rate 18, height 5' 4" (1 626 m), weight 68 4 kg (150 lb 12 7 oz), SpO2 91 %  ,Body mass index is 25 88 kg/m²  Intake/Output Summary (Last 24 hours) at 8/1/2019 1032  Last data filed at 7/31/2019 2321  Gross per 24 hour   Intake 1000 ml   Output    Net 1000 ml     @LDASHORT    @ROS:  12 set ROS reviewed and negative except for:   Abdominal pain  Vomiting  I had diarrhea a few weeks ago  I was constipated before I came in       Physical Exam:    General appearance: alert, appears stated age and cooperative  HEENT: PERRLA, EOMI, sclera clear, anicterus, oral mucosa is dry  Back: no tenderness,deformity,   Lungs:clear throughout  Heart[de-identified] RRR, S1, S2 normal, no murmur  Abdomen: sot mild tenderness in RUQ and Right mid abd, no hernia, masses, organomegaly  NBS   Healed incision midline and RLQ,  NBS  Extremities: FROM no joint deformities, motor,sensory intact,pedal edema none   Skin: no rashes, jaundice   Neurologic: CN II-XII grossly intact, no tremor, affect appropriate    Lab Results:   CBC with diff:   Lab Results   Component Value Date    WBC 13 16 (H) 08/01/2019    HGB 14 7 08/01/2019    HCT 46 5 08/01/2019    MCV 93 08/01/2019     08/01/2019    MCH 29 3 08/01/2019    MCHC 31 6 08/01/2019    RDW 13 5 08/01/2019    MPV 9 1 08/01/2019 NRBC 0 08/01/2019   , BMP/CMP:   Lab Results   Component Value Date    SODIUM 139 08/01/2019    K 4 1 08/01/2019     08/01/2019    CO2 21 08/01/2019    BUN 21 08/01/2019    CREATININE 1 25 08/01/2019    CALCIUM 8 5 08/01/2019    AST 42 08/01/2019    ALT 48 08/01/2019    ALKPHOS 52 08/01/2019    EGFR 59 08/01/2019   , Coags: No results found for: PT, PTT, INR  Imaging Studies: Ct Abdomen Pelvis Wo Contrast    Result Date: 7/31/2019  Impression: Distended gallbladder with wall thickening and surrounding fat stranding may indicate cholecystitis  No radiopaque gallstones  Follow-up with right upper quadrant ultrasound  The study was marked in [facility] for immediate notification  Workstation performed: [id number]     Xr Abdomen Complete Inc Upright And/or Decubitus    Result Date: 8/1/2019  Impression: Nonspecific bowel gas pattern without evidence to suggest bowel obstruction  Workstation performed: [id number]     Us Gallbladder    Result Date: 7/31/2019  Impression: Gallbladder sludge with mild to moderate gallbladder distention, gallbladder wall thickening as well as positive sonographic Randall sign, and pericholecystic fluid; findings taken together are suspicious for acute cholecystitis in the appropriate clinical setting  Correlation with the patient's symptoms and laboratory values recommended  Pancreas essentially obscured by overlying bowel gas  Severe right renal atrophy  Other findings as above   Workstation performed: [id number]       VTE Prophylaxis: Sequential compression device (Venodyne)  and Enoxaparin (Lovenox)     Code Status: Level 1 - Full Code  Advance Directive and Living Will:      Power of :    POLST:      Ciaran Easley PA-C  8/1/2019

## 2019-08-01 NOTE — ASSESSMENT & PLAN NOTE
·  medical management  Cardiology on board as well  Continue with medical management    Patient has been evaluated by Cardiology service for clearance

## 2019-08-01 NOTE — ED NOTES
1 CC- Abnormal Lab/ Elevated WBC    2  Orientation status- A&O x4    3  Abnormal labs/vitals/focused assessment- Refer to recent labs    4  Medication/drips- Zosyn running & Sodium Chloride running    5  Narcotic time- N/A    6 IV lines/drains/etc - 20 in R AC    7  Isolation status- N/A    8 Skin- Intact    9  Ambulation status- Ambulatory    10   ED phone number- #[phone]       Rebecca Amaro RN  07/31/19 4850

## 2019-08-01 NOTE — ASSESSMENT & PLAN NOTE
· Patient presented with severe abdominal pain and was found to have acute cholecystitis  · CT Scan A/P showed a distended gallbladder with wall thickening and surrounding fat stranding consistent with acute cholecystitis  · RUQ Ultrasound showed gallbladder sludge with mild to moderate gallbladder distention, gallbladder wall thickening as well as a positive sonographic Randall sign, and pericholecystic fluid consistent with acute cholecystitis; no no intrahepatic biliary dilatation, CBD not well seen but appears to be of normal caliber on the recent prior CT    · WBC count 16,000 on admission and trending down to 13,000 today  · On Zosyn IV, NPO, IVFs, pain control, follow up blood cultures, serial abdominal exams  · Cardiology consult for surgery clearance given CAD hx and MV repair hx  Nephrology consult regarding Kidney transplant for Polycystic Kidney Disease/chronic immunosuppression  Follow up EKG and CXR results  · OR per general surgery pending Cardiology and Nephrology clearance versus perc deja if deemed too high risk

## 2019-08-01 NOTE — CONSULTS
Consult- Dariela Cabezas 1950, 77 y o  male MRN: [id number]    Unit/Bed#: -01 Encounter: [id number]    Primary Care Provider: Donald Mercer MD   Date and time admitted to hospital: 7/31/2019  7:51 PM      Consults    * Acute calculous cholecystitis  Assessment & Plan  · Patient presented with severe abdominal pain and was found to have acute cholecystitis  · CT Scan A/P showed a distended gallbladder with wall thickening and surrounding fat stranding consistent with acute cholecystitis  · RUQ Ultrasound showed gallbladder sludge with mild to moderate gallbladder distention, gallbladder wall thickening as well as a positive sonographic Randall sign, and pericholecystic fluid consistent with acute cholecystitis; no no intrahepatic biliary dilatation, CBD not well seen but appears to be of normal caliber on the recent prior CT    · WBC count 16,000 on admission and trending down to 13,000 today  · On Zosyn IV, NPO, IVFs, pain control, follow up blood cultures, serial abdominal exams  · Cardiology consult for surgery clearance given CAD hx and MV repair hx  Nephrology consult regarding Kidney transplant for Polycystic Kidney Disease/chronic immunosuppression  Follow up EKG and CXR results  · OR per general surgery pending Cardiology and Nephrology clearance  CAD (coronary artery disease)  Assessment & Plan  · Patient has a history of CAD and cardiac stenting in 2010  He also has a history of a MVR in 2006  · He is on Asa 81mg which is on hold at present  · He is also on Metoprolol 25mg po daily and Statin  Continue BB perioperatively  · Follow up EKG  Cardiology consult regarding cardiac clearance  He follows with a cardiologist at [facility] and did have a Echo in April through [facility] which showed an EF of 70% and moderate mitral stenosis  H/O kidney transplant  Assessment & Plan  · Patient has a history of Polycystic Kidney Disease and had a renal transplant in 2001 and again in 2011    Cr 1 25 today  · He follows with Dr Cristina Yung   · He is on Myfortic, Tacrolimus, Prednisone, and Bactrim  Stress dosing of steroids recommended at this time  · Check Tacrolimus level  · Nephrology consult  Benign localized prostatic hyperplasia with lower urinary tract symptoms (LUTS)  Assessment & Plan  · On Flomax  HLD (hyperlipidemia)  Assessment & Plan  · On statin  VTE Prophylaxis: Heparin  / sequential compression device       Counseling / Coordination of Care Time: 45 minutes  Greater than 50% of total time spent on patient counseling and coordination of care  Collaboration of Care: Were Recommendations Directly Discussed with Primary Treatment Team? - No     History of Present Illness:    Yisel Laureano is a 77 y o  male who is originally admitted to the general surgery service due to acute cholecysitis  We are consulted for medical management  Review of Systems:    Review of Systems   Constitutional: Positive for appetite change and fatigue  Negative for chills, diaphoresis and fever  HENT: Negative for ear pain, nosebleeds and rhinorrhea  Eyes: Negative for pain and discharge  Respiratory: Negative for cough, chest tightness, shortness of breath and wheezing  Cardiovascular: Negative for chest pain and leg swelling  Gastrointestinal: Positive for abdominal pain, constipation, nausea and vomiting  Endocrine: Negative for cold intolerance and heat intolerance  Genitourinary: Positive for decreased urine volume  Negative for difficulty urinating and dysuria  Musculoskeletal: Negative for back pain and myalgias  Neurological: Negative for tremors, seizures and headaches  Psychiatric/Behavioral: Negative for agitation, confusion and hallucinations            Past Medical and Surgical History:     Past Medical History:   Diagnosis Date    Bacterial pneumonia     last assessed: 2/2/2017    Benign neoplasm of skin     Benign prostatic hyperplasia     Cardiac disorder  GERD (gastroesophageal reflux disease)     Gross hematuria     last assessed: 12/5/2016    Hypercholesterolemia     Hypertension     Kidney disease     Kidney transplanted     x 2, 2001 and 2012    Osteoporosis     Pneumonia     last assessed: 10/9/2017    Seborrheic keratosis     Sinusitis     Viral warts        Past Surgical History:   Procedure Laterality Date    AV FISTULA PLACEMENT Right 2001    arteriovenous surgery creation of A-V fistula, right arm radiocephalic-Dr Karli Haro    AV FISTULA PLACEMENT Left 2011    hemodialysis acess type arteriovenous fistula, left brachiocepalic AVF [id number]  [facility]  2012    CORONARY STENT PLACEMENT  09/15/2011    PTA stenting-[facility]/M Dr Iftikhar Quintero  2006    [facility] OTHER SURGICAL HISTORY  01/2012    fluids from transplant    OTHER SURGICAL HISTORY Left 07/08/2013    shaving of lesion shoulders, skin left shoulder combined compound and blue nevus    TONSILLECTOMY      TRANSPLANTATION RENAL Left [place], [place], [facility] [address] TRANSPLANTATION RENAL Right 12/08/2011    [place], [place]       Meds/Allergies:    all medications and allergies reviewed    Allergies:    Allergies   Allergen Reactions    Other        Social History:     Marital Status: /Civil Union    Substance Use History:   Social History     Substance and Sexual Activity   Alcohol Use Never    Frequency: Never     Social History     Tobacco Use   Smoking Status Never Smoker   Smokeless Tobacco Never Used     Social History     Substance and Sexual Activity   Drug Use No       Family History:    Family History   Problem Relation Age of Onset    Polycystic kidney disease Father     Kidney disease Father        Physical Exam:     Vitals:   Blood Pressure: 104/57 (08/01/19 0655)  Pulse: 70 (08/01/19 0655)  Temperature: 98 2 °F (36 8 °C) (08/01/19 0655)  Temp Source: Oral (07/31/19 1955)  Respirations: 18 (08/01/19 0650)  Height: 5' 4" (162 6 cm) (08/01/19 1041)  Weight - Scale: 68 kg (150 lb) (08/01/19 1040)  SpO2: 91 % (08/01/19 0655)    Physical Exam   Constitutional: He is oriented to person, place, and time  No distress  HENT:   Head: Normocephalic and atraumatic  Eyes: No scleral icterus  Cardiovascular: Normal rate and regular rhythm  Pulmonary/Chest: Effort normal  No respiratory distress  He has no wheezes  He has no rales  Abdominal: Soft  Bowel sounds are normal  He exhibits no distension  There is no rebound and no guarding  +Mild TTP in the RUQ  Musculoskeletal: He exhibits no edema  Neurological: He is alert and oriented to person, place, and time  Skin: He is not diaphoretic  Vitals reviewed  Additional Data:     Lab Results: I have personally reviewed pertinent reports  Results from last 7 days   Lab Units 08/01/19  0554   WBC Thousand/uL 13 16*   HEMOGLOBIN g/dL 14 7   HEMATOCRIT % 46 5   PLATELETS Thousands/uL 204   NEUTROS PCT % 83*   LYMPHS PCT % 7*   MONOS PCT % 9   EOS PCT % 0     Results from last 7 days   Lab Units 08/01/19  0554   SODIUM mmol/L 139   POTASSIUM mmol/L 4 1   CHLORIDE mmol/L 107   CO2 mmol/L 21   BUN mg/dL 21   CREATININE mg/dL 1 25   ANION GAP mmol/L 11   CALCIUM mg/dL 8 5   ALBUMIN g/dL 2 7*   TOTAL BILIRUBIN mg/dL 2 20*   ALK PHOS U/L 52   ALT U/L 48   AST U/L 42   GLUCOSE RANDOM mg/dL 99         Results from last 7 days   Lab Units 07/31/19 2016   TROPONIN I ng/mL <0 02     No results found for: HGBA1C            Imaging: I have personally reviewed pertinent reports  US gallbladder   Final Result by Marie Lees DO (07/31 2230)      Gallbladder sludge with mild to moderate gallbladder distention, gallbladder wall thickening as well as positive sonographic Randall sign, and pericholecystic fluid; findings taken together are suspicious for acute cholecystitis in the appropriate    clinical setting    Correlation with the patient's symptoms and laboratory values recommended  Pancreas essentially obscured by overlying bowel gas  Severe right renal atrophy  Other findings as above  Workstation performed: [id number]         CT abdomen pelvis wo contrast   Final Result by Shreyas Miller MD (07/31 2050)      Distended gallbladder with wall thickening and surrounding fat stranding may indicate cholecystitis  No radiopaque gallstones  Follow-up with right upper quadrant ultrasound  The study was marked in [facility] for immediate notification  Workstation performed: [id number]         XR chest pa & lateral    (Results Pending)       EKG, Pathology, and Other Studies Reviewed on Admission:   · EKG: Not available, awaiting EKG report  ** Please Note: This note has been constructed using a voice recognition system   **

## 2019-08-01 NOTE — CONSULTS
Consultation - Nephrology   Yisel Laureano 77 y o  male MRN: [id number]  Unit/Bed#: -01 Encounter: [id number]    Referring PHYSICIAN: Zahra Davis     REASON FOR THE CONSULTATION:  Renal transplant    DATE OF CONSULTATION:  August 1, 2019    ADMISSION DIAGNOSIS: Acute calculous cholecystitis     CHIEF COMPLAINT     Patient known to with kidney transplant came to the hospital abdominal discomfort and was found to have gallstone with cholecystitis requiring cholecystectomy    HPI     Patient is feeling better though still has abdominal discomfort  According to him he was having abdominal discomfort since last 2 days apparently it was epigastric in nature  Was not getting better so decided to come to emergency room where workup reveal gallstone with cholecystitis and was admitted    He is feeling better though still not 100% better    Still has a nausea  Still has abdominal discomfort    No chest pain no palpitation  Denies any urinary complaint patient does have kidney transplant is working for a while  He does have post kidney transplant so far  He was on dialysis in between  He had coronary artery disease with stenting    Asymptomatic at this point    PAST MEDICAL HISTORY     Past Medical History:   Diagnosis Date    Bacterial pneumonia     last assessed: 2/2/2017    Benign neoplasm of skin     Benign prostatic hyperplasia     Cardiac disorder     GERD (gastroesophageal reflux disease)     Gross hematuria     last assessed: 12/5/2016    Hypercholesterolemia     Hypertension     Kidney disease     Kidney transplanted     x 2, 2001 and 2012    Osteoporosis     Pneumonia     last assessed: 10/9/2017    Seborrheic keratosis     Sinusitis     Viral warts        PAST SURGICAL HISTORY     Past Surgical History:   Procedure Laterality Date    AV FISTULA PLACEMENT Right 2001    arteriovenous surgery creation of A-V fistula, right arm radiocephalic-Dr Brett Brambila    AV FISTULA PLACEMENT Left 2011    hemodialysis acess type arteriovenous fistula, left brachiocepalic AVF [id number]  [facility] SURGERY  2012    CORONARY STENT PLACEMENT  09/15/2011    PTA stenting-[facility]/M Dr Breanna Paiz  2006    10/2006-[facility]   [place] OTHER SURGICAL HISTORY  01/2012    fluids from transplant    OTHER SURGICAL HISTORY Left 07/08/2013    shaving of lesion shoulders, skin left shoulder combined compound and blue nevus    TONSILLECTOMY      TRANSPLANTATION RENAL Left [place], [address] TRANSPLANTATION RENAL Right 12/08/2011    [place], [facility]       ALLERGIES     Allergies   Allergen Reactions    Other        SOCIAL HISTORY     Social History     Substance and Sexual Activity   Alcohol Use Never    Frequency: Never     Social History     Substance and Sexual Activity   Drug Use No     Social History     Tobacco Use   Smoking Status Never Smoker   Smokeless Tobacco Never Used       FAMILY HISTORY     Family History   Problem Relation Age of Onset    Polycystic kidney disease Father     Kidney disease Father        CURRENT MEDICATIONS       Current Facility-Administered Medications:     atorvastatin (LIPITOR) tablet 10 mg, 10 mg, Oral, Daily With Dinner, Pauline Houston PA-C    ceFAZolin (ANCEF) IVPB (premix) 2,000 mg, 2,000 mg, Intravenous, Q8H, Bean Hanna MD, Last Rate: 100 mL/hr at 08/01/19 1353, 2,000 mg at 08/01/19 1353    famotidine (PEPCID) tablet 20 mg, 20 mg, Oral, Daily, Bean Hanna MD, 20 mg at 08/01/19 0858    heparin (porcine) subcutaneous injection 5,000 Units, 5,000 Units, Subcutaneous, Q8H [address], 5,000 Units at 08/01/19 1354 **AND** [CANCELED] Platelet count, , , Once, Bean Hanna MD    methylPREDNISolone sodium succinate (Solu-MEDROL) injection 40 mg, 40 mg, Intravenous, Q12H [address], Sohan Patton MD    metoprolol tartrate (LOPRESSOR) tablet 25 mg, 25 mg, Oral, HS, Pauline Houston PA-C    morphine injection 2 mg, 2 mg, Intravenous, Q1H PRN, Aj Hernandez MD, 2 mg at 07/31/19 5302    mycophenolic acid (MYFORTIC) EC tablet 360 mg, 360 mg, Oral, BID, Helen Harper PA-C, 360 mg at 08/01/19 0859    ondansetron (ZOFRAN) injection 4 mg, 4 mg, Intravenous, Q6H PRN, Aj Hernandez MD, 4 mg at 07/31/19 2333    sodium chloride 0 9 % infusion, 100 mL/hr, Intravenous, Continuous, Aj Hernandez MD, Last Rate: 100 mL/hr at 08/01/19 1351, 100 mL/hr at 08/01/19 1351    sulfamethoxazole-trimethoprim (BACTRIM) 400-80 mg per tablet 1 tablet, 1 tablet, Oral, Daily, Mela Harper PA-C, 1 tablet at 08/01/19 0858    tacrolimus (PROGRAF) capsule 1 mg, 1 mg, Oral, BID, Helen Harper PA-C, 1 mg at 08/01/19 0858    tamsulosin (FLOMAX) capsule 0 4 mg, 0 4 mg, Oral, Daily With Dinner, Jesus Manuel Acosta PA-C    REVIEW OF SYSTEMS     Review of Systems   Constitutional: Negative for activity change and fatigue  HENT: Negative for congestion and ear discharge  Eyes: Negative for photophobia and pain  Respiratory: Negative for apnea, choking, chest tightness and shortness of breath  Cardiovascular: Negative for chest pain, palpitations and leg swelling  Gastrointestinal: Positive for abdominal pain, nausea and vomiting  Negative for abdominal distention and blood in stool  Endocrine: Negative for heat intolerance and polyphagia  Genitourinary: Negative for decreased urine volume, difficulty urinating, flank pain and urgency  Musculoskeletal: Positive for back pain  Negative for arthralgias, neck pain and neck stiffness  Skin: Negative for color change and wound  Allergic/Immunologic: Negative for food allergies and immunocompromised state  Neurological: Negative for seizures and facial asymmetry  Hematological: Negative for adenopathy  Does not bruise/bleed easily  Psychiatric/Behavioral: Negative for self-injury and suicidal ideas         LAB RESULTS        Results from last 7 days   Lab Units 08/01/19  0554 07/31/19  1542   WBC Thousand/uL 13 16* 16 21*   HEMOGLOBIN g/dL 14 7 16 8   HEMATOCRIT % 46 5 53 2*   PLATELETS Thousands/uL 204 252   POTASSIUM mmol/L 4 1 4 6   CHLORIDE mmol/L 107 103   CO2 mmol/L 21 26   BUN mg/dL 21 27*   CREATININE mg/dL 1 25 1 36*   EGFR ml/min/1 73sq m 59 53   CALCIUM mg/dL 8 5 9 5   MAGNESIUM mg/dL 1 9  --    PHOSPHORUS mg/dL 3 0  --        I have personally reviewed the old medical records and patient's previously known baseline creatinine level is ~ 1 2    RADIOLOGY RESULTS     Results for orders placed during the hospital encounter of 12/06/16   XR chest portable    Narrative CHEST     INDICATION:  CHF    COMPARISON:  12/12/2016    VIEWS:   AP frontal;  1 image    FINDINGS:      Endotracheal tube, right internal jugular central venous catheter, and nasogastric tube have been removed  Heart shadow is enlarged but stable from prior exam     Pulmonary edema has been improved  Small bilateral pleural effusions unchanged  No pneumothorax  Visualized osseous structures appear within normal limits for the patient's age  Impression 1  Interval removal of lines and tubes  2   Improved pulmonary edema  3   Unchanged small bilateral pleural effusions  Workstation performed: [id number]       Results for orders placed during the hospital encounter of 06/04/19   XR chest pa & lateral    Narrative CHEST     INDICATION:   R05: Cough  COMPARISON:  Two-view chest 12/19/2017    EXAM PERFORMED/VIEWS:  XR CHEST PA & LATERAL      FINDINGS:    Normal cardiac silhouette  Aortic calcification is present  Prosthetic mitral valve  Stable right anterior diaphragmatic eventration and crowding of the overlying bronchovascular markings  Right thoracic postsurgical changes  Minimal scarring right middle lobe and right lung base  No airspace consolidation, pneumothorax, pulmonary edema, or pleural effusion  Right azygous fissure, a normal variant      Multilevel thoracic spondylosis  Impression No radiographic evidence of acute intrathoracic process or significant interval change  Workstation performed: [id number]       Results for orders placed during the hospital encounter of 05/16/17   CT chest wo contrast    Narrative CT CHEST WITHOUT IV CONTRAST    INDICATION:  History of pneumonia  Follow-up  COMPARISON: CT chest dated April 4, 2017  TECHNIQUE: CT examination of the chest was performed without intravenous contrast   Reformatted images were created in axial, sagittal, and coronal planes  Radiation dose length product (DLP) for this visit:  247 mGy-cm   This examination, like all CT scans performed in the [facility], was performed utilizing techniques to minimize radiation dose exposure, including the use of iterative   reconstruction and automated exposure control  FINDINGS:    LUNGS:  When compared to the prior exam, there has been interval resolution of previously described bibasilar infiltrates  There is mild subsegmental atelectasis at the anterior right lung base  No new infiltrate or consolidation is identified  There   is no pleural effusion or pneumothorax  There is no tracheal or endobronchial lesion  PLEURA:  Incidental note is made of an azygos fissure  HEART/GREAT VESSELS:  Postoperative changes of mitral valve replacement are noted  There is extensive atherosclerotic calcification of the coronary vessels  MEDIASTINUM AND ZOEY:  Unremarkable  CHEST WALL AND LOWER NECK:  Unremarkable  VISUALIZED STRUCTURES IN THE UPPER ABDOMEN:  The bilateral kidneys are markedly atrophic  OSSEOUS STRUCTURES:  No acute fracture  No destructive osseous lesion  Impression Interval resolution of previously described bibasilar infiltrates when compared to a CT chest dated April 4, 2017  No new infiltrates or pleural effusion        Workstation performed: [id number]       No results found for this or any previous visit  Results for orders placed during the hospital encounter of 07/31/19   CT abdomen pelvis wo contrast    Narrative CT ABDOMEN AND PELVIS WITHOUT IV CONTRAST    INDICATION:   abd pain and distension  "Sent to ED by PCP for leukocytosis  Pt reports 1-2 days of constant aching fullness and distension in his upper b/l abdomen which is associated w 2 episodes of nonbloody nonbilious emesis  It occurs in context of   chronic immunosuppression and prior renal transplant, on tacrolimus  "    COMPARISON:  CT abdomen pelvis 12/5/2016  TECHNIQUE:  CT examination of the abdomen and pelvis was performed without intravenous contrast   Axial, sagittal, and coronal 2D reformatted images were created from the source data and submitted for interpretation  Radiation dose length product (DLP) for this visit:  427 mGy-cm   This examination, like all CT scans performed in the [facility], was performed utilizing techniques to minimize radiation dose exposure, including the use of iterative   reconstruction and automated exposure control  Enteric contrast was administered  FINDINGS:    ABDOMEN    LOWER CHEST:  Partially imaged cardiomegaly  No acute findings in the lung bases  LIVER/BILIARY TREE:  Fatty infiltration  Mild hepatomegaly  GALLBLADDER:  Gallbladder distention with wall thickening and surrounding fat stranding  No radiopaque gallstones  SPLEEN:  Unremarkable  PANCREAS:  Unremarkable  ADRENAL GLANDS:  Unremarkable  KIDNEYS/URETERS:  Atrophic native kidneys  Stable simple appearing cysts  No hydronephrosis  Left lower quadrant renal transplant without hydronephrosis or perinephric collection  Failed right renal transplant with unchanged appearance  STOMACH AND BOWEL:  Right colonic anastomotic sutures  APPENDIX:  No findings to suggest appendicitis  ABDOMINOPELVIC CAVITY:  No ascites or free intraperitoneal air  No lymphadenopathy      VESSELS: Dense atherosclerotic calcifications of the aorta and branches  PELVIS    REPRODUCTIVE ORGANS:  Prostamegaly protruding into the bladder base  URINARY BLADDER:  Diffusely distended without wall thickening  ABDOMINAL WALL/INGUINAL REGIONS:  Uncomplicated fat-containing right inguinal hernia  OSSEOUS STRUCTURES:  No acute fracture or destructive osseous lesion  Impression Distended gallbladder with wall thickening and surrounding fat stranding may indicate cholecystitis  No radiopaque gallstones  Follow-up with right upper quadrant ultrasound  The study was marked in [facility] for immediate notification  Workstation performed: [id number]       No results found for this or any previous visit  OBJECTIVE     Current Weight: Weight - Scale: 68 kg (150 lb)  Vitals:    08/01/19 0655   BP: 104/57   Pulse: 70   Resp:    Temp: 98 2 °F (36 8 °C)   SpO2: 91%       Intake/Output Summary (Last 24 hours) at 8/1/2019 1526  Last data filed at 8/1/2019 1401  Gross per 24 hour   Intake 2450 ml   Output 275 ml   Net 2175 ml       PHYSICAL EXAMINATION     Physical Exam   Constitutional: He is oriented to person, place, and time  He appears well-developed  No distress  HENT:   Head: Normocephalic and atraumatic  Mouth/Throat: Oropharynx is clear and moist    Eyes: Pupils are equal, round, and reactive to light  Conjunctivae and EOM are normal  No scleral icterus  Neck: Normal range of motion  Neck supple  No JVD present  Cardiovascular: Normal rate, regular rhythm, normal heart sounds and intact distal pulses  Pulmonary/Chest: Effort normal and breath sounds normal  He has no wheezes  Abdominal: Soft  Bowel sounds are normal  There is tenderness  Musculoskeletal: Normal range of motion  He exhibits no edema  Neurological: He is alert and oriented to person, place, and time  Skin: Skin is warm  No rash noted  Psychiatric: He has a normal mood and affect   His behavior is normal         PLAN / RECOMMENDATIONS      Renal transplant:  Seems to be working well with baseline creatinine 1 2 and stable  Advised to continue same medication  Will also advised hydration around surgery  I will change prednisone to IV Solu-Medrol as part of the stress management will continue tacrolimus at same dose    Acute cholecystitis with gallstone: For possible surgery tomorrow    Hypertension:  Seems to be reasonably well control    Discussed the case at length with the patient family  Will continue to monitor patient with you    Thank you for the consultation to participate in patient's care  I have personally discussed my plan with the referring physician  Urmila Mary MD  Nephrology  8/1/2019        Portions of the record may have been created with voice recognition software  Occasional wrong word or "sound a like" substitutions may have occurred due to the inherent limitations of voice recognition software  Read the chart carefully and recognize, using context, where substitutions have occurred

## 2019-08-01 NOTE — ASSESSMENT & PLAN NOTE
Patient with history of renal transplant x2  Last renal transplant in 2011  Nephrology following  Continue with current treatment    Creatinine stable

## 2019-08-01 NOTE — PLAN OF CARE
Problem: Nutrition/Hydration-ADULT  Goal: Nutrient/Hydration intake appropriate for improving, restoring or maintaining nutritional needs  Description  Monitor and assess patient's nutrition/hydration status for malnutrition (ex- brittle hair, bruises, dry skin, pale skin and conjunctiva, muscle wasting, smooth red tongue, and disorientation)  Collaborate with interdisciplinary team and initiate plan and interventions as ordered  Monitor patient's weight and dietary intake as ordered or per policy  Utilize nutrition screening tool and intervene per policy  Determine patient's food preferences and provide high-protein, high-caloric foods as appropriate       INTERVENTIONS:  - Monitor oral intake, urinary output, labs, and treatment plans  - Assess nutrition and hydration status and recommend course of action  - Evaluate amount of meals eaten  - Assist patient with eating if necessary   - Allow adequate time for meals  - Recommend/ encourage appropriate diets, oral nutritional supplements, and vitamin/mineral supplements  - Order, calculate, and assess calorie counts as needed  - Recommend, monitor, and adjust tube feedings and TPN/PPN based on assessed needs  - Assess need for intravenous fluids  - Provide specific nutrition/hydration education as appropriate  - Include patient/family/caregiver in decisions related to nutrition  Outcome: Progressing     Problem: PAIN - ADULT  Goal: Verbalizes/displays adequate comfort level or baseline comfort level  Description  Interventions:  - Encourage patient to monitor pain and request assistance  - Assess pain using appropriate pain scale  - Administer analgesics based on type and severity of pain and evaluate response  - Implement non-pharmacological measures as appropriate and evaluate response  - Consider cultural and social influences on pain and pain management  - Notify physician/advanced practitioner if interventions unsuccessful or patient reports new pain  Outcome: Progressing     Problem: INFECTION - ADULT  Goal: Absence or prevention of progression during hospitalization  Description  INTERVENTIONS:  - Assess and monitor for signs and symptoms of infection  - Monitor lab/diagnostic results  - Monitor all insertion sites, i e  indwelling lines, tubes, and drains  - Monitor endotracheal (as able) and nasal secretions for changes in amount and color  - [place] appropriate cooling/warming therapies per order  - Administer medications as ordered  - Instruct and encourage patient and family to use good hand hygiene technique  - Identify and instruct in appropriate isolation precautions for identified infection/condition  Outcome: Progressing  Goal: Absence of fever/infection during neutropenic period  Description  INTERVENTIONS:  - Monitor WBC  - Implement neutropenic guidelines  Outcome: Progressing     Problem: SAFETY ADULT  Goal: Patient will remain free of falls  Description  INTERVENTIONS:  - Assess patient frequently for physical needs  -  Identify cognitive and physical deficits and behaviors that affect risk of falls    -  [place] fall precautions as indicated by assessment   - Educate patient/family on patient safety including physical limitations  - Instruct patient to call for assistance with activity based on assessment  - Modify environment to reduce risk of injury  - Consider OT/PT consult to assist with strengthening/mobility  Outcome: Progressing  Goal: Maintain or return to baseline ADL function  Description  INTERVENTIONS:  -  Assess patient's ability to carry out ADLs; assess patient's baseline for ADL function and identify physical deficits which impact ability to perform ADLs (bathing, care of mouth/teeth, toileting, grooming, dressing, etc )  - Assess/evaluate cause of self-care deficits   - Assess range of motion  - Assess patient's mobility; develop plan if impaired  - Assess patient's need for assistive devices and provide as appropriate  - Encourage maximum independence but intervene and supervise when necessary  ¯ Involve family in performance of ADLs  ¯ Assess for home care needs following discharge   ¯ Request OT consult to assist with ADL evaluation and planning for discharge  ¯ Provide patient education as appropriate  Outcome: Progressing  Goal: Maintain or return mobility status to optimal level  Description  INTERVENTIONS:  - Assess patient's baseline mobility status (ambulation, transfers, stairs, etc )    - Identify cognitive and physical deficits and behaviors that affect mobility  - Identify mobility aids required to assist with transfers and/or ambulation (gait belt, sit-to-stand, lift, walker, cane, etc )  - [place] fall precautions as indicated by assessment  - Record patient progress and toleration of activity level on Mobility SBAR; progress patient to next Phase/Stage  - Instruct patient to call for assistance with activity based on assessment  - Request Rehabilitation consult to assist with strengthening/weightbearing, etc   Outcome: Progressing     Problem: GENITOURINARY - ADULT  Goal: Absence of urinary retention  Description  INTERVENTIONS:  - Assess patients ability to void and empty bladder  - Monitor I/O  - Bladder scan as needed  - Discuss with physician/AP medications to alleviate retention as needed  - Discuss catheterization for long term situations as appropriate  Outcome: Progressing

## 2019-08-01 NOTE — UTILIZATION REVIEW
Initial Clinical Review    Admission: Date/Time/Statement: 7/31/19 @ [address] [facility], [address] This Encounter   Procedures    Inpatient Admission (expected length of stay for this patient Order details is greater than two midnights)     Standing Status:   Standing     Number of Occurrences:   1     Order Specific Question:   Admitting Physician     Answer:   Kenji Pinon [[id number]]     Order Specific Question:   Level of Care     Answer:   Med Surg [[id number]]     Order Specific Question:   Estimated length of stay     Answer:   More than 2 Midnights     Order Specific Question:   Certification     Answer:   I certify that inpatient services are medically necessary for this patient for a duration of greater than two midnights  See H&P and MD Progress Notes for additional information about the patient's course of treatment  ED Arrival Information     Expected Arrival Acuity Means of Arrival Escorted By Service Admission Type    - 7/31/2019 19:44 Urgent Walk-In Family Member Surgery-General Urgent    Arrival Complaint    ABNORMAL LAB RESULT        Chief Complaint   Patient presents with    Abnormal Lab     Pt is having pain across the abdomen  Pt vomitied today twice  Pt reports his WBC is elevated  Pt has a kidney transplant  Assessment/Plan:   Sent to ED by PCP for leukocytosis  Pt reports 1-2 days of constant aching fullness and distension in his upper b/l abdomen which is associated w 2 episodes of nonbloody nonbilious emesis  It occurs in context of chronic immunosuppression and prior renal transplant, on tacrolimus  He denies fever, dyspnea, cough, presyncope, weakness, and CP  He denies back pain  He denies weakness  He denies recent trauma  He does have a h/o CAD s/p cardiac stent but denies anginal sxs  He had a partial colectomy previously but denies h/o bowel obstruction  He denies urinary sxs  His wife provides additional history   I reviewed labs in Epic today which are significant for mildly elevated lipase in the 400's and wbc of 16 with left shift  Acute calculous cholecystitis  Assessment & Plan  · Patient presented with severe abdominal pain and was found to have acute cholecystitis  · CT Scan A/P showed a distended gallbladder with wall thickening and surrounding fat stranding consistent with acute cholecystitis  · RUQ Ultrasound showed gallbladder sludge with mild to moderate gallbladder distention, gallbladder wall thickening as well as a positive sonographic Randall sign, and pericholecystic fluid consistent with acute cholecystitis; no no intrahepatic biliary dilatation, CBD not well seen but appears to be of normal caliber on the recent prior CT    · WBC count 16,000 on admission and trending down to 13,000 today  · On Zosyn IV, NPO, IVFs, pain control, follow up blood cultures, serial abdominal exams  · Cardiology consult for surgery clearance given CAD hx and MV repair hx  Nephrology consult regarding Kidney transplant for Polycystic Kidney Disease/chronic immunosuppression  Follow up EKG and CXR results    · OR per general surgery pending Cardiology and Nephrology clearance      ED Triage Vitals [07/31/19 1955]   Temperature Pulse Respirations Blood Pressure SpO2   99 3 °F (37 4 °C) 83 19 127/64 96 %      Temp Source Heart Rate Source Patient Position - Orthostatic VS BP Location FiO2 (%)   Oral Monitor Lying Right arm --      Pain Score       8        Wt Readings from Last 1 Encounters:   08/01/19 68 kg (150 lb)     Additional Vital Signs:   Date/Time  Temp  Pulse  Resp  BP  SpO2  O2 Device  Patient Position - Orthostatic VS   08/01/19 0655  98 2 °F (36 8 °C)  70    104/57  91 %       08/01/19 0650  98 2 °F (36 8 °C)  72  18  96/55  95 %       07/31/19 2222  99 9 °F (37 7 °C)  75    121/59  95 %       07/31/19 2030    78  18  116/65  98 %  None (Room air)  Lying       Pertinent Labs/Diagnostic Test Results:   Results from last 7 days   Lab Units 08/01/19  0554 07/31/19  1542   WBC Thousand/uL 13 16* 16 21*   HEMOGLOBIN g/dL 14 7 16 8   HEMATOCRIT % 46 5 53 2*   PLATELETS Thousands/uL 204 252   NEUTROS ABS Thousands/µL 10 94* 14 28*     Results from last 7 days   Lab Units 08/01/19  0554 07/31/19  1542   SODIUM mmol/L 139 137   POTASSIUM mmol/L 4 1 4 6   CHLORIDE mmol/L 107 103   CO2 mmol/L 21 26   ANION GAP mmol/L 11 8   BUN mg/dL 21 27*   CREATININE mg/dL 1 25 1 36*   EGFR ml/min/1 73sq m 59 53   CALCIUM mg/dL 8 5 9 5   MAGNESIUM mg/dL 1 9  --    PHOSPHORUS mg/dL 3 0  --      Results from last 7 days   Lab Units 08/01/19  0554 07/31/19  1542   AST U/L 42 17   ALT U/L 48 19   ALK PHOS U/L 52 61   TOTAL PROTEIN g/dL 6 8 8 7*   ALBUMIN g/dL 2 7* 3 8   TOTAL BILIRUBIN mg/dL 2 20* 1 90*     Results from last 7 days   Lab Units 08/01/19  0554 07/31/19  1542   GLUCOSE RANDOM mg/dL 99 135     Results from last 7 days   Lab Units 07/31/19  2016   TROPONIN I ng/mL <0 02     Results from last 7 days   Lab Units 07/31/19  1542   LIPASE u/L 453*   AMYLASE IU/L 115   07-31-19  US GALL BLADDER  Gallbladder sludge with mild to moderate gallbladder distention, gallbladder wall thickening as well as positive sonographic Randall sign, and pericholecystic fluid; findings taken together are suspicious for acute cholecystitis in the appropriate   clinical setting   Correlation with the patient's symptoms and laboratory values recommended  Pancreas essentially obscured by overlying bowel gas  Severe right renal atrophy    CT A/P 07-3-19  Distended gallbladder with wall thickening and surrounding fat stranding may indicate cholecystitis  No radiopaque gallstones  Follow-up with right upper quadrant ultrasound    XR ABDOMEN  08-01-19  Nonspecific bowel gas pattern without evidence to suggest bowel obstruction    ED Treatment:   Medication Administration from 07/31/2019 1944 to 07/31/2019 2218       Date/Time Order Dose Route Action Action by Comments     07/31/2019 6320 piperacillin-tazobactam (ZOSYN) 3 375 g in sodium chloride 0 9 % 50 mL IVPB 3 375 g Intravenous New Bag Taurus Grimm RN      07/31/2019 2144 sodium chloride 0 9 % bolus 1,000 mL 1,000 mL Intravenous New Bag Taurus Grimm RN         Past Medical History:   Diagnosis Date    Bacterial pneumonia     last assessed: 2/2/2017    Benign neoplasm of skin     Benign prostatic hyperplasia     Cardiac disorder     GERD (gastroesophageal reflux disease)     Gross hematuria     last assessed: 12/5/2016    Hypercholesterolemia     Hypertension     Kidney disease     Kidney transplanted     x 2, 2001 and 2012    Osteoporosis     Pneumonia     last assessed: 10/9/2017    Seborrheic keratosis     Sinusitis     Viral warts      Present on Admission:   Acute calculous cholecystitis   CAD (coronary artery disease)   HLD (hyperlipidemia)   Benign localized prostatic hyperplasia with lower urinary tract symptoms (LUTS)      Admitting Diagnosis: Acute cholecystitis [K81 0]  Abnormal laboratory test result [R89 9]  History of renal transplant [Z94 0]  Age/Sex: 77 y o  male  Admission Orders:    Current Facility-Administered Medications:  atorvastatin 10 mg Oral Daily With [employer], PA-C    cefazolin 2,000 mg Intravenous Q8H Yasmin Thompson MD Last Rate: 2,000 mg (08/01/19 0600)   famotidine 20 mg Oral Daily Yasmin Thompson MD    heparin (porcine) 5,000 Units Subcutaneous [facility] Yasmin Thompson MD    methylPREDNISolone sodium succinate 40 mg Intravenous Q12H [address] Kassandra Forbes MD    metoprolol tartrate 25 mg Oral HS Elena West PA-C    morphine injection 2 mg Intravenous Q1H PRN Yasmin Thompson MD    mycophenolate 360 mg Oral BID Onelia  GINNY Harper    ondansetron 4 mg Intravenous Q6H PRN Yasmin Thompson MD    sodium chloride 100 mL/hr Intravenous Continuous Yasmin Thompson MD Last Rate: 100 mL/hr (07/31/19 4461)   sulfamethoxazole-trimethoprim 1 tablet Oral Daily Helen Harper PA-C    tacrolimus 1 mg Oral BID Onelia  GINNY Harper    tamsulosin 0 4 mg Oral Daily With Dinner Deangelo Cam PA-C        IP CONSULT TO INTERNAL MEDICINE  IP CONSULT TO NEPHROLOGY  IP CONSULT TO CARDIOLOGY  [facility] [place]  CLEARS    Network Utilization Review Department  Phone: [phone]; Fax [phone]  [email or web address]  ATTENTION: Please call with any questions or concerns to [phone]  and carefully listen to the prompts so that you are directed to the right person  Send all requests for admission clinical reviews, approved or denied determinations and any other requests to fax [phone]   All voicemails are confidential

## 2019-08-01 NOTE — PHYSICIAN ADVISOR
Current patient class: Inpatient  The patient is currently on Hospital Day: 2 at [address]      The patient was admitted to the hospital at 2152 on 7/31/19 for the following diagnosis:  Acute cholecystitis [K81 0]  Abnormal laboratory test result [R89 9]  History of renal transplant [Z94 0]       There is documentation in the medical record of an expected length of stay of at least 2 midnights  The patient is therefore expected to satisfy the 2 midnight benchmark and given the 2 midnight presumption is appropriate for INPATIENT ADMISSION  Given this expectation of a satisfying stay, CMS instructs us that the patient is most often appropriate for inpatient admission under part A provided medical necessity is documented in the chart  After review of the relevant documentation, labs, vital signs and test results, the patient is appropriate for INPATIENT ADMISSION  Admission to the hospital as an inpatient is a complex decision making process which requires the practitioner to consider the patients presenting complaint, history and physical examination and all relevant testing  With this in mind, in this case, the patient was deemed appropriate for INPATIENT ADMISSION  After review of the documentation and testing available at the time of the admission I concur with this clinical determination of medical necessity  Rationale is as follows: The patient is a 77 yrs old Male who presented to the ED at 7/31/2019  7:51 PM with a chief complaint of abdominal pain with nausea and vomiting  The patient has a history of chronic immunosuppression s/p renal transplant on tacrolimus as well as CAD s/p stents and partial colectomy  He was found to have a leukocytosis with left shift and an elevated lipase  He was treated with IVF hydration, IV cefazolin as well as zosyn and pain control   Ultrasound of the gallbladder showed sludge with mild to moderate GBD distension and a positive sonographic Randall's sign suggesting acute cholecystitis  The plan of care includes continuation of IV antibiotics as well as IVF hydration, NPO and serial abdominal exams with f/u blood cultures  The patient has consultations to cardiology, nephrology and general surgery  Given the patient's immunosupression, he also requires increased stress dosing of prednisone  The plan is for the patient to go to the OR on 8/2 for cholecystectomy pending clearance from cardiology and nephrology  Given the patient's complicated medical history including immunosupression post renal transplant and need for surgical intervention s/p medical clearance by cardiology and nephrology, the patient is appropriate for INPATIENT ADMISSION       The patients vitals on arrival were ED Triage Vitals [07/31/19 1955]   Temperature Pulse Respirations Blood Pressure SpO2   99 3 °F (37 4 °C) 83 19 127/64 96 %      Temp Source Heart Rate Source Patient Position - Orthostatic VS BP Location FiO2 (%)   Oral Monitor Lying Right arm --      Pain Score       8           Past Medical History:   Diagnosis Date    Bacterial pneumonia     last assessed: 2/2/2017    Benign neoplasm of skin     Benign prostatic hyperplasia     Cardiac disorder     GERD (gastroesophageal reflux disease)     Gross hematuria     last assessed: 12/5/2016    Hypercholesterolemia     Hypertension     Kidney disease     Kidney transplanted     x 2, 2001 and 2012    Osteoporosis     Pneumonia     last assessed: 10/9/2017    Seborrheic keratosis     Sinusitis     Viral warts      Past Surgical History:   Procedure Laterality Date    AV FISTULA PLACEMENT Right 2001    arteriovenous surgery creation of A-V fistula, right arm radiocephalic-Dr Gustavo Bravo    AV FISTULA PLACEMENT Left 2011    hemodialysis acess type arteriovenous fistula, left brachiocepalic AVF 2466-[id number]  [facility]  2012    CORONARY STENT PLACEMENT  09/15/2011    PTA stenting-LV[facility]/M Dr Maria Teresa Valadez  2006    10/[facility]    NEPHRECTOMY TRANSPLANTED ORGAN      OTHER SURGICAL HISTORY  01/2012    fluids from transplant    OTHER SURGICAL HISTORY Left 07/08/2013    shaving of lesion shoulders, skin left shoulder combined compound and blue nevus    TONSILLECTOMY      TRANSPLANTATION RENAL Left JOSE Higginsebonyalenatim JOSE, [address] TRANSPLANTATION RENAL Right 12/08/2011    Kelly Yen Pineda JOSE, [address] have been placed to:   IP CONSULT TO INTERNAL MEDICINE  IP CONSULT TO NEPHROLOGY  IP CONSULT TO CARDIOLOGY    Vitals:    08/01/19 0650 08/01/19 0655 08/01/19 1040 08/01/19 1041   BP: 96/55 104/57     BP Location:       Pulse: 72 70     Resp: 18      Temp: 98 2 °F (36 8 °C) 98 2 °F (36 8 °C)     TempSrc:       SpO2: 95% 91%     Weight:   68 kg (150 lb)    Height:   5' 4" (1 626 m) 5' 4" (1 626 m)       Most recent labs:    Recent Labs     07/31/19  1542 07/31/19 2016 08/01/19  0554   WBC 16 21*  --  13 16*   HGB 16 8  --  14 7   HCT 53 2*  --  46 5     --  204   K 4 6  --  4 1   CALCIUM 9 5  --  8 5   BUN 27*  --  21   CREATININE 1 36*  --  1 25   LIPASE 453*  --   --    AMYLASE 115  --   --    TROPONINI  --  <0 02  --    AST 17  --  42   ALT 19  --  48   ALKPHOS 61  --  52       Scheduled Meds:  Current Facility-Administered Medications:  atorvastatin 10 mg Oral Daily With [employer], PA-C    cefazolin 2,000 mg Intravenous Q8H George Roper MD Last Rate: 2,000 mg (08/01/19 0600)   famotidine 20 mg Oral Daily George Roper MD    heparin (porcine) 5,000 Units Subcutaneous [facility] George Roper MD    methylPREDNISolone sodium succinate 40 mg Intravenous Q12H [facility] Minna Bonilla MD    metoprolol tartrate 25 mg Oral HS Isabelle Mejía PA-C    morphine injection 2 mg Intravenous Q1H PRN George Roper MD    mycophenolate 360 mg Oral BID Caity Harper PA-C    ondansetron 4 mg Intravenous Q6H PRN George Roper MD    sodium chloride 100 mL/hr Intravenous Continuous Carroll Cockayne, MD Last Rate: 100 mL/hr (07/31/19 2321)   sulfamethoxazole-trimethoprim 1 tablet Oral Daily Helen Harper PA-C    tacrolimus 1 mg Oral BID Elisa Harper PA-C    tamsulosin 0 4 mg Oral Daily With GINNY GOMES      Continuous Infusions:  sodium chloride 100 mL/hr Last Rate: 100 mL/hr (07/31/19 2321)     PRN Meds: morphine injection    ondansetron    Surgical procedures (if appropriate):  Procedure(s):  CHOLECYSTECTOMY, OPEN  IOC,

## 2019-08-01 NOTE — SOCIAL WORK
CM met with pt and spouse Avani at bedside and explained role  Pt lives with his spouse in 2 story house; 12 steps inside, 0 DORENE  Pt denies the use of an assistive device to ambulate,  He also denies any DME use at home  Pt reports he is completely independent with ADLs  He reports being active and continues to drive  Pt PCP is Dr Sheela Sever  He uses [facility] for medications and denies any difficulty obtaining them  Pt has a history of OP PT several years ago  He denies any history of HHC or STR  He further denied any history of MH and D&A treatment  Pt denies any discharge needs at this time  No needs identified by treatment team at this time  Pt indicates his wife will transport him home upon discharge  CM to follow  CM reviewed d/c planning process including the following: identifying help at home, patient preference for d/c planning needs, availability of treatment team to discuss questions or concerns patient and/or family may have regarding understanding medications and recognizing signs and symptoms once discharged  CM also encouraged patient to follow up with all recommended appointments after discharge  Patient advised of importance for patient and family to participate in managing patients medical well being

## 2019-08-02 ENCOUNTER — APPOINTMENT (INPATIENT)
Dept: RADIOLOGY | Facility: HOSPITAL | Age: 69
DRG: 414 | End: 2019-08-02
Payer: MEDICARE

## 2019-08-02 ENCOUNTER — ANESTHESIA EVENT (INPATIENT)
Dept: PERIOP | Facility: HOSPITAL | Age: 69
DRG: 414 | End: 2019-08-02
Payer: MEDICARE

## 2019-08-02 ENCOUNTER — ANESTHESIA (INPATIENT)
Dept: PERIOP | Facility: HOSPITAL | Age: 69
DRG: 414 | End: 2019-08-02
Payer: MEDICARE

## 2019-08-02 LAB
ALBUMIN SERPL BCP-MCNC: 2.5 G/DL (ref 3.5–5)
ALP SERPL-CCNC: 68 U/L (ref 46–116)
ALT SERPL W P-5'-P-CCNC: 98 U/L (ref 12–78)
ANION GAP SERPL CALCULATED.3IONS-SCNC: 11 MMOL/L (ref 4–13)
AST SERPL W P-5'-P-CCNC: 61 U/L (ref 5–45)
BASOPHILS # BLD AUTO: 0 THOUSANDS/ΜL (ref 0–0.1)
BASOPHILS NFR BLD AUTO: 0 % (ref 0–1)
BILIRUB SERPL-MCNC: 1.3 MG/DL (ref 0.2–1)
BUN SERPL-MCNC: 19 MG/DL (ref 5–25)
CALCIUM SERPL-MCNC: 8.5 MG/DL (ref 8.3–10.1)
CHLORIDE SERPL-SCNC: 105 MMOL/L (ref 100–108)
CO2 SERPL-SCNC: 21 MMOL/L (ref 21–32)
CREAT SERPL-MCNC: 1.15 MG/DL (ref 0.6–1.3)
EOSINOPHIL # BLD AUTO: 0 THOUSAND/ΜL (ref 0–0.61)
EOSINOPHIL NFR BLD AUTO: 0 % (ref 0–6)
ERYTHROCYTE [DISTWIDTH] IN BLOOD BY AUTOMATED COUNT: 13.4 % (ref 11.6–15.1)
GFR SERPL CREATININE-BSD FRML MDRD: 65 ML/MIN/1.73SQ M
GLUCOSE SERPL-MCNC: 132 MG/DL (ref 65–140)
HCT VFR BLD AUTO: 46.2 % (ref 36.5–49.3)
HGB BLD-MCNC: 14.5 G/DL (ref 12–17)
IMM GRANULOCYTES # BLD AUTO: 0.07 THOUSAND/UL (ref 0–0.2)
IMM GRANULOCYTES NFR BLD AUTO: 1 % (ref 0–2)
LIPASE SERPL-CCNC: 132 U/L (ref 73–393)
LYMPHOCYTES # BLD AUTO: 0.4 THOUSANDS/ΜL (ref 0.6–4.47)
LYMPHOCYTES NFR BLD AUTO: 4 % (ref 14–44)
MCH RBC QN AUTO: 28.8 PG (ref 26.8–34.3)
MCHC RBC AUTO-ENTMCNC: 31.4 G/DL (ref 31.4–37.4)
MCV RBC AUTO: 92 FL (ref 82–98)
MONOCYTES # BLD AUTO: 0.18 THOUSAND/ΜL (ref 0.17–1.22)
MONOCYTES NFR BLD AUTO: 2 % (ref 4–12)
NEUTROPHILS # BLD AUTO: 8.97 THOUSANDS/ΜL (ref 1.85–7.62)
NEUTS SEG NFR BLD AUTO: 93 % (ref 43–75)
NRBC BLD AUTO-RTO: 0 /100 WBCS
PLATELET # BLD AUTO: 201 THOUSANDS/UL (ref 149–390)
PMV BLD AUTO: 9.1 FL (ref 8.9–12.7)
POTASSIUM SERPL-SCNC: 4.5 MMOL/L (ref 3.5–5.3)
PROT SERPL-MCNC: 6.9 G/DL (ref 6.4–8.2)
RBC # BLD AUTO: 5.03 MILLION/UL (ref 3.88–5.62)
SODIUM SERPL-SCNC: 137 MMOL/L (ref 136–145)
WBC # BLD AUTO: 9.62 THOUSAND/UL (ref 4.31–10.16)

## 2019-08-02 PROCEDURE — 99232 SBSQ HOSP IP/OBS MODERATE 35: CPT | Performed by: INTERNAL MEDICINE

## 2019-08-02 PROCEDURE — 83690 ASSAY OF LIPASE: CPT | Performed by: PHYSICIAN ASSISTANT

## 2019-08-02 PROCEDURE — 80053 COMPREHEN METABOLIC PANEL: CPT | Performed by: PHYSICIAN ASSISTANT

## 2019-08-02 PROCEDURE — 99232 SBSQ HOSP IP/OBS MODERATE 35: CPT | Performed by: SURGERY

## 2019-08-02 PROCEDURE — 47600 CHOLECYSTECTOMY: CPT | Performed by: CLINICAL NURSE SPECIALIST

## 2019-08-02 PROCEDURE — 88304 TISSUE EXAM BY PATHOLOGIST: CPT | Performed by: PATHOLOGY

## 2019-08-02 PROCEDURE — 0FT40ZZ RESECTION OF GALLBLADDER, OPEN APPROACH: ICD-10-PCS | Performed by: SURGERY

## 2019-08-02 PROCEDURE — 47600 CHOLECYSTECTOMY: CPT | Performed by: SURGERY

## 2019-08-02 PROCEDURE — 85025 COMPLETE CBC W/AUTO DIFF WBC: CPT | Performed by: PHYSICIAN ASSISTANT

## 2019-08-02 PROCEDURE — C1729 CATH, DRAINAGE: HCPCS | Performed by: SURGERY

## 2019-08-02 RX ORDER — ROCURONIUM BROMIDE 10 MG/ML
INJECTION, SOLUTION INTRAVENOUS AS NEEDED
Status: DISCONTINUED | OUTPATIENT
Start: 2019-08-02 | End: 2019-08-02 | Stop reason: SURG

## 2019-08-02 RX ORDER — DEXAMETHASONE SODIUM PHOSPHATE 4 MG/ML
INJECTION, SOLUTION INTRA-ARTICULAR; INTRALESIONAL; INTRAMUSCULAR; INTRAVENOUS; SOFT TISSUE AS NEEDED
Status: DISCONTINUED | OUTPATIENT
Start: 2019-08-02 | End: 2019-08-02 | Stop reason: SURG

## 2019-08-02 RX ORDER — GLYCOPYRROLATE 0.2 MG/ML
INJECTION INTRAMUSCULAR; INTRAVENOUS AS NEEDED
Status: DISCONTINUED | OUTPATIENT
Start: 2019-08-02 | End: 2019-08-02 | Stop reason: SURG

## 2019-08-02 RX ORDER — PROPOFOL 10 MG/ML
INJECTION, EMULSION INTRAVENOUS AS NEEDED
Status: DISCONTINUED | OUTPATIENT
Start: 2019-08-02 | End: 2019-08-02 | Stop reason: SURG

## 2019-08-02 RX ORDER — KETAMINE HCL IN NACL, ISO-OSM 100MG/10ML
SYRINGE (ML) INJECTION AS NEEDED
Status: DISCONTINUED | OUTPATIENT
Start: 2019-08-02 | End: 2019-08-02 | Stop reason: SURG

## 2019-08-02 RX ORDER — OXYCODONE HYDROCHLORIDE 10 MG/1
10 TABLET ORAL EVERY 4 HOURS PRN
Status: DISCONTINUED | OUTPATIENT
Start: 2019-08-02 | End: 2019-08-07 | Stop reason: HOSPADM

## 2019-08-02 RX ORDER — MIDAZOLAM HYDROCHLORIDE 1 MG/ML
INJECTION INTRAMUSCULAR; INTRAVENOUS AS NEEDED
Status: DISCONTINUED | OUTPATIENT
Start: 2019-08-02 | End: 2019-08-02 | Stop reason: SURG

## 2019-08-02 RX ORDER — HYDROMORPHONE HCL/PF 1 MG/ML
SYRINGE (ML) INJECTION AS NEEDED
Status: DISCONTINUED | OUTPATIENT
Start: 2019-08-02 | End: 2019-08-02 | Stop reason: SURG

## 2019-08-02 RX ORDER — ONDANSETRON 2 MG/ML
4 INJECTION INTRAMUSCULAR; INTRAVENOUS ONCE AS NEEDED
Status: DISCONTINUED | OUTPATIENT
Start: 2019-08-02 | End: 2019-08-02 | Stop reason: HOSPADM

## 2019-08-02 RX ORDER — HYDROMORPHONE HCL/PF 1 MG/ML
0.4 SYRINGE (ML) INJECTION
Status: DISCONTINUED | OUTPATIENT
Start: 2019-08-02 | End: 2019-08-02 | Stop reason: HOSPADM

## 2019-08-02 RX ORDER — ONDANSETRON 2 MG/ML
INJECTION INTRAMUSCULAR; INTRAVENOUS AS NEEDED
Status: DISCONTINUED | OUTPATIENT
Start: 2019-08-02 | End: 2019-08-02 | Stop reason: SURG

## 2019-08-02 RX ORDER — ACETAMINOPHEN 325 MG/1
650 TABLET ORAL EVERY 6 HOURS PRN
Status: DISCONTINUED | OUTPATIENT
Start: 2019-08-02 | End: 2019-08-07 | Stop reason: HOSPADM

## 2019-08-02 RX ORDER — FENTANYL CITRATE 50 UG/ML
INJECTION, SOLUTION INTRAMUSCULAR; INTRAVENOUS AS NEEDED
Status: DISCONTINUED | OUTPATIENT
Start: 2019-08-02 | End: 2019-08-02 | Stop reason: SURG

## 2019-08-02 RX ORDER — HYDROMORPHONE HCL/PF 1 MG/ML
0.2 SYRINGE (ML) INJECTION EVERY 4 HOURS PRN
Status: DISCONTINUED | OUTPATIENT
Start: 2019-08-02 | End: 2019-08-07

## 2019-08-02 RX ORDER — NEOSTIGMINE METHYLSULFATE 1 MG/ML
INJECTION INTRAVENOUS AS NEEDED
Status: DISCONTINUED | OUTPATIENT
Start: 2019-08-02 | End: 2019-08-02 | Stop reason: SURG

## 2019-08-02 RX ORDER — FENTANYL CITRATE/PF 50 MCG/ML
50 SYRINGE (ML) INJECTION
Status: DISCONTINUED | OUTPATIENT
Start: 2019-08-02 | End: 2019-08-02 | Stop reason: HOSPADM

## 2019-08-02 RX ORDER — LIDOCAINE HYDROCHLORIDE 10 MG/ML
INJECTION, SOLUTION EPIDURAL; INFILTRATION; INTRACAUDAL; PERINEURAL AS NEEDED
Status: DISCONTINUED | OUTPATIENT
Start: 2019-08-02 | End: 2019-08-02 | Stop reason: SURG

## 2019-08-02 RX ORDER — METOCLOPRAMIDE HYDROCHLORIDE 5 MG/ML
10 INJECTION INTRAMUSCULAR; INTRAVENOUS ONCE AS NEEDED
Status: DISCONTINUED | OUTPATIENT
Start: 2019-08-02 | End: 2019-08-02 | Stop reason: HOSPADM

## 2019-08-02 RX ORDER — MAGNESIUM HYDROXIDE 1200 MG/15ML
LIQUID ORAL AS NEEDED
Status: DISCONTINUED | OUTPATIENT
Start: 2019-08-02 | End: 2019-08-02 | Stop reason: HOSPADM

## 2019-08-02 RX ORDER — OXYCODONE HYDROCHLORIDE 5 MG/1
5 TABLET ORAL EVERY 4 HOURS PRN
Status: DISCONTINUED | OUTPATIENT
Start: 2019-08-02 | End: 2019-08-07 | Stop reason: HOSPADM

## 2019-08-02 RX ADMIN — HYDROMORPHONE HYDROCHLORIDE 0.2 MG: 1 INJECTION, SOLUTION INTRAMUSCULAR; INTRAVENOUS; SUBCUTANEOUS at 16:51

## 2019-08-02 RX ADMIN — MIDAZOLAM HYDROCHLORIDE 2 MG: 1 INJECTION, SOLUTION INTRAMUSCULAR; INTRAVENOUS at 15:14

## 2019-08-02 RX ADMIN — Medication 20 MG: at 15:20

## 2019-08-02 RX ADMIN — Medication 10 MG: at 15:37

## 2019-08-02 RX ADMIN — CEFAZOLIN SODIUM 2000 MG: 2 SOLUTION INTRAVENOUS at 05:43

## 2019-08-02 RX ADMIN — METHYLPREDNISOLONE SODIUM SUCCINATE 40 MG: 40 INJECTION, POWDER, FOR SOLUTION INTRAMUSCULAR; INTRAVENOUS at 23:07

## 2019-08-02 RX ADMIN — SODIUM CHLORIDE 100 ML/HR: 0.9 INJECTION, SOLUTION INTRAVENOUS at 10:45

## 2019-08-02 RX ADMIN — CEFAZOLIN SODIUM 2000 MG: 2 SOLUTION INTRAVENOUS at 23:20

## 2019-08-02 RX ADMIN — HYDROMORPHONE HYDROCHLORIDE 0.4 MG: 1 INJECTION, SOLUTION INTRAMUSCULAR; INTRAVENOUS; SUBCUTANEOUS at 17:17

## 2019-08-02 RX ADMIN — GLYCOPYRROLATE 0.4 MG: 0.2 INJECTION, SOLUTION INTRAMUSCULAR; INTRAVENOUS at 16:30

## 2019-08-02 RX ADMIN — ONDANSETRON 4 MG: 2 INJECTION INTRAMUSCULAR; INTRAVENOUS at 15:34

## 2019-08-02 RX ADMIN — ROCURONIUM BROMIDE 50 MG: 10 INJECTION, SOLUTION INTRAVENOUS at 15:20

## 2019-08-02 RX ADMIN — NEOSTIGMINE METHYLSULFATE 3 MG: 1 INJECTION, SOLUTION INTRAVENOUS at 16:30

## 2019-08-02 RX ADMIN — CEFAZOLIN SODIUM 2000 MG: 2 SOLUTION INTRAVENOUS at 15:13

## 2019-08-02 RX ADMIN — ONDANSETRON 4 MG: 2 INJECTION INTRAMUSCULAR; INTRAVENOUS at 23:58

## 2019-08-02 RX ADMIN — LIDOCAINE HYDROCHLORIDE 50 MG: 10 INJECTION, SOLUTION EPIDURAL; INFILTRATION; INTRACAUDAL; PERINEURAL at 15:19

## 2019-08-02 RX ADMIN — MYCOPHENOLIC ACID 360 MG: 180 TABLET, DELAYED RELEASE ORAL at 08:54

## 2019-08-02 RX ADMIN — HEPARIN SODIUM 5000 UNITS: 5000 INJECTION INTRAVENOUS; SUBCUTANEOUS at 05:44

## 2019-08-02 RX ADMIN — TACROLIMUS 1 MG: 1 CAPSULE ORAL at 08:53

## 2019-08-02 RX ADMIN — MYCOPHENOLIC ACID 360 MG: 180 TABLET, DELAYED RELEASE ORAL at 18:11

## 2019-08-02 RX ADMIN — TACROLIMUS 1 MG: 1 CAPSULE ORAL at 18:11

## 2019-08-02 RX ADMIN — OXYCODONE HYDROCHLORIDE 10 MG: 10 TABLET ORAL at 19:38

## 2019-08-02 RX ADMIN — FAMOTIDINE 20 MG: 20 TABLET ORAL at 08:53

## 2019-08-02 RX ADMIN — HYDROMORPHONE HYDROCHLORIDE 0.4 MG: 1 INJECTION, SOLUTION INTRAMUSCULAR; INTRAVENOUS; SUBCUTANEOUS at 15:49

## 2019-08-02 RX ADMIN — METHYLPREDNISOLONE SODIUM SUCCINATE 40 MG: 40 INJECTION, POWDER, FOR SOLUTION INTRAMUSCULAR; INTRAVENOUS at 08:53

## 2019-08-02 RX ADMIN — SULFAMETHOXAZOLE AND TRIMETHOPRIM 1 TABLET: 400; 80 TABLET ORAL at 08:54

## 2019-08-02 RX ADMIN — HYDROMORPHONE HYDROCHLORIDE 0.4 MG: 1 INJECTION, SOLUTION INTRAMUSCULAR; INTRAVENOUS; SUBCUTANEOUS at 16:30

## 2019-08-02 RX ADMIN — METOPROLOL TARTRATE 25 MG: 25 TABLET, FILM COATED ORAL at 23:07

## 2019-08-02 RX ADMIN — FENTANYL CITRATE 50 MCG: 50 INJECTION, SOLUTION INTRAMUSCULAR; INTRAVENOUS at 15:37

## 2019-08-02 RX ADMIN — HEPARIN SODIUM 5000 UNITS: 5000 INJECTION INTRAVENOUS; SUBCUTANEOUS at 23:08

## 2019-08-02 RX ADMIN — PROPOFOL 150 MG: 10 INJECTION, EMULSION INTRAVENOUS at 15:19

## 2019-08-02 RX ADMIN — DEXAMETHASONE SODIUM PHOSPHATE 5 MG: 4 INJECTION, SOLUTION INTRAMUSCULAR; INTRAVENOUS at 15:20

## 2019-08-02 RX ADMIN — FENTANYL CITRATE 50 MCG: 50 INJECTION, SOLUTION INTRAMUSCULAR; INTRAVENOUS at 15:19

## 2019-08-02 NOTE — ANESTHESIA PREPROCEDURE EVALUATION
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      LEFT ATRIUM:  The atrium was markedly dilated      RIGHT ATRIUM:  The atrium was moderately to markedly dilated      MITRAL VALVE:  Prior repair procedures: surgical repair  There was trace regurgitation      TRICUSPID VALVE:  There was mild regurgitation      PULMONIC VALVE:  There was mild regurgitation      AORTA:  The root exhibited moderate dilation  4 5 cm  There was mild dilatation of the ascending aorta  3 6 cm  ,  Pulmonary  Pneumonia,        GI/Hepatic    GERD ,        Kidney transplant,        Endo/Other  Negative endo/other ROS      GYN       Hematology  Negative hematology ROS      Musculoskeletal  Negative musculoskeletal ROS        Neurology  Negative neurology ROS      Psychology   Negative psychology ROS              Physical Exam    Airway    Mallampati score: I  TM Distance: >3 FB  Neck ROM: full     Dental   No notable dental hx     Cardiovascular  Rate: normal,     Pulmonary  Breath sounds clear to auscultation,     Other Findings        Anesthesia Plan  ASA Score- 3     Anesthesia Type- general with ASA Monitors  Additional Monitors:   Airway Plan:         Plan Factors-  Patient did not smoke on day of surgery  Induction- intravenous  Postoperative Plan- Plan for postoperative opioid use  Informed Consent- Anesthetic plan and risks discussed with patient    I personally reviewed this patient with the CRNA  Discussed and agreed on the Anesthesia Plan with the CRNA  Armando Johnson

## 2019-08-02 NOTE — OP NOTE
OPERATIVE REPORT  PATIENT NAME: Nils Pleitez    :  1950  MRN: [id number]  Pt Location: [facility] OR ROOM 04    SURGERY DATE: 2019    Surgeon(s) and Role:     * Tristin Carpenter MD - Primary     * Maeve Mercado PA-C - Assisting    Preop Diagnosis:  Acute cholecystitis [K81 0]    Post-Op Diagnosis Codes:     * Acute cholecystitis [K81 0]    Procedure(s) (LRB):  CHOLECYSTECTOMY, OPEN (N/A)    Specimen(s):  ID Type Source Tests Collected by Time Destination   1 : Gallbladder and contents Tissue Gallbladder TISSUE EXAM Tristin Carpenter MD 2019 1619        Estimated Blood Loss:   Minimal    Drains:  NG/OG/Enteral Tube Orogastric 18 Fr Center mouth (Active)   Number of days: 0   None    Anesthesia Type:   General    Operative Indications:  Acute cholecystitis [K81 0]    Operative Findings: The gallbladder was markedly distended with edema of the wall and adhesions to the surrounding fatty tissue  Complications:   None    Procedure and Technique:  The patient was identified in the patient was placed in the operating table in a supine position  After adequate anesthesia induction and satisfactory endotracheal intubation the abdomen was prepped and draped in the usual fashion with ChloraPrep  Time-out was called the patient was identified as was surgical site  Right subcostal incision was made with a scalpel, taken down through the subcutaneous tissue with cautery  The muscles were transected with cautery, the peritoneum was picked between 2 hemostats and opened with Metzenbaum scissors  The abdominal cavity was entered, small amount of clear fluid was identified  The at this point the gallbladder was seen covered by the omentum and surrounding fatty tissue  The omentum was peeled off carefully to avoid any bleeding  The gallbladder was exposed, this point a cholecystostomy was performed with cautery and the gallbladder contents were aspirated  The cholecystostomy was closed with 2 0 Vicryl    At this point proceeded to grab the fundus with the Lu clamp as well as the infundibulum after removing all the adhesions  The cystic duct was identified and dissected, staple it proximally and distally divided with scissors  The cystic artery was also identified, dissected, staple it proximally and distally divided with scissors  The gallbladder was removed the gallbladder fossa using electrocautery and a hook in the retrograde fashion, this part was very tissues tremendous desmoid inflammatory reaction of the back wall of the gallbladder  Also was accomplished the gallbladder fossa had some bleeding this was controlled with cautery and Surgicel  After irrigating the abdominal cavity there was no evidence of bleeding from the gallbladder fossa, cystic duct or any of the dissection sites  At this point the posterior rectus sheath was closed with 1 Vicryl in a continuous fashion  The anterior rectus sheath was closed with 1 Vicryl in a continuous fashion  Subcutaneous tissue was copiously irrigated, approximated with 3 0 Vicryl in interrupted fashion  The skin was closed with 4 O Vicryl in a continuous good color fashion  Sterile dressing was applied  At the end of the case instrument, needles, and sponges counts were correct  Patient tolerated the procedure well     I was present for the entire procedure, A qualified resident physician was not available and A physician assistant was required during the procedure for retraction tissue handling,dissection and suturing    Patient Disposition:  PACU , hemodynamically stable and extubated and stable    SIGNATURE: Wyline Schilder, MD  DATE: August 2, 2019  TIME: 4:20 PM

## 2019-08-02 NOTE — PROGRESS NOTES
Progress Note - General Surgery   Laurel Jerez 77 y o  male MRN: [id number]  Unit/Bed#: -01 Encounter: [id number]    Assessment/Plan  Laurel Jerez is a 77 y o  male     1  Acute cholecystitis with cholelithiasis  Cleared for surgery by cardiology and nephrology  Plan for OR today for open cholecystectomy and IOC  Informed consent will be obtained by the surgeon  NPO/IVF/IV Antibiotics  Ambulation/OOB  Pain control PRN  DVT prophylaxis  Holding oral anticoagulation  Serial abdominal exams    Subjective/Objective     Subjective: No acute events overnight  Lying in bed, abdominal pain controlled with medication  Objective:     Blood pressure 114/67, pulse 64, temperature 97 7 °F (36 5 °C), resp  rate 18, height 5' 4" (1 626 m), weight 68 kg (150 lb), SpO2 94 %  ,Body mass index is 25 75 kg/m²  Intake/Output Summary (Last 24 hours) at 8/2/2019 0933  Last data filed at 8/2/2019 0801  Gross per 24 hour   Intake 1450 ml   Output 1275 ml   Net 175 ml       Invasive Devices     Peripheral Intravenous Line            Peripheral IV 04/03/17 Right Antecubital 850 days    Peripheral IV 07/31/19 Right Antecubital 1 day                Physical Exam: /67   Pulse 64   Temp 97 7 °F (36 5 °C)   Resp 18   Ht 5' 4" (1 626 m)   Wt 68 kg (150 lb)   SpO2 94%   BMI 25 75 kg/m²   General appearance: alert and oriented, in no acute distress  Lungs: clear to auscultation bilaterally  Heart: regular rate and rhythm, S1, S2 normal, no murmur, click, rub or gallop  Abdomen: soft, non-distended, active bowel sounds, mild tenderness to palpation in RUQ    Lab, Imaging and other studies:I have personally reviewed pertinent lab results       VTE Pharmacologic Prophylaxis: Heparin  VTE Mechanical Prophylaxis: sequential compression device    Recent Results (from the past 36 hour(s))   CBC and differential    Collection Time: 08/01/19  5:54 AM   Result Value Ref Range    WBC 13 16 (H) 4 31 - 10 16 Thousand/uL    RBC 5  02 3 88 - 5 62 Million/uL    Hemoglobin 14 7 12 0 - 17 0 g/dL    Hematocrit 46 5 36 5 - 49 3 %    MCV 93 82 - 98 fL    MCH 29 3 26 8 - 34 3 pg    MCHC 31 6 31 4 - 37 4 g/dL    RDW 13 5 11 6 - 15 1 %    MPV 9 1 8 9 - 12 7 fL    Platelets 396 959 - 217 Thousands/uL    nRBC 0 /100 WBCs    Neutrophils Relative 83 (H) 43 - 75 %    Immat GRANS % 1 0 - 2 %    Lymphocytes Relative 7 (L) 14 - 44 %    Monocytes Relative 9 4 - 12 %    Eosinophils Relative 0 0 - 6 %    Basophils Relative 0 0 - 1 %    Neutrophils Absolute 10 94 (H) 1 85 - 7 62 Thousands/µL    Immature Grans Absolute 0 07 0 00 - 0 20 Thousand/uL    Lymphocytes Absolute 0 91 0 60 - 4 47 Thousands/µL    Monocytes Absolute 1 20 0 17 - 1 22 Thousand/µL    Eosinophils Absolute 0 02 0 00 - 0 61 Thousand/µL    Basophils Absolute 0 02 0 00 - 0 10 Thousands/µL   Comprehensive metabolic panel    Collection Time: 08/01/19  5:54 AM   Result Value Ref Range    Sodium 139 136 - 145 mmol/L    Potassium 4 1 3 5 - 5 3 mmol/L    Chloride 107 100 - 108 mmol/L    CO2 21 21 - 32 mmol/L    ANION GAP 11 4 - 13 mmol/L    BUN 21 5 - 25 mg/dL    Creatinine 1 25 0 60 - 1 30 mg/dL    Glucose 99 65 - 140 mg/dL    Calcium 8 5 8 3 - 10 1 mg/dL    AST 42 5 - 45 U/L    ALT 48 12 - 78 U/L    Alkaline Phosphatase 52 46 - 116 U/L    Total Protein 6 8 6 4 - 8 2 g/dL    Albumin 2 7 (L) 3 5 - 5 0 g/dL    Total Bilirubin 2 20 (H) 0 20 - 1 00 mg/dL    eGFR 59 ml/min/1 73sq m   Magnesium    Collection Time: 08/01/19  5:54 AM   Result Value Ref Range    Magnesium 1 9 1 6 - 2 6 mg/dL   Phosphorus    Collection Time: 08/01/19  5:54 AM   Result Value Ref Range    Phosphorus 3 0 2 3 - 4 1 mg/dL   ECG 12 lead    Collection Time: 08/01/19  8:17 AM   Result Value Ref Range    Ventricular Rate 71 BPM    Atrial Rate 71 BPM    AR Interval 174 ms    QRSD Interval 140 ms    QT Interval 434 ms    QTC Interval 471 ms    P Axis -13 degrees    QRS Axis 102 degrees    T Wave Axis 29 degrees   Type and screen Collection Time: 08/01/19 11:39 AM   Result Value Ref Range    ABO Grouping B     Rh Factor Positive     Antibody Screen Negative     Specimen Expiration Date [id number]    CBC and differential    Collection Time: 08/02/19  5:37 AM   Result Value Ref Range    WBC 9 62 4 31 - 10 16 Thousand/uL    RBC 5 03 3 88 - 5 62 Million/uL    Hemoglobin 14 5 12 0 - 17 0 g/dL    Hematocrit 46 2 36 5 - 49 3 %    MCV 92 82 - 98 fL    MCH 28 8 26 8 - 34 3 pg    MCHC 31 4 31 4 - 37 4 g/dL    RDW 13 4 11 6 - 15 1 %    MPV 9 1 8 9 - 12 7 fL    Platelets 747 886 - 347 Thousands/uL    nRBC 0 /100 WBCs    Neutrophils Relative 93 (H) 43 - 75 %    Immat GRANS % 1 0 - 2 %    Lymphocytes Relative 4 (L) 14 - 44 %    Monocytes Relative 2 (L) 4 - 12 %    Eosinophils Relative 0 0 - 6 %    Basophils Relative 0 0 - 1 %    Neutrophils Absolute 8 97 (H) 1 85 - 7 62 Thousands/µL    Immature Grans Absolute 0 07 0 00 - 0 20 Thousand/uL    Lymphocytes Absolute 0 40 (L) 0 60 - 4 47 Thousands/µL    Monocytes Absolute 0 18 0 17 - 1 22 Thousand/µL    Eosinophils Absolute 0 00 0 00 - 0 61 Thousand/µL    Basophils Absolute 0 00 0 00 - 0 10 Thousands/µL   Comprehensive metabolic panel    Collection Time: 08/02/19  5:37 AM   Result Value Ref Range    Sodium 137 136 - 145 mmol/L    Potassium 4 5 3 5 - 5 3 mmol/L    Chloride 105 100 - 108 mmol/L    CO2 21 21 - 32 mmol/L    ANION GAP 11 4 - 13 mmol/L    BUN 19 5 - 25 mg/dL    Creatinine 1 15 0 60 - 1 30 mg/dL    Glucose 132 65 - 140 mg/dL    Calcium 8 5 8 3 - 10 1 mg/dL    AST 61 (H) 5 - 45 U/L    ALT 98 (H) 12 - 78 U/L    Alkaline Phosphatase 68 46 - 116 U/L    Total Protein 6 9 6 4 - 8 2 g/dL    Albumin 2 5 (L) 3 5 - 5 0 g/dL    Total Bilirubin 1 30 (H) 0 20 - 1 00 mg/dL    eGFR 65 ml/min/1 73sq m   Lipase    Collection Time: 08/02/19  5:37 AM   Result Value Ref Range    Lipase 132 73 - 393 u/L

## 2019-08-02 NOTE — PLAN OF CARE
Problem: Nutrition/Hydration-ADULT  Goal: Nutrient/Hydration intake appropriate for improving, restoring or maintaining nutritional needs  Description  Monitor and assess patient's nutrition/hydration status for malnutrition (ex- brittle hair, bruises, dry skin, pale skin and conjunctiva, muscle wasting, smooth red tongue, and disorientation)  Collaborate with interdisciplinary team and initiate plan and interventions as ordered  Monitor patient's weight and dietary intake as ordered or per policy  Utilize nutrition screening tool and intervene per policy  Determine patient's food preferences and provide high-protein, high-caloric foods as appropriate       INTERVENTIONS:  - Monitor oral intake, urinary output, labs, and treatment plans  - Assess nutrition and hydration status and recommend course of action  - Evaluate amount of meals eaten  - Assist patient with eating if necessary   - Allow adequate time for meals  - Recommend/ encourage appropriate diets, oral nutritional supplements, and vitamin/mineral supplements  - Order, calculate, and assess calorie counts as needed  - Recommend, monitor, and adjust tube feedings and TPN/PPN based on assessed needs  - Assess need for intravenous fluids  - Provide specific nutrition/hydration education as appropriate  - Include patient/family/caregiver in decisions related to nutrition  Outcome: Progressing     Problem: PAIN - ADULT  Goal: Verbalizes/displays adequate comfort level or baseline comfort level  Description  Interventions:  - Encourage patient to monitor pain and request assistance  - Assess pain using appropriate pain scale  - Administer analgesics based on type and severity of pain and evaluate response  - Implement non-pharmacological measures as appropriate and evaluate response  - Consider cultural and social influences on pain and pain management  - Notify physician/advanced practitioner if interventions unsuccessful or patient reports new pain  Outcome: Progressing     Problem: INFECTION - ADULT  Goal: Absence or prevention of progression during hospitalization  Description  INTERVENTIONS:  - Assess and monitor for signs and symptoms of infection  - Monitor lab/diagnostic results  - Monitor all insertion sites, i e  indwelling lines, tubes, and drains  - Monitor endotracheal (as able) and nasal secretions for changes in amount and color  - [place] appropriate cooling/warming therapies per order  - Administer medications as ordered  - Instruct and encourage patient and family to use good hand hygiene technique  - Identify and instruct in appropriate isolation precautions for identified infection/condition  Outcome: Progressing  Goal: Absence of fever/infection during neutropenic period  Description  INTERVENTIONS:  - Monitor WBC  - Implement neutropenic guidelines  Outcome: Progressing     Problem: SAFETY ADULT  Goal: Patient will remain free of falls  Description  INTERVENTIONS:  - Assess patient frequently for physical needs  -  Identify cognitive and physical deficits and behaviors that affect risk of falls    -  [place] fall precautions as indicated by assessment   - Educate patient/family on patient safety including physical limitations  - Instruct patient to call for assistance with activity based on assessment  - Modify environment to reduce risk of injury  - Consider OT/PT consult to assist with strengthening/mobility  Outcome: Progressing  Goal: Maintain or return to baseline ADL function  Description  INTERVENTIONS:  -  Assess patient's ability to carry out ADLs; assess patient's baseline for ADL function and identify physical deficits which impact ability to perform ADLs (bathing, care of mouth/teeth, toileting, grooming, dressing, etc )  - Assess/evaluate cause of self-care deficits   - Assess range of motion  - Assess patient's mobility; develop plan if impaired  - Assess patient's need for assistive devices and provide as appropriate  - Encourage maximum independence but intervene and supervise when necessary  ¯ Involve family in performance of ADLs  ¯ Assess for home care needs following discharge   ¯ Request OT consult to assist with ADL evaluation and planning for discharge  ¯ Provide patient education as appropriate  Outcome: Progressing  Goal: Maintain or return mobility status to optimal level  Description  INTERVENTIONS:  - Assess patient's baseline mobility status (ambulation, transfers, stairs, etc )    - Identify cognitive and physical deficits and behaviors that affect mobility  - Identify mobility aids required to assist with transfers and/or ambulation (gait belt, sit-to-stand, lift, walker, cane, etc )  - [place] fall precautions as indicated by assessment  - Record patient progress and toleration of activity level on Mobility SBAR; progress patient to next Phase/Stage  - Instruct patient to call for assistance with activity based on assessment  - Request Rehabilitation consult to assist with strengthening/weightbearing, etc   Outcome: Progressing     Problem: GENITOURINARY - ADULT  Goal: Absence of urinary retention  Description  INTERVENTIONS:  - Assess patients ability to void and empty bladder  - Monitor I/O  - Bladder scan as needed  - Discuss with physician/AP medications to alleviate retention as needed  - Discuss catheterization for long term situations as appropriate  Outcome: Progressing

## 2019-08-02 NOTE — ANESTHESIA POSTPROCEDURE EVALUATION
Post-Op Assessment Note    CV Status:  Stable  Pain Score: 0    Pain management: adequate     Mental Status:  Alert and awake   Hydration Status:  Euvolemic   PONV Controlled:  Controlled   Airway Patency:  Patent   Post Op Vitals Reviewed: Yes      Staff: CRNA           BP (P) 135/73 (08/02/19 1654)    Temp (P) 98 8 °F (37 1 °C) (08/02/19 1654)    Pulse (P) 93 (08/02/19 1654)   Resp (P) 21 (08/02/19 1654)    SpO2 (P) 96 % (08/02/19 1654)

## 2019-08-02 NOTE — PROGRESS NOTES
[facility] Progress Note  Patient: Gordy Guzman 77 y o  male   MRN: [id number]  PCP: Grady Glaser MD  Unit/Bed#: OR POOL Encounter: [id number]  Date Of Visit: 08/02/19          * Acute calculous cholecystitis  Assessment & Plan  Plan for surgery today as per primary  Benign localized prostatic hyperplasia with lower urinary tract symptoms (LUTS)  Assessment & Plan  · On Flomax  HLD (hyperlipidemia)  Assessment & Plan  · On statin  H/O kidney transplant  Assessment & Plan  Patient with history of renal transplant x2  Last renal transplant in 2011  Nephrology following  Continue with current treatment  Creatinine stable    CAD (coronary artery disease)  Assessment & Plan  ·  medical management  Cardiology on board as well  Continue with medical management  Patient has been evaluated by Cardiology service for clearance      Present on Admission:   Acute calculous cholecystitis   CAD (coronary artery disease)   HLD (hyperlipidemia)   Benign localized prostatic hyperplasia with lower urinary tract symptoms (LUTS)   Acute cholecystitis            VTE Pharmacologic Prophylaxis:   Pharmacologic: Heparin  Mechanical VTE Prophylaxis in Place: Yes    Patient Centered Rounds: I have performed bedside rounds with nursing staff today  Discussions with Specialists or Other Care Team Provider:  Yes    Education and Discussions with Family / Patient:  Yes    Time Spent for Care: 20+ minutes  More than 50% of total time spent on counseling and coordination of care as described above  Current Length of Stay: 2 day(s)    Current Patient Status: Inpatient   Certification Statement: The patient will continue to require additional inpatient hospital stay due to  Gallstone/cholecystitis/gallbladder surgery    Discharge Plan:  As per primary team    Code Status: Level 1 - Full Code      Subjective:   Feels okay  Has mild soreness/pain in his abdomen  Denies any fever or chills    No nausea or vomiting  He is NPO and waiting to go for surgery  No fever or chills    Objective:     Vitals:   Temp (24hrs), Av 8 °F (36 6 °C), Min:97 7 °F (36 5 °C), Max:98 1 °F (36 7 °C)    Temp:  [97 7 °F (36 5 °C)-98 1 °F (36 7 °C)] 97 7 °F (36 5 °C)  HR:  [62-68] 64  Resp:  [18] 18  BP: (113-134)/(66-73) 134/73  SpO2:  [94 %-96 %] 96 %  Body mass index is 25 73 kg/m²  Input and Output Summary (last 24 hours): Intake/Output Summary (Last 24 hours) at 2019 1620  Last data filed at 2019 1043  Gross per 24 hour   Intake 1000 ml   Output 1150 ml   Net -150 ml           Physical Exam:     Vital signs are reviewed as above  Sitting in chair  Not in any respiratory distress  S1 and S2 audible  Chest mostly clear to auscultation  Abdomen is soft  Has mild soreness in right upper quadrant  No cyanosis or clubbing  Mood and affect are pleasant  Oropharynx are hydrated      Additional Data:     Labs:    Results from last 7 days   Lab Units 19  0537   WBC Thousand/uL 9 62   HEMOGLOBIN g/dL 14 5   HEMATOCRIT % 46 2   PLATELETS Thousands/uL 201   NEUTROS PCT % 93*   LYMPHS PCT % 4*   MONOS PCT % 2*   EOS PCT % 0     Results from last 7 days   Lab Units 19  0537   POTASSIUM mmol/L 4 5   CHLORIDE mmol/L 105   CO2 mmol/L 21   BUN mg/dL 19   CREATININE mg/dL 1 15   CALCIUM mg/dL 8 5   ALK PHOS U/L 68   ALT U/L 98*   AST U/L 61*           * I Have Reviewed All Lab Data Listed Above  * Additional Pertinent Lab Tests Reviewed: All Labs Within Last 24 Hours Reviewed      Recent Cultures (last 7 days):     Results from last 7 days   Lab Units 19  2016   BLOOD CULTURE  No Growth at 24 hrs  No Growth at 24 hrs         Last 24 Hours Medication List:     Current Facility-Administered Medications:  [MAR Hold] atorvastatin 10 mg Oral Daily With GINNY GOMES    [facility] Hold] cefazolin 2,000 mg Intravenous Q8H Kobe Pratt MD Last Rate: 2,000 mg (19 0543)   [MAR Hold] famotidine 20 mg Oral Daily Roxie Andre MD    [facility] Hold] heparin (porcine) 5,000 Units Subcutaneous [facility] Roxie Andre MD    [facility] Hold] methylPREDNISolone sodium succinate 40 mg Intravenous Q12H Erick Enamorado MD    [facility] Hold] metoprolol tartrate 25 mg Oral HS Tevin Amin PA-C    [facility] Hold] morphine injection 2 mg Intravenous Q1H PRN Roxie Andre MD    [facility] Hold] mycophenolate 360 mg Oral BID GINNY FISCHER    [MAR Hold] ondansetron 4 mg Intravenous Q6H PRN Roxie Andre MD    sodium chloride 100 mL/hr Intravenous Continuous Roxie Andre MD Last Rate: 100 mL/hr (08/02/19 1045)   sodium chloride   PRN Roxie Andre MD    [facility] Hold] sulfamethoxazole-trimethoprim 1 tablet Oral Daily Marry Harper PA-C    [MAR Hold] tacrolimus 1 mg Oral BID Marry Harper PA-C    [MAR Hold] tamsulosin 0 4 mg Oral Daily With Arin De La Rosa PA-C      Facility-Administered Medications Ordered in Other Encounters:  dexamethasone  Intravenous PRN Susannah Ad, CRNA   fentanyl citrate (PF)  Intravenous PRN Susannah Ad, CRNA   HYDROmorphone  Intravenous PRN Susannah Ad, CRNA   Ketamine HCl  Intravenous PRN Susannah Ad, CRNA   lidocaine (PF)   PRN Susannah Ad, CRNA   midazolam  Intravenous PRN [place] Ad, CRNA   ondansetron  Intravenous PRN Susannah Ad, CRNA   propofol  Intravenous PRN [place] Ad, CRNA   ROCuronium  Intravenous PRN Susannah Ad, CRNA        Today, Patient Was Seen By: Donald Aase, MD    ** Please Note: Dragon 360 Dictation voice to text software may have been used in the creation of this document   **

## 2019-08-02 NOTE — PROGRESS NOTES
Cardiology Progress Note - Vannesa Phillips 77 y o  male MRN: [id number]    Unit/Bed#: -01 Encounter: [id number]      Assessment:  She risk forAbdominal pain/we had the proper acute cholecystitis  Coronary artery disease history of PCI  Mitral valve repair  History of renal transplant for possible polycystic kidney disorder   Hyperlipidemia   Plan:  Continue current medical management  Patient is planned for cholecystectomy today  He is asymptomatic from cardiac standpoint  On antibiotics for cholecystitis   From cardiac standpoint patient is stable      Subjective:   Patient seen and examined  No significant events overnight  All other systems reviewed and are negative    Objective:     Vitals: Blood pressure 114/67, pulse 64, temperature 97 7 °F (36 5 °C), resp  rate 18, height 5' 4" (1 626 m), weight 68 kg (150 lb), SpO2 94 %  , Body mass index is 25 75 kg/m² ,   Orthostatic Blood Pressures      Most Recent Value   Blood Pressure  114/67 filed at 08/02/2019 1685   Patient Position - Orthostatic VS  Lying filed at 07/31/2019 2030            Intake/Output Summary (Last 24 hours) at 8/2/2019 1126  Last data filed at 8/2/2019 1043  Gross per 24 hour   Intake 2450 ml   Output 1275 ml   Net 1175 ml         Physical Exam:    GEN: Vannesa Phillips appears well, alert and oriented x 3, pleasant and cooperative   HEENT: pupils equal, round, and reactive to light; extraocular muscles intact  NECK: supple, no carotid bruits   HEART: regular rhythm, normal S1 and S2, no murmurs, clicks, gallops or rubs   LUNGS: clear to auscultation bilaterally; no wheezes, rales, or rhonchi   ABDOMEN: normal bowel sounds, soft, + tenderness, no distention  EXTREMITIES: peripheral pulses normal; no clubbing, cyanosis, or edema      Medications:      Current Facility-Administered Medications:     atorvastatin (LIPITOR) tablet 10 mg, 10 mg, Oral, Daily With Dinner, Joby Lockett PA-C, 10 mg at 08/01/19 1807    ceFAZolin (ANCEF) IVPB (premix) 2,000 mg, 2,000 mg, Intravenous, Q8H, Wyline Schilder, MD, Last Rate: 100 mL/hr at 08/02/19 0543, 2,000 mg at 08/02/19 0543    famotidine (PEPCID) tablet 20 mg, 20 mg, Oral, Daily, Wyline Schilder, MD, 20 mg at 08/02/19 0853    heparin (porcine) subcutaneous injection 5,000 Units, 5,000 Units, Subcutaneous, Q8H [facility], 5,000 Units at 08/02/19 0544 **AND** [CANCELED] Platelet count, , , Once, Wyline Schilder, MD    methylPREDNISolone sodium succinate (Solu-MEDROL) injection 40 mg, 40 mg, Intravenous, Q12H [facility], Apolonia Diaz MD, 40 mg at 08/02/19 0853    metoprolol tartrate (LOPRESSOR) tablet 25 mg, 25 mg, Oral, HS, Ev French PA-C, 25 mg at 08/01/19 2216    morphine injection 2 mg, 2 mg, Intravenous, Q1H PRN, Wyline Schilder, MD, 2 mg at 07/31/19 0567    mycophenolic acid (MYFORTIC) EC tablet 360 mg, 360 mg, Oral, BID, Helen Harper PA-C, 360 mg at 08/02/19 0854    ondansetron (ZOFRAN) injection 4 mg, 4 mg, Intravenous, Q6H PRN, Wyline Schilder, MD, 4 mg at 07/31/19 2333    sodium chloride 0 9 % infusion, 100 mL/hr, Intravenous, Continuous, Wyline Schilder, MD, Last Rate: 100 mL/hr at 08/02/19 1045, 100 mL/hr at 08/02/19 1045    sulfamethoxazole-trimethoprim (BACTRIM) 400-80 mg per tablet 1 tablet, 1 tablet, Oral, Daily, Jeane Harper PA-C, 1 tablet at 08/02/19 0854    tacrolimus (PROGRAF) capsule 1 mg, 1 mg, Oral, BID, Helen Harper PA-C, 1 mg at 08/02/19 0853    tamsulosin (FLOMAX) capsule 0 4 mg, 0 4 mg, Oral, Daily With Dinner, Colt Oliver PA-C, 0 4 mg at 08/01/19 1807     Labs & Results:    Results from last 7 days   Lab Units 07/31/19 2016   TROPONIN I ng/mL <0 02     Results from last 7 days   Lab Units 08/02/19  0537 08/01/19  0554 07/31/19  1542   WBC Thousand/uL 9 62 13 16* 16 21*   HEMOGLOBIN g/dL 14 5 14 7 16 8   HEMATOCRIT % 46 2 46 5 53 2*   PLATELETS Thousands/uL 201 204 252         Results from last 7 days   Lab Units 08/02/19  0537 08/01/19  0554 07/31/19  1542   POTASSIUM mmol/L 4 5 4 1 4 6   CHLORIDE mmol/L 105 107 103   CO2 mmol/L 21 21 26   BUN mg/dL 19 21 27*   CREATININE mg/dL 1 15 1 25 1 36*   CALCIUM mg/dL 8 5 8 5 9 5   ALK PHOS U/L 68 52 61   ALT U/L 98* 48 19   AST U/L 61* 42 17         Results from last 7 days   Lab Units 08/01/19  0554   MAGNESIUM mg/dL 1 9         Of renal by the and 1 is complaining tried to explain to skin

## 2019-08-02 NOTE — PROGRESS NOTES
NEPHROLOGY PROGRESS NOTE    Patient: Joshua Davis               Sex: male          DOA: 7/31/2019  7:51 PM   YOB: 1950        Age:  77 y o         LOS:  LOS: 2 days   8/2/2019    REASON FOR THE CONSULTATION:      History of renal transplantation    SUBJECTIVE     Patient is seen and examined next to the bedside he is sitting up and denies any abdominal pain    Scheduled to undergo cholecystectomy today    CURRENT MEDICATIONS       Current Facility-Administered Medications:     atorvastatin (LIPITOR) tablet 10 mg, 10 mg, Oral, Daily With Mendez Blanc PA-C, 10 mg at 08/01/19 1807    ceFAZolin (ANCEF) IVPB (premix) 2,000 mg, 2,000 mg, Intravenous, Q8H, Erik Turcios MD, Last Rate: 100 mL/hr at 08/02/19 0543, 2,000 mg at 08/02/19 0543    famotidine (PEPCID) tablet 20 mg, 20 mg, Oral, Daily, Erik Turcios MD, 20 mg at 08/02/19 0853    heparin (porcine) subcutaneous injection 5,000 Units, 5,000 Units, Subcutaneous, Q8H [facility], 5,000 Units at 08/02/19 0544 **AND** [CANCELED] Platelet count, , , Once, Erik Turcios MD    methylPREDNISolone sodium succinate (Solu-MEDROL) injection 40 mg, 40 mg, Intravenous, Q12H [facility], Ras Tyson MD, 40 mg at 08/02/19 0853    metoprolol tartrate (LOPRESSOR) tablet 25 mg, 25 mg, Oral, HS, Ev French PA-C, 25 mg at 08/01/19 2216    morphine injection 2 mg, 2 mg, Intravenous, Q1H PRN, Erik Turcios MD, 2 mg at 07/31/19 0372    mycophenolic acid (MYFORTIC) EC tablet 360 mg, 360 mg, Oral, BID, Helen Harper PA-C, 360 mg at 08/02/19 0854    ondansetron (ZOFRAN) injection 4 mg, 4 mg, Intravenous, Q6H PRN, Erik Turcios MD, 4 mg at 07/31/19 2333    sodium chloride 0 9 % infusion, 100 mL/hr, Intravenous, Continuous, Erik Turcios MD, Last Rate: 100 mL/hr at 08/02/19 1045, 100 mL/hr at 08/02/19 1045    sulfamethoxazole-trimethoprim (BACTRIM) 400-80 mg per tablet 1 tablet, 1 tablet, Oral, Daily, Jordyn Harper PA-C, 1 tablet at 08/02/19 3572    tacrolimus (PROGRAF) capsule 1 mg, 1 mg, Oral, BID, Helen GREEN ANA Harper-MAIK, 1 mg at 08/02/19 0853    tamsulosin (FLOMAX) capsule 0 4 mg, 0 4 mg, Oral, Daily With Dinner, Salina Colon PA-C, 0 4 mg at 08/01/19 1807    REVIEW OF SYSTEMS     Review of Systems   Constitutional: Negative  HENT: Negative  Eyes: Negative  Respiratory: Negative  Cardiovascular: Negative  Gastrointestinal: Negative  Endocrine: Negative  Genitourinary: Negative  Musculoskeletal: Negative  Skin: Negative  Allergic/Immunologic: Negative  Neurological: Negative  Hematological: Negative  All other systems reviewed and are negative  OBJECTIVE     Current Weight: Weight - Scale: 68 kg (150 lb)  Vitals:    08/02/19 0729   BP: 114/67   Pulse: 64   Resp: 18   Temp: 97 7 °F (36 5 °C)   SpO2: 94%     Body mass index is 25 75 kg/m²  Intake/Output Summary (Last 24 hours) at 8/2/2019 1148  Last data filed at 8/2/2019 1043  Gross per 24 hour   Intake 2450 ml   Output 1275 ml   Net 1175 ml       PHYSICAL EXAMINATION     Physical Exam   Constitutional: He is oriented to person, place, and time  HENT:   Head: Normocephalic and atraumatic  Eyes: Pupils are equal, round, and reactive to light  Neck: Neck supple  No JVD present  Cardiovascular: Normal rate, regular rhythm and normal heart sounds  Exam reveals no friction rub  No murmur heard  Pulmonary/Chest: Effort normal and breath sounds normal    Abdominal: Soft  Bowel sounds are normal  He exhibits no distension  There is no tenderness  There is no rebound  Musculoskeletal: He exhibits no edema or tenderness  Neurological: He is alert and oriented to person, place, and time  Skin: Skin is dry  No rash noted  Psychiatric: He has a normal mood and affect           LAB RESULTS     Results from last 7 days   Lab Units 08/02/19  0537 08/01/19  0554 07/31/19  1542   WBC Thousand/uL 9 62 13 16* 16 21*   HEMOGLOBIN g/dL 14 5 14 7 16 8 HEMATOCRIT % 46 2 46 5 53 2*   PLATELETS Thousands/uL 201 204 252   POTASSIUM mmol/L 4 5 4 1 4 6   CHLORIDE mmol/L 105 107 103   CO2 mmol/L 21 21 26   BUN mg/dL 19 21 27*   CREATININE mg/dL 1 15 1 25 1 36*   EGFR ml/min/1 73sq m 65 59 53   CALCIUM mg/dL 8 5 8 5 9 5   MAGNESIUM mg/dL  --  1 9  --    PHOSPHORUS mg/dL  --  3 0  --            RADIOLOGY RESULTS      Results for orders placed during the hospital encounter of 12/06/16   XR chest portable    Narrative CHEST     INDICATION:  CHF    COMPARISON:  12/12/2016    VIEWS:   AP frontal;  1 image    FINDINGS:      Endotracheal tube, right internal jugular central venous catheter, and nasogastric tube have been removed  Heart shadow is enlarged but stable from prior exam     Pulmonary edema has been improved  Small bilateral pleural effusions unchanged  No pneumothorax  Visualized osseous structures appear within normal limits for the patient's age  Impression 1  Interval removal of lines and tubes  2   Improved pulmonary edema  3   Unchanged small bilateral pleural effusions  Workstation performed: [id number]       Results for orders placed during the hospital encounter of 07/31/19   XR chest pa & lateral    Narrative CHEST     INDICATION:   Pre-op  COMPARISON:  Chest x-ray 6/4/2019    EXAM PERFORMED/VIEWS:  XR CHEST PA & LATERAL      FINDINGS:  Surgical clips overlie the right perihilar region  There has been prior cardiac valve replacement  There is stable moderate cardiomegaly  Right hemidiaphragm is mildly elevated similar to the prior exam   Underlying lucency here is likely related to colonic position  New small right pleural effusion  Stable fullness of the pulmonary vasculature  Osseous structures appear within normal limits for patient age  Impression New small right pleural effusion  Stable fullness of the pulmonary vasculature may be related to chronic congestive changes          Workstation performed: [id number]         ASSESSMENT/PLAN     77years old male with past medical history of CKD status post living unrelated renal transplant in the year 2011 that was preceded by a 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, probable polycystic kidney disease, hypertension who presented to the hospital to undergo laparoscopic cholecystectomy  1  CKD status post living unrelated renal transplantation:  Last transplant performed in the year 2011 and had done reasonably well on triple immunosuppression of prednisone, Prograf and Myfortic   -renal function relatively normal with serum creatinine 1 1-1 2     2  Immunosuppression:  Due to upcoming surgery, patient is being given stress dose steroids in the form of Solu-Medrol   -once able to tolerate p o  He may be started on oral prednisone  Will continue with tacrolimus 1 mg p  O  B i d  And Myfortic 360 mg twice daily  Will check Prograf level in a m  3  Acute cholecystitis:  For cholecystectomy today as per surgery  4  Hypertension:  Maintained on metoprolol 25 mg daily  Blood pressures are noted to be on target            Robert Mason MD  Nephrology  8/2/2019

## 2019-08-03 PROBLEM — R33.9 URINARY RETENTION: Status: ACTIVE | Noted: 2019-08-03

## 2019-08-03 LAB
ALBUMIN SERPL BCP-MCNC: 2.5 G/DL (ref 3.5–5)
ALP SERPL-CCNC: 59 U/L (ref 46–116)
ALT SERPL W P-5'-P-CCNC: 69 U/L (ref 12–78)
ANION GAP SERPL CALCULATED.3IONS-SCNC: 6 MMOL/L (ref 4–13)
AST SERPL W P-5'-P-CCNC: 63 U/L (ref 5–45)
BILIRUB SERPL-MCNC: 0.6 MG/DL (ref 0.2–1)
BUN SERPL-MCNC: 31 MG/DL (ref 5–25)
CALCIUM SERPL-MCNC: 8.5 MG/DL (ref 8.3–10.1)
CHLORIDE SERPL-SCNC: 105 MMOL/L (ref 100–108)
CO2 SERPL-SCNC: 24 MMOL/L (ref 21–32)
CREAT SERPL-MCNC: 1.23 MG/DL (ref 0.6–1.3)
ERYTHROCYTE [DISTWIDTH] IN BLOOD BY AUTOMATED COUNT: 13.2 % (ref 11.6–15.1)
GFR SERPL CREATININE-BSD FRML MDRD: 60 ML/MIN/1.73SQ M
GLUCOSE SERPL-MCNC: 153 MG/DL (ref 65–140)
HCT VFR BLD AUTO: 47.7 % (ref 36.5–49.3)
HGB BLD-MCNC: 14.7 G/DL (ref 12–17)
MCH RBC QN AUTO: 28.8 PG (ref 26.8–34.3)
MCHC RBC AUTO-ENTMCNC: 30.8 G/DL (ref 31.4–37.4)
MCV RBC AUTO: 93 FL (ref 82–98)
PLATELET # BLD AUTO: 240 THOUSANDS/UL (ref 149–390)
PMV BLD AUTO: 8.9 FL (ref 8.9–12.7)
POTASSIUM SERPL-SCNC: 4.9 MMOL/L (ref 3.5–5.3)
PROT SERPL-MCNC: 7.2 G/DL (ref 6.4–8.2)
RBC # BLD AUTO: 5.11 MILLION/UL (ref 3.88–5.62)
SODIUM SERPL-SCNC: 135 MMOL/L (ref 136–145)
WBC # BLD AUTO: 9.47 THOUSAND/UL (ref 4.31–10.16)

## 2019-08-03 PROCEDURE — 99024 POSTOP FOLLOW-UP VISIT: CPT | Performed by: SURGERY

## 2019-08-03 PROCEDURE — 80053 COMPREHEN METABOLIC PANEL: CPT | Performed by: CLINICAL NURSE SPECIALIST

## 2019-08-03 PROCEDURE — 99232 SBSQ HOSP IP/OBS MODERATE 35: CPT | Performed by: INTERNAL MEDICINE

## 2019-08-03 PROCEDURE — 80197 ASSAY OF TACROLIMUS: CPT | Performed by: INTERNAL MEDICINE

## 2019-08-03 PROCEDURE — 85027 COMPLETE CBC AUTOMATED: CPT | Performed by: CLINICAL NURSE SPECIALIST

## 2019-08-03 RX ORDER — PREDNISONE 1 MG/1
5 TABLET ORAL DAILY
Status: DISCONTINUED | OUTPATIENT
Start: 2019-08-03 | End: 2019-08-07 | Stop reason: HOSPADM

## 2019-08-03 RX ADMIN — HEPARIN SODIUM 5000 UNITS: 5000 INJECTION INTRAVENOUS; SUBCUTANEOUS at 05:56

## 2019-08-03 RX ADMIN — ATORVASTATIN CALCIUM 10 MG: 10 TABLET, FILM COATED ORAL at 17:59

## 2019-08-03 RX ADMIN — HEPARIN SODIUM 5000 UNITS: 5000 INJECTION INTRAVENOUS; SUBCUTANEOUS at 15:17

## 2019-08-03 RX ADMIN — HEPARIN SODIUM 5000 UNITS: 5000 INJECTION INTRAVENOUS; SUBCUTANEOUS at 22:17

## 2019-08-03 RX ADMIN — TACROLIMUS 1 MG: 1 CAPSULE ORAL at 17:59

## 2019-08-03 RX ADMIN — CEFAZOLIN SODIUM 2000 MG: 2 SOLUTION INTRAVENOUS at 22:12

## 2019-08-03 RX ADMIN — TACROLIMUS 1 MG: 1 CAPSULE ORAL at 09:23

## 2019-08-03 RX ADMIN — SODIUM CHLORIDE 100 ML/HR: 0.9 INJECTION, SOLUTION INTRAVENOUS at 23:45

## 2019-08-03 RX ADMIN — SODIUM CHLORIDE 100 ML/HR: 0.9 INJECTION, SOLUTION INTRAVENOUS at 05:57

## 2019-08-03 RX ADMIN — CEFAZOLIN SODIUM 2000 MG: 2 SOLUTION INTRAVENOUS at 05:58

## 2019-08-03 RX ADMIN — MYCOPHENOLIC ACID 360 MG: 180 TABLET, DELAYED RELEASE ORAL at 09:19

## 2019-08-03 RX ADMIN — FAMOTIDINE 20 MG: 20 TABLET ORAL at 09:19

## 2019-08-03 RX ADMIN — OXYCODONE HYDROCHLORIDE 10 MG: 10 TABLET ORAL at 00:03

## 2019-08-03 RX ADMIN — METHYLPREDNISOLONE SODIUM SUCCINATE 40 MG: 40 INJECTION, POWDER, FOR SOLUTION INTRAMUSCULAR; INTRAVENOUS at 09:19

## 2019-08-03 RX ADMIN — CEFAZOLIN SODIUM 2000 MG: 2 SOLUTION INTRAVENOUS at 15:17

## 2019-08-03 RX ADMIN — METOPROLOL TARTRATE 25 MG: 25 TABLET, FILM COATED ORAL at 22:11

## 2019-08-03 RX ADMIN — SULFAMETHOXAZOLE AND TRIMETHOPRIM 1 TABLET: 400; 80 TABLET ORAL at 09:19

## 2019-08-03 RX ADMIN — TAMSULOSIN HYDROCHLORIDE 0.4 MG: 0.4 CAPSULE ORAL at 17:59

## 2019-08-03 RX ADMIN — PREDNISONE 5 MG: 5 TABLET ORAL at 12:52

## 2019-08-03 RX ADMIN — MYCOPHENOLIC ACID 360 MG: 180 TABLET, DELAYED RELEASE ORAL at 17:59

## 2019-08-03 NOTE — PROGRESS NOTES
NEPHROLOGY PROGRESS NOTE    Patient: Boy Garcia               Sex: male          DOA: 7/31/2019  7:51 PM   YOB: 1950        Age:  77 y o         LOS:  LOS: 3 days   8/3/2019    REASON FOR THE CONSULTATION:      History of renal transplantation    SUBJECTIVE     Patient is seen and examined next to the bedside  Postop day 1  Laparoscopic cholecystectomy  Doing well except episode of vomiting yesterday  Noted to have 650 cc of urine in the bladder prevoid  He did urinated small amounts this morning      CURRENT MEDICATIONS       Current Facility-Administered Medications:     acetaminophen (TYLENOL) tablet 650 mg, 650 mg, Oral, Q6H PRN, Bryant Gar PA-C    atorvastatin (LIPITOR) tablet 10 mg, 10 mg, Oral, Daily With Ro Faganine PA-C, 10 mg at 08/01/19 1807    ceFAZolin (ANCEF) IVPB (premix) 2,000 mg, 2,000 mg, Intravenous, Q8H, Bryant Gar PA-C, Last Rate: 100 mL/hr at 08/03/19 0558, 2,000 mg at 08/03/19 0558    famotidine (PEPCID) tablet 20 mg, 20 mg, Oral, Daily, Bryant Gar, PA-C, 20 mg at 08/03/19 0919    heparin (porcine) subcutaneous injection 5,000 Units, 5,000 Units, Subcutaneous, Q8H [facility], 5,000 Units at 08/03/19 0556 **AND** [CANCELED] Platelet count, , , Once, Benjie Espinosa MD    HYDROmorphone (DILAUDID) injection 0 2 mg, 0 2 mg, Intravenous, Q4H PRN, Bryant Gar PA-C    methylPREDNISolone sodium succinate (Solu-MEDROL) injection 40 mg, 40 mg, Intravenous, Q12H [facility], Bryant Gar PA-C, 40 mg at 08/03/19 0919    metoprolol tartrate (LOPRESSOR) tablet 25 mg, 25 mg, Oral, HS, Bryant Gar, PA-C, 25 mg at 08/02/19 0747    mycophenolic acid (MYFORTIC) EC tablet 360 mg, 360 mg, Oral, BID, Bryant Gar, PA-C, 360 mg at 08/03/19 0919    ondansetron [facility]) injection 4 mg, 4 mg, Intravenous, Q6H PRN, Bryant Gar PA-C, 4 mg at 08/02/19 2075    oxyCODONE (ROXICODONE) IR tablet 10 mg, 10 mg, Oral, Q4H PRN, Bryant Gar PA-C, 10 mg at 08/03/19 0003    oxyCODONE (ROXICODONE) IR tablet 5 mg, 5 mg, Oral, Q4H PRN, David Toney PA-C    sodium chloride 0 9 % infusion, 100 mL/hr, Intravenous, Continuous, David Toney PA-C, Last Rate: 100 mL/hr at 08/03/19 0557, 100 mL/hr at 08/03/19 0557    sulfamethoxazole-trimethoprim (BACTRIM) 400-80 mg per tablet 1 tablet, 1 tablet, Oral, Daily, David Toney PA-C, 1 tablet at 08/03/19 0919    tacrolimus (PROGRAF) capsule 1 mg, 1 mg, Oral, BID, [facility], 1 mg at 08/03/19 4915    tamsulosin (FLOMAX) capsule 0 4 mg, 0 4 mg, Oral, Daily With Tigre Cantu PA-C, 0 4 mg at 08/01/19 1807    REVIEW OF SYSTEMS     Review of Systems   Constitutional: Negative  HENT: Negative  Eyes: Negative  Respiratory: Negative  Cardiovascular: Negative  Gastrointestinal: Positive for abdominal pain  Endocrine: Negative  Genitourinary: Negative  Musculoskeletal: Negative  Skin: Negative  Allergic/Immunologic: Negative  Neurological: Negative  Hematological: Negative  All other systems reviewed and are negative  OBJECTIVE     Current Weight: Weight - Scale: 68 kg (149 lb 14 6 oz)  Vitals:    08/03/19 0745   BP: 135/80   Pulse: 68   Resp: 18   Temp: (!) 97 3 °F (36 3 °C)   SpO2: (!) 88%     Body mass index is 25 73 kg/m²  Intake/Output Summary (Last 24 hours) at 8/3/2019 1102  Last data filed at 8/3/2019 0557  Gross per 24 hour   Intake 2000 ml   Output 100 ml   Net 1900 ml       PHYSICAL EXAMINATION     Physical Exam   Constitutional: He is oriented to person, place, and time  He appears well-developed and well-nourished  HENT:   Head: Normocephalic and atraumatic  Eyes: Pupils are equal, round, and reactive to light  Neck: Neck supple  Cardiovascular: Normal rate, regular rhythm and normal heart sounds  Pulmonary/Chest: Effort normal    Abdominal: Soft  Bowel sounds are normal    Musculoskeletal: Normal range of motion     Neurological: He is alert and oriented to person, place, and time  Skin: Skin is warm  Psychiatric: He has a normal mood and affect  LAB RESULTS     Results from last 7 days   Lab Units 08/03/19  0551 08/02/19  0537 08/01/19  0554 07/31/19  1542   WBC Thousand/uL 9 47 9 62 13 16* 16 21*   HEMOGLOBIN g/dL 14 7 14 5 14 7 16 8   HEMATOCRIT % 47 7 46 2 46 5 53 2*   PLATELETS Thousands/uL 240 201 204 252   POTASSIUM mmol/L 4 9 4 5 4 1 4 6   CHLORIDE mmol/L 105 105 107 103   CO2 mmol/L 24 21 21 26   BUN mg/dL 31* 19 21 27*   CREATININE mg/dL 1 23 1 15 1 25 1 36*   EGFR ml/min/1 73sq m 60 65 59 53   CALCIUM mg/dL 8 5 8 5 8 5 9 5   MAGNESIUM mg/dL  --   --  1 9  --    PHOSPHORUS mg/dL  --   --  3 0  --            RADIOLOGY RESULTS      Results for orders placed during the hospital encounter of 12/06/16   XR chest portable    Narrative CHEST     INDICATION:  CHF    COMPARISON:  12/12/2016    VIEWS:   AP frontal;  1 image    FINDINGS:      Endotracheal tube, right internal jugular central venous catheter, and nasogastric tube have been removed  Heart shadow is enlarged but stable from prior exam     Pulmonary edema has been improved  Small bilateral pleural effusions unchanged  No pneumothorax  Visualized osseous structures appear within normal limits for the patient's age  Impression 1  Interval removal of lines and tubes  2   Improved pulmonary edema  3   Unchanged small bilateral pleural effusions  Workstation performed: [id number]       Results for orders placed during the hospital encounter of 07/31/19   XR chest pa & lateral    Narrative CHEST     INDICATION:   Pre-op  COMPARISON:  Chest x-ray 6/4/2019    EXAM PERFORMED/VIEWS:  XR CHEST PA & LATERAL      FINDINGS:  Surgical clips overlie the right perihilar region  There has been prior cardiac valve replacement  There is stable moderate cardiomegaly    Right hemidiaphragm is mildly elevated similar to the prior exam   Underlying lucency here is likely related to colonic position  New small right pleural effusion  Stable fullness of the pulmonary vasculature  Osseous structures appear within normal limits for patient age  Impression New small right pleural effusion  Stable fullness of the pulmonary vasculature may be related to chronic congestive changes  Workstation performed: [id number]         ASSESSMENT/PLAN     77years old male with past medical history of CKD status post living unrelated renal transplant in the year 2011 that was preceded by a 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, probable polycystic kidney disease, hypertension who presented to the hospital to undergo laparoscopic cholecystectomy  1  CKD status post living unrelated renal transplantation:  Last transplant performed in the year 2011 and had done reasonably well on triple immunosuppression of prednisone, Prograf and Myfortic   -renal function relatively normal with serum creatinine 1 1-1 2     2  Immunosuppression:  Due to upcoming surgery, patient is being given stress dose steroids in the form of Solu-Medrol   -will DC Solu-Medrol and start patient on prednisone 5 mg once daily     -Prograf level noted to be below target on July 31st at 2 8  Will repeat Prograf level today  3  Acute cholecystitis:  Postop day 1  Laparoscopic cholecystectomy  4  Hypertension:  Maintained on metoprolol 25 mg daily  Blood pressures are noted to be on target  5  Urinary retention:  Noted to have 650 cc residual urine this morning  Patient asked to get moved around  Ordered bladder scan Q shift with PVR  To undergo straight catheterization if PVR greater than 200 cc             Asad Zeng MD  Nephrology  8/3/2019

## 2019-08-03 NOTE — PROGRESS NOTES
[facility] Progress Note  Patient: Landy Godoy 77 y o  male   MRN: [id number]  PCP: Mario Pfeiffer MD  Unit/Bed#: -[facility] Encounter: [id number]  Date Of Visit: 08/03/19          * Acute calculous cholecystitis  Assessment & Plan  Postop day 1  Having some nausea/vomiting  Benign localized prostatic hyperplasia with lower urinary tract symptoms (LUTS)/urinary retention-postop  Assessment & Plan  · On Flomax  · Bladder scan and straight cath as needed  HLD (hyperlipidemia)  Assessment & Plan  · On statin  H/O kidney transplant  Assessment & Plan  Patient with history of renal transplant x2  Last renal transplant in 2011  Nephrology following  Continue with current treatment  Creatinine stable    CAD (coronary artery disease)  Assessment & Plan  ·  medical management  Cardiology on board as well  Continue with medical management  Patient has been evaluated by Cardiology service for clearance        Present on Admission:   Acute calculous cholecystitis   CAD (coronary artery disease)   HLD (hyperlipidemia)   Benign localized prostatic hyperplasia with lower urinary tract symptoms (LUTS)   Acute cholecystitis            VTE Pharmacologic Prophylaxis:   Pharmacologic: Heparin  Mechanical VTE Prophylaxis in Place: Yes    Patient Centered Rounds: I have performed bedside rounds with nursing staff today  Discussions with Specialists or Other Care Team Provider:  Yes    Education and Discussions with Family / Patient:  Yes    Time Spent for Care: 25+ min  More than 50% of total time spent on counseling and coordination of care as described above  Current Length of Stay: 3 day(s)    Current Patient Status: Inpatient   Certification Statement: The patient will continue to require additional inpatient hospital stay due to Postop care    Discharge Plan:  As per primary team    Code Status: Level 1 - Full Code      Subjective:   Feeling nauseous    He vomited couple of times yesterday  He is burping, however, not passing any flatus yet  Has abdominal pain  Also developed urinary retention and he only peed 150 mL and had about 600 cc of urine on bladder scan  Denies any chest pain  Denies any shortness of breath  No fever or chills    Objective:     Vitals:   Temp (24hrs), Av 1 °F (36 7 °C), Min:97 3 °F (36 3 °C), Max:98 8 °F (37 1 °C)    Temp:  [97 3 °F (36 3 °C)-98 8 °F (37 1 °C)] 97 3 °F (36 3 °C)  HR:  [64-93] 68  Resp:  [16-25] 18  BP: (127-140)/(68-86) 135/80  SpO2:  [88 %-99 %] 88 %  Body mass index is 25 73 kg/m²  Input and Output Summary (last 24 hours): Intake/Output Summary (Last 24 hours) at 8/3/2019 1005  Last data filed at 8/3/2019 0557  Gross per 24 hour   Intake 3000 ml   Output 100 ml   Net 2900 ml           Physical Exam:     Vital signs are reviewed as above  Up in the bed  He was walking with his family on the floor  Feeling nauseous  Decreased breath sounds at the bases  Abdomen-distended  I did not hear any significant bowels  Oropharynx slightly dry  S1 and S2 audible  Awake and alert  Oriented x3  No cyanosis or clubbing  Mood and affect are pleasant      Additional Data:     Labs:    Results from last 7 days   Lab Units 19  0551 19  0537   WBC Thousand/uL 9 47 9 62   HEMOGLOBIN g/dL 14 7 14 5   HEMATOCRIT % 47 7 46 2   PLATELETS Thousands/uL 240 201   NEUTROS PCT %  --  93*   LYMPHS PCT %  --  4*   MONOS PCT %  --  2*   EOS PCT %  --  0     Results from last 7 days   Lab Units 19  0551   POTASSIUM mmol/L 4 9   CHLORIDE mmol/L 105   CO2 mmol/L 24   BUN mg/dL 31*   CREATININE mg/dL 1 23   CALCIUM mg/dL 8 5   ALK PHOS U/L 59   ALT U/L 69   AST U/L 63*           * I Have Reviewed All Lab Data Listed Above  * Additional Pertinent Lab Tests Reviewed: All Labs Within Last 24 Hours Reviewed      Recent Cultures (last 7 days):     Results from last 7 days   Lab Units 07/31/19  2016   BLOOD CULTURE  No Growth at 48 hrs    No Growth at 48 hrs  Last 24 Hours Medication List:     Current Facility-Administered Medications:  acetaminophen 650 mg Oral Q6H PRN Cordell Portillo PA-C    atorvastatin 10 mg Oral Daily With [facility]    cefazolin 2,000 mg Intravenous Q8H Selina Halsted [place], [place] Last Rate: 2,000 mg (08/03/19 0558)   famotidine 20 mg Oral Daily Cordell Portillo PA-C    heparin (porcine) 5,000 Units Subcutaneous [facility] Selina Halsted [place], [place]    HYDROmorphone 0 2 mg Intravenous Q4H PRN Cordell Portillo PA-C    methylPREDNISolone sodium succinate 40 mg Intravenous Q12H [facility] Cordell Portillo PA-C    metoprolol tartrate 25 mg Oral HS [facility]    mycophenolate 360 mg Oral BID [facility] Carrion PA-C    ondansetron 4 mg Intravenous Q6H PRN Cordell Portillo PA-C    oxyCODONE 10 mg Oral Q4H PRN Cordell Portillo PA-C    oxyCODONE 5 mg Oral Q4H PRN Cordell Portillo PA-C    sodium chloride 100 mL/hr Intravenous Continuous Cordell Portillo PA-C Last Rate: 100 mL/hr (08/03/19 0557)   sulfamethoxazole-trimethoprim 1 tablet Oral Daily Selina Halsted [place], PA-C    tacrolimus 1 mg Oral BID Cordell Portillo PA-C    tamsulosin 0 4 mg Oral Daily With [facility]         Today, Patient Was Seen By: Gabriella Blanco MD    ** Please Note: Dragon 360 Dictation voice to text software may have been used in the creation of this document   **

## 2019-08-03 NOTE — PROGRESS NOTES
Progress Note - General Surgery   Jazmin Reyes 77 y o  male MRN: [id number]  Unit/Bed#: -01 Encounter: [id number]    Assessment/Plan  Jazmin Reyes is a 77 y o  male     1  POD#1 s/p open cholecystectomy with IOC  Continue clear liquid diet as tolerated  Patient did have episode of emesis yesterday afternoon after PO intake but thinks it was secondary to anesthesia  Has not had any nausea or vomiting since  Informed patient to be judicious with intake and that if he develops nausea and vomiting after breakfast this morning, diet may need to be decreased to NPO    Ambulation/OOB  Pain control PRN  Trend labs  Cr at baseline this AM  DVT prophylaxis  Nephrology following   Monitor urine output  Patient noting some hesitancy after surgery but has been otherwise able to void without sensation of rentention     Subjective/Objective     Subjective: Had episode of nausea with emesis yesterday after dinner of CLD  Believes to be due to waking up from anesthesia  No flatus at this time  No current nausea or vomiting  Has not ambulated the hallways yet  No fevers or chills    Objective:     Blood pressure 135/80, pulse 68, temperature (!) 97 3 °F (36 3 °C), resp  rate 18, height 5' 4" (1 626 m), weight 68 kg (149 lb 14 6 oz), SpO2 (!) 88 %  ,Body mass index is 25 73 kg/m²        Intake/Output Summary (Last 24 hours) at 8/3/2019 0946  Last data filed at 8/3/2019 0557  Gross per 24 hour   Intake 3000 ml   Output 100 ml   Net 2900 ml       Invasive Devices     Peripheral Intravenous Line            Peripheral IV 04/03/17 Right Antecubital 851 days    Peripheral IV 07/31/19 Right Antecubital 2 days    Peripheral IV 08/02/19 Right Wrist less than 1 day          Drain            NG/OG/Enteral Tube Orogastric 18 Fr Center mouth less than 1 day                Physical Exam: /80   Pulse 68   Temp (!) 97 3 °F (36 3 °C)   Resp 18   Ht 5' 4" (1 626 m)   Wt 68 kg (149 lb 14 6 oz)   SpO2 (!) 88%   BMI 25 73 kg/m² General appearance: alert and oriented, in no acute distress  Lungs: clear to auscultation bilaterally  Heart: regular rate and rhythm, S1, S2 normal, no murmur, click, rub or gallop  Abdomen: soft, rounded, incisional tenderness, incision covered with dressing, no erythema or edema, no evidence of bleeding into the dressing    Lab, Imaging and other studies:I have personally reviewed pertinent lab results       VTE Pharmacologic Prophylaxis: Heparin  VTE Mechanical Prophylaxis: sequential compression device    Recent Results (from the past 36 hour(s))   CBC and differential    Collection Time: 08/02/19  5:37 AM   Result Value Ref Range    WBC 9 62 4 31 - 10 16 Thousand/uL    RBC 5 03 3 88 - 5 62 Million/uL    Hemoglobin 14 5 12 0 - 17 0 g/dL    Hematocrit 46 2 36 5 - 49 3 %    MCV 92 82 - 98 fL    MCH 28 8 26 8 - 34 3 pg    MCHC 31 4 31 4 - 37 4 g/dL    RDW 13 4 11 6 - 15 1 %    MPV 9 1 8 9 - 12 7 fL    Platelets 011 313 - 623 Thousands/uL    nRBC 0 /100 WBCs    Neutrophils Relative 93 (H) 43 - 75 %    Immat GRANS % 1 0 - 2 %    Lymphocytes Relative 4 (L) 14 - 44 %    Monocytes Relative 2 (L) 4 - 12 %    Eosinophils Relative 0 0 - 6 %    Basophils Relative 0 0 - 1 %    Neutrophils Absolute 8 97 (H) 1 85 - 7 62 Thousands/µL    Immature Grans Absolute 0 07 0 00 - 0 20 Thousand/uL    Lymphocytes Absolute 0 40 (L) 0 60 - 4 47 Thousands/µL    Monocytes Absolute 0 18 0 17 - 1 22 Thousand/µL    Eosinophils Absolute 0 00 0 00 - 0 61 Thousand/µL    Basophils Absolute 0 00 0 00 - 0 10 Thousands/µL   Comprehensive metabolic panel    Collection Time: 08/02/19  5:37 AM   Result Value Ref Range    Sodium 137 136 - 145 mmol/L    Potassium 4 5 3 5 - 5 3 mmol/L    Chloride 105 100 - 108 mmol/L    CO2 21 21 - 32 mmol/L    ANION GAP 11 4 - 13 mmol/L    BUN 19 5 - 25 mg/dL    Creatinine 1 15 0 60 - 1 30 mg/dL    Glucose 132 65 - 140 mg/dL    Calcium 8 5 8 3 - 10 1 mg/dL    AST 61 (H) 5 - 45 U/L    ALT 98 (H) 12 - 78 U/L    Alkaline Phosphatase 68 46 - 116 U/L    Total Protein 6 9 6 4 - 8 2 g/dL    Albumin 2 5 (L) 3 5 - 5 0 g/dL    Total Bilirubin 1 30 (H) 0 20 - 1 00 mg/dL    eGFR 65 ml/min/1 73sq m   Lipase    Collection Time: 08/02/19  5:37 AM   Result Value Ref Range    Lipase 132 73 - 393 u/L   CBC    Collection Time: 08/03/19  5:51 AM   Result Value Ref Range    WBC 9 47 4 31 - 10 16 Thousand/uL    RBC 5 11 3 88 - 5 62 Million/uL    Hemoglobin 14 7 12 0 - 17 0 g/dL    Hematocrit 47 7 36 5 - 49 3 %    MCV 93 82 - 98 fL    MCH 28 8 26 8 - 34 3 pg    MCHC 30 8 (L) 31 4 - 37 4 g/dL    RDW 13 2 11 6 - 15 1 %    Platelets 336 609 - 687 Thousands/uL    MPV 8 9 8 9 - 12 7 fL   Comprehensive metabolic panel    Collection Time: 08/03/19  5:51 AM   Result Value Ref Range    Sodium 135 (L) 136 - 145 mmol/L    Potassium 4 9 3 5 - 5 3 mmol/L    Chloride 105 100 - 108 mmol/L    CO2 24 21 - 32 mmol/L    ANION GAP 6 4 - 13 mmol/L    BUN 31 (H) 5 - 25 mg/dL    Creatinine 1 23 0 60 - 1 30 mg/dL    Glucose 153 (H) 65 - 140 mg/dL    Calcium 8 5 8 3 - 10 1 mg/dL    AST 63 (H) 5 - 45 U/L    ALT 69 12 - 78 U/L    Alkaline Phosphatase 59 46 - 116 U/L    Total Protein 7 2 6 4 - 8 2 g/dL    Albumin 2 5 (L) 3 5 - 5 0 g/dL    Total Bilirubin 0 60 0 20 - 1 00 mg/dL    eGFR 60 ml/min/1 73sq m

## 2019-08-03 NOTE — PLAN OF CARE
Problem: Nutrition/Hydration-ADULT  Goal: Nutrient/Hydration intake appropriate for improving, restoring or maintaining nutritional needs  Description  Monitor and assess patient's nutrition/hydration status for malnutrition (ex- brittle hair, bruises, dry skin, pale skin and conjunctiva, muscle wasting, smooth red tongue, and disorientation)  Collaborate with interdisciplinary team and initiate plan and interventions as ordered  Monitor patient's weight and dietary intake as ordered or per policy  Utilize nutrition screening tool and intervene per policy  Determine patient's food preferences and provide high-protein, high-caloric foods as appropriate       INTERVENTIONS:  - Monitor oral intake, urinary output, labs, and treatment plans  - Assess nutrition and hydration status and recommend course of action  - Evaluate amount of meals eaten  - Assist patient with eating if necessary   - Allow adequate time for meals  - Recommend/ encourage appropriate diets, oral nutritional supplements, and vitamin/mineral supplements  - Order, calculate, and assess calorie counts as needed  - Recommend, monitor, and adjust tube feedings and TPN/PPN based on assessed needs  - Assess need for intravenous fluids  - Provide specific nutrition/hydration education as appropriate  - Include patient/family/caregiver in decisions related to nutrition  8/3/2019 0505 by Nikolay Traylor RN  Outcome: Progressing  8/3/2019 0502 by Nikolay Traylor RN  Outcome: Progressing     Problem: PAIN - ADULT  Goal: Verbalizes/displays adequate comfort level or baseline comfort level  Description  Interventions:  - Encourage patient to monitor pain and request assistance  - Assess pain using appropriate pain scale  - Administer analgesics based on type and severity of pain and evaluate response  - Implement non-pharmacological measures as appropriate and evaluate response  - Consider cultural and social influences on pain and pain management  - Notify physician/advanced practitioner if interventions unsuccessful or patient reports new pain  8/3/2019 0505 by Marilin Vallejo RN  Outcome: Progressing  8/3/2019 0502 by Marilin Vallejo RN  Outcome: Progressing     Problem: INFECTION - ADULT  Goal: Absence or prevention of progression during hospitalization  Description  INTERVENTIONS:  - Assess and monitor for signs and symptoms of infection  - Monitor lab/diagnostic results  - Monitor all insertion sites, i e  indwelling lines, tubes, and drains  - Monitor endotracheal (as able) and nasal secretions for changes in amount and color  - [place] appropriate cooling/warming therapies per order  - Administer medications as ordered  - Instruct and encourage patient and family to use good hand hygiene technique  - Identify and instruct in appropriate isolation precautions for identified infection/condition  8/3/2019 0505 by Marilin Vallejo RN  Outcome: Progressing  8/3/2019 0502 by Marilin Vallejo RN  Outcome: Progressing  Goal: Absence of fever/infection during neutropenic period  Description  INTERVENTIONS:  - Monitor WBC  - Implement neutropenic guidelines  8/3/2019 0505 by Marilin Vallejo RN  Outcome: Progressing  8/3/2019 0502 by Marilin Vallejo RN  Outcome: Progressing     Problem: SAFETY ADULT  Goal: Patient will remain free of falls  Description  INTERVENTIONS:  - Assess patient frequently for physical needs  -  Identify cognitive and physical deficits and behaviors that affect risk of falls    -  [place] fall precautions as indicated by assessment   - Educate patient/family on patient safety including physical limitations  - Instruct patient to call for assistance with activity based on assessment  - Modify environment to reduce risk of injury  - Consider OT/PT consult to assist with strengthening/mobility  8/3/2019 0505 by Marilin Vallejo RN  Outcome: Progressing  8/3/2019 0502 by Peggy Trotter RN  Outcome: Progressing  Goal: Maintain or return to baseline ADL function  Description  INTERVENTIONS:  -  Assess patient's ability to carry out ADLs; assess patient's baseline for ADL function and identify physical deficits which impact ability to perform ADLs (bathing, care of mouth/teeth, toileting, grooming, dressing, etc )  - Assess/evaluate cause of self-care deficits   - Assess range of motion  - Assess patient's mobility; develop plan if impaired  - Assess patient's need for assistive devices and provide as appropriate  - Encourage maximum independence but intervene and supervise when necessary  ¯ Involve family in performance of ADLs  ¯ Assess for home care needs following discharge   ¯ Request OT consult to assist with ADL evaluation and planning for discharge  ¯ Provide patient education as appropriate  8/3/2019 0505 by Peggy Trotter RN  Outcome: Progressing  8/3/2019 0502 by Peggy Trotter RN  Outcome: Progressing  Goal: Maintain or return mobility status to optimal level  Description  INTERVENTIONS:  - Assess patient's baseline mobility status (ambulation, transfers, stairs, etc )    - Identify cognitive and physical deficits and behaviors that affect mobility  - Identify mobility aids required to assist with transfers and/or ambulation (gait belt, sit-to-stand, lift, walker, cane, etc )  - [place] fall precautions as indicated by assessment  - Record patient progress and toleration of activity level on Mobility SBAR; progress patient to next Phase/Stage  - Instruct patient to call for assistance with activity based on assessment  - Request Rehabilitation consult to assist with strengthening/weightbearing, etc   8/3/2019 0505 by Peggy Trotter RN  Outcome: Progressing  8/3/2019 0502 by Peggy Trotter RN  Outcome: Progressing     Problem: GENITOURINARY - ADULT  Goal: Absence of urinary retention  Description  INTERVENTIONS:  - Assess patients ability to void and empty bladder  - Monitor I/O  - Bladder scan as needed  - Discuss with physician/AP medications to alleviate retention as needed  - Discuss catheterization for long term situations as appropriate  8/3/2019 0505 by Sienna Bush, VALENCIA  Outcome: Progressing  8/3/2019 0502 by Sienna Bush, RN  Outcome: Progressing

## 2019-08-03 NOTE — PROGRESS NOTES
Patient complaint of insufficient urinary output after surgery  Patient was assessed and bladder scanned for amount of urine in bladder  SLIM  paged  Awaiting call for further orders

## 2019-08-03 NOTE — PLAN OF CARE
Problem: Nutrition/Hydration-ADULT  Goal: Nutrient/Hydration intake appropriate for improving, restoring or maintaining nutritional needs  Description  Monitor and assess patient's nutrition/hydration status for malnutrition (ex- brittle hair, bruises, dry skin, pale skin and conjunctiva, muscle wasting, smooth red tongue, and disorientation)  Collaborate with interdisciplinary team and initiate plan and interventions as ordered  Monitor patient's weight and dietary intake as ordered or per policy  Utilize nutrition screening tool and intervene per policy  Determine patient's food preferences and provide high-protein, high-caloric foods as appropriate       INTERVENTIONS:  - Monitor oral intake, urinary output, labs, and treatment plans  - Assess nutrition and hydration status and recommend course of action  - Evaluate amount of meals eaten  - Assist patient with eating if necessary   - Allow adequate time for meals  - Recommend/ encourage appropriate diets, oral nutritional supplements, and vitamin/mineral supplements  - Order, calculate, and assess calorie counts as needed  - Recommend, monitor, and adjust tube feedings and TPN/PPN based on assessed needs  - Assess need for intravenous fluids  - Provide specific nutrition/hydration education as appropriate  - Include patient/family/caregiver in decisions related to nutrition  Outcome: Progressing     Problem: PAIN - ADULT  Goal: Verbalizes/displays adequate comfort level or baseline comfort level  Description  Interventions:  - Encourage patient to monitor pain and request assistance  - Assess pain using appropriate pain scale  - Administer analgesics based on type and severity of pain and evaluate response  - Implement non-pharmacological measures as appropriate and evaluate response  - Consider cultural and social influences on pain and pain management  - Notify physician/advanced practitioner if interventions unsuccessful or patient reports new pain  Outcome: Progressing     Problem: INFECTION - ADULT  Goal: Absence or prevention of progression during hospitalization  Description  INTERVENTIONS:  - Assess and monitor for signs and symptoms of infection  - Monitor lab/diagnostic results  - Monitor all insertion sites, i e  indwelling lines, tubes, and drains  - Monitor endotracheal (as able) and nasal secretions for changes in amount and color  - [place] appropriate cooling/warming therapies per order  - Administer medications as ordered  - Instruct and encourage patient and family to use good hand hygiene technique  - Identify and instruct in appropriate isolation precautions for identified infection/condition  Outcome: Progressing  Goal: Absence of fever/infection during neutropenic period  Description  INTERVENTIONS:  - Monitor WBC  - Implement neutropenic guidelines  Outcome: Progressing     Problem: SAFETY ADULT  Goal: Patient will remain free of falls  Description  INTERVENTIONS:  - Assess patient frequently for physical needs  -  Identify cognitive and physical deficits and behaviors that affect risk of falls    -  [place] fall precautions as indicated by assessment   - Educate patient/family on patient safety including physical limitations  - Instruct patient to call for assistance with activity based on assessment  - Modify environment to reduce risk of injury  - Consider OT/PT consult to assist with strengthening/mobility  Outcome: Progressing  Goal: Maintain or return to baseline ADL function  Description  INTERVENTIONS:  -  Assess patient's ability to carry out ADLs; assess patient's baseline for ADL function and identify physical deficits which impact ability to perform ADLs (bathing, care of mouth/teeth, toileting, grooming, dressing, etc )  - Assess/evaluate cause of self-care deficits   - Assess range of motion  - Assess patient's mobility; develop plan if impaired  - Assess patient's need for assistive devices and provide as appropriate  - Encourage maximum independence but intervene and supervise when necessary  ¯ Involve family in performance of ADLs  ¯ Assess for home care needs following discharge   ¯ Request OT consult to assist with ADL evaluation and planning for discharge  ¯ Provide patient education as appropriate  Outcome: Progressing  Goal: Maintain or return mobility status to optimal level  Description  INTERVENTIONS:  - Assess patient's baseline mobility status (ambulation, transfers, stairs, etc )    - Identify cognitive and physical deficits and behaviors that affect mobility  - Identify mobility aids required to assist with transfers and/or ambulation (gait belt, sit-to-stand, lift, walker, cane, etc )  - [place] fall precautions as indicated by assessment  - Record patient progress and toleration of activity level on Mobility SBAR; progress patient to next Phase/Stage  - Instruct patient to call for assistance with activity based on assessment  - Request Rehabilitation consult to assist with strengthening/weightbearing, etc   Outcome: Progressing     Problem: GENITOURINARY - ADULT  Goal: Absence of urinary retention  Description  INTERVENTIONS:  - Assess patients ability to void and empty bladder  - Monitor I/O  - Bladder scan as needed  - Discuss with physician/AP medications to alleviate retention as needed  - Discuss catheterization for long term situations as appropriate  Outcome: Progressing

## 2019-08-04 ENCOUNTER — APPOINTMENT (INPATIENT)
Dept: CT IMAGING | Facility: HOSPITAL | Age: 69
DRG: 414 | End: 2019-08-04
Payer: MEDICARE

## 2019-08-04 ENCOUNTER — APPOINTMENT (INPATIENT)
Dept: RADIOLOGY | Facility: HOSPITAL | Age: 69
DRG: 414 | End: 2019-08-04
Payer: MEDICARE

## 2019-08-04 LAB
ANION GAP SERPL CALCULATED.3IONS-SCNC: 2 MMOL/L (ref 4–13)
BASE EXCESS BLDA CALC-SCNC: 0 MMOL/L (ref -2–3)
BUN SERPL-MCNC: 31 MG/DL (ref 5–25)
CA-I BLD-SCNC: 1.24 MMOL/L (ref 1.12–1.32)
CALCIUM SERPL-MCNC: 8.6 MG/DL (ref 8.3–10.1)
CHLORIDE SERPL-SCNC: 106 MMOL/L (ref 100–108)
CO2 SERPL-SCNC: 25 MMOL/L (ref 21–32)
CREAT SERPL-MCNC: 1.23 MG/DL (ref 0.6–1.3)
ERYTHROCYTE [DISTWIDTH] IN BLOOD BY AUTOMATED COUNT: 13.1 % (ref 11.6–15.1)
FIO2 GAS DIL.REBREATH: 40 L
GFR SERPL CREATININE-BSD FRML MDRD: 60 ML/MIN/1.73SQ M
GLUCOSE SERPL-MCNC: 112 MG/DL (ref 65–140)
GLUCOSE SERPL-MCNC: 114 MG/DL (ref 65–140)
HCO3 BLDA-SCNC: 23.3 MMOL/L (ref 22–28)
HCT VFR BLD AUTO: 44 % (ref 36.5–49.3)
HCT VFR BLD CALC: 47 % (ref 36.5–49.3)
HGB BLD-MCNC: 13.9 G/DL (ref 12–17)
HGB BLDA-MCNC: 16 G/DL (ref 12–17)
MCH RBC QN AUTO: 29 PG (ref 26.8–34.3)
MCHC RBC AUTO-ENTMCNC: 31.6 G/DL (ref 31.4–37.4)
MCV RBC AUTO: 92 FL (ref 82–98)
PCO2 BLD: 24 MMOL/L (ref 21–32)
PCO2 BLD: 34.7 MM HG (ref 36–44)
PH BLD: 7.43 [PH] (ref 7.35–7.45)
PLATELET # BLD AUTO: 237 THOUSANDS/UL (ref 149–390)
PMV BLD AUTO: 8.6 FL (ref 8.9–12.7)
PO2 BLD: 70 MM HG (ref 75–129)
POTASSIUM BLD-SCNC: 4.5 MMOL/L (ref 3.5–5.3)
POTASSIUM SERPL-SCNC: 5 MMOL/L (ref 3.5–5.3)
RBC # BLD AUTO: 4.8 MILLION/UL (ref 3.88–5.62)
SAO2 % BLD FROM PO2: 94 % (ref 95–98)
SODIUM BLD-SCNC: 135 MMOL/L (ref 136–145)
SODIUM SERPL-SCNC: 133 MMOL/L (ref 136–145)
SPECIMEN SOURCE: ABNORMAL
TACROLIMUS BLD-MCNC: 19.1 NG/ML (ref 2–20)
WBC # BLD AUTO: 8.96 THOUSAND/UL (ref 4.31–10.16)

## 2019-08-04 PROCEDURE — 99232 SBSQ HOSP IP/OBS MODERATE 35: CPT | Performed by: INTERNAL MEDICINE

## 2019-08-04 PROCEDURE — 84295 ASSAY OF SERUM SODIUM: CPT

## 2019-08-04 PROCEDURE — 84132 ASSAY OF SERUM POTASSIUM: CPT

## 2019-08-04 PROCEDURE — 82947 ASSAY GLUCOSE BLOOD QUANT: CPT

## 2019-08-04 PROCEDURE — 82803 BLOOD GASES ANY COMBINATION: CPT

## 2019-08-04 PROCEDURE — 94668 MNPJ CHEST WALL SBSQ: CPT

## 2019-08-04 PROCEDURE — 71045 X-RAY EXAM CHEST 1 VIEW: CPT

## 2019-08-04 PROCEDURE — 80048 BASIC METABOLIC PNL TOTAL CA: CPT | Performed by: PHYSICIAN ASSISTANT

## 2019-08-04 PROCEDURE — 94664 DEMO&/EVAL PT USE INHALER: CPT

## 2019-08-04 PROCEDURE — 71250 CT THORAX DX C-: CPT

## 2019-08-04 PROCEDURE — 85027 COMPLETE CBC AUTOMATED: CPT | Performed by: PHYSICIAN ASSISTANT

## 2019-08-04 PROCEDURE — 82330 ASSAY OF CALCIUM: CPT

## 2019-08-04 PROCEDURE — 85014 HEMATOCRIT: CPT

## 2019-08-04 PROCEDURE — 80197 ASSAY OF TACROLIMUS: CPT | Performed by: INTERNAL MEDICINE

## 2019-08-04 PROCEDURE — 99024 POSTOP FOLLOW-UP VISIT: CPT | Performed by: SURGERY

## 2019-08-04 RX ORDER — METRONIDAZOLE 500 MG/1
500 TABLET ORAL EVERY 8 HOURS SCHEDULED
Status: DISCONTINUED | OUTPATIENT
Start: 2019-08-04 | End: 2019-08-06

## 2019-08-04 RX ORDER — FUROSEMIDE 10 MG/ML
20 INJECTION INTRAMUSCULAR; INTRAVENOUS ONCE
Status: COMPLETED | OUTPATIENT
Start: 2019-08-04 | End: 2019-08-04

## 2019-08-04 RX ADMIN — TAMSULOSIN HYDROCHLORIDE 0.4 MG: 0.4 CAPSULE ORAL at 19:41

## 2019-08-04 RX ADMIN — SULFAMETHOXAZOLE AND TRIMETHOPRIM 1 TABLET: 400; 80 TABLET ORAL at 08:52

## 2019-08-04 RX ADMIN — HEPARIN SODIUM 5000 UNITS: 5000 INJECTION INTRAVENOUS; SUBCUTANEOUS at 14:47

## 2019-08-04 RX ADMIN — ATORVASTATIN CALCIUM 10 MG: 10 TABLET, FILM COATED ORAL at 19:43

## 2019-08-04 RX ADMIN — HEPARIN SODIUM 5000 UNITS: 5000 INJECTION INTRAVENOUS; SUBCUTANEOUS at 05:26

## 2019-08-04 RX ADMIN — ACETAMINOPHEN 650 MG: 325 TABLET, FILM COATED ORAL at 00:26

## 2019-08-04 RX ADMIN — TACROLIMUS 1 MG: 1 CAPSULE ORAL at 08:51

## 2019-08-04 RX ADMIN — TACROLIMUS 1 MG: 1 CAPSULE ORAL at 19:41

## 2019-08-04 RX ADMIN — FAMOTIDINE 20 MG: 20 TABLET ORAL at 08:51

## 2019-08-04 RX ADMIN — METRONIDAZOLE 500 MG: 500 INJECTION, SOLUTION INTRAVENOUS at 19:41

## 2019-08-04 RX ADMIN — CEFAZOLIN SODIUM 2000 MG: 2 SOLUTION INTRAVENOUS at 14:47

## 2019-08-04 RX ADMIN — PREDNISONE 5 MG: 5 TABLET ORAL at 08:51

## 2019-08-04 RX ADMIN — MYCOPHENOLIC ACID 360 MG: 180 TABLET, DELAYED RELEASE ORAL at 08:52

## 2019-08-04 RX ADMIN — CEFAZOLIN SODIUM 2000 MG: 2 SOLUTION INTRAVENOUS at 05:31

## 2019-08-04 RX ADMIN — MYCOPHENOLIC ACID 360 MG: 180 TABLET, DELAYED RELEASE ORAL at 19:43

## 2019-08-04 RX ADMIN — FUROSEMIDE 20 MG: 10 INJECTION, SOLUTION INTRAVENOUS at 20:31

## 2019-08-04 NOTE — PROGRESS NOTES
[facility] Progress Note  Patient: Nils Pleitez 77 y o  male   MRN: [id number]  PCP: Ambika Stokes MD  Unit/Bed#: -[facility] Encounter: [id number]  Date Of Visit: 08/04/19          * Acute calculous cholecystitis/status post cholecystectomy  Assessment & Plan  Postop day 2  Making slow progress  Still no flatus, however, has been burping  Treatment as per primary      Urinary retention-postop  Assessment & Plan  He is emptying his bladder better than yesterday  Nephrology following  ? Need for Manrique catheter  Benign localized prostatic hyperplasia with lower urinary tract symptoms (LUTS)  Assessment & Plan  · On Flomax  HLD (hyperlipidemia)  Assessment & Plan  · On statin  H/O kidney transplant  Assessment & Plan  Patient with history of renal transplant x2  Last renal transplant in 2011  Nephrology following  Continue with current treatment  Creatinine stable    CAD (coronary artery disease)  Assessment & Plan  ·  medical management  Cardiology on board as well  Continue with medical management  Patient has been evaluated by Cardiology service for clearance      Present on Admission:   Acute calculous cholecystitis/status post cholecystectomy   CAD (coronary artery disease)   HLD (hyperlipidemia)   Benign localized prostatic hyperplasia with lower urinary tract symptoms (LUTS)            VTE Pharmacologic Prophylaxis:   Pharmacologic: Heparin  Mechanical VTE Prophylaxis in Place: Yes    Patient Centered Rounds: I have performed bedside rounds with nursing staff today  Discussions with Specialists or Other Care Team Provider:  Yes    Education and Discussions with Family / Patient:  Yes    Time Spent for Care: 30+ minutes  More than 50% of total time spent on counseling and coordination of care as described above      Current Length of Stay: 4 day(s)    Current Patient Status: Inpatient   Certification Statement: The patient will continue to require additional inpatient hospital stay due to [facility] as per primary team    Discharge Plan:  As per primary team    Code Status: Level 1 - Full Code      Subjective:   Patient feels better in general   He has less abdominal pain  He is not passing any flatus yet, however, continues to have burps  Not much nauseous  Also did not vomit today  Still having trouble emptying his bladder completely although less issue than yesterday  No fever or chills  No chest pain or shortness of breath    Objective:     Vitals:   Temp (24hrs), Av 8 °F (36 6 °C), Min:97 7 °F (36 5 °C), Max:98 1 °F (36 7 °C)    Temp:  [97 7 °F (36 5 °C)-98 1 °F (36 7 °C)] 97 7 °F (36 5 °C)  HR:  [61-77] 66  Resp:  [17] 17  BP: (118-138)/(67-78) 129/69  SpO2:  [86 %-95 %] 93 %  Body mass index is 25 73 kg/m²  Input and Output Summary (last 24 hours): Intake/Output Summary (Last 24 hours) at 2019 1449  Last data filed at 2019 1409  Gross per 24 hour   Intake 2460 ml   Output 1875 ml   Net 585 ml           Physical Exam:     Vital signs reviewed as above  Patient up in the chair  Not in any respiratory distress  Abdomen is distended  I did not hear any significant bowel sounds    S1 and S2 audible  Awake and alert  Oriented x3  Oropharynx are hydrated  No cyanosis or clubbing  Mood and affect are pleasant      Additional Data:     Labs:    Results from last 7 days   Lab Units 19  0520  19  0537   WBC Thousand/uL 8 96   < > 9 62   HEMOGLOBIN g/dL 13 9   < > 14 5   HEMATOCRIT % 44 0   < > 46 2   PLATELETS Thousands/uL 237   < > 201   NEUTROS PCT %  --   --  93*   LYMPHS PCT %  --   --  4*   MONOS PCT %  --   --  2*   EOS PCT %  --   --  0    < > = values in this interval not displayed       Results from last 7 days   Lab Units 19  0520 19  0551   POTASSIUM mmol/L 5 0 4 9   CHLORIDE mmol/L 106 105   CO2 mmol/L 25 24   BUN mg/dL 31* 31*   CREATININE mg/dL 1 23 1 23   CALCIUM mg/dL 8 6 8 5   ALK PHOS U/L  --  59   ALT U/L --  69   AST U/L  --  63*           * I Have Reviewed All Lab Data Listed Above  * Additional Pertinent Lab Tests Reviewed: All Labs Within Last 24 Hours Reviewed      Recent Cultures (last 7 days):     Results from last 7 days   Lab Units 07/31/19 2016   BLOOD CULTURE  No Growth at 72 hrs  No Growth at 72 hrs  Last 24 Hours Medication List:     Current Facility-Administered Medications:  acetaminophen 650 mg Oral Q6H PRN Cathlean SleeGINNY zuniga    atorvastatin 10 mg Oral Daily With The GINNY Boucher    cefazolin 2,000 mg Intravenous [facility]H [facility] [place], [place] Last Rate: 2,000 mg (08/04/19 1447)   famotidine 20 mg Oral Daily Serena Carrion PA-C    heparin (porcine) 5,000 Units Subcutaneous [facility] [place], [place]    HYDROmorphone 0 2 mg Intravenous Q4H PRN Cathlean SleekGINNY    metoprolol tartrate 25 mg Oral HS Serena Carrion PA-C    mycophenolate 360 mg Oral BID Serena Carrion PA-C    ondansetron 4 mg Intravenous Q6H PRN Cathlean Sleek, GINNY    oxyCODONE 10 mg Oral Q4H PRN Cathlean Sleek, GINNY    oxyCODONE 5 mg Oral Q4H PRN Cathlean SleekGINNY    predniSONE 5 mg Oral Daily Rustam Engle MD    sulfamethoxazole-trimethoprim 1 tablet Oral Daily Serena Carrion PA-C    tacrolimus 1 mg Oral BID Serena Carrion PA-C    tamsulosin 0 4 mg Oral Daily With The GINNY Boucher         Today, Patient Was Seen By: Lea Mcclellan MD    ** Please Note: Dragon 360 Dictation voice to text software may have been used in the creation of this document   **

## 2019-08-04 NOTE — PROGRESS NOTES
NEPHROLOGY PROGRESS NOTE    Patient: Boy Garcia               Sex: male          DOA: 7/31/2019  7:51 PM   YOB: 1950        Age:  77 y o         LOS:  LOS: 4 days   8/4/2019    REASON FOR THE CONSULTATION:      History of renal transplantation    SUBJECTIVE     Patient is seen and examined next to the bedside  Postop day 2  Laparoscopic cholecystectomy  Doing well however underwent straight cath twice yesterday  Still noted to have residual urine output up to 650 this morning and will be undergoing 3rd straight cath      CURRENT MEDICATIONS       Current Facility-Administered Medications:     acetaminophen (TYLENOL) tablet 650 mg, 650 mg, Oral, Q6H PRN, Bryant Rin, PA-C, 650 mg at 08/04/19 0026    atorvastatin (LIPITOR) tablet 10 mg, 10 mg, Oral, Daily With Ro Conine, PA-C, 10 mg at 08/03/19 1759    ceFAZolin (ANCEF) IVPB (premix) 2,000 mg, 2,000 mg, Intravenous, Q8H, Bryant Rin, PA-C, Last Rate: 100 mL/hr at 08/04/19 0531, 2,000 mg at 08/04/19 0531    famotidine (PEPCID) tablet 20 mg, 20 mg, Oral, Daily, Bryant Rin, PA-C, 20 mg at 08/04/19 0851    heparin (porcine) subcutaneous injection 5,000 Units, 5,000 Units, Subcutaneous, Q8H [facility], 5,000 Units at 08/04/19 0526 **AND** [CANCELED] Platelet count, , , Once, Benjie Espinosa MD    HYDROmorphone (DILAUDID) injection 0 2 mg, 0 2 mg, Intravenous, Q4H PRN, Bryant Rin, PA-C    metoprolol tartrate (LOPRESSOR) tablet 25 mg, 25 mg, Oral, HS, Bryant Rin, PA-C, 25 mg at 08/03/19 1774    mycophenolic acid (MYFORTIC) EC tablet 360 mg, 360 mg, Oral, BID, Bryant Rin, PA-C, 360 mg at 08/04/19 0996    ondansetron [facility]) injection 4 mg, 4 mg, Intravenous, Q6H PRN, Bryant Rin, PA-C, 4 mg at 08/02/19 2358    oxyCODONE (ROXICODONE) IR tablet 10 mg, 10 mg, Oral, Q4H PRN, Bryant Gar PA-C, 10 mg at 08/03/19 0003    oxyCODONE (ROXICODONE) IR tablet 5 mg, 5 mg, Oral, Q4H PRN, Bryant Gar PA-C   predniSONE tablet 5 mg, 5 mg, Oral, Daily, Yohana Holliday MD, 5 mg at 08/04/19 0851    sodium chloride 0 9 % infusion, 100 mL/hr, Intravenous, Continuous, Anitra Land PA-C, Last Rate: 100 mL/hr at 08/03/19 2345, 100 mL/hr at 08/03/19 2345    sulfamethoxazole-trimethoprim (BACTRIM) 400-80 mg per tablet 1 tablet, 1 tablet, Oral, Daily, Anitra Land PA-C, 1 tablet at 08/04/19 6717    tacrolimus (PROGRAF) capsule 1 mg, 1 mg, Oral, BID, Sangita Jose Manuel [place] chow, 1 mg at 08/04/19 3573    tamsulosin (FLOMAX) capsule 0 4 mg, 0 4 mg, Oral, Daily With Eugenia Low PA-C, 0 4 mg at 08/03/19 1759    REVIEW OF SYSTEMS     Review of Systems   Constitutional: Negative  HENT: Negative  Eyes: Negative  Respiratory: Negative  Cardiovascular: Negative  Gastrointestinal: Positive for abdominal pain  Endocrine: Negative  Genitourinary: Negative  Musculoskeletal: Negative  Skin: Negative  Allergic/Immunologic: Negative  Neurological: Negative  Hematological: Negative  All other systems reviewed and are negative  OBJECTIVE     Current Weight: Weight - Scale: 68 kg (149 lb 14 6 oz)  Vitals:    08/04/19 0823   BP:    Pulse:    Resp:    Temp:    SpO2: 93%     Body mass index is 25 73 kg/m²  Intake/Output Summary (Last 24 hours) at 8/4/2019 1117  Last data filed at 8/4/2019 1106  Gross per 24 hour   Intake 2460 ml   Output 2125 ml   Net 335 ml       PHYSICAL EXAMINATION     Physical Exam   Constitutional: He is oriented to person, place, and time  He appears well-developed and well-nourished  HENT:   Head: Normocephalic and atraumatic  Eyes: Pupils are equal, round, and reactive to light  Neck: Neck supple  Cardiovascular: Normal rate, regular rhythm and normal heart sounds  Pulmonary/Chest: Effort normal    Abdominal: Soft  Bowel sounds are normal    Musculoskeletal: Normal range of motion  Neurological: He is alert and oriented to person, place, and time     Skin: Skin is warm  Psychiatric: He has a normal mood and affect  LAB RESULTS     Results from last 7 days   Lab Units 08/04/19  0520 08/03/19  0551 08/02/19  0537 08/01/19  0554 07/31/19  1542   WBC Thousand/uL 8 96 9 47 9 62 13 16* 16 21*   HEMOGLOBIN g/dL 13 9 14 7 14 5 14 7 16 8   HEMATOCRIT % 44 0 47 7 46 2 46 5 53 2*   PLATELETS Thousands/uL 237 240 201 204 252   POTASSIUM mmol/L 5 0 4 9 4 5 4 1 4 6   CHLORIDE mmol/L 106 105 105 107 103   CO2 mmol/L 25 24 21 21 26   BUN mg/dL 31* 31* 19 21 27*   CREATININE mg/dL 1 23 1 23 1 15 1 25 1 36*   EGFR ml/min/1 73sq m 60 60 65 59 53   CALCIUM mg/dL 8 6 8 5 8 5 8 5 9 5   MAGNESIUM mg/dL  --   --   --  1 9  --    PHOSPHORUS mg/dL  --   --   --  3 0  --            RADIOLOGY RESULTS      Results for orders placed during the hospital encounter of 12/06/16   XR chest portable    Narrative CHEST     INDICATION:  CHF    COMPARISON:  12/12/2016    VIEWS:   AP frontal;  1 image    FINDINGS:      Endotracheal tube, right internal jugular central venous catheter, and nasogastric tube have been removed  Heart shadow is enlarged but stable from prior exam     Pulmonary edema has been improved  Small bilateral pleural effusions unchanged  No pneumothorax  Visualized osseous structures appear within normal limits for the patient's age  Impression 1  Interval removal of lines and tubes  2   Improved pulmonary edema  3   Unchanged small bilateral pleural effusions  Workstation performed: [id number]       Results for orders placed during the hospital encounter of 07/31/19   XR chest pa & lateral    Narrative CHEST     INDICATION:   Pre-op  COMPARISON:  Chest x-ray 6/4/2019    EXAM PERFORMED/VIEWS:  XR CHEST PA & LATERAL      FINDINGS:  Surgical clips overlie the right perihilar region  There has been prior cardiac valve replacement  There is stable moderate cardiomegaly    Right hemidiaphragm is mildly elevated similar to the prior exam   Underlying lucency here is likely related to colonic position  New small right pleural effusion  Stable fullness of the pulmonary vasculature  Osseous structures appear within normal limits for patient age  Impression New small right pleural effusion  Stable fullness of the pulmonary vasculature may be related to chronic congestive changes  Workstation performed: [id number]         ASSESSMENT/PLAN     77years old male with past medical history of CKD status post living unrelated renal transplant in the year 2011 that was preceded by a 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, probable polycystic kidney disease, hypertension who presented to the hospital to undergo laparoscopic cholecystectomy  1  CKD status post living unrelated renal transplantation:  Last transplant performed in the year 2011 and had done reasonably well on triple immunosuppression of prednisone, Prograf and Myfortic   -renal function relatively normal with serum creatinine 1 1-1 2     2  Immunosuppression:  Remains on Prograf, Myfortic and prednisone   -Prograf level noted to be 19 and drawn at the wrong time   -will redraw Prograf trough level to be drawn 1 hour prior to next dose  3  Acute cholecystitis:  Postop day 2  Laparoscopic cholecystectomy  4  Hypertension:  Maintained on metoprolol 25 mg daily  Blood pressures are noted to be on target  5  Urinary retention:  Still with moderate retention postvoid  continue straight cath for now and hold off on Manrique catheter placement  May consult Urology in a m  If PVR continue to show more than 250 cc urine  -expecting bladder to regain dysfunction as the anesthetic effect goes away  -remains on Flomax for BPH  6  Hyponatremia:  Noted sodium level of 133  This is likely secondary to fluid administration with incomplete evacuation of urine              Donnell Cano MD  Nephrology  8/4/2019

## 2019-08-04 NOTE — PROGRESS NOTES
Patient voided 100 , bladder scan post void 589  Gave patient a chance to void on his own and voided another 250 cc  Confirmed with Dr Frankie Mcnamara marrero placement   Per Dr Frankie Mcnamara , "Don't put marrero in yet and give patient a chance for 1 more day "

## 2019-08-04 NOTE — PROGRESS NOTES
Fluids running at 50 CC/hr, order states 100 ml /hr  Dr Karolina Wong aware  Ordered to continue to run at 50/hr

## 2019-08-04 NOTE — ASSESSMENT & PLAN NOTE
Continued to have trouble urinating  Manrique catheter was placed    Continue to maintain catheter for urinary retention

## 2019-08-04 NOTE — PLAN OF CARE
Problem: Nutrition/Hydration-ADULT  Goal: Nutrient/Hydration intake appropriate for improving, restoring or maintaining nutritional needs  Description  Monitor and assess patient's nutrition/hydration status for malnutrition (ex- brittle hair, bruises, dry skin, pale skin and conjunctiva, muscle wasting, smooth red tongue, and disorientation)  Collaborate with interdisciplinary team and initiate plan and interventions as ordered  Monitor patient's weight and dietary intake as ordered or per policy  Utilize nutrition screening tool and intervene per policy  Determine patient's food preferences and provide high-protein, high-caloric foods as appropriate       INTERVENTIONS:  - Monitor oral intake, urinary output, labs, and treatment plans  - Assess nutrition and hydration status and recommend course of action  - Evaluate amount of meals eaten  - Assist patient with eating if necessary   - Allow adequate time for meals  - Recommend/ encourage appropriate diets, oral nutritional supplements, and vitamin/mineral supplements  - Order, calculate, and assess calorie counts as needed  - Recommend, monitor, and adjust tube feedings and TPN/PPN based on assessed needs  - Assess need for intravenous fluids  - Provide specific nutrition/hydration education as appropriate  - Include patient/family/caregiver in decisions related to nutrition  Outcome: Progressing     Problem: PAIN - ADULT  Goal: Verbalizes/displays adequate comfort level or baseline comfort level  Description  Interventions:  - Encourage patient to monitor pain and request assistance  - Assess pain using appropriate pain scale  - Administer analgesics based on type and severity of pain and evaluate response  - Implement non-pharmacological measures as appropriate and evaluate response  - Consider cultural and social influences on pain and pain management  - Notify physician/advanced practitioner if interventions unsuccessful or patient reports new pain  Outcome: Progressing     Problem: INFECTION - ADULT  Goal: Absence or prevention of progression during hospitalization  Description  INTERVENTIONS:  - Assess and monitor for signs and symptoms of infection  - Monitor lab/diagnostic results  - Monitor all insertion sites, i e  indwelling lines, tubes, and drains  - Monitor endotracheal (as able) and nasal secretions for changes in amount and color  - [place] appropriate cooling/warming therapies per order  - Administer medications as ordered  - Instruct and encourage patient and family to use good hand hygiene technique  - Identify and instruct in appropriate isolation precautions for identified infection/condition  Outcome: Progressing  Goal: Absence of fever/infection during neutropenic period  Description  INTERVENTIONS:  - Monitor WBC  - Implement neutropenic guidelines  Outcome: Progressing     Problem: SAFETY ADULT  Goal: Patient will remain free of falls  Description  INTERVENTIONS:  - Assess patient frequently for physical needs  -  Identify cognitive and physical deficits and behaviors that affect risk of falls    -  [place] fall precautions as indicated by assessment   - Educate patient/family on patient safety including physical limitations  - Instruct patient to call for assistance with activity based on assessment  - Modify environment to reduce risk of injury  - Consider OT/PT consult to assist with strengthening/mobility  Outcome: Progressing  Goal: Maintain or return to baseline ADL function  Description  INTERVENTIONS:  -  Assess patient's ability to carry out ADLs; assess patient's baseline for ADL function and identify physical deficits which impact ability to perform ADLs (bathing, care of mouth/teeth, toileting, grooming, dressing, etc )  - Assess/evaluate cause of self-care deficits   - Assess range of motion  - Assess patient's mobility; develop plan if impaired  - Assess patient's need for assistive devices and provide as appropriate  - Encourage maximum independence but intervene and supervise when necessary  ¯ Involve family in performance of ADLs  ¯ Assess for home care needs following discharge   ¯ Request OT consult to assist with ADL evaluation and planning for discharge  ¯ Provide patient education as appropriate  Outcome: Progressing  Goal: Maintain or return mobility status to optimal level  Description  INTERVENTIONS:  - Assess patient's baseline mobility status (ambulation, transfers, stairs, etc )    - Identify cognitive and physical deficits and behaviors that affect mobility  - Identify mobility aids required to assist with transfers and/or ambulation (gait belt, sit-to-stand, lift, walker, cane, etc )  - [place] fall precautions as indicated by assessment  - Record patient progress and toleration of activity level on Mobility SBAR; progress patient to next Phase/Stage  - Instruct patient to call for assistance with activity based on assessment  - Request Rehabilitation consult to assist with strengthening/weightbearing, etc   Outcome: Progressing     Problem: GENITOURINARY - ADULT  Goal: Absence of urinary retention  Description  INTERVENTIONS:  - Assess patients ability to void and empty bladder  - Monitor I/O  - Bladder scan as needed  - Discuss with physician/AP medications to alleviate retention as needed  - Discuss catheterization for long term situations as appropriate  Outcome: Progressing

## 2019-08-04 NOTE — PROGRESS NOTES
Patient refused straight cath, has been voiding on own  1750 CC day shift, will continue to monitor  Dr Israel Lee aware

## 2019-08-04 NOTE — PROGRESS NOTES
Progress Note - General Surgery   Larae Boas 77 y o  male MRN: [id number]  Unit/Bed#: -01 Encounter: [id number]    Assessment/Plan  Larae Boas is a 77 y o  male     1  POD#2 Status post open cholecystectomy    Continue clear liquid diet as tolerated  No bowel function at this time  No further episodes of nausea or vomiting  We will advance as tolerated following return of bowel function  Monitor urine output with postvoid residual  Patient did have to get straight cathed twice yesterday following bladder scan with postvoid residual  States this a m  was able to void without needing straight cath  If fails postvoid residual again, may need placement of Manrique with Urology consult for urinary retention  Ambulation/out of bed  Trend labs, serial abdominal exams  DVT prophylaxis  Nephrology following      Addendum - Patient tolerated CLD w/o nausea or vomiting, will advance to solid low-fat as tolerated    Subjective/Objective     Subjective:  No acute events overnight  Did have to get straight cathed twice yesterday  States he has been able to void this morning  Last bladder scan showed 230 cc of urine  Tolerating clear liquid diet without further nausea or vomiting  States he has been belching, no flatus or bowel movement yet  Has been able to ambulate the hallways  Objective:     Blood pressure 129/69, pulse 66, temperature 97 7 °F (36 5 °C), resp  rate 17, height 5' 4" (1 626 m), weight 68 kg (149 lb 14 6 oz), SpO2 93 %  ,Body mass index is 25 73 kg/m²        Intake/Output Summary (Last 24 hours) at 8/4/2019 1032  Last data filed at 8/4/2019 0900  Gross per 24 hour   Intake 2700 ml   Output 1775 ml   Net 925 ml       Invasive Devices     Peripheral Intravenous Line            Peripheral IV 04/03/17 Right Antecubital 852 days    Peripheral IV 07/31/19 Right Antecubital 3 days    Peripheral IV 08/02/19 Right Wrist 1 day          Drain            NG/OG/Enteral Tube Orogastric 18 Fr Center mouth 1 day                Physical Exam: /69   Pulse 66   Temp 97 7 °F (36 5 °C)   Resp 17   Ht 5' 4" (1 626 m)   Wt 68 kg (149 lb 14 6 oz)   SpO2 93%   BMI 25 73 kg/m²   General appearance: alert and oriented, in no acute distress  Lungs: clear to auscultation bilaterally  Heart: regular rate and rhythm, S1, S2 normal, no murmur, click, rub or gallop  Abdomen: Soft, rounded, some tympany on percussion, appropriate incisional tenderness to palpation, incision clean, dry, intact without surrounding erythema or edema    Lab, Imaging and other studies:I have personally reviewed pertinent lab results       VTE Pharmacologic Prophylaxis: Heparin  VTE Mechanical Prophylaxis: sequential compression device    Recent Results (from the past 36 hour(s))   CBC    Collection Time: 08/03/19  5:51 AM   Result Value Ref Range    WBC 9 47 4 31 - 10 16 Thousand/uL    RBC 5 11 3 88 - 5 62 Million/uL    Hemoglobin 14 7 12 0 - 17 0 g/dL    Hematocrit 47 7 36 5 - 49 3 %    MCV 93 82 - 98 fL    MCH 28 8 26 8 - 34 3 pg    MCHC 30 8 (L) 31 4 - 37 4 g/dL    RDW 13 2 11 6 - 15 1 %    Platelets 853 433 - 232 Thousands/uL    MPV 8 9 8 9 - 12 7 fL   Comprehensive metabolic panel    Collection Time: 08/03/19  5:51 AM   Result Value Ref Range    Sodium 135 (L) 136 - 145 mmol/L    Potassium 4 9 3 5 - 5 3 mmol/L    Chloride 105 100 - 108 mmol/L    CO2 24 21 - 32 mmol/L    ANION GAP 6 4 - 13 mmol/L    BUN 31 (H) 5 - 25 mg/dL    Creatinine 1 23 0 60 - 1 30 mg/dL    Glucose 153 (H) 65 - 140 mg/dL    Calcium 8 5 8 3 - 10 1 mg/dL    AST 63 (H) 5 - 45 U/L    ALT 69 12 - 78 U/L    Alkaline Phosphatase 59 46 - 116 U/L    Total Protein 7 2 6 4 - 8 2 g/dL    Albumin 2 5 (L) 3 5 - 5 0 g/dL    Total Bilirubin 0 60 0 20 - 1 00 mg/dL    eGFR 60 ml/min/1 73sq m   CBC and Platelet    Collection Time: 08/04/19  5:20 AM   Result Value Ref Range    WBC 8 96 4 31 - 10 16 Thousand/uL    RBC 4 80 3 88 - 5 62 Million/uL    Hemoglobin 13 9 12 0 - 17 0 g/dL Hematocrit 44 0 36 5 - 49 3 %    MCV 92 82 - 98 fL    MCH 29 0 26 8 - 34 3 pg    MCHC 31 6 31 4 - 37 4 g/dL    RDW 13 1 11 6 - 15 1 %    Platelets 351 599 - 094 Thousands/uL    MPV 8 6 (L) 8 9 - 12 7 fL   Basic metabolic panel    Collection Time: 08/04/19  5:20 AM   Result Value Ref Range    Sodium 133 (L) 136 - 145 mmol/L    Potassium 5 0 3 5 - 5 3 mmol/L    Chloride 106 100 - 108 mmol/L    CO2 25 21 - 32 mmol/L    ANION GAP 2 (L) 4 - 13 mmol/L    BUN 31 (H) 5 - 25 mg/dL    Creatinine 1 23 0 60 - 1 30 mg/dL    Glucose 114 65 - 140 mg/dL    Calcium 8 6 8 3 - 10 1 mg/dL    eGFR 60 ml/min/1 73sq m

## 2019-08-04 NOTE — QUICK NOTE
Was paged by nursing staff regarding pt with new onset shortness of breath and hypoxia  Pt seen and evaluated at bedside, saturating mid 80s on 5 L NC, therefore non-rebreather placed with significant improvement to 99%  SLIM attending accompanied myself during evaluation  Severely diminished lung sounds noted on the right base  Discussed with nephrology who recommended STAT CXR  CXR with evidence of congestion and concerns for possible aspiration on the right base  Advised placing patient on aspiration precautions, SPEECH evaluation, place on prophylactic flagyl  Obtain procalcitonin  Also strongly advised marrero catheter placement as patient was continuing to have urinary retention, straight cath x 3  Monitor accurate I&Os  Also advised that patient receive dose of IV lasix, however despite extensive education with patient's wife and sons, they are refusing the lasix currently  Pt's wife reports "my mom got a dose of lasix and , I don't want to do that " continue to monitor patient, currently improved on non-rebreather, try to wean as able  ADDENDUM: pt re-evaluated, continues to be on non-rebreather, after continued discussion with patient and family at bedside, are now agreeable to IV lasix, will place order for IV lasix 20 mg x 1 dose  Also obtain CT chest without contrast for further evaluation of RLL base  Case discussed with ICU team as well  Advised VEST therapy with incentive spirometry, ordered placed, respiratory aware  Additional night provider contacted surgical team if need for anticoagulation would be needed if patient would be cleared for this  Surgery cleared patient for a/c if needed  Currently vitals are stable, weaned to NC at 5 L  Monitor closely  CT chest results are pending       Jazmine Hernandez PA-C

## 2019-08-05 ENCOUNTER — APPOINTMENT (INPATIENT)
Dept: NON INVASIVE DIAGNOSTICS | Facility: HOSPITAL | Age: 69
DRG: 414 | End: 2019-08-05
Payer: MEDICARE

## 2019-08-05 ENCOUNTER — APPOINTMENT (INPATIENT)
Dept: RADIOLOGY | Facility: HOSPITAL | Age: 69
DRG: 414 | End: 2019-08-05
Payer: MEDICARE

## 2019-08-05 PROBLEM — J98.4 ACUTE PULMONARY INSUFFICIENCY: Status: ACTIVE | Noted: 2019-08-05

## 2019-08-05 LAB
ALBUMIN SERPL BCP-MCNC: 2.6 G/DL (ref 3.5–5)
ALP SERPL-CCNC: 52 U/L (ref 46–116)
ALT SERPL W P-5'-P-CCNC: 28 U/L (ref 12–78)
ANION GAP SERPL CALCULATED.3IONS-SCNC: 12 MMOL/L (ref 4–13)
ANION GAP SERPL CALCULATED.3IONS-SCNC: 8 MMOL/L (ref 4–13)
AST SERPL W P-5'-P-CCNC: 30 U/L (ref 5–45)
BACTERIA BLD CULT: NORMAL
BACTERIA BLD CULT: NORMAL
BASOPHILS # BLD AUTO: 0.01 THOUSANDS/ΜL (ref 0–0.1)
BASOPHILS NFR BLD AUTO: 0 % (ref 0–1)
BILIRUB SERPL-MCNC: 1.2 MG/DL (ref 0.2–1)
BUN SERPL-MCNC: 23 MG/DL (ref 5–25)
BUN SERPL-MCNC: 25 MG/DL (ref 5–25)
CALCIUM SERPL-MCNC: 9.1 MG/DL (ref 8.3–10.1)
CALCIUM SERPL-MCNC: 9.2 MG/DL (ref 8.3–10.1)
CHLORIDE SERPL-SCNC: 97 MMOL/L (ref 100–108)
CHLORIDE SERPL-SCNC: 99 MMOL/L (ref 100–108)
CO2 SERPL-SCNC: 25 MMOL/L (ref 21–32)
CO2 SERPL-SCNC: 25 MMOL/L (ref 21–32)
CREAT SERPL-MCNC: 1.15 MG/DL (ref 0.6–1.3)
CREAT SERPL-MCNC: 1.36 MG/DL (ref 0.6–1.3)
EOSINOPHIL # BLD AUTO: 0 THOUSAND/ΜL (ref 0–0.61)
EOSINOPHIL NFR BLD AUTO: 0 % (ref 0–6)
ERYTHROCYTE [DISTWIDTH] IN BLOOD BY AUTOMATED COUNT: 12.7 % (ref 11.6–15.1)
GFR SERPL CREATININE-BSD FRML MDRD: 53 ML/MIN/1.73SQ M
GFR SERPL CREATININE-BSD FRML MDRD: 65 ML/MIN/1.73SQ M
GLUCOSE SERPL-MCNC: 107 MG/DL (ref 65–140)
GLUCOSE SERPL-MCNC: 149 MG/DL (ref 65–140)
HCT VFR BLD AUTO: 48.4 % (ref 36.5–49.3)
HGB BLD-MCNC: 15.7 G/DL (ref 12–17)
IMM GRANULOCYTES # BLD AUTO: 0.04 THOUSAND/UL (ref 0–0.2)
IMM GRANULOCYTES NFR BLD AUTO: 1 % (ref 0–2)
L PNEUMO1 AG UR QL IA.RAPID: NEGATIVE
LYMPHOCYTES # BLD AUTO: 0.58 THOUSANDS/ΜL (ref 0.6–4.47)
LYMPHOCYTES NFR BLD AUTO: 8 % (ref 14–44)
MAGNESIUM SERPL-MCNC: 1.8 MG/DL (ref 1.6–2.6)
MCH RBC QN AUTO: 29 PG (ref 26.8–34.3)
MCHC RBC AUTO-ENTMCNC: 32.4 G/DL (ref 31.4–37.4)
MCV RBC AUTO: 90 FL (ref 82–98)
MONOCYTES # BLD AUTO: 0.92 THOUSAND/ΜL (ref 0.17–1.22)
MONOCYTES NFR BLD AUTO: 12 % (ref 4–12)
NEUTROPHILS # BLD AUTO: 6.04 THOUSANDS/ΜL (ref 1.85–7.62)
NEUTS SEG NFR BLD AUTO: 79 % (ref 43–75)
NRBC BLD AUTO-RTO: 0 /100 WBCS
NT-PROBNP SERPL-MCNC: 4683 PG/ML
PLATELET # BLD AUTO: 214 THOUSANDS/UL (ref 149–390)
PMV BLD AUTO: 8.4 FL (ref 8.9–12.7)
POTASSIUM SERPL-SCNC: 3.9 MMOL/L (ref 3.5–5.3)
POTASSIUM SERPL-SCNC: 4.2 MMOL/L (ref 3.5–5.3)
PROCALCITONIN SERPL-MCNC: 0.23 NG/ML
PROT SERPL-MCNC: 7.4 G/DL (ref 6.4–8.2)
RBC # BLD AUTO: 5.41 MILLION/UL (ref 3.88–5.62)
S PNEUM AG UR QL: NEGATIVE
SODIUM SERPL-SCNC: 132 MMOL/L (ref 136–145)
SODIUM SERPL-SCNC: 134 MMOL/L (ref 136–145)
TACROLIMUS BLD-MCNC: 13 NG/ML (ref 2–20)
TACROLIMUS BLD-MCNC: 17.6 NG/ML (ref 2–20)
WBC # BLD AUTO: 7.59 THOUSAND/UL (ref 4.31–10.16)

## 2019-08-05 PROCEDURE — 80048 BASIC METABOLIC PNL TOTAL CA: CPT | Performed by: NURSE PRACTITIONER

## 2019-08-05 PROCEDURE — 93306 TTE W/DOPPLER COMPLETE: CPT

## 2019-08-05 PROCEDURE — 99291 CRITICAL CARE FIRST HOUR: CPT | Performed by: ANESTHESIOLOGY

## 2019-08-05 PROCEDURE — 80053 COMPREHEN METABOLIC PANEL: CPT | Performed by: PHYSICIAN ASSISTANT

## 2019-08-05 PROCEDURE — 94760 N-INVAS EAR/PLS OXIMETRY 1: CPT

## 2019-08-05 PROCEDURE — 84145 PROCALCITONIN (PCT): CPT | Performed by: PHYSICIAN ASSISTANT

## 2019-08-05 PROCEDURE — 87081 CULTURE SCREEN ONLY: CPT | Performed by: NURSE PRACTITIONER

## 2019-08-05 PROCEDURE — 94640 AIRWAY INHALATION TREATMENT: CPT

## 2019-08-05 PROCEDURE — 83735 ASSAY OF MAGNESIUM: CPT | Performed by: NURSE PRACTITIONER

## 2019-08-05 PROCEDURE — 83880 ASSAY OF NATRIURETIC PEPTIDE: CPT | Performed by: NURSE PRACTITIONER

## 2019-08-05 PROCEDURE — 94668 MNPJ CHEST WALL SBSQ: CPT

## 2019-08-05 PROCEDURE — 85025 COMPLETE CBC W/AUTO DIFF WBC: CPT | Performed by: PHYSICIAN ASSISTANT

## 2019-08-05 PROCEDURE — 99232 SBSQ HOSP IP/OBS MODERATE 35: CPT | Performed by: INTERNAL MEDICINE

## 2019-08-05 PROCEDURE — 99222 1ST HOSP IP/OBS MODERATE 55: CPT | Performed by: NURSE PRACTITIONER

## 2019-08-05 PROCEDURE — 99024 POSTOP FOLLOW-UP VISIT: CPT | Performed by: SURGERY

## 2019-08-05 PROCEDURE — 87449 NOS EACH ORGANISM AG IA: CPT | Performed by: NURSE PRACTITIONER

## 2019-08-05 PROCEDURE — 92610 EVALUATE SWALLOWING FUNCTION: CPT

## 2019-08-05 PROCEDURE — 94669 MECHANICAL CHEST WALL OSCILL: CPT

## 2019-08-05 PROCEDURE — 71045 X-RAY EXAM CHEST 1 VIEW: CPT

## 2019-08-05 PROCEDURE — 94660 CPAP INITIATION&MGMT: CPT

## 2019-08-05 PROCEDURE — 93306 TTE W/DOPPLER COMPLETE: CPT | Performed by: INTERNAL MEDICINE

## 2019-08-05 RX ORDER — LEVALBUTEROL 1.25 MG/.5ML
1.25 SOLUTION, CONCENTRATE RESPIRATORY (INHALATION)
Status: DISCONTINUED | OUTPATIENT
Start: 2019-08-05 | End: 2019-08-06

## 2019-08-05 RX ORDER — VANCOMYCIN HYDROCHLORIDE 1 G/200ML
15 INJECTION, SOLUTION INTRAVENOUS EVERY 12 HOURS
Status: DISCONTINUED | OUTPATIENT
Start: 2019-08-05 | End: 2019-08-05

## 2019-08-05 RX ORDER — POTASSIUM CHLORIDE 20 MEQ/1
20 TABLET, EXTENDED RELEASE ORAL ONCE
Status: COMPLETED | OUTPATIENT
Start: 2019-08-05 | End: 2019-08-05

## 2019-08-05 RX ORDER — SODIUM CHLORIDE FOR INHALATION 0.9 %
3 VIAL, NEBULIZER (ML) INHALATION
Status: DISCONTINUED | OUTPATIENT
Start: 2019-08-05 | End: 2019-08-06

## 2019-08-05 RX ORDER — MAGNESIUM SULFATE HEPTAHYDRATE 40 MG/ML
2 INJECTION, SOLUTION INTRAVENOUS ONCE
Status: COMPLETED | OUTPATIENT
Start: 2019-08-05 | End: 2019-08-05

## 2019-08-05 RX ORDER — FUROSEMIDE 10 MG/ML
20 INJECTION INTRAMUSCULAR; INTRAVENOUS ONCE
Status: COMPLETED | OUTPATIENT
Start: 2019-08-05 | End: 2019-08-05

## 2019-08-05 RX ORDER — ALBUTEROL SULFATE 2.5 MG/3ML
2.5 SOLUTION RESPIRATORY (INHALATION) ONCE
Status: COMPLETED | OUTPATIENT
Start: 2019-08-05 | End: 2019-08-05

## 2019-08-05 RX ORDER — METOPROLOL TARTRATE 5 MG/5ML
5 INJECTION INTRAVENOUS ONCE
Status: COMPLETED | OUTPATIENT
Start: 2019-08-05 | End: 2019-08-05

## 2019-08-05 RX ADMIN — MYCOPHENOLIC ACID 360 MG: 180 TABLET, DELAYED RELEASE ORAL at 18:28

## 2019-08-05 RX ADMIN — OXYCODONE HYDROCHLORIDE 5 MG: 5 TABLET ORAL at 20:10

## 2019-08-05 RX ADMIN — CEFAZOLIN SODIUM 2000 MG: 2 SOLUTION INTRAVENOUS at 05:34

## 2019-08-05 RX ADMIN — ISODIUM CHLORIDE 3 ML: 0.03 SOLUTION RESPIRATORY (INHALATION) at 20:53

## 2019-08-05 RX ADMIN — METRONIDAZOLE 500 MG: 500 TABLET, FILM COATED ORAL at 13:11

## 2019-08-05 RX ADMIN — HEPARIN SODIUM 5000 UNITS: 5000 INJECTION INTRAVENOUS; SUBCUTANEOUS at 05:33

## 2019-08-05 RX ADMIN — ATORVASTATIN CALCIUM 10 MG: 10 TABLET, FILM COATED ORAL at 18:27

## 2019-08-05 RX ADMIN — TACROLIMUS 1 MG: 1 CAPSULE ORAL at 12:58

## 2019-08-05 RX ADMIN — FUROSEMIDE 20 MG: 10 INJECTION, SOLUTION INTRAVENOUS at 06:15

## 2019-08-05 RX ADMIN — LEVALBUTEROL HYDROCHLORIDE 1.25 MG: 1.25 SOLUTION, CONCENTRATE RESPIRATORY (INHALATION) at 20:53

## 2019-08-05 RX ADMIN — METRONIDAZOLE 500 MG: 500 TABLET, FILM COATED ORAL at 21:57

## 2019-08-05 RX ADMIN — LEVALBUTEROL HYDROCHLORIDE 1.25 MG: 1.25 SOLUTION, CONCENTRATE RESPIRATORY (INHALATION) at 09:06

## 2019-08-05 RX ADMIN — ALBUTEROL SULFATE 2.5 MG: 2.5 SOLUTION RESPIRATORY (INHALATION) at 05:39

## 2019-08-05 RX ADMIN — CEFAZOLIN SODIUM 2000 MG: 2 SOLUTION INTRAVENOUS at 00:06

## 2019-08-05 RX ADMIN — MAGNESIUM SULFATE HEPTAHYDRATE 2 G: 40 INJECTION, SOLUTION INTRAVENOUS at 13:12

## 2019-08-05 RX ADMIN — HEPARIN SODIUM 5000 UNITS: 5000 INJECTION INTRAVENOUS; SUBCUTANEOUS at 00:19

## 2019-08-05 RX ADMIN — POTASSIUM CHLORIDE 20 MEQ: 1500 TABLET, EXTENDED RELEASE ORAL at 13:11

## 2019-08-05 RX ADMIN — METRONIDAZOLE 500 MG: 500 TABLET, FILM COATED ORAL at 05:33

## 2019-08-05 RX ADMIN — PREDNISONE 5 MG: 5 TABLET ORAL at 13:05

## 2019-08-05 RX ADMIN — FAMOTIDINE 20 MG: 20 TABLET ORAL at 13:05

## 2019-08-05 RX ADMIN — ISODIUM CHLORIDE 3 ML: 0.03 SOLUTION RESPIRATORY (INHALATION) at 09:06

## 2019-08-05 RX ADMIN — METOPROLOL TARTRATE 25 MG: 25 TABLET, FILM COATED ORAL at 21:56

## 2019-08-05 RX ADMIN — TAMSULOSIN HYDROCHLORIDE 0.4 MG: 0.4 CAPSULE ORAL at 18:28

## 2019-08-05 RX ADMIN — HEPARIN SODIUM 5000 UNITS: 5000 INJECTION INTRAVENOUS; SUBCUTANEOUS at 13:11

## 2019-08-05 RX ADMIN — METRONIDAZOLE 500 MG: 500 TABLET, FILM COATED ORAL at 00:05

## 2019-08-05 RX ADMIN — SULFAMETHOXAZOLE AND TRIMETHOPRIM 1 TABLET: 400; 80 TABLET ORAL at 12:58

## 2019-08-05 RX ADMIN — MYCOPHENOLIC ACID 360 MG: 180 TABLET, DELAYED RELEASE ORAL at 12:58

## 2019-08-05 RX ADMIN — METOPROLOL TARTRATE 5 MG: 1 INJECTION, SOLUTION INTRAVENOUS at 13:12

## 2019-08-05 RX ADMIN — TACROLIMUS 1 MG: 1 CAPSULE ORAL at 18:27

## 2019-08-05 RX ADMIN — LEVALBUTEROL HYDROCHLORIDE 1.25 MG: 1.25 SOLUTION, CONCENTRATE RESPIRATORY (INHALATION) at 15:06

## 2019-08-05 RX ADMIN — METOPROLOL TARTRATE 25 MG: 25 TABLET, FILM COATED ORAL at 00:04

## 2019-08-05 RX ADMIN — ISODIUM CHLORIDE 3 ML: 0.03 SOLUTION RESPIRATORY (INHALATION) at 15:06

## 2019-08-05 RX ADMIN — HEPARIN SODIUM 5000 UNITS: 5000 INJECTION INTRAVENOUS; SUBCUTANEOUS at 21:57

## 2019-08-05 NOTE — SPEECH THERAPY NOTE
Speech-Language Pathology Bedside Swallow Evaluation      Patient Name: Dariela KAPOORY Date: 8/5/2019     Problem List  Patient Active Problem List   Diagnosis    Hypoalbuminemia    CAD (coronary artery disease)    H/O kidney transplant    New onset a-fib ([facility] [address] )    HLD (hyperlipidemia)    S/P mitral valve repair    Polycystic kidney disease    CKD (chronic kidney disease)    Essential hypertension    Elevated brain natriuretic peptide (BNP) level    Immunosuppression ([facility] [address] )    Renal transplant recipient    Acute non-recurrent maxillary sinusitis    H/O recurrent pneumonia    Multiple nevi    Benign localized prostatic hyperplasia with lower urinary tract symptoms (LUTS)    Incomplete bladder emptying    Acute calculous cholecystitis/status post cholecystectomy    Acute cholecystitis    Urinary retention-postop       Past Medical History  Past Medical History:   Diagnosis Date    Bacterial pneumonia     last assessed: 2/2/2017    Benign neoplasm of skin     Benign prostatic hyperplasia     Cardiac disorder     GERD (gastroesophageal reflux disease)     Gross hematuria     last assessed: 12/5/2016    Hypercholesterolemia     Hypertension     Kidney disease     Kidney transplanted     x 2, 2001 and 2012    Osteoporosis     Pneumonia     last assessed: 10/9/2017    Seborrheic keratosis     Sinusitis     Viral warts        Past Surgical History  Past Surgical History:   Procedure Laterality Date    AV FISTULA PLACEMENT Right 2001    arteriovenous surgery creation of A-V fistula, right arm radiocephalic-Dr Rik Fuentes    AV FISTULA PLACEMENT Left 2011    hemodialysis acess type arteriovenous fistula, left brachiocepalic AVF 5001-GP  R Sally Watson 74 N/A 8/2/2019    Procedure: CHOLECYSTECTOMY, OPEN;  Surgeon: Bishop Babar MD;  Location: [facility];  Service: General    COLON SURGERY  2012    CORONARY STENT PLACEMENT  09/15/2011    PTA stenting-[facility]/M   [place]    MITRAL VALVE REPAIR  2006    10/2006-[facility]    NEPHRECTOMY TRANSPLANTED ORGAN      OTHER SURGICAL HISTORY  01/2012    fluids from transplant    OTHER SURGICAL HISTORY Left 07/08/2013    shaving of lesion shoulders, skin left shoulder combined compound and blue nevus    TONSILLECTOMY      TRANSPLANTATION RENAL Left [facility] Pineda [facility]en, [address] TRANSPLANTATION RENAL Right 12/08/2011    Pined[place], [place]       Summary   Pt presented with functional appearing oral and pharyngeal stage swallowing skills with materials administered today  Recommendations: diet as per MD  (no texture restriction, Ok for thin liquids)    Recommended Form of Meds: whole with liquid     Aspiration precautions and compensatory swallowing strategies: slow rate of feeding          Current Medical Status (from MD & CRNP notes of this am   Pallavi Pires is a 77 y o  male who presents on 07/31 with complaint of 2 days of upper abdominal pain as well as vomiting green bile with imaging consistent with acalculous cholecystitis  He has a past medical history of a history of kidney transplant x2 on immunosuppression, history of bowel resection with perforation, history of atrial fibrillation, history of mitral valve replacement, benign prostate hyperplasia  He underwent an open cholecystectomy on 08/02 and was placed on antibiotics postprocedure for the surgical service  He has had slow return of bowel function  Over the course of the evening of 8/4 and 8/5, the patient has intermittent shortness of breath with hypoxia intermittently requiring a non-rebreather  This morning he more consistently required a non-rebreather and was transferred to the step-down unit for high-flow nasal cannula and further workup  CT scan reviewed  Demonstrating hypoinflated lungs with small bilateral effusions and adjacent opacities  Procalcitonin is negative  He is afebrile without leukocytosis   Assessment is cardiogenic edema in setting of fluid recruitment s/p cholecystectomy vs atelctasis vs less likely aspiration  Will observe off abx given above findings  Will obtain TTE and consider diuresis  Swallowing Evaluation also ordered at this time  Current Precautions:  Contact    Past medical history:  Please see H&P for details    Special Studies:  CT of chest of 8/4:  1  Hypoinflated lungs with small bilateral pleural effusions and adjacent opacities  2  Pneumoperitoneum consistent with recent surgery  3  Cardiomegaly    Social/Education/Vocational Hx:  Pt lives with family      Swallow Information   Current Risks for Dysphagia & Aspiration: respiratory challenge     Current Diet: NPO      Baseline Diet: regular diet and thin liquids      Baseline Assessment   Behavior/Cognition: alert    Speech/Language Status: able to participate in conversation    Patient Positioning: upright in chair    Pain Status/Interventions/Response to Interventions: No report of or nonverbal indications of pain (until he laughed)       Swallow Mechanism Exam   Oral-motor structures and function are WNL for symmetry, strength, ROM & coordination  Facial: symmetrical  Labial: WFL  Lingual: WFL  Velum: bruised appearing but midline with good elevation  Mandible: adequate ROM  Dentition: adequate  Vocal quality:clear/adequate  Volitional Cough: fair strength   Respiratory Status:  On HFNC 35L 40% FiO2  Baseline O2 sat of 96%      Consistencies Assessed and Performance   Consistencies Administered: thin liquids, puree and soft solids    Oral Stage: WFL  Mastication was adequate with the materials administered today  Bolus formation and transfer were functional with no significant oral residue noted  No overt s/s reduced oral control  Pharyngeal Stage: WFL    Swallow Mechanics:  Swallowing initiation appeared prompt  Laryngeal rise was palpated and judged to be within functional limits    No coughing, throat clearing, change in vocal quality or respiratory status noted today       Esophageal Concerns: none reported    Strategies and Efficacy: pt fed himself slowly, demo'd effective mastication       Summary and Recommendations (see above)    Results Reviewed with: patient, RN and family     Treatment Recommended: No tx indicted at this time

## 2019-08-05 NOTE — RESPIRATORY THERAPY NOTE
RT Protocol Note  Carla Laureano 77 y o  male MRN: [id number]  Unit/Bed#:  Encounter: [id number]    Assessment    Principal Problem:    Acute calculous cholecystitis/status post cholecystectomy  Active Problems:    CAD (coronary artery disease)    H/O kidney transplant    HLD (hyperlipidemia)    Benign localized prostatic hyperplasia with lower urinary tract symptoms (LUTS)    Urinary retention-postop      Home Pulmonary Medications:  n/a       Past Medical History:   Diagnosis Date    Bacterial pneumonia     last assessed: 2/2/2017    Benign neoplasm of skin     Benign prostatic hyperplasia     Cardiac disorder     GERD (gastroesophageal reflux disease)     Gross hematuria     last assessed: 12/5/2016    Hypercholesterolemia     Hypertension     Kidney disease     Kidney transplanted     x 2, 2001 and 2012    Osteoporosis     Pneumonia     last assessed: 10/9/2017    Seborrheic keratosis     Sinusitis     Viral warts      Social History     Socioeconomic History    Marital status: /Civil Union     Spouse name: None    Number of children: 4    Years of education: None    Highest education level: None   Occupational History    Occupation: retired     Comment: , not employed   Social Needs    Financial resource strain: None    Food insecurity:     Worry: None     Inability: None    Transportation needs:     Medical: None     Non-medical: None   Tobacco Use    Smoking status: Never Smoker    Smokeless tobacco: Never Used   Substance and Sexual Activity    Alcohol use: Never     Frequency: Never    Drug use: No    Sexual activity: Yes     Partners: Female     Comment: denied: history of high risk sexual behavior   Lifestyle    Physical activity:     Days per week: None     Minutes per session: None    Stress: None   Relationships    Social connections:     Talks on phone: None     Gets together: None     Attends Muslim service: None     Active member of club or organization: None     Attends meetings of clubs or organizations: None     Relationship status: None    Intimate partner violence:     Fear of current or ex partner: None     Emotionally abused: None     Physically abused: None     Forced sexual activity: None   Other Topics Concern    None   Social History Narrative    Active advance directive-yes    Exercises occasionally       Subjective    Subjective Data: patient with slightly improved work of breathing on HFNC    Objective    Physical Exam:   Assessment Type: During-treatment  General Appearance: Alert, Awake  Respiratory Pattern: Tachypneic, Shallow, Spontaneous, Assisted  Chest Assessment: Chest expansion symmetrical  Bilateral Breath Sounds: Clear, Diminished  Cough: None  O2 Device: HFNC    Vitals:  Blood pressure (!) 87/52, pulse 75, temperature 97 9 °F (36 6 °C), resp  rate (!) 31, height 5' 4" (1 626 m), weight 68 kg (149 lb 14 6 oz), SpO2 95 %  Imaging and other studies: I have personally reviewed pertinent reports  O2 Device: HFNC     Plan    Respiratory Plan: Mild Distress pathway  Airway Clearance Plan: Percussive Vest     Resp Comments: respiratory protocol and airway clearnace protocol completed at this time  pateint awake and alert recently transfered from [address] [place] [address] s/p acute hypoxemia and increased work of breathing noted overnight  pateint has significanrt CXR revealing infiltrtes/pleural effusions/atelectasis  will attempt HFNC to improve oxygenation and work of breathing and vest therapy in attemept  to imporve mobilization of retained secretions

## 2019-08-05 NOTE — PROGRESS NOTES
NEPHROLOGY PROGRESS NOTE    Patient: Colletta Au               Sex: male          DOA: 7/31/2019  7:51 PM   YOB: 1950        Age:  77 y o         LOS:  LOS: 5 days   8/5/2019    REASON FOR THE CONSULTATION:      History of renal transplantation    SUBJECTIVE     Patient seen examined in the intensive care unit  Transferred to ICU this morning after developing respiratory distress  Chest x-ray and CT scan of chest revealed volume overload  Patient is still complaining of shortness of breath on exertion  He was administered 20 mg of IV Lasix resulting in at least 7 L of urine output      CURRENT MEDICATIONS       Current Facility-Administered Medications:     acetaminophen (TYLENOL) tablet 650 mg, 650 mg, Oral, Q6H PRN, POLI Fair, 650 mg at 08/04/19 0026    atorvastatin (LIPITOR) tablet 10 mg, 10 mg, Oral, Daily With [employer], POLI, 10 mg at 08/04/19 1943    cefepime (MAXIPIME) 2,000 mg in dextrose 5 % 50 mL IVPB, 2,000 mg, Intravenous, Q12H, POLI Fair    famotidine (PEPCID) tablet 20 mg, 20 mg, Oral, Daily, POLI Fair, 20 mg at 08/04/19 0851    heparin (porcine) subcutaneous injection 5,000 Units, 5,000 Units, Subcutaneous, Q8H [address], 5,000 Units at 08/05/19 0533 **AND** [CANCELED] Platelet count, , , Once, Marocs Spencer MD    HYDROmorphone (DILAUDID) injection 0 2 mg, 0 2 mg, Intravenous, Q4H PRN, POLI Fair    levalbuterol [facility]) inhalation solution 1 25 mg, 1 25 mg, Nebulization, Q6H, Cristina Mortimer, MD, 1 25 mg at 08/05/19 0906    metroNIDAZOLE (FLAGYL) tablet 500 mg, 500 mg, Oral, Q8H [address], POLI Fair, 500 mg at 08/05/19 1941    mycophenolic acid (MYFORTIC) EC tablet 360 mg, 360 mg, Oral, BID, POLI Fair, 360 mg at 08/04/19 1943    [facility]) injection 4 mg, 4 mg, Intravenous, Q6H PRN, POLI Fair, 4 mg at 08/02/19 1028    oxyCODONE (ROXICODONE) IR tablet 10 mg, 10 mg, Oral, Q4H PRN, POLI Montaño, 10 mg at 08/03/19 0003    oxyCODONE (ROXICODONE) IR tablet 5 mg, 5 mg, Oral, Q4H PRN, POLI Montaño    predniSONE tablet 5 mg, 5 mg, Oral, Daily, POLI Montaño, 5 mg at 08/04/19 0851    sodium chloride 0 9 % inhalation solution 3 mL, 3 mL, Nebulization, Q6H, Nan Gregory MD, 3 mL at 08/05/19 0906    sulfamethoxazole-trimethoprim (BACTRIM) 400-80 mg per tablet 1 tablet, 1 tablet, Oral, Daily, POLI Montaño, 1 tablet at 08/04/19 1327    tacrolimus (PROGRAF) capsule 1 mg, 1 mg, Oral, BID, POLI Montaño, 1 mg at 08/04/19 1941    tamsulosin (FLOMAX) capsule 0 4 mg, 0 4 mg, Oral, Daily With POLI Gaona, 0 4 mg at 08/04/19 1941    vancomycin (VANCOCIN) IVPB (premix) 1,000 mg, 15 mg/kg, Intravenous, Q12H, POLI Montaño    REVIEW OF SYSTEMS     Review of Systems   Constitutional: Negative  HENT: Negative  Eyes: Negative  Respiratory: Positive for cough and shortness of breath  Cardiovascular: Negative  Gastrointestinal: Negative  Endocrine: Negative  Genitourinary: Negative  Musculoskeletal: Negative  Skin: Negative  Allergic/Immunologic: Negative  Neurological: Negative  Hematological: Negative  All other systems reviewed and are negative  OBJECTIVE     Current Weight: Weight - Scale: 68 kg (149 lb 14 6 oz)  Vitals:    08/05/19 0934   BP:    Pulse: 75   Resp: (!) 31   Temp:    SpO2: 95%     Body mass index is 25 73 kg/m²  Intake/Output Summary (Last 24 hours) at 8/5/2019 1007  Last data filed at 8/5/2019 0618  Gross per 24 hour   Intake 380 ml   Output 7025 ml   Net -6645 ml       PHYSICAL EXAMINATION     Physical Exam   Constitutional: He is oriented to person, place, and time  He appears well-developed and well-nourished  HENT:   Head: Normocephalic and atraumatic  Eyes: Pupils are equal, round, and reactive to light  Neck: Neck supple  Cardiovascular: Normal rate, regular rhythm and normal heart sounds  Pulmonary/Chest: Effort normal    Decreased breath sounds at the bases   Abdominal: Soft  Bowel sounds are normal    Musculoskeletal: Normal range of motion  Neurological: He is alert and oriented to person, place, and time  Skin: Skin is warm  Psychiatric: He has a normal mood and affect  LAB RESULTS     Results from last 7 days   Lab Units 08/05/19  0514 08/04/19  2102 08/04/19  0520 08/03/19  0551 08/02/19  0537 08/01/19  0554 07/31/19  1542   WBC Thousand/uL 7 59  --  8 96 9 47 9 62 13 16* 16 21*   HEMOGLOBIN g/dL 15 7  --  13 9 14 7 14 5 14 7 16 8   I STAT HEMOGLOBIN g/dl  --  16 0  --   --   --   --   --    HEMATOCRIT % 48 4  --  44 0 47 7 46 2 46 5 53 2*   HEMATOCRIT, ISTAT %  --  47  --   --   --   --   --    PLATELETS Thousands/uL 214  --  237 240 201 204 252   POTASSIUM mmol/L 4 2  --  5 0 4 9 4 5 4 1 4 6   CHLORIDE mmol/L 99*  --  106 105 105 107 103   CO2 mmol/L 25  --  25 24 21 21 26   CO2, I-STAT mmol/L  --  24  --   --   --   --   --    BUN mg/dL 23  --  31* 31* 19 21 27*   CREATININE mg/dL 1 15  --  1 23 1 23 1 15 1 25 1 36*   EGFR ml/min/1 73sq m 65  --  60 60 65 59 53   CALCIUM mg/dL 9 1  --  8 6 8 5 8 5 8 5 9 5   MAGNESIUM mg/dL  --   --   --   --   --  1 9  --    PHOSPHORUS mg/dL  --   --   --   --   --  3 0  --    GLUCOSE, ISTAT mg/dl  --  112  --   --   --   --   --            RADIOLOGY RESULTS      Results for orders placed during the hospital encounter of 12/06/16   XR chest portable    Narrative CHEST     INDICATION:  CHF    COMPARISON:  12/12/2016    VIEWS:   AP frontal;  1 image    FINDINGS:      Endotracheal tube, right internal jugular central venous catheter, and nasogastric tube have been removed  Heart shadow is enlarged but stable from prior exam     Pulmonary edema has been improved  Small bilateral pleural effusions unchanged  No pneumothorax      Visualized osseous structures appear within normal limits for the patient's age  Impression 1  Interval removal of lines and tubes  2   Improved pulmonary edema  3   Unchanged small bilateral pleural effusions  Workstation performed: [id number]       Results for orders placed during the hospital encounter of 07/31/19   XR chest pa & lateral    Narrative CHEST     INDICATION:   Pre-op  COMPARISON:  Chest x-ray 6/4/2019    EXAM PERFORMED/VIEWS:  XR CHEST PA & LATERAL      FINDINGS:  Surgical clips overlie the right perihilar region  There has been prior cardiac valve replacement  There is stable moderate cardiomegaly  Right hemidiaphragm is mildly elevated similar to the prior exam   Underlying lucency here is likely related to colonic position  New small right pleural effusion  Stable fullness of the pulmonary vasculature  Osseous structures appear within normal limits for patient age  Impression New small right pleural effusion  Stable fullness of the pulmonary vasculature may be related to chronic congestive changes  Workstation performed: [id number]         ASSESSMENT/PLAN     77years old male with past medical history of CKD status post living unrelated renal transplant in the year 2011 that was preceded by a 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, probable polycystic kidney disease, hypertension who presented to the hospital to undergo laparoscopic cholecystectomy  1  CKD status post living unrelated renal transplantation:  Last transplant performed in the year 2011 and had done reasonably well on triple immunosuppression of prednisone, Prograf and Myfortic   -renal function relatively normal with serum creatinine 1 1-1 2      2  Shortness of breath:  Etiology likely volume overload in the setting of IV fluids administration along with developing recurrent urinary retention over the past 48 hours  Will keep patient on Lasix 20 mg IV b i d  There is no evidence of pneumonia per CT scan      3  Immunosuppression:  Remains on Prograf, Myfortic and prednisone   -Prograf level noted to be 19 and drawn at the wrong time   -awaiting repeat Prograf level  4  Acute cholecystitis:  Postop day 3  Laparoscopic cholecystectomy for acalculous cholecystitis  5  Hypertension:  Systolic blood pressure is low this morning  Will hold off on diuretics unless systolic blood pressures greater than 100 mm Hg  6  Urinary retention:  Patient developed urinary retention post surgery  Recommend Urology evaluation  6  Hyponatremia:  Noted sodium level of 132  Likely secondary to hypervolemia        Jonel Flores MD  Nephrology  8/5/2019

## 2019-08-05 NOTE — ASSESSMENT & PLAN NOTE
Even stone noted  Patient started having trouble breathing yesterday evening  Transferred to ICU  Was diuresed with improvement in the symptoms  Likely multifactorial including possible aspiration in addition to fluid overload/atelectasis

## 2019-08-05 NOTE — PROGRESS NOTES
Patient placed back on non-rebreather due to hypoxia as per SLIM  Respiratory treatment of Albuterol ordered and administered  Patient continues to experience episodes of hypoxia  Vital signs stable  Patient being monitored closely for further deterioration  Another 20mg of lasix IV ordered and administered  SLIM re-evaluated patient status  Will continue to monitor

## 2019-08-05 NOTE — PROGRESS NOTES
Progress Note - General Surgery   Dave Lopez 77 y o  male MRN: [id number]  Unit/Bed#:  Encounter: [id number]      Assessment:   28-year-old male, postop day 3, status post open cholecystectomy  - appropriate tenderness  - incisions well approximated without vinita-incisional erythema or discharge  - patient is currently NPO  - patient has hypoactive bowel sounds, but reports flatus   Respiratory distress  - patient experience shortness of breath and O2 sat showed to be in mid 80s with 4 L of nasal cannula  - patient continued to experience intermittent shortness of breath requiring a non-rebreather  - patient has been transferred to step-down unit for high-flow nasal cannula and further workup  - CT of the chest without contrast shows hypoinflated lungs with small bilateral pleural effusions and adjacent at today's well as cardiomegaly  - BNP is 4683  - likely due to volume overload  Postop urinary retention  - patient experienced urinary retention requiring multiple straight cath  - patient has Manrique catheter in place with good urine output  - nephrology and urology following  - patient was started on Lasix and has had a 7 L urine output in the last 24 hours  -   Plan:  - nephrology consult appreciated- continue with 20 mg of Lasix b i d  As per Nephrology recommendations  - urology consult/recommendations appreciated   - patient stable from surgical standpoint, and diet may be advanced when patient is cleared by speech therapy, if ICU team deems appropriate  - serial abdominal exams  - pain control p r n   - discontinue antibiotics as per ICU team  - continue DVT prophylaxis  - ambulate patient    Subjective/Objective   Chief Complaint: SOB    Subjective:  Patient experience some shortness of breath overnight and was found to have oxygen desaturation, requiring non-rebreather    Patient was transferred to ICU step-down due to respiratory distress and requirement for non-rebreather; patient was placed on high-flow nasal cannula  Patient states that he has some discomfort at the incision site but no overt abdominal pain  Patient reports flatus but no bowel movements  Patient was able to tolerate clear liquid diet yesterday without reports nausea or vomiting, but has since been made NPO due to respiratory distress, and is awaiting the speech therapy evaluation  Patient experience some urinary retention a yesterday that require multiple straight catheterizations and subsequently had a Manrique catheter place; since then the patient has had over 7 L of urine output and Urology and Nephrology or following  Patient denies any chest pain, palpitations, fevers, or chills  Objective:     Blood pressure (!) 87/52, pulse 75, temperature 97 9 °F (36 6 °C), resp  rate (!) 31, height 5' 4" (1 626 m), weight 68 kg (149 lb 14 6 oz), SpO2 95 %  Body mass index is 25 73 kg/m²  I/O       08/03 0701 - 08/04 0700 08/04 0701 - 08/05 0700 08/05 0701 - 08/06 0700    P  O  560 740     I V  (mL/kg) 1780 (26 2)      Total Intake(mL/kg) 2340 (34 4) 740 (10 9)     Urine (mL/kg/hr) 1850 (1 1) 7325 (4 5)     Stool 0 0     Blood       Total Output 1850 7325     Net +490 -6585            Unmeasured Urine Occurrence 0 x 0 x     Unmeasured Stool Occurrence 0 x 0 x           Invasive Devices     Peripheral Intravenous Line            Peripheral IV 04/03/17 Right Antecubital 853 days    Peripheral IV 08/05/19 Right Forearm less than 1 day          Drain            NG/OG/Enteral Tube Orogastric 18 Fr Center mouth 2 days    Urethral Catheter 16 Fr  less than 1 day                Physical Exam: BP (!) 87/52   Pulse 75   Temp 97 9 °F (36 6 °C)   Resp (!) 31   Ht 5' 4" (1 626 m)   Wt 68 kg (149 lb 14 6 oz)   SpO2 95%   BMI 25 73 kg/m²   General appearance: alert and mild distress due to shortness of breath  Lungs: diminished breath sounds and Difficult to assess due to high flow oxygen through nasal cannula  Heart: regular rate and rhythm  Abdomen: Soft, slight distention, moderate tenderness to palpation that is appropriate for postop, hypoactive bowel sounds present, incisions well approximated without vinita-incisional erythema or discharge,  Extremities: extremities normal, warm and well-perfused; no cyanosis, clubbing, or edema    Labs   Recent Results (from the past 24 hour(s))   POCT Blood Gas (CG8+)    Collection Time: 08/04/19  9:02 PM   Result Value Ref Range    pH, Art i-STAT 7 435 7 350 - 7 450    pCO2, Art i-STAT 34 7 (L) 36 0 - 44 0 mm HG    pO2, ART i-STAT 70 0 (L) 75 0 - 129 0 mm HG    BE, i-STAT 0 -2 - 3 mmol/L    HCO3, Art i-STAT 23 3 22 0 - 28 0 mmol/L    CO2, i-STAT 24 21 - 32 mmol/L    O2 Sat, i-STAT 94 (L) 95 - 98 %    SODIUM, I-STAT 135 (L) 136 - 145 mmol/l    Potassium, i-STAT 4 5 3 5 - 5 3 mmol/L    Calcium, Ionized i-STAT 1 24 1 12 - 1 32 mmol/L    Hct, i-STAT 47 36 5 - 49 3 %    Hgb, i-STAT 16 0 12 0 - 17 0 g/dl    Glucose, i-STAT 112 65 - 140 mg/dl    POC FIO2 40 L    Specimen Type ARTERIAL    Procalcitonin    Collection Time: 08/05/19 12:54 AM   Result Value Ref Range    Procalcitonin 0 23 <=0 25 ng/ml   CBC and differential    Collection Time: 08/05/19  5:14 AM   Result Value Ref Range    WBC 7 59 4 31 - 10 16 Thousand/uL    RBC 5 41 3 88 - 5 62 Million/uL    Hemoglobin 15 7 12 0 - 17 0 g/dL    Hematocrit 48 4 36 5 - 49 3 %    MCV 90 82 - 98 fL    MCH 29 0 26 8 - 34 3 pg    MCHC 32 4 31 4 - 37 4 g/dL    RDW 12 7 11 6 - 15 1 %    MPV 8 4 (L) 8 9 - 12 7 fL    Platelets 905 482 - 603 Thousands/uL    nRBC 0 /100 WBCs    Neutrophils Relative 79 (H) 43 - 75 %    Immat GRANS % 1 0 - 2 %    Lymphocytes Relative 8 (L) 14 - 44 %    Monocytes Relative 12 4 - 12 %    Eosinophils Relative 0 0 - 6 %    Basophils Relative 0 0 - 1 %    Neutrophils Absolute 6 04 1 85 - 7 62 Thousands/µL    Immature Grans Absolute 0 04 0 00 - 0 20 Thousand/uL    Lymphocytes Absolute 0 58 (L) 0 60 - 4 47 Thousands/µL    Monocytes Absolute 0 92 0 17 - 1 22 Thousand/µL    Eosinophils Absolute 0 00 0 00 - 0 61 Thousand/µL    Basophils Absolute 0 01 0 00 - 0 10 Thousands/µL   Comprehensive metabolic panel    Collection Time: 08/05/19  5:14 AM   Result Value Ref Range    Sodium 132 (L) 136 - 145 mmol/L    Potassium 4 2 3 5 - 5 3 mmol/L    Chloride 99 (L) 100 - 108 mmol/L    CO2 25 21 - 32 mmol/L    ANION GAP 8 4 - 13 mmol/L    BUN 23 5 - 25 mg/dL    Creatinine 1 15 0 60 - 1 30 mg/dL    Glucose 107 65 - 140 mg/dL    Calcium 9 1 8 3 - 10 1 mg/dL    AST 30 5 - 45 U/L    ALT 28 12 - 78 U/L    Alkaline Phosphatase 52 46 - 116 U/L    Total Protein 7 4 6 4 - 8 2 g/dL    Albumin 2 6 (L) 3 5 - 5 0 g/dL    Total Bilirubin 1 20 (H) 0 20 - 1 00 mg/dL    eGFR 65 ml/min/1 73sq m   NT-BNP PRO (BNP for AL, AN, BE, MI, MO, QU, SH, [place] [facility])    Collection Time: 08/05/19  5:14 AM   Result Value Ref Range    NT-proBNP 4,683 (H) <125 pg/mL        Imaging and other studies:  Ct Abdomen Pelvis Wo Contrast    Result Date: 7/31/2019  Impression: Distended gallbladder with wall thickening and surrounding fat stranding may indicate cholecystitis  No radiopaque gallstones  Follow-up with right upper quadrant ultrasound  The study was marked in [facility] for immediate notification  Workstation performed: [id number]     Xr Chest Portable    Result Date: 8/5/2019  Impression: New bilateral pleural effusions and subjacent lower lobes opacities, most likely atelectasis and/or aspiration/infection  Workstation performed: [id number]     Xr Chest Pa & Lateral    Result Date: 8/1/2019  Impression: New small right pleural effusion  Stable fullness of the pulmonary vasculature may be related to chronic congestive changes  Workstation performed: [id number]     Xr Abdomen Complete Inc Upright And/or Decubitus    Result Date: 8/1/2019  Impression: Nonspecific bowel gas pattern without evidence to suggest bowel obstruction   Workstation performed: [id number]     Ct Chest Wo Contrast    Result Date: 8/5/2019  Impression: 1  Hypoinflated lungs with small bilateral pleural effusions and adjacent opacities 2  Pneumoperitoneum consistent with recent surgery 3  Cardiomegaly Workstation performed: [id number]     Us Gallbladder    Result Date: 7/31/2019  Impression: Gallbladder sludge with mild to moderate gallbladder distention, gallbladder wall thickening as well as positive sonographic Randall sign, and pericholecystic fluid; findings taken together are suspicious for acute cholecystitis in the appropriate clinical setting  Correlation with the patient's symptoms and laboratory values recommended  Pancreas essentially obscured by overlying bowel gas  Severe right renal atrophy  Other findings as above   Workstation performed: [id number]       VTE Pharmacologic Prophylaxis: Heparin  VTE Mechanical Prophylaxis: sequential compression device    Wendy Dillon PA-C  8/5/2019

## 2019-08-05 NOTE — PROGRESS NOTES
SLIM paged regarding patient who woke up from sleep with acute shortness of breath and hypoxia  Saturation  Mid 80's on 4  Nasal cannula  Patient assessed with severely diminished coarse lung sounds  Continue to monitor patient  Waiting for follow up orders

## 2019-08-05 NOTE — PROGRESS NOTES
[facility] Progress Note  Patient: Mary Mcadams 77 y o  male   MRN: [id number]  PCP: Isidro Leon MD  Unit/Bed#:  Encounter: [id number]  Date Of Visit: 08/05/19          * Acute calculous cholecystitis/status post cholecystectomy  Assessment & Plan  Postop day 3  Still not passing a whole lot of flatus  As per patient, he had couple of episodes today  Continue with current other treatment  He is burping  Acute pulmonary insufficiency  Assessment & Plan  Even stone noted  Patient started having trouble breathing yesterday evening  Transferred to ICU  Was diuresed with improvement in the symptoms  Likely multifactorial including possible aspiration in addition to fluid overload/atelectasis  Urinary retention-postop  Assessment & Plan  Continued to have trouble urinating  Manrique catheter was placed  Continue to maintain catheter for urinary retention    Benign localized prostatic hyperplasia with lower urinary tract symptoms (LUTS)  Assessment & Plan  · On Flomax  HLD (hyperlipidemia)  Assessment & Plan  · On statin  H/O kidney transplant  Assessment & Plan  Patient with history of renal transplant x2  Last renal transplant in 2011  Nephrology following  Continue with current treatment  Creatinine stable    CAD (coronary artery disease)  Assessment & Plan  ·  medical management  Cardiology on board as well  Continue with medical management  Patient has been evaluated by Cardiology service for clearance      Present on Admission:   Acute calculous cholecystitis/status post cholecystectomy   CAD (coronary artery disease)   HLD (hyperlipidemia)   Benign localized prostatic hyperplasia with lower urinary tract symptoms (LUTS)            VTE Pharmacologic Prophylaxis:   Pharmacologic: Heparin  Mechanical VTE Prophylaxis in Place: Yes    Patient Centered Rounds: I have performed bedside rounds with nursing staff today      Discussions with Specialists or Other Care Team Provider:  Yes    Education and Discussions with Family / Patient: yes    Time Spent for Care: 20 minutes  More than 50% of total time spent on counseling and coordination of care as described above  Current Length of Stay: 5 day(s)      Current Patient Status: Inpatient   Certification Statement: The patient will continue to require additional inpatient hospital stay due to potop care      Discharge Plan: per primary team    Code Status: Level 1 - Full Code      Subjective:   Events noted  Patient became short of breath last evening  Transferred to ICU as he was requiring high-flow oxygen  He is feeling better now  He received couple of doses of IV diuretic  He also had Manrique catheter placed because of urinary retention  He states that he has passed flatus/gas couple of times  Has been burping  Abdomen is sore  No fever or chills  He has not vomited this morning    Objective:     Vitals:   Temp (24hrs), Av °F (36 7 °C), Min:97 8 °F (36 6 °C), Max:98 2 °F (36 8 °C)    Temp:  [97 8 °F (36 6 °C)-98 2 °F (36 8 °C)] 97 8 °F (36 6 °C)  HR:  [70-80] 75  Resp:  [16-31] 31  BP: ()/(52-73) 110/71  SpO2:  [94 %-98 %] 95 %  Body mass index is 25 73 kg/m²  Input and Output Summary (last 24 hours): Intake/Output Summary (Last 24 hours) at 2019 1308  Last data filed at 2019 0618  Gross per 24 hour   Intake 380 ml   Output 6325 ml   Net -5945 ml           Physical Exam:     Vital signs are reviewed as above  Patient sitting in bed  He is in ICU now  S1 and S2 audible  Awake and alert  Oriented x3  Decreased breath sounds at the bases  Has oxygen on  Abdomen is slightly distended  ?  Few audible bowel sounds  No cyanosis or clubbing      Additional Data:     Labs:    Results from last 7 days   Lab Units 19  0514   WBC Thousand/uL 7 59   HEMOGLOBIN g/dL 15 7   HEMATOCRIT % 48 4   PLATELETS Thousands/uL 214   NEUTROS PCT % 79*   LYMPHS PCT % 8*   MONOS PCT % 12   EOS PCT % 0     Results from last 7 days   Lab Units 08/05/19  1222 08/05/19  0514 08/04/19  2102   POTASSIUM mmol/L 3 9 4 2  --    CHLORIDE mmol/L 97* 99*  --    CO2 mmol/L 25 25  --    CO2, I-STAT mmol/L  --   --  24   BUN mg/dL 25 23  --    CREATININE mg/dL 1 36* 1 15  --    CALCIUM mg/dL 9 2 9 1  --    ALK PHOS U/L  --  52  --    ALT U/L  --  28  --    AST U/L  --  30  --    GLUCOSE, ISTAT mg/dl  --   --  112           * I Have Reviewed All Lab Data Listed Above  * Additional Pertinent Lab Tests Reviewed: All Labs Within Last 24 Hours Reviewed        Recent Cultures (last 7 days):     Results from last 7 days   Lab Units 07/31/19 2016   BLOOD CULTURE  No Growth After 4 Days  No Growth After 4 Days         Last 24 Hours Medication List:     Current Facility-Administered Medications:  acetaminophen 650 mg Oral Q6H PRN POLI Maloney   atorvastatin 10 mg Oral Daily With [employer]   famotidine 20 mg Oral Daily POLI Maloney   heparin (porcine) 5,000 Units Subcutaneous [facility] POLI Maloney   HYDROmorphone 0 2 mg Intravenous Q4H PRN POLI Maloney   levalbuterol 1 25 mg Nebulization Q6H Eh Beltrán MD   magnesium sulfate 2 g Intravenous Once POLI Maloney   metoprolol 5 mg Intravenous Once POLI Maloney   metoprolol tartrate 25 mg Oral HS POLI Maloney   metroNIDAZOLE 500 mg Oral [facility] POLI Dennison   mycophenolate 360 mg Oral BID POLI Maloney   ondansetron 4 mg Intravenous Q6H PRN POLI Maloney   oxyCODONE 10 mg Oral Q4H PRN POLI Maloney   oxyCODONE 5 mg Oral Q4H PRN POLI Maloney   potassium chloride 20 mEq Oral Once POLI Maloney   predniSONE 5 mg Oral Daily POLI Maloney   sodium chloride 3 mL Nebulization Q6H Eh Beltrán MD   sulfamethoxazole-trimethoprim 1 tablet Oral Daily POLI Maloney   tacrolimus 1 mg Oral BID POLI Maloney   tamsulosin 0 4 mg Oral Daily With [address], CRNP        Today, Patient Was Seen By: Inocencio Pradhan MD    ** Please Note: Dragon 360 Dictation voice to text software may have been used in the creation of this document   **

## 2019-08-05 NOTE — PROGRESS NOTES
ICU notified on patients status  Patient stable on non-rebreather  O2 sat in mid ninties  Awaiting further orders and ICU follow up

## 2019-08-05 NOTE — PROGRESS NOTES
Received patient from previous shift placed on non-rebreather after episode of shortness of breath and hypoxia  Patient O2 saturation improved on non-rebreather  IV lasix 20 mg ordered and administered  Patient re-evaluated with significant improvement in O2 saturation  Patient further weaned to 4L nasal cannula with no signs of distress, and O2 level 96 on 4L  Vital signs stable  Sent for follow-up CT scan  Continue to monitor patient

## 2019-08-05 NOTE — PROGRESS NOTES
Patient was on 2l nc po2 was 90% tried to use incentive spirometry but he was having difficulty and could not reach 1000  Encouraged him to used it every 1hr while awake  I reclined patient to do bladder scan and patient became increasingly SOB and requested to sit up  Bladder scan showed almost 900mls  Pulse ox dropped to 70% and patient was cyanotic  Po2 remained low so oxygen was increased to 5lnc soon after was placed on nonrebreather and Vivien Nicole NP from [facility] came to bedside  Stat Xray and marrero ordered

## 2019-08-05 NOTE — PLAN OF CARE
Problem: Nutrition/Hydration-ADULT  Goal: Nutrient/Hydration intake appropriate for improving, restoring or maintaining nutritional needs  Description  Monitor and assess patient's nutrition/hydration status for malnutrition (ex- brittle hair, bruises, dry skin, pale skin and conjunctiva, muscle wasting, smooth red tongue, and disorientation)  Collaborate with interdisciplinary team and initiate plan and interventions as ordered  Monitor patient's weight and dietary intake as ordered or per policy  Utilize nutrition screening tool and intervene per policy  Determine patient's food preferences and provide high-protein, high-caloric foods as appropriate       INTERVENTIONS:  - Monitor oral intake, urinary output, labs, and treatment plans  - Assess nutrition and hydration status and recommend course of action  - Evaluate amount of meals eaten  - Assist patient with eating if necessary   - Allow adequate time for meals  - Recommend/ encourage appropriate diets, oral nutritional supplements, and vitamin/mineral supplements  - Order, calculate, and assess calorie counts as needed  - Recommend, monitor, and adjust tube feedings and TPN/PPN based on assessed needs  - Assess need for intravenous fluids  - Provide specific nutrition/hydration education as appropriate  - Include patient/family/caregiver in decisions related to nutrition  Outcome: Progressing     Problem: PAIN - ADULT  Goal: Verbalizes/displays adequate comfort level or baseline comfort level  Description  Interventions:  - Encourage patient to monitor pain and request assistance  - Assess pain using appropriate pain scale  - Administer analgesics based on type and severity of pain and evaluate response  - Implement non-pharmacological measures as appropriate and evaluate response  - Consider cultural and social influences on pain and pain management  - Notify physician/advanced practitioner if interventions unsuccessful or patient reports new pain  Outcome: Progressing     Problem: INFECTION - ADULT  Goal: Absence or prevention of progression during hospitalization  Description  INTERVENTIONS:  - Assess and monitor for signs and symptoms of infection  - Monitor lab/diagnostic results  - Monitor all insertion sites, i e  indwelling lines, tubes, and drains  - Monitor endotracheal (as able) and nasal secretions for changes in amount and color  - [place] appropriate cooling/warming therapies per order  - Administer medications as ordered  - Instruct and encourage patient and family to use good hand hygiene technique  - Identify and instruct in appropriate isolation precautions for identified infection/condition  Outcome: Progressing  Goal: Absence of fever/infection during neutropenic period  Description  INTERVENTIONS:  - Monitor WBC  - Implement neutropenic guidelines  Outcome: Progressing     Problem: SAFETY ADULT  Goal: Patient will remain free of falls  Description  INTERVENTIONS:  - Assess patient frequently for physical needs  -  Identify cognitive and physical deficits and behaviors that affect risk of falls    -  [place] fall precautions as indicated by assessment   - Educate patient/family on patient safety including physical limitations  - Instruct patient to call for assistance with activity based on assessment  - Modify environment to reduce risk of injury  - Consider OT/PT consult to assist with strengthening/mobility  Outcome: Progressing  Goal: Maintain or return to baseline ADL function  Description  INTERVENTIONS:  -  Assess patient's ability to carry out ADLs; assess patient's baseline for ADL function and identify physical deficits which impact ability to perform ADLs (bathing, care of mouth/teeth, toileting, grooming, dressing, etc )  - Assess/evaluate cause of self-care deficits   - Assess range of motion  - Assess patient's mobility; develop plan if impaired  - Assess patient's need for assistive devices and provide as appropriate  - Encourage maximum independence but intervene and supervise when necessary  ¯ Involve family in performance of ADLs  ¯ Assess for home care needs following discharge   ¯ Request OT consult to assist with ADL evaluation and planning for discharge  ¯ Provide patient education as appropriate  Outcome: Progressing  Goal: Maintain or return mobility status to optimal level  Description  INTERVENTIONS:  - Assess patient's baseline mobility status (ambulation, transfers, stairs, etc )    - Identify cognitive and physical deficits and behaviors that affect mobility  - Identify mobility aids required to assist with transfers and/or ambulation (gait belt, sit-to-stand, lift, walker, cane, etc )  - [place] fall precautions as indicated by assessment  - Record patient progress and toleration of activity level on Mobility SBAR; progress patient to next Phase/Stage  - Instruct patient to call for assistance with activity based on assessment  - Request Rehabilitation consult to assist with strengthening/weightbearing, etc   Outcome: Progressing     Problem: GENITOURINARY - ADULT  Goal: Absence of urinary retention  Description  INTERVENTIONS:  - Assess patients ability to void and empty bladder  - Monitor I/O  - Bladder scan as needed  - Discuss with physician/AP medications to alleviate retention as needed  - Discuss catheterization for long term situations as appropriate  Outcome: Progressing     Problem: Potential for Falls  Goal: Patient will remain free of falls  Description  INTERVENTIONS:  - Assess patient frequently for physical needs  -  Identify cognitive and physical deficits and behaviors that affect risk of falls    -  [place] fall precautions as indicated by assessment   - Educate patient/family on patient safety including physical limitations  - Instruct patient to call for assistance with activity based on assessment  - Modify environment to reduce risk of injury  - Consider OT/PT consult to assist with strengthening/mobility  Outcome: Progressing     Problem: RESPIRATORY - ADULT  Goal: Achieves optimal ventilation and oxygenation  Description  INTERVENTIONS:  - Assess for changes in respiratory status  - Assess for changes in mentation and behavior  - Position to facilitate oxygenation and minimize respiratory effort  - Oxygen administration by appropriate delivery method based on oxygen saturation (per order) or ABGs  - Initiate smoking cessation education as indicated  - Encourage broncho-pulmonary hygiene including cough, deep breathe, Incentive Spirometry  - Assess the need for suctioning and aspirate as needed  - Assess and instruct to report SOB or any respiratory difficulty  - Respiratory Therapy support as indicated  Outcome: Progressing

## 2019-08-05 NOTE — QUICK NOTE
Patient did well throughout the night after after being administered lasix he was weaned off of his non-rebreather and placed on 4L of O2  Patient placed back on non-rebreather about 1 hour ago due to hypoxia  Another 20mg of lasix IV was ordered  His official Chest Ct results are still pending from last night-surgery aware and okay with ac if needed  There is a low suspicion for pe at this time, recent labs and abg reviewed  ICU updated on patient's current condition, they will evaluate patient  Plan d/w Dr Chito Dolan

## 2019-08-05 NOTE — RESPIRATORY THERAPY NOTE
RT Ventilator Management Note  Boy Garcia 77 y o  male MRN: [id number]  Unit/Bed#:  Encounter: [id number]      Daily Screen     No data found in the last 10 encounters  Physical Exam:   Assessment Type: Assess only  General Appearance: Alert, Awake  Respiratory Pattern: Irregular, Tachypneic, Spontaneous, Assisted  Chest Assessment: Chest expansion symmetrical  Bilateral Breath Sounds: Clear, Diminished  Cough: Non-productive, Weak  O2 Device: HFNC  Subjective Data: pateint awake an dlaert with noted tyachypnea wihtout hihg flow      Resp Comments: pateint remains on HFNC settings attempted to be weaned in attempt to wena as tolerated from high levels of  HFNC pateint is LOS # 5 s/p being admitted for acute choleystitis requiring HFNC to [facility] adeqauet ventilatuion and oxygenation due to CXR results revealing infiltrates/atalectais/pleural effusion  vest therapy ordered in attemept to improve pulmonary status  plan: contineu curre t support and attempt to wena as tolerated

## 2019-08-05 NOTE — PROGRESS NOTES
Transfer/Accept - Critical Care   Park Vanegas 77 y o  male MRN: [id number]  Unit/Bed#: -01 Encounter: [id number]    Reason for Consultation / Chief Complaint: Hypoxia    History of Present Illness:  Park Vanegas is a 77 y o  male who presents on 07/31 with complaint of 2 days of upper abdominal pain as well as vomiting green bile with imaging consistent with acalculous cholecystitis  He has a past medical history of a history of kidney transplant x2 on immunosuppression, history of bowel resection with perforation, history of atrial fibrillation, history of mitral valve replacement, benign prostate hyperplasia  He underwent an open cholecystectomy on 08/02 and was placed on antibiotics postprocedure for the surgical service  He has had slow return of bowel function  Over the course of the evening of 8/4 and 8/5, the patient has intermittent shortness of breath with hypoxia intermittently requiring a non-rebreather  This morning he is more consistently required a non-rebreather and will be transferred to the step-down unit for high-flow nasal cannula and further workup  History obtained from chart review and the patient      Past Medical History:  Past Medical History:   Diagnosis Date    Bacterial pneumonia     last assessed: 2/2/2017    Benign neoplasm of skin     Benign prostatic hyperplasia     Cardiac disorder     GERD (gastroesophageal reflux disease)     Gross hematuria     last assessed: 12/5/2016    Hypercholesterolemia     Hypertension     Kidney disease     Kidney transplanted     x 2, 2001 and 2012    Osteoporosis     Pneumonia     last assessed: 10/9/2017    Seborrheic keratosis     Sinusitis     Viral warts        Past Surgical History:  Past Surgical History:   Procedure Laterality Date    AV FISTULA PLACEMENT Right 2001    arteriovenous surgery creation of A-V fistula, right arm radiocephalic-Dr Jara Inch    AV FISTULA PLACEMENT Left 2011    hemodialysis acess type arteriovenous fistula, left brachiocepalic AVF [id number]  [facility]  2012    CORONARY STENT PLACEMENT  09/15/2011    PTA stenting-[facility]/M Dr Tanya Gallardo  2006    [facility] OTHER SURGICAL HISTORY  01/2012    fluids from transplant    OTHER SURGICAL HISTORY Left 07/08/2013    shaving of lesion shoulders, skin left shoulder combined compound and blue nevus    TONSILLECTOMY      TRANSPLANTATION RENAL Left [place] [address] TRANSPLANTATION RENAL Right 12/08/2011    [place], [place]       Past Family History:  Family History   Problem Relation Age of Onset    Polycystic kidney disease Father     Kidney disease Father        Social History:  Social History     Tobacco Use   Smoking Status Never Smoker   Smokeless Tobacco Never Used     Social History     Substance and Sexual Activity   Alcohol Use Never    Frequency: Never     Social History     Substance and Sexual Activity   Drug Use No     Marital Status: /Civil Union  Exercise History:  Independent in ADLs    Home Medications:   Prior to Admission medications    Medication Sig Start Date End Date Taking?  Authorizing Provider   sulfamethoxazole-trimethoprim (BACTRIM) 400-80 mg per tablet Take 1 tablet by mouth daily   Yes Historical Provider, MD   aspirin 81 mg chewable tablet Chew 81 mg daily     Historical Provider, MD   atorvastatin (LIPITOR) 10 mg tablet TAKE 1 TABLET BY MOUTH EVERY DAY 6/17/19   Yessica Vega PA-C   metoprolol tartrate (LOPRESSOR) 25 mg tablet Take 1 tablet (25 mg total) by mouth daily  Patient taking differently: Take 25 mg by mouth daily at bedtime  11/27/18   Janak Mcmahon MD   mycophenolate (MYFORTIC) 360 MG TBEC TAKE 1 TABLET (360 MG TOTAL) BY MOUTH 2 (TWO) TIMES A DAY 7/30/19   Kimo Ramos MD   Omega-3 Fatty Acids (FISH OIL CONCENTRATE) 300 MG CAPS Take 1 capsule by mouth daily     Historical Provider, MD   predniSONE 5 mg tablet Take 1 tablet (5 mg total) by mouth daily 6/25/19   Minna Bonilla MD   tacrolimus (PROGRAF) 1 mg capsule Take 1 capsule (1 mg total) by mouth 2 (two) times a day 11/14/18   Minna Bonilla MD   tamsulosin (FLOMAX) 0 4 mg Take 1 capsule (0 4 mg total) by mouth daily 6/7/19   Minna Bonilla MD       Inpatient Medications:  Scheduled Meds:  Current Facility-Administered Medications:  acetaminophen 650 mg Oral Q6H PRN Haze AshGINNY downing    atorvastatin 10 mg Oral Daily With [facility] GINNY Boucher    cefazolin 2,000 mg Intravenous Q8H [place], [place] Last Rate: 2,000 mg (08/05/19 0534)   famotidine 20 mg Oral Daily [place] Tereso Carrion PA-C    heparin (porcine) 5,000 Units Subcutaneous [place], [place]    HYDROmorphone 0 2 mg Intravenous Q4H PRN Haze AshGINNY downing    metoprolol tartrate 25 mg Oral HS [place] Tereso Carrion PA-C    metroNIDAZOLE 500 mg Oral Q8H [address] Pedro Weber PA-C    mycophenolate 360 mg Oral BID [place] Carrion PA-C    ondansetron 4 mg Intravenous Q6H PRN Haze AshingGINNY    oxyCODONE 10 mg Oral Q4H PRN Haze AshingGINNY    oxyCODONE 5 mg Oral Q4H PRN Haze AshGINNY downing    predniSONE 5 mg Oral Daily Ryanne Melvin MD    sulfamethoxazole-trimethoprim 1 tablet Oral Daily [place] Tereso Carrion PA-C    tacrolimus 1 mg Oral BID [place] Tereso Carrion PA-C    tamsulosin 0 4 mg Oral Daily With [employer] GINNY Boucher      Continuous Infusions:   PRN Meds:    acetaminophen 650 mg Q6H PRN   HYDROmorphone 0 2 mg Q4H PRN   ondansetron 4 mg Q6H PRN   oxyCODONE 10 mg Q4H PRN   oxyCODONE 5 mg Q4H PRN       Allergies: Allergies   Allergen Reactions    Other        ROS:   Review of Systems   Constitutional: Positive for appetite change (Decreased) and fatigue  Negative for chills, diaphoresis and fever  HENT: Positive for congestion  Negative for trouble swallowing  Eyes: Negative for photophobia  Respiratory: Positive for cough, shortness of breath and wheezing  Negative for choking  Cardiovascular: Negative for chest pain and leg swelling  Gastrointestinal: Positive for abdominal distention and abdominal pain  Negative for constipation, diarrhea, nausea and vomiting  Endocrine: Negative  Genitourinary: Negative  Musculoskeletal: Negative  Skin: Negative  Allergic/Immunologic: Negative  Neurological: Negative  Hematological: Negative  Psychiatric/Behavioral: Negative  Vitals:  Vitals:    19 1536 19 1900 19 0003 19 0539   BP: 131/72  134/73    Pulse: 74 70 71    Resp: 19      Temp: 98 2 °F (36 8 °C)      TempSrc:       SpO2: 95% 98% 94% 98%   Weight:       Height:         Temperature:   Temp (24hrs), Av °F (36 7 °C), Min:97 7 °F (36 5 °C), Max:98 2 °F (36 8 °C)    Current Temperature: 98 2 °F (36 8 °C)    Weights:   IBW: 59 2 kg  Body mass index is 25 73 kg/m²  Hemodynamic Monitoring:  N/A     Non-Invasive/Invasive Ventilation Settings:  Respiratory    Lab Data (Last 4 hours)    None         O2/Vent Data (Last 4 hours)    None              No results found for: PHART, UZE5RUM, PO2ART, LFP5QKZ, I9CTIYHR, BEART, SOURCE  SpO2: SpO2: 98 %, SpO2 Activity: SpO2 Activity: At Rest, SpO2 Device: O2 Device: Non-rebreather mask     Physical Exam:  Physical Exam   Constitutional: He is oriented to person, place, and time  He appears well-developed and well-nourished  No distress  Face mask in place  HENT:   Head: Normocephalic and atraumatic  Eyes: Pupils are equal, round, and reactive to light  EOM are normal    Neck: No JVD present  No tracheal deviation present  Cardiovascular: Normal rate, regular rhythm and intact distal pulses  Pulmonary/Chest: Effort normal  No tachypnea  No respiratory distress  He has decreased breath sounds in the right middle field, the right lower field, the left middle field and the left lower field  Abdominal: Soft  Bowel sounds are normal  He exhibits no distension   There is tenderness (Right upper quadrant along incision)  There is no guarding  Genitourinary: Penis normal    Genitourinary Comments: Manrique in place with yellow urine   Musculoskeletal: Normal range of motion  He exhibits no edema, tenderness or deformity  Neurological: He is alert and oriented to person, place, and time  No cranial nerve deficit  GCS eye subscore is 4  GCS verbal subscore is 5  GCS motor subscore is 6  Skin: Skin is warm and dry  Capillary refill takes less than 2 seconds  He is not diaphoretic         Labs:  Results from last 7 days   Lab Units 08/05/19 0514 08/04/19 2102 08/04/19  0520 08/03/19  0551 08/02/19  0537 08/01/19  0554 07/31/19  1542   WBC Thousand/uL 7 59  --  8 96 9 47 9 62 13 16* 16 21*   HEMOGLOBIN g/dL 15 7  --  13 9 14 7 14 5 14 7 16 8   I STAT HEMOGLOBIN g/dl  --  16 0  --   --   --   --   --    HEMATOCRIT % 48 4  --  44 0 47 7 46 2 46 5 53 2*   HEMATOCRIT, ISTAT %  --  47  --   --   --   --   --    PLATELETS Thousands/uL 214  --  237 240 201 204 252   NEUTROS PCT % 79*  --   --   --  93* 83* 87*   MONOS PCT % 12  --   --   --  2* 9 9     Results from last 7 days   Lab Units 08/05/19 0514 08/04/19 2102 08/04/19 0520 08/03/19  0551 08/02/19  0537 08/01/19  0554 07/31/19  1542   SODIUM mmol/L 132*  --  133* 135* 137 139 137   POTASSIUM mmol/L 4 2  --  5 0 4 9 4 5 4 1 4 6   CHLORIDE mmol/L 99*  --  106 105 105 107 103   CO2 mmol/L 25  --  25 24 21 21 26   CO2, I-STAT mmol/L  --  24  --   --   --   --   --    ANION GAP mmol/L 8  --  2* 6 11 11 8   BUN mg/dL 23  --  31* 31* 19 21 27*   CREATININE mg/dL 1 15  --  1 23 1 23 1 15 1 25 1 36*   CALCIUM mg/dL 9 1  --  8 6 8 5 8 5 8 5 9 5   GLUCOSE RANDOM mg/dL 107  --  114 153* 132 99 135   ALT U/L 28  --   --  69 98* 48 19   AST U/L 30  --   --  63* 61* 42 17   ALK PHOS U/L 52  --   --  59 68 52 61   ALBUMIN g/dL 2 6*  --   --  2 5* 2 5* 2 7* 3 8   TOTAL BILIRUBIN mg/dL 1 20*  --   --  0 60 1 30* 2 20* 1 90*     Results from last 7 days   Lab Units 19  0554   MAGNESIUM mg/dL 1 9   PHOSPHORUS mg/dL 3 0           Results from last 7 days   Lab Units 19   TROPONIN I ng/mL <0 02         ABG:    VBG:          Imagin/5 CT hest- bilateral effusions with question of infiltrative disease, my interpretation I have personally reviewed pertinent films in PACS  EKG: Sinus rhythm This was personally reviewed by myself  Micro:  Results from last 7 days   Lab Units 19   BLOOD CULTURE  No Growth After 4 Days  No Growth After 4 Days  ______________________________________________________________________    Assessment and Plan:   Principal Problem:    Acute calculous cholecystitis/status post cholecystectomy  Active Problems:    CAD (coronary artery disease)    H/O kidney transplant    HLD (hyperlipidemia)    Benign localized prostatic hyperplasia with lower urinary tract symptoms (LUTS)    Urinary retention-postop  Resolved Problems:    * No resolved hospital problems   *      Neuro:  Frequent neurologic monitoring, continue present pain medications    CV:  Close hemodynamic monitoring, follow results of proBNP, would check echocardiogram    Lung:  Encourage good pulmonary hygiene, respiratory protocol, nebulizers per the respiratory team, airway clearance protocol, will continue high-flow nasal cannula with high-flow requirements    GI:  Serial abdominal exams, NPO, appreciate surgery recommendations    F/E/N:  NPO for now, would stop IV fluids, replete electrolytes as necessary    :  Close intake and output, daily weights, trend serum creatinine, continue Prograf, continue prednisone, appreciate Nephrology recommendations    ID:  Has presently receive 4 days of antibiotics, transitioned to vancomycin/cefepime, check procalcitonin, send cultures, procalcitonin elevated and continuing vancomycin will request pharmacy, review of old micro does not demonstrate any infectious organisms    Heme:  Continue heparin for DVT prophylaxis    Endo: Follow blood sugars as needed    Msk/Skin:  Out of bed as tolerated    Disposition:  Step-down    Counseling / Coordination of Care  Total time spent today 31 minutes  Greater than 50% of total time was spent with the patient and / or family counseling and / or coordination of care  A description of the counseling / coordination of care: Plan of care for transfer  ______________________________________________________________________    VTE Pharmacologic Prophylaxis: Heparin  VTE Mechanical Prophylaxis: sequential compression device    Invasive lines and devices: Invasive Devices     Peripheral Intravenous Line            Peripheral IV 04/03/17 Right Antecubital 853 days    Peripheral IV 08/05/19 Right Forearm less than 1 day          Drain            NG/OG/Enteral Tube Orogastric 18 Fr Center mouth 2 days    Urethral Catheter 16 Fr  less than 1 day                Code Status: Level 1 - Full Code  POA:    POLST:      Given critical illness, patient length of stay will require greater than two midnights  Portions of the record may have been created with voice recognition software  Occasional wrong word or "sound a like" substitutions may have occurred due to the inherent limitations of voice recognition software  Read the chart carefully and recognize, using context, where substitutions have occurred        POLI Coe    Consults

## 2019-08-05 NOTE — CONSULTS
CONSULT    Patient Name: Carla Laureano  Patient MRN: [id number]  Date of Service: 8/5/2019   Date / Time Note Created: 8/5/2019 8:08 AM   Referring Provider: Connie Bass MD    Provider Creating Note: POLI Peterson    PCP: Eduarda Hernandez  Attending Provider:  Connie Bass MD    Reason for Consult: Urinary Retention    HPI --Carla Larueano is 81-year-old male with history of end-stage renal disease status post renal transplantation x2 with baseline serum creatinine of approximately 1 2, admitted for cholecystitis status post laparoscopic cholecystectomy, postoperative day 3  He is on chronic alpha blockade for history of BPH with moderate lower urinary tract symptoms, managed by Dr Lele Layne  In November of last year, patient underwent discussion for possible definitive surgical management BPH with either uro lift or traditional trans urethral resection of prostate  Patient was scheduled in December of 2018 for truss and cystoscopy for preoperative evaluation and patient cancelled appointment  Patient later explain that he re-cancer decision to proceed with surgical intervention  Since, patient has been on Flomax  Nursing documentation revealed that patient has demonstrated moderate--severe urinary retention with bladder scans ranging from greater than 500 mL to almost 900 mL  He is now status post indwelling urinary catheter insertion  Postoperatively, patient developed respiratory distress and was transfer to intensive care unit/step-down for further management  He received aggressive diuresis within output exceeding 7 L in the last 24 hours  The primary surgical services awaiting return of bowel function  Patient is afebrile and normotensive  Creatinine slightly above baseline at 1 36  WBC count is within normal limits    Last previous image of abdomen and pelvis demonstrate atrophic native kidneys and left lower quadrant renal transplant without hydronephrosis of perinephric collection  There is presence of failed right renal transplant with unchanged appearance  Mr Yifan Merrill denies any current fever, chills, suprapubic, groin or testicular pain, recent dysuria or gross hematuria  He reports chronic mild--moderate bothersome obstructive urinary symptoms at home including nocturia  Urologic consultation is requested for further management recommendations  Source:chart review and the patient       Patient Active Problem List   Diagnosis    Hypoalbuminemia    CAD (coronary artery disease)    H/O kidney transplant    New onset a-fib ([address] )    HLD (hyperlipidemia)    S/P mitral valve repair    Polycystic kidney disease    CKD (chronic kidney disease)    Essential hypertension    Elevated brain natriuretic peptide (BNP) level    Immunosuppression (HCC)    Renal transplant recipient    Acute non-recurrent maxillary sinusitis    H/O recurrent pneumonia    Multiple nevi    Benign localized prostatic hyperplasia with lower urinary tract symptoms (LUTS)    Incomplete bladder emptying    Acute calculous cholecystitis/status post cholecystectomy    Acute cholecystitis    Urinary retention-postop             Impressions  Post-Operative Urinary Retention (Multi-factorial) secondary to recent administration of anesthesia/sedatives, acute illness episode (respiratory distress/fluid volume overload), narcotics, debilitation/recumbency and aggressive diuresis exacerbated by underlying BPH  Recommendations  1  Maintain marrero catheter  2  Do not remove  Not Nsg managed   3  Continue/resume Flomax --when no longer NPO  4  In interim, increase ambulation, wean narcotics, hydrate and treat constipation  5  Proceed with void trial day prior to discharge (minimum 48 hours) once patient is transferred to medical surgical unit, is weaned off IV narcotics, no longer receiving IV diuretics and ambulatory  6  Nursing staff may follow urinary retention protocol    If catheter re-insertion is necessary, patient should be discharged with Manrique catheter and return to the office of Dr Skyler Aguayo for further management              Past Medical History:   Diagnosis Date    Bacterial pneumonia     last assessed: 2/2/2017    Benign neoplasm of skin     Benign prostatic hyperplasia     Cardiac disorder     GERD (gastroesophageal reflux disease)     Gross hematuria     last assessed: 12/5/2016    Hypercholesterolemia     Hypertension     Kidney disease     Kidney transplanted     x 2, 2001 and 2012    Osteoporosis     Pneumonia     last assessed: 10/9/2017    Seborrheic keratosis     Sinusitis     Viral warts        Past Surgical History:   Procedure Laterality Date    AV FISTULA PLACEMENT Right 2001    arteriovenous surgery creation of A-V fistula, right arm radiocephalic-Dr Yudi Rodriguez    AV FISTULA PLACEMENT Left 2011    hemodialysis acess type arteriovenous fistula, left brachiocepalic AVF [id number]-MD  [facility]  2012    CORONARY STENT PLACEMENT  09/15/2011    PTA stenting-[facility]/M Dr Violeta Jefferson  2006    [facility] OTHER SURGICAL HISTORY  01/2012    fluids from transplant    OTHER SURGICAL HISTORY Left 07/08/2013    shaving of lesion shoulders, skin left shoulder combined compound and blue nevus    TONSILLECTOMY      TRANSPLANTATION RENAL Left Pineda Higgins, [facility] [address] TRANSPLANTATION RENAL Right 12/08/2011    Pineda Moon, [place]       Family History   Problem Relation Age of Onset    Polycystic kidney disease Father     Kidney disease Father        Social History     Socioeconomic History    Marital status: /Civil Union     Spouse name: Not on file    Number of children: 3    Years of education: Not on file    Highest education level: Not on file   Occupational History    Occupation: retired     Comment: , not employed   Social Needs    Financial resource strain: Not on file    Food insecurity:     Worry: Not on file     Inability: Not on file    Transportation needs:     Medical: Not on file     Non-medical: Not on file   Tobacco Use    Smoking status: Never Smoker    Smokeless tobacco: Never Used   Substance and Sexual Activity    Alcohol use: Never     Frequency: Never    Drug use: No    Sexual activity: Yes     Partners: Female     Comment: denied: history of high risk sexual behavior   Lifestyle    Physical activity:     Days per week: Not on file     Minutes per session: Not on file    Stress: Not on file   Relationships    Social connections:     Talks on phone: Not on file     Gets together: Not on file     Attends [employer] service: Not on file     Active member of club or organization: Not on file     Attends meetings of clubs or organizations: Not on file     Relationship status: Not on file    Intimate partner violence:     Fear of current or ex partner: Not on file     Emotionally abused: Not on file     Physically abused: Not on file     Forced sexual activity: Not on file   Other Topics Concern    Not on file   Social History Narrative    Active advance directive-yes    Exercises occasionally       Allergies   Allergen Reactions    Other        Review of Systems  Review of Systems - History obtained from chart review and the patient  General ROS: negative for - chills or fever  Genito-Urinary ROS: positive for - nocturia  negative for - change in urinary stream, incontinence, scrotal mass/pain or urinary frequency/urgency    Chart Review   Allergies, current medications, history, problem list    Vital Signs & Physical Exam  General appearance: alert and oriented, in no acute distress, appears older than stated age, cooperative and no distress  Head: Normocephalic, without obvious abnormality, atraumatic  Neck: no adenopathy, no carotid bruit, no JVD, supple, symmetrical, trachea midline and thyroid not enlarged, symmetric, no tenderness/mass/nodules  Lungs: diminished breath sounds  Heart: regular rate and rhythm, S1, S2 normal, no murmur, click, rub or gallop  Abdomen: abnormal findings:  moderate tenderness in the lower abdomen  Extremities: extremities normal, warm and well-perfused; no cyanosis, clubbing, or edema  Pulses: 2+ and symmetric  Neurologic: Grossly normal  Manrique patent for clear jacqueline urine     Laboratory Studies  Lab Results   Component Value Date     (L) 05/18/2016    K 4 2 08/05/2019    K 4 3 05/18/2016    CL 99 (L) 08/05/2019     05/18/2016    CO2 25 08/05/2019    CO2 24 08/04/2019    GLUCOSE 112 08/04/2019    GLUCOSE 87 03/13/2015    CREATININE 1 15 08/05/2019    CREATININE 1 30 (H) 05/18/2016    BUN 23 08/05/2019    BUN 24 05/18/2016    MG 1 9 08/01/2019    MG 2 0 03/13/2015    PHOS 3 0 08/01/2019    PHOS 3 1 03/13/2015               Imaging and Other Studies  )Ct Abdomen Pelvis Wo Contrast    Result Date: 7/31/2019  Narrative: CT ABDOMEN AND PELVIS WITHOUT IV CONTRAST INDICATION:   abd pain and distension  "Sent to ED by PCP for leukocytosis  Pt reports 1-2 days of constant aching fullness and distension in his upper b/l abdomen which is associated w 2 episodes of nonbloody nonbilious emesis  It occurs in context of chronic immunosuppression and prior renal transplant, on tacrolimus  " COMPARISON:  CT abdomen pelvis 12/5/2016  TECHNIQUE:  CT examination of the abdomen and pelvis was performed without intravenous contrast   Axial, sagittal, and coronal 2D reformatted images were created from the source data and submitted for interpretation  Radiation dose length product (DLP) for this visit:  427 mGy-cm   This examination, like all CT scans performed in the [facility], was performed utilizing techniques to minimize radiation dose exposure, including the use of iterative reconstruction and automated exposure control  Enteric contrast was administered   FINDINGS: ABDOMEN LOWER CHEST:  Partially imaged cardiomegaly  No acute findings in the lung bases  LIVER/BILIARY TREE:  Fatty infiltration  Mild hepatomegaly  GALLBLADDER:  Gallbladder distention with wall thickening and surrounding fat stranding  No radiopaque gallstones  SPLEEN:  Unremarkable  PANCREAS:  Unremarkable  ADRENAL GLANDS:  Unremarkable  KIDNEYS/URETERS:  Atrophic native kidneys  Stable simple appearing cysts  No hydronephrosis  Left lower quadrant renal transplant without hydronephrosis or perinephric collection  Failed right renal transplant with unchanged appearance  STOMACH AND BOWEL:  Right colonic anastomotic sutures  APPENDIX:  No findings to suggest appendicitis  ABDOMINOPELVIC CAVITY:  No ascites or free intraperitoneal air  No lymphadenopathy  VESSELS:  Dense atherosclerotic calcifications of the aorta and branches  PELVIS REPRODUCTIVE ORGANS:  Prostamegaly protruding into the bladder base  URINARY BLADDER:  Diffusely distended without wall thickening  ABDOMINAL WALL/INGUINAL REGIONS:  Uncomplicated fat-containing right inguinal hernia  OSSEOUS STRUCTURES:  No acute fracture or destructive osseous lesion  Impression: Distended gallbladder with wall thickening and surrounding fat stranding may indicate cholecystitis  No radiopaque gallstones  Follow-up with right upper quadrant ultrasound  The study was marked in [facility] for immediate notification  Workstation performed: [id number]     Xr Chest Pa & Lateral    Result Date: 8/1/2019  Narrative: CHEST INDICATION:   Pre-op  COMPARISON:  Chest x-ray 6/4/2019 EXAM PERFORMED/VIEWS:  XR CHEST PA & LATERAL FINDINGS:  Surgical clips overlie the right perihilar region  There has been prior cardiac valve replacement  There is stable moderate cardiomegaly  Right hemidiaphragm is mildly elevated similar to the prior exam   Underlying lucency here is likely related to colonic position  New small right pleural effusion  Stable fullness of the pulmonary vasculature  Osseous structures appear within normal limits for patient age  Impression: New small right pleural effusion  Stable fullness of the pulmonary vasculature may be related to chronic congestive changes  Workstation performed: [id number]     Xr Abdomen Complete Inc Upright And/or Decubitus    Result Date: 8/1/2019  Narrative: ABDOMEN INDICATION:   R10 84: Generalized abdominal pain  Fever  COMPARISON:  None VIEWS:  AP supine FINDINGS: Anastomotic surgical clips noted over the right lower abdomen  There is a nonobstructive bowel gas pattern  No discernible free air on this supine study  Upright or left lateral decubitus imaging is more sensitive to detect subtle free air in the appropriate setting  No pathologic calcifications or soft tissue masses  Visualized lung bases are clear  Extensive atherosclerotic vascular calcification is noted  Visualized osseous structures are unremarkable for the patient's age  Impression: Nonspecific bowel gas pattern without evidence to suggest bowel obstruction  Workstation performed: [id number]     Us Gallbladder    Result Date: 7/31/2019  Narrative: RIGHT UPPER QUADRANT ULTRASOUND INDICATION:     RUQ abd pain, elevated lipase    COMPARISON:  CT abdomen and pelvis, same day TECHNIQUE:   Real-time ultrasound of the right upper quadrant was performed with a curvilinear transducer with both volumetric sweeps and still imaging techniques  FINDINGS: PANCREAS:  Pancreas essentially obscured by bowel gas  AORTA AND IVC:  Visualized portions are normal for patient age  LIVER: Size:  Within normal range  The liver measures 15 8 cm in the midclavicular line  Contour:  Surface contour is smooth  Parenchyma:  Echogenicity and echotexture are within normal limits  No evidence of suspicious mass  Limited imaging of the main portal vein shows it to be patent and hepatopetal   BILIARY: Mild to moderately distended gallbladder with sludge within the gallbladder lumen    There is thickening of the gallbladder wall which measures approximately 5 mm in thickness  Positive sonographic Randall sign elicited during real-time examination  No discrete gallstones are identified  No intrahepatic biliary dilatation  Common bile duct not well seen but appears to be normal caliber on the recent prior CT KIDNEY: Severely atrophic measuring 6 0 x 2 5 cm in size with hyperechoic cortex  No discrete hydronephrosis  ASCITES:   Tiny amount of hypoechoic perihepatic and pericholecystic fluid, probably reactive  Impression: Gallbladder sludge with mild to moderate gallbladder distention, gallbladder wall thickening as well as positive sonographic Randall sign, and pericholecystic fluid; findings taken together are suspicious for acute cholecystitis in the appropriate clinical setting  Correlation with the patient's symptoms and laboratory values recommended  Pancreas essentially obscured by overlying bowel gas  Severe right renal atrophy  Other findings as above   Workstation performed: [id number]         Medications   Scheduled Meds:  Current Facility-Administered Medications:  acetaminophen 650 mg Oral Q6H PRN Gwenith Perez, CRNP   atorvastatin 10 mg Oral Daily With [employer], CRNP   famotidine 20 mg Oral Daily Gwenith Perez, POLI   heparin (porcine) 5,000 Units Subcutaneous [facility] Gwenith Grayer, CRNP   HYDROmorphone 0 2 mg Intravenous Q4H PRN [facility] Perez, CRNP   levalbuterol 1 25 mg Nebulization Q6H Robbie Zhang MD   magnesium sulfate 2 g Intravenous Once Gwenith Grayer, CRNP   metoprolol 5 mg Intravenous Once Gwenith Perez, CRNP   metoprolol tartrate 25 mg Oral HS Gwenith Grayer, CRNP   metroNIDAZOLE 500 mg Oral [facility] POLI Dennison   mycophenolate 360 mg Oral BID Gwenith Perez, CRNP   ondansetron 4 mg Intravenous Q6H PRN eni Perez, CRNP   oxyCODONE 10 mg Oral Q4H PRN [facility] Perez, CRNP   oxyCODONE 5 mg Oral Q4H PRN [facility] Perez, POLI potassium chloride 20 mEq Oral Once Darlean Asa, CRNP   predniSONE 5 mg Oral Daily Darlean Asa, CRNP   sodium chloride 3 mL Nebulization Q6H Mamadou Ambrocio MD   sulfamethoxazole-trimethoprim 1 tablet Oral Daily Darlean Asa, CRMOHAN   tacrolimus 1 mg Oral BID Darlean Asa, CRMOHAN   tamsulosin 0 4 mg Oral Daily With POLI Shah     Continuous Infusions:   PRN Meds:   acetaminophen    HYDROmorphone    ondansetron    oxyCODONE    oxyCODONE      Total time spent with patient 25 minutes, >50% spent counseling and/or coordination of care           )POLI Betancourt

## 2019-08-06 ENCOUNTER — APPOINTMENT (INPATIENT)
Dept: RADIOLOGY | Facility: HOSPITAL | Age: 69
DRG: 414 | End: 2019-08-06
Payer: MEDICARE

## 2019-08-06 ENCOUNTER — TELEPHONE (OUTPATIENT)
Dept: CARDIOLOGY CLINIC | Facility: CLINIC | Age: 69
End: 2019-08-06

## 2019-08-06 PROBLEM — R09.89 PULMONARY CONGESTION: Status: ACTIVE | Noted: 2019-08-06

## 2019-08-06 PROBLEM — I05.0 MITRAL VALVE STENOSIS: Status: ACTIVE | Noted: 2019-08-06

## 2019-08-06 LAB
ANION GAP SERPL CALCULATED.3IONS-SCNC: 5 MMOL/L (ref 4–13)
ANION GAP SERPL CALCULATED.3IONS-SCNC: 7 MMOL/L (ref 4–13)
ANION GAP SERPL CALCULATED.3IONS-SCNC: 8 MMOL/L (ref 4–13)
ANION GAP SERPL CALCULATED.3IONS-SCNC: 9 MMOL/L (ref 4–13)
BASE EX.OXY STD BLDV CALC-SCNC: 97.5 % (ref 60–80)
BASE EXCESS BLDV CALC-SCNC: 5 MMOL/L
BASOPHILS # BLD AUTO: 0.02 THOUSANDS/ΜL (ref 0–0.1)
BASOPHILS NFR BLD AUTO: 0 % (ref 0–1)
BUN SERPL-MCNC: 25 MG/DL (ref 5–25)
BUN SERPL-MCNC: 28 MG/DL (ref 5–25)
BUN SERPL-MCNC: 34 MG/DL (ref 5–25)
BUN SERPL-MCNC: 36 MG/DL (ref 5–25)
CALCIUM SERPL-MCNC: 8.8 MG/DL (ref 8.3–10.1)
CALCIUM SERPL-MCNC: 9 MG/DL (ref 8.3–10.1)
CALCIUM SERPL-MCNC: 9.1 MG/DL (ref 8.3–10.1)
CALCIUM SERPL-MCNC: 9.3 MG/DL (ref 8.3–10.1)
CHLORIDE SERPL-SCNC: 97 MMOL/L (ref 100–108)
CHLORIDE SERPL-SCNC: 98 MMOL/L (ref 100–108)
CHLORIDE SERPL-SCNC: 99 MMOL/L (ref 100–108)
CHLORIDE SERPL-SCNC: 99 MMOL/L (ref 100–108)
CO2 SERPL-SCNC: 25 MMOL/L (ref 21–32)
CO2 SERPL-SCNC: 26 MMOL/L (ref 21–32)
CO2 SERPL-SCNC: 27 MMOL/L (ref 21–32)
CO2 SERPL-SCNC: 30 MMOL/L (ref 21–32)
CREAT SERPL-MCNC: 1.28 MG/DL (ref 0.6–1.3)
CREAT SERPL-MCNC: 1.42 MG/DL (ref 0.6–1.3)
CREAT SERPL-MCNC: 1.79 MG/DL (ref 0.6–1.3)
CREAT SERPL-MCNC: 1.87 MG/DL (ref 0.6–1.3)
EOSINOPHIL # BLD AUTO: 0.1 THOUSAND/ΜL (ref 0–0.61)
EOSINOPHIL NFR BLD AUTO: 1 % (ref 0–6)
ERYTHROCYTE [DISTWIDTH] IN BLOOD BY AUTOMATED COUNT: 12.6 % (ref 11.6–15.1)
ERYTHROCYTE [DISTWIDTH] IN BLOOD BY AUTOMATED COUNT: 12.8 % (ref 11.6–15.1)
GFR SERPL CREATININE-BSD FRML MDRD: 36 ML/MIN/1.73SQ M
GFR SERPL CREATININE-BSD FRML MDRD: 38 ML/MIN/1.73SQ M
GFR SERPL CREATININE-BSD FRML MDRD: 50 ML/MIN/1.73SQ M
GFR SERPL CREATININE-BSD FRML MDRD: 57 ML/MIN/1.73SQ M
GLUCOSE SERPL-MCNC: 112 MG/DL (ref 65–140)
GLUCOSE SERPL-MCNC: 144 MG/DL (ref 65–140)
GLUCOSE SERPL-MCNC: 147 MG/DL (ref 65–140)
GLUCOSE SERPL-MCNC: 180 MG/DL (ref 65–140)
HCO3 BLDV-SCNC: 24 MMOL/L (ref 24–30)
HCT VFR BLD AUTO: 48.3 % (ref 36.5–49.3)
HCT VFR BLD AUTO: 48.5 % (ref 36.5–49.3)
HGB BLD-MCNC: 15.5 G/DL (ref 12–17)
HGB BLD-MCNC: 15.9 G/DL (ref 12–17)
IMM GRANULOCYTES # BLD AUTO: 0.12 THOUSAND/UL (ref 0–0.2)
IMM GRANULOCYTES NFR BLD AUTO: 1 % (ref 0–2)
LACTATE SERPL-SCNC: 1.3 MMOL/L (ref 0.5–2)
LYMPHOCYTES # BLD AUTO: 1.05 THOUSANDS/ΜL (ref 0.6–4.47)
LYMPHOCYTES NFR BLD AUTO: 12 % (ref 14–44)
MAGNESIUM SERPL-MCNC: 2 MG/DL (ref 1.6–2.6)
MAGNESIUM SERPL-MCNC: 2.1 MG/DL (ref 1.6–2.6)
MCH RBC QN AUTO: 28.7 PG (ref 26.8–34.3)
MCH RBC QN AUTO: 29 PG (ref 26.8–34.3)
MCHC RBC AUTO-ENTMCNC: 32 G/DL (ref 31.4–37.4)
MCHC RBC AUTO-ENTMCNC: 32.9 G/DL (ref 31.4–37.4)
MCV RBC AUTO: 87 FL (ref 82–98)
MCV RBC AUTO: 91 FL (ref 82–98)
MONOCYTES # BLD AUTO: 1 THOUSAND/ΜL (ref 0.17–1.22)
MONOCYTES NFR BLD AUTO: 11 % (ref 4–12)
MRSA NOSE QL CULT: NORMAL
NEUTROPHILS # BLD AUTO: 6.6 THOUSANDS/ΜL (ref 1.85–7.62)
NEUTS SEG NFR BLD AUTO: 75 % (ref 43–75)
NRBC BLD AUTO-RTO: 0 /100 WBCS
O2 CT BLDV-SCNC: 22.9 ML/DL
PCO2 BLDV: 23.1 MM HG (ref 42–50)
PH BLDV: 7.63 [PH] (ref 7.3–7.4)
PHOSPHATE SERPL-MCNC: 3.9 MG/DL (ref 2.3–4.1)
PLATELET # BLD AUTO: 199 THOUSANDS/UL (ref 149–390)
PLATELET # BLD AUTO: 249 THOUSANDS/UL (ref 149–390)
PMV BLD AUTO: 8.5 FL (ref 8.9–12.7)
PMV BLD AUTO: 8.8 FL (ref 8.9–12.7)
PO2 BLDV: 119.6 MM HG (ref 35–45)
POTASSIUM SERPL-SCNC: 3.9 MMOL/L (ref 3.5–5.3)
POTASSIUM SERPL-SCNC: 4 MMOL/L (ref 3.5–5.3)
POTASSIUM SERPL-SCNC: 4.2 MMOL/L (ref 3.5–5.3)
POTASSIUM SERPL-SCNC: 4.4 MMOL/L (ref 3.5–5.3)
RBC # BLD AUTO: 5.35 MILLION/UL (ref 3.88–5.62)
RBC # BLD AUTO: 5.54 MILLION/UL (ref 3.88–5.62)
SODIUM SERPL-SCNC: 131 MMOL/L (ref 136–145)
SODIUM SERPL-SCNC: 132 MMOL/L (ref 136–145)
SODIUM SERPL-SCNC: 133 MMOL/L (ref 136–145)
SODIUM SERPL-SCNC: 134 MMOL/L (ref 136–145)
TROPONIN I SERPL-MCNC: <0.02 NG/ML
WBC # BLD AUTO: 5.97 THOUSAND/UL (ref 4.31–10.16)
WBC # BLD AUTO: 8.89 THOUSAND/UL (ref 4.31–10.16)

## 2019-08-06 PROCEDURE — 71045 X-RAY EXAM CHEST 1 VIEW: CPT

## 2019-08-06 PROCEDURE — 99232 SBSQ HOSP IP/OBS MODERATE 35: CPT | Performed by: INTERNAL MEDICINE

## 2019-08-06 PROCEDURE — 85025 COMPLETE CBC W/AUTO DIFF WBC: CPT | Performed by: ANESTHESIOLOGY

## 2019-08-06 PROCEDURE — 99024 POSTOP FOLLOW-UP VISIT: CPT | Performed by: SURGERY

## 2019-08-06 PROCEDURE — 80048 BASIC METABOLIC PNL TOTAL CA: CPT | Performed by: NURSE PRACTITIONER

## 2019-08-06 PROCEDURE — 83735 ASSAY OF MAGNESIUM: CPT | Performed by: NURSE PRACTITIONER

## 2019-08-06 PROCEDURE — 82805 BLOOD GASES W/O2 SATURATION: CPT | Performed by: PHYSICIAN ASSISTANT

## 2019-08-06 PROCEDURE — 99292 CRITICAL CARE ADDL 30 MIN: CPT | Performed by: ANESTHESIOLOGY

## 2019-08-06 PROCEDURE — 80048 BASIC METABOLIC PNL TOTAL CA: CPT | Performed by: ANESTHESIOLOGY

## 2019-08-06 PROCEDURE — 80048 BASIC METABOLIC PNL TOTAL CA: CPT | Performed by: PHYSICIAN ASSISTANT

## 2019-08-06 PROCEDURE — 84484 ASSAY OF TROPONIN QUANT: CPT | Performed by: PHYSICIAN ASSISTANT

## 2019-08-06 PROCEDURE — 80197 ASSAY OF TACROLIMUS: CPT | Performed by: INTERNAL MEDICINE

## 2019-08-06 PROCEDURE — 94640 AIRWAY INHALATION TREATMENT: CPT

## 2019-08-06 PROCEDURE — 94660 CPAP INITIATION&MGMT: CPT

## 2019-08-06 PROCEDURE — 94760 N-INVAS EAR/PLS OXIMETRY 1: CPT

## 2019-08-06 PROCEDURE — 84100 ASSAY OF PHOSPHORUS: CPT | Performed by: ANESTHESIOLOGY

## 2019-08-06 PROCEDURE — 83605 ASSAY OF LACTIC ACID: CPT | Performed by: ANESTHESIOLOGY

## 2019-08-06 PROCEDURE — 99291 CRITICAL CARE FIRST HOUR: CPT | Performed by: ANESTHESIOLOGY

## 2019-08-06 PROCEDURE — 93005 ELECTROCARDIOGRAM TRACING: CPT

## 2019-08-06 PROCEDURE — 85027 COMPLETE CBC AUTOMATED: CPT | Performed by: NURSE PRACTITIONER

## 2019-08-06 PROCEDURE — 83735 ASSAY OF MAGNESIUM: CPT | Performed by: ANESTHESIOLOGY

## 2019-08-06 PROCEDURE — 94669 MECHANICAL CHEST WALL OSCILL: CPT

## 2019-08-06 RX ORDER — ALBUMIN, HUMAN INJ 5% 5 %
12.5 SOLUTION INTRAVENOUS ONCE
Status: COMPLETED | OUTPATIENT
Start: 2019-08-06 | End: 2019-08-07

## 2019-08-06 RX ORDER — FUROSEMIDE 10 MG/ML
20 INJECTION INTRAMUSCULAR; INTRAVENOUS EVERY 8 HOURS
Status: DISCONTINUED | OUTPATIENT
Start: 2019-08-06 | End: 2019-08-07

## 2019-08-06 RX ORDER — FUROSEMIDE 10 MG/ML
20 INJECTION INTRAMUSCULAR; INTRAVENOUS
Status: DISCONTINUED | OUTPATIENT
Start: 2019-08-06 | End: 2019-08-06

## 2019-08-06 RX ORDER — FUROSEMIDE 10 MG/ML
20 INJECTION INTRAMUSCULAR; INTRAVENOUS ONCE
Status: COMPLETED | OUTPATIENT
Start: 2019-08-06 | End: 2019-08-06

## 2019-08-06 RX ORDER — TACROLIMUS 0.5 MG/1
0.5 CAPSULE ORAL
Status: DISCONTINUED | OUTPATIENT
Start: 2019-08-06 | End: 2019-08-07 | Stop reason: HOSPADM

## 2019-08-06 RX ORDER — LIDOCAINE HYDROCHLORIDE AND EPINEPHRINE 10; 10 MG/ML; UG/ML
1 INJECTION, SOLUTION INFILTRATION; PERINEURAL ONCE
Status: COMPLETED | OUTPATIENT
Start: 2019-08-06 | End: 2019-08-07

## 2019-08-06 RX ORDER — LEVALBUTEROL 1.25 MG/.5ML
1.25 SOLUTION, CONCENTRATE RESPIRATORY (INHALATION) EVERY 6 HOURS PRN
Status: DISCONTINUED | OUTPATIENT
Start: 2019-08-06 | End: 2019-08-07 | Stop reason: HOSPADM

## 2019-08-06 RX ORDER — TACROLIMUS 1 MG/1
1 CAPSULE ORAL DAILY
Status: DISCONTINUED | OUTPATIENT
Start: 2019-08-07 | End: 2019-08-07 | Stop reason: HOSPADM

## 2019-08-06 RX ORDER — HEPARIN SODIUM 10000 [USP'U]/100ML
3-30 INJECTION, SOLUTION INTRAVENOUS
Status: DISCONTINUED | OUTPATIENT
Start: 2019-08-06 | End: 2019-08-07 | Stop reason: HOSPADM

## 2019-08-06 RX ORDER — SODIUM CHLORIDE FOR INHALATION 0.9 %
3 VIAL, NEBULIZER (ML) INHALATION EVERY 6 HOURS PRN
Status: DISCONTINUED | OUTPATIENT
Start: 2019-08-06 | End: 2019-08-07 | Stop reason: HOSPADM

## 2019-08-06 RX ADMIN — ISODIUM CHLORIDE 3 ML: 0.03 SOLUTION RESPIRATORY (INHALATION) at 02:12

## 2019-08-06 RX ADMIN — ONDANSETRON 4 MG: 2 INJECTION INTRAMUSCULAR; INTRAVENOUS at 12:18

## 2019-08-06 RX ADMIN — AMIODARONE HYDROCHLORIDE 150 MG: 50 INJECTION, SOLUTION INTRAVENOUS at 22:35

## 2019-08-06 RX ADMIN — PHENYLEPHRINE HYDROCHLORIDE 50 MCG/MIN: 10 INJECTION INTRAVENOUS at 22:35

## 2019-08-06 RX ADMIN — TACROLIMUS 1 MG: 1 CAPSULE ORAL at 08:58

## 2019-08-06 RX ADMIN — TACROLIMUS 0.5 MG: 0.5 CAPSULE ORAL at 22:50

## 2019-08-06 RX ADMIN — HEPARIN SODIUM 5000 UNITS: 5000 INJECTION INTRAVENOUS; SUBCUTANEOUS at 13:37

## 2019-08-06 RX ADMIN — HEPARIN SODIUM 5000 UNITS: 5000 INJECTION INTRAVENOUS; SUBCUTANEOUS at 05:00

## 2019-08-06 RX ADMIN — AMIODARONE HYDROCHLORIDE 1 MG/MIN: 50 INJECTION, SOLUTION INTRAVENOUS at 22:30

## 2019-08-06 RX ADMIN — FUROSEMIDE 20 MG: 10 INJECTION, SOLUTION INTRAVENOUS at 18:27

## 2019-08-06 RX ADMIN — PREDNISONE 5 MG: 5 TABLET ORAL at 09:00

## 2019-08-06 RX ADMIN — ALBUMIN (HUMAN) 12.5 G: 12.5 SOLUTION INTRAVENOUS at 23:13

## 2019-08-06 RX ADMIN — AMIODARONE HYDROCHLORIDE 150 MG: 50 INJECTION, SOLUTION INTRAVENOUS at 23:54

## 2019-08-06 RX ADMIN — LEVALBUTEROL HYDROCHLORIDE 1.25 MG: 1.25 SOLUTION, CONCENTRATE RESPIRATORY (INHALATION) at 14:48

## 2019-08-06 RX ADMIN — FAMOTIDINE 20 MG: 20 TABLET ORAL at 09:00

## 2019-08-06 RX ADMIN — MYCOPHENOLIC ACID 360 MG: 180 TABLET, DELAYED RELEASE ORAL at 18:28

## 2019-08-06 RX ADMIN — AMIODARONE HYDROCHLORIDE 1 MG/MIN: 50 INJECTION, SOLUTION INTRAVENOUS at 22:42

## 2019-08-06 RX ADMIN — ISODIUM CHLORIDE 3 ML: 0.03 SOLUTION RESPIRATORY (INHALATION) at 14:48

## 2019-08-06 RX ADMIN — SULFAMETHOXAZOLE AND TRIMETHOPRIM 1 TABLET: 400; 80 TABLET ORAL at 08:59

## 2019-08-06 RX ADMIN — TAMSULOSIN HYDROCHLORIDE 0.4 MG: 0.4 CAPSULE ORAL at 18:27

## 2019-08-06 RX ADMIN — FUROSEMIDE 20 MG: 10 INJECTION, SOLUTION INTRAVENOUS at 01:27

## 2019-08-06 RX ADMIN — MYCOPHENOLIC ACID 360 MG: 180 TABLET, DELAYED RELEASE ORAL at 08:59

## 2019-08-06 RX ADMIN — METRONIDAZOLE 500 MG: 500 TABLET, FILM COATED ORAL at 05:00

## 2019-08-06 RX ADMIN — ISODIUM CHLORIDE 3 ML: 0.03 SOLUTION RESPIRATORY (INHALATION) at 10:38

## 2019-08-06 RX ADMIN — LEVALBUTEROL HYDROCHLORIDE 1.25 MG: 1.25 SOLUTION, CONCENTRATE RESPIRATORY (INHALATION) at 02:12

## 2019-08-06 RX ADMIN — ATORVASTATIN CALCIUM 10 MG: 10 TABLET, FILM COATED ORAL at 18:27

## 2019-08-06 RX ADMIN — ACETAMINOPHEN 650 MG: 325 TABLET, FILM COATED ORAL at 13:36

## 2019-08-06 RX ADMIN — LEVALBUTEROL HYDROCHLORIDE 1.25 MG: 1.25 SOLUTION, CONCENTRATE RESPIRATORY (INHALATION) at 10:38

## 2019-08-06 RX ADMIN — FUROSEMIDE 20 MG: 10 INJECTION, SOLUTION INTRAMUSCULAR; INTRAVENOUS at 09:10

## 2019-08-06 NOTE — PROGRESS NOTES
NEPHROLOGY PROGRESS NOTE    Patient: Nils Pleitez               Sex: male          DOA: 7/31/2019  7:51 PM   YOB: 1950        Age:  77 y o         LOS:  LOS: 6 days   8/6/2019    REASON FOR THE CONSULTATION:      History of renal transplantation    SUBJECTIVE     Patient seen and examined in the intensive care unit  Continues to be short of breath  Chest x-ray revealing mild vascular congestion and small bilateral pleural effusion      CURRENT MEDICATIONS       Current Facility-Administered Medications:     acetaminophen (TYLENOL) tablet 650 mg, 650 mg, Oral, Q6H PRN, SARBJIT BarrosoNP, 650 mg at 08/04/19 0026    atorvastatin (LIPITOR) tablet 10 mg, 10 mg, Oral, Daily With Giulia Ramirez, SARBJITNP, 10 mg at 08/05/19 1827    famotidine (PEPCID) tablet 20 mg, 20 mg, Oral, Daily, Mehnaz Mclaughlin CRNP, 20 mg at 08/06/19 0900    furosemide (LASIX) injection 20 mg, 20 mg, Intravenous, BID (diuretic), SARBJIT BarrosoNP, 20 mg at 08/06/19 0910    heparin (porcine) subcutaneous injection 5,000 Units, 5,000 Units, Subcutaneous, Q8H [facility], 5,000 Units at 08/06/19 0500 **AND** [CANCELED] Platelet count, , , Once, Tristin Carpenter MD    HYDROmorphone (DILAUDID) injection 0 2 mg, 0 2 mg, Intravenous, Q4H PRN, POLI Barroso    levalbuterol [facility]) inhalation solution 1 25 mg, 1 25 mg, Nebulization, Q6H, Siddhartha Roesn MD, 1 25 mg at 08/06/19 0212    metoprolol tartrate (LOPRESSOR) tablet 25 mg, 25 mg, Oral, HS, Mehnaz Mclaughlin, CRNP, 25 mg at 08/05/19 5844    mycophenolic acid (MYFORTIC) EC tablet 360 mg, 360 mg, Oral, BID, Mehnaz Mclaughlin, CRNP, 360 mg at 08/06/19 0859    ondansetron (ZOFRAN) injection 4 mg, 4 mg, Intravenous, Q6H PRN, POLI Barroso, 4 mg at 08/02/19 2701    oxyCODONE (ROXICODONE) IR tablet 10 mg, 10 mg, Oral, Q4H PRN, POLI Barroso, 10 mg at 08/03/19 0003    oxyCODONE (ROXICODONE) IR tablet 5 mg, 5 mg, Oral, Q4H PRN, Yessica Abdul GvazSARBJIT truongNP, 5 mg at 08/05/19 2010    predniSONE tablet 5 mg, 5 mg, Oral, Daily, Suki Amarillo, CRNP, 5 mg at 08/06/19 0900    sodium chloride 0 9 % inhalation solution 3 mL, 3 mL, Nebulization, Q6H, Otoniel Reeves MD, 3 mL at 08/06/19 0212    sulfamethoxazole-trimethoprim (BACTRIM) 400-80 mg per tablet 1 tablet, 1 tablet, Oral, Daily, Suki , CRNP, 1 tablet at 08/06/19 0859    tacrolimus (PROGRAF) capsule 1 mg, 1 mg, Oral, BID, Suki Amarillo, CRNP, 1 mg at 08/06/19 0858    tamsulosin (FLOMAX) capsule 0 4 mg, 0 4 mg, Oral, Daily With SARBJIT DrakeNP, 0 4 mg at 08/05/19 1828    REVIEW OF SYSTEMS     Review of Systems   Constitutional: Negative  HENT: Negative  Eyes: Negative  Respiratory: Positive for cough and shortness of breath  Cardiovascular: Negative  Gastrointestinal: Negative  Endocrine: Negative  Genitourinary: Negative  Musculoskeletal: Negative  Skin: Negative  Allergic/Immunologic: Negative  Neurological: Negative  Hematological: Negative  All other systems reviewed and are negative  OBJECTIVE     Current Weight: Weight - Scale: 68 kg (149 lb 14 6 oz)  Vitals:    08/06/19 0700   BP: 111/62   Pulse: 78   Resp: 20   Temp: 98 3 °F (36 8 °C)   SpO2: 98%     Body mass index is 25 73 kg/m²  Intake/Output Summary (Last 24 hours) at 8/6/2019 0942  Last data filed at 8/6/2019 0530  Gross per 24 hour   Intake    Output 3270 ml   Net -3270 ml       PHYSICAL EXAMINATION     Physical Exam   Constitutional: He is oriented to person, place, and time  He appears well-developed and well-nourished  HENT:   Head: Normocephalic and atraumatic  Eyes: Pupils are equal, round, and reactive to light  Neck: Neck supple  Cardiovascular: Normal rate, regular rhythm and normal heart sounds  Pulmonary/Chest: Effort normal  He has rales  Abdominal: Soft  Bowel sounds are normal    Musculoskeletal: Normal range of motion  Neurological: He is alert and oriented to person, place, and time  Skin: Skin is warm  Psychiatric: He has a normal mood and affect  LAB RESULTS     Results from last 7 days   Lab Units 08/06/19  0507 08/05/19  1222 08/05/19  0514 08/04/19  2102 08/04/19  0520 08/03/19  0551 08/02/19  0537 08/01/19  0554 07/31/19  1542   WBC Thousand/uL 5 97  --  7 59  --  8 96 9 47 9 62 13 16* 16 21*   HEMOGLOBIN g/dL 15 5  --  15 7  --  13 9 14 7 14 5 14 7 16 8   I STAT HEMOGLOBIN g/dl  --   --   --  16 0  --   --   --   --   --    HEMATOCRIT % 48 5  --  48 4  --  44 0 47 7 46 2 46 5 53 2*   HEMATOCRIT, ISTAT %  --   --   --  47  --   --   --   --   --    PLATELETS Thousands/uL 199  --  214  --  237 240 201 204 252   POTASSIUM mmol/L 4 0 3 9 4 2  --  5 0 4 9 4 5 4 1 4 6   CHLORIDE mmol/L 99* 97* 99*  --  106 105 105 107 103   CO2 mmol/L 30 25 25  --  25 24 21 21 26   CO2, I-STAT mmol/L  --   --   --  24  --   --   --   --   --    BUN mg/dL 25 25 23  --  31* 31* 19 21 27*   CREATININE mg/dL 1 28 1 36* 1 15  --  1 23 1 23 1 15 1 25 1 36*   EGFR ml/min/1 73sq m 57 53 65  --  60 60 65 59 53   CALCIUM mg/dL 9 0 9 2 9 1  --  8 6 8 5 8 5 8 5 9 5   MAGNESIUM mg/dL 2 1 1 8  --   --   --   --   --  1 9  --    PHOSPHORUS mg/dL  --   --   --   --   --   --   --  3 0  --    GLUCOSE, ISTAT mg/dl  --   --   --  112  --   --   --   --   --            RADIOLOGY RESULTS      Results for orders placed during the hospital encounter of 12/06/16   XR chest portable    Narrative CHEST     INDICATION:  CHF    COMPARISON:  12/12/2016    VIEWS:   AP frontal;  1 image    FINDINGS:      Endotracheal tube, right internal jugular central venous catheter, and nasogastric tube have been removed  Heart shadow is enlarged but stable from prior exam     Pulmonary edema has been improved  Small bilateral pleural effusions unchanged  No pneumothorax  Visualized osseous structures appear within normal limits for the patient's age        Impression 1   Interval removal of lines and tubes  2   Improved pulmonary edema  3   Unchanged small bilateral pleural effusions  Workstation performed: [id number]       Results for orders placed during the hospital encounter of 07/31/19   XR chest pa & lateral    Narrative CHEST     INDICATION:   Pre-op  COMPARISON:  Chest x-ray 6/4/2019    EXAM PERFORMED/VIEWS:  XR CHEST PA & LATERAL      FINDINGS:  Surgical clips overlie the right perihilar region  There has been prior cardiac valve replacement  There is stable moderate cardiomegaly  Right hemidiaphragm is mildly elevated similar to the prior exam   Underlying lucency here is likely related to colonic position  New small right pleural effusion  Stable fullness of the pulmonary vasculature  Osseous structures appear within normal limits for patient age  Impression New small right pleural effusion  Stable fullness of the pulmonary vasculature may be related to chronic congestive changes  Workstation performed: [id number]         ASSESSMENT/PLAN     77years old male with past medical history of CKD status post living unrelated renal transplant in the year 2011 that was preceded by a 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, probable polycystic kidney disease, hypertension who presented to the hospital to undergo laparoscopic cholecystectomy  1  CKD status post living unrelated renal transplantation:  Last transplant performed in the year 2011 and had done reasonably well on triple immunosuppression of prednisone, Prograf and Myfortic   -baseline serum creatinine 1 1-1 2   -current serum creatinine 1 2 and baseline    2   Shortness of breath:  Etiology of shortness of breath likely severe mitral stenosis revealed on echocardiogram   Despite diuresing adequately over the past 48 hours, patient with persistent decreased breath sounds on exam and x-ray revealing vascular congestion   -attempt to keep patient on Lasix 20 IV mg b i d   -cardiology evaluation pending  3  Immunosuppression:  Remains on Prograf, Myfortic and prednisone   -true Prograf level noted to be 13 and elevated  -will decrease Prograf dose to 1 mg in a m  And 0 5 mg in p m  4  Acute cholecystitis:  Postop day 4  Laparoscopic cholecystectomy for acalculous cholecystitis  5  Hypertension:  Normotensive at present time  6  Urinary retention:  Now with Manrique catheter placement  Urology evaluation done recommendations noted  6  Hyponatremia:  Noted sodium level of 134  Likely secondary to hypervolemia        Ivet Abreu MD  Nephrology  8/6/2019

## 2019-08-06 NOTE — PROGRESS NOTES
Progress Note - Critical Care   Yisel Laureano 77 y o  male MRN: [id number]  Unit/Bed#:  Encounter: [id number]    Assessment:     78 yo M w PMH CAD, AF, renal transplant, GERD, HTN, HLD who underwent an open cholecystectomy on 8/2 and developed some respiratory distress secondary to pulmonary edema and required transfer to the ICU for HFNC and diuresis       Principal Problem:    Acute calculous cholecystitis/status post cholecystectomy  Active Problems:    CAD (coronary artery disease)    H/O kidney transplant    HLD (hyperlipidemia)    Benign localized prostatic hyperplasia with lower urinary tract symptoms (LUTS)    Urinary retention-postop    Acute pulmonary insufficiency      Plan:          Neuro:    Delirium precautions, sleep hygiene, daily CAM-ICU    Acute post-op pain - PRN tylenol, oxy 5/10, dilaudid                  CV:    HTN - metoprolol 25mg QD    HLD - lipitor                  Lung:    Acute hypoxic respiratory insufficiency - continue HFNC and wean as tolerated, pulmonary toilet, consider PE if no improvement w diuresis however contrast scan contra-indicated given renal transplant hx     Atelectasis - IS and flutter every hour, analgesia, mobilize, encourage coughing, oscillating vest     Pulmonary edema and pleural effusions - diurese w 20mg lasix as needed, goal negative fluid balance, BNP > 4K, ECHO pending                 GI:    Acalculous cholecystitis s/p open cholecystectomy, POD 4 - await spontaneous return of bowel function, passing flatus, monitor for spontaneous bowel movements     GERD - no listed home meds for this, on pepcid QD here, eating and does not require ppx                  FEN:    Fluids - no mIVF    Lytes - monitor / trend lytes and replete as needed    Nutrition - clear liquid                  :    Hx of renal transplant (2001 / 2011) - continue home immunosuppressive agents myfortic, tacrolimus, prednisone and prophylactic bactrim      Urinary retention - daily flomax, notify urology prior to removing marrero, needs void trial 48h prior to d/c once ambulatory / off narcotics                  ID:    Antibiotics discontinued (other than daily PO bactrim) as no obvious infectious process, afebrile wo leukocytosis, no infiltrates noted on CT chest wo contrast yesterday, legionella and s pna antigens negative, MRSA swab pending                 Heme:    DVT ppx - SQH, SCDs                  Endo:    Stable - no acute issues   Monitor daily BS on chemistry panel                            Msk/Skin:    Repetitive repositioning and off-loading to prevent skin break down and ulcerative formation                 Disposition:    Continue SD level of care       Chief Complaint: "a little short of breath"    HPI/24hr events: Patient transferred to the ICU on POD 3 for acute hypoxic respiratory failure w pulmonary edema, placed on HFNC, given lasix to help diurese  Overnight he had an episode of hypoxia while laying flat to sleep  When the nurse arrived to the room his O2 was off and he had tried to fix this but placed it upside down  After about 20 - 30 min sats slowly improved to the high 90s - 100%  Physical Exam: Physical Exam   Constitutional: He is oriented to person, place, and time  He appears well-developed and well-nourished  No distress  Prefers to sit / sleep in upright position    HENT:   Head: Normocephalic and atraumatic  Eyes: Pupils are equal, round, and reactive to light  Neck: Normal range of motion  Neck supple  No tracheal deviation present  Cardiovascular: Normal rate, regular rhythm, normal heart sounds and intact distal pulses  Exam reveals no gallop and no friction rub  No murmur heard  Regular rate / rhythm w infrequent PVCs    Pulmonary/Chest: Effort normal and breath sounds normal  No respiratory distress  He has no wheezes  He has no rales     35L and 40% FiO2   Diminished breath sounds in bilateral bases, R > L   Short of breath w sats in 80s when laying flat, improved w sitting up    Abdominal: Soft  Bowel sounds are normal  He exhibits no distension and no mass  There is no tenderness (mild ttp in the RUQ to deep palpation, abdomen soft)  There is no guarding  Incision to RUQ, no drains   Genitourinary:   Genitourinary Comments: Manrique w jacqueline urine in bag   Musculoskeletal: Normal range of motion  He exhibits no edema or deformity  Ranging all extremities equally and normally   Normal motor / sensory strength   Neurological: He is alert and oriented to person, place, and time  GCS 15   Alert and oriented x 3    Skin: Skin is warm and dry  He is not diaphoretic  No peripheral edema    Psychiatric: He has a normal mood and affect  His behavior is normal    Nursing note and vitals reviewed  Vitals:    19 1817 19 2000 19 2359   BP:  101/70  122/66   BP Location:  Right arm  Right arm   Pulse: 71 75  64   Resp: (!) 27 22  15   Temp:  98 °F (36 7 °C)  98 2 °F (36 8 °C)   TempSrc:  Oral  Oral   SpO2: 97% 96% 97% (!) 88%   Weight:       Height:                 Temperature:   Temp (24hrs), Av 9 °F (36 6 °C), Min:97 8 °F (36 6 °C), Max:98 2 °F (36 8 °C)    Current: Temperature: 98 2 °F (36 8 °C)    Weights:   IBW: 59 2 kg    Body mass index is 25 73 kg/m²  Weight (last 2 days)     None          Hemodynamic Monitoring:  N/A     Non-Invasive/Invasive Ventilation Settings:  Respiratory    Lab Data (Last 4 hours)    None         O2/Vent Data (Last 4 hours)       0031          Non-Invasive Ventilation Mode HFNC                 No results found for: PHART, EJC6CZS, PO2ART, PIU4PSN, J2SUEKTW, BEART, SOURCE  SpO2: SpO2: (!) 88 %      Intake and Outputs:  I/O       701 -  07 -  0700    P  O  740 120    Total Intake(mL/kg) 740 (10 9) 120 (1 8)    Urine (mL/kg/hr) 7325 (4 5) 3600 (2 2)    Stool 0     Total Output 7325 3600    Net -3544 -4492          Unmeasured Urine Occurrence 0 x Unmeasured Stool Occurrence 0 x         UOP: 50/hour   Nutrition:        Diet Orders   (From admission, onward)             Start     Ordered    08/05/19 1132  Dietary nutrition supplements  Once     Question Answer Comment   Select Supplement: Ensure Clear Apple    Frequency Breakfast, Lunch, Dinner        08/05/19 1131    08/05/19 1036  Diet Clear Liquid  Diet effective now     Question Answer Comment   Diet Type Clear Liquid    RD to adjust diet per protocol?  Yes        08/05/19 1035                  Labs:   Results from last 7 days   Lab Units 08/05/19  0514 08/04/19  2102 08/04/19  0520 08/03/19  0551 08/02/19  0537 08/01/19  0554   WBC Thousand/uL 7 59  --  8 96 9 47 9 62 13 16*   HEMOGLOBIN g/dL 15 7  --  13 9 14 7 14 5 14 7   I STAT HEMOGLOBIN g/dl  --  16 0  --   --   --   --    HEMATOCRIT % 48 4  --  44 0 47 7 46 2 46 5   HEMATOCRIT, ISTAT %  --  47  --   --   --   --    PLATELETS Thousands/uL 214  --  237 240 201 204   NEUTROS PCT % 79*  --   --   --  93* 83*   MONOS PCT % 12  --   --   --  2* 9      Results from last 7 days   Lab Units 08/05/19  1222 08/05/19  0514 08/04/19 2102 08/04/19  0520 08/03/19  0551 08/02/19  0537   SODIUM mmol/L 134* 132*  --  133* 135* 137   POTASSIUM mmol/L 3 9 4 2  --  5 0 4 9 4 5   CHLORIDE mmol/L 97* 99*  --  106 105 105   CO2 mmol/L 25 25  --  25 24 21   CO2, I-STAT mmol/L  --   --  24  --   --   --    BUN mg/dL 25 23  --  31* 31* 19   CREATININE mg/dL 1 36* 1 15  --  1 23 1 23 1 15   CALCIUM mg/dL 9 2 9 1  --  8 6 8 5 8 5   ALK PHOS U/L  --  52  --   --  59 68   ALT U/L  --  28  --   --  69 98*   AST U/L  --  30  --   --  63* 61*   GLUCOSE, ISTAT mg/dl  --   --  112  --   --   --      Results from last 7 days   Lab Units 08/05/19  1222 08/01/19  0554   MAGNESIUM mg/dL 1 8 1 9     Results from last 7 days   Lab Units 08/01/19  0554   PHOSPHORUS mg/dL 3 0              0   Lab Value Date/Time    TROPONINI <0 02 07/31/2019 2016    TROPONINI <0 02 04/03/2017 2132 Imaging:   XR chest portable ICU   Final Result      Improved aeration of the lungs compared with yesterday  There are persistent small pleural effusions  Persistent pneumoperitoneum reportedly due to prior surgery  Workstation performed: [id number]         CT chest wo contrast   Final Result      1  Hypoinflated lungs with small bilateral pleural effusions and adjacent opacities   2  Pneumoperitoneum consistent with recent surgery   3  Cardiomegaly               Workstation performed: [id number]         XR chest portable   Final Result      New bilateral pleural effusions and subjacent lower lobes opacities, most likely atelectasis and/or aspiration/infection  Workstation performed: [id number]         XR chest pa & lateral   Final Result      New small right pleural effusion  Stable fullness of the pulmonary vasculature may be related to chronic congestive changes  Workstation performed: [id number]         US gallbladder   Final Result      Gallbladder sludge with mild to moderate gallbladder distention, gallbladder wall thickening as well as positive sonographic Randall sign, and pericholecystic fluid; findings taken together are suspicious for acute cholecystitis in the appropriate    clinical setting  Correlation with the patient's symptoms and laboratory values recommended  Pancreas essentially obscured by overlying bowel gas  Severe right renal atrophy  Other findings as above  Workstation performed: [id number]         CT abdomen pelvis wo contrast   Final Result      Distended gallbladder with wall thickening and surrounding fat stranding may indicate cholecystitis  No radiopaque gallstones  Follow-up with right upper quadrant ultrasound  The study was marked in [facility] for immediate notification           Workstation performed: [id number]         XR chest portable ICU    (Results Pending)      I have personally reviewed pertinent reports  and I have personally reviewed pertinent films in PACS    EKG: as per tele NSR w intermittent PVCs    Micro:  Lab Results   Component Value Date    BLOODCX No Growth After 5 Days  07/31/2019    BLOODCX No Growth After 5 Days  07/31/2019    BLOODCX No Growth After 5 Days  04/03/2017    URINECX No Growth <1000 cfu/mL 04/03/2017    URINECX No Growth <1000 cfu/mL 12/05/2016    SPUTUMCULTUR 3+ Growth of Mixed Respiratory Dorota 04/04/2017    SPUTUMCULTUR 3+ Growth of Mixed Respiratory Dorota 12/08/2016    SPUTUMCULTUR 2+ Growth of Mixed Respiratory Dorota 12/06/2016       Allergies:    Allergies   Allergen Reactions    Other        Medications:   Scheduled Meds:    Current Facility-Administered Medications:  acetaminophen 650 mg Oral Q6H PRN Lollie Reason, CRNP   atorvastatin 10 mg Oral Daily With [employer], CRNP   famotidine 20 mg Oral Daily Lollie Reason, CRNP   heparin (porcine) 5,000 Units Subcutaneous [facility] Lollie Reason, CRNP   HYDROmorphone 0 2 mg Intravenous Q4H PRN Lollie Reason, CRNP   levalbuterol 1 25 mg Nebulization Q6H Frannie Reynoso MD   metoprolol tartrate 25 mg Oral HS Lollie Reason, CRNP   metroNIDAZOLE 500 mg Oral [facility] Gio Prasad, CRNP   mycophenolate 360 mg Oral BID Lollie Reason, CRNP   ondansetron 4 mg Intravenous Q6H PRN Lollie Reason, CRNP   oxyCODONE 10 mg Oral Q4H PRN Lollie Reason, CRNP   oxyCODONE 5 mg Oral Q4H PRN Lollie Reason, CRNP   predniSONE 5 mg Oral Daily Lollie Reason, CRNP   sodium chloride 3 mL Nebulization Q6H Frannie Reynoso MD   sulfamethoxazole-trimethoprim 1 tablet Oral Daily Lollie Reason, CRNP   tacrolimus 1 mg Oral BID Lollie Reason, CRNP   tamsulosin 0 4 mg Oral Daily With Gerry Amezcua, CRNP     Continuous Infusions:   PRN Meds:    acetaminophen 650 mg Q6H PRN   HYDROmorphone 0 2 mg Q4H PRN   ondansetron 4 mg Q6H PRN   oxyCODONE 10 mg Q4H PRN   oxyCODONE 5 mg Q4H PRN       VTE Pharmacologic Prophylaxis: Sequential compression device (Venodyne)  and Heparin  VTE Mechanical Prophylaxis: sequential compression device    Invasive lines and devices: Invasive Devices     Peripheral Intravenous Line            Peripheral IV 04/03/17 Right Antecubital 854 days    Peripheral IV 08/05/19 Right Forearm 1 day          Drain            NG/OG/Enteral Tube Orogastric 18 Fr Center mouth 3 days    Urethral Catheter 16 Fr  1 day                   Counseling / Coordination of Care  Total Critical Care time spent 36 minutes excluding procedures, teaching and family updates  Code Status: Level 1 - Full Code     Portions of the record may have been created with voice recognition software  Occasional wrong word or "sound a like" substitutions may have occurred due to the inherent limitations of voice recognition software  Read the chart carefully and recognize, using context, where substitutions have occurred       Chase Chang PA-C

## 2019-08-06 NOTE — RESPIRATORY THERAPY NOTE
RT Ventilator Management Note  Lindsey Ruiz 77 y o  male MRN: [id number]  Unit/Bed#:  Encounter: [id number]      Daily Screen     No data found in the last 10 encounters              Physical Exam:   Assessment Type: Assess only  General Appearance: Drowsy, Eyes open/responds to voice  Respiratory Pattern: Irregular, Spontaneous, Assisted  Chest Assessment: Chest expansion symmetrical  Bilateral Breath Sounds: Clear, Diminished  Cough: None  O2 Device: v 60  Subjective Data: pateitn having periods of bradypnea and desaturation      Resp Comments: pateint placed on bipap at this time due to periods of bradypnea and desaturation on elevated settings of HFNC

## 2019-08-06 NOTE — PROGRESS NOTES
General Cardiology   Progress Note   Celestino Webb 77 y o  male MRN: [id number]  Unit/Bed#:  Encounter: [id number]    Assessment/Plan:    1  Acute pulmonary insufficiency  with bilateral pleural effusions  -S/P open cholecystectomy on 08/02 and developed respiratory distress secondary to pulmonary edema required transfer to the ICU and high-flow nasal cannula  -recommend increasing IV lasix to 20 mg t i d   -strict I&O's  -management per Critical Care team    2  History of CAD S/P  SHABBIR x1 in 2011   -continue statin, metoprolol tartrate    3  Paroxysmal atrial fibrillation, currently sinus rhythm  -continue to monitor on telemetry    4  Hyperlipidemia  -continue statin    5  S/P Mitral valve repair in 2006   -current ECHO showed moderate mitral stenosis  -LEIDA/cardiac catheterization needed to further evaluate valve, to be completed when patient stable    6  Hypertension, currently stable   -continue metoprolol tartrate  -continue to monitor blood pressures    7  S/P open cholecystectomy  -management per General surgery    8  History of CKD s/p kidney transplant 2011  -continue her immunosuppression  - Nephrology following      Subjective:   Patient seen and examined  No significant events since the last encounter  Patient states his shortness of breathe fluctuates and feels labored at times       REVIEW OF SYSTEMS:  Constitutional:  Denies fever or chills   Eyes:  Denies change in visual acuity   HENT:  Denies nasal congestion or sore throat   Respiratory:  Denies cough, +shortness of breath   Cardiovascular:  Denies chest pain or edema   GI:  + abdominal pain, nausea, vomiting, bloody stools or diarrhea   :  Denies dysuria, frequency, difficulty in micturition and nocturia  Musculoskeletal:  Denies back pain or joint pain   Neurologic:  Denies headache, focal weakness or sensory changes   Endocrine:  Denies polyuria or polydipsia   Lymphatic:  Denies swollen glands   Psychiatric:  Denies depression or anxiety     Objective:   Vitals:  Vitals:    08/06/19 0700   BP: 111/62   Pulse: 78   Resp: 20   Temp: 98 3 °F (36 8 °C)   SpO2: 98%       Body mass index is 25 73 kg/m²  Systolic (58DKO), PIZ:843 , Min:101 , XBT:119     Diastolic (47ISP), KXM:75, Min:56, Max:85      Intake/Output Summary (Last 24 hours) at 8/6/2019 0925  Last data filed at 8/6/2019 0530  Gross per 24 hour   Intake    Output 3270 ml   Net -3270 ml     Weight (last 2 days)     None          Telemetry Review: No significant arrhythmias seen on telemetry review  Sinus rhythm in the 80's      PHYSICAL EXAMS:  General:  Patient is not in acute distress, laying in the bed comfortably, awake, alert responding to commands  Head: Normocephalic, Atraumatic  HEENT: White sclera, pink conjunctiva,  PERRLA,pharynx benign  Neck:  Supple, no neck vein distention, carotids+2/+2 no bruits, thyromegaly, adenopathy  Respiratory: clear to P/A  Cardiovascular:  PMI normal, S1-S2 normal, No  Murmurs, thrills, gallops, rubs   Regular rhythm  GI:  Abdomen soft nontender   No hepatosplenomegaly, adenopathy, ascites,or rebound tenderness  Extremities: No edema, normal pulses, no calf tenderness, no joint deformities, no venous disease   Integument:  No skin rashes or ulceration  Lymphatic:  No cervical or inguinal lymphadenopathy  Neurologic:  Patient is awake alert, responding to command, well-oriented to time and place and person moving all extremities      LABORATORY RESULTS:  Results from last 7 days   Lab Units 08/06/19  0507 07/31/19 2016   TROPONIN I ng/mL <0 02 <0 02     CBC with diff:   Results from last 7 days   Lab Units 08/06/19  0507 08/05/19  0514 08/04/19  2102 08/04/19  0520  08/02/19  0537 08/01/19  0554   WBC Thousand/uL 5 97 7 59  --  8 96   < > 9 62 13 16*   HEMOGLOBIN g/dL 15 5 15 7  --  13 9   < > 14 5 14 7   I STAT HEMOGLOBIN g/dl  --   --  16 0  --   --   --   --    HEMATOCRIT % 48 5 48 4  --  44 0   < > 46 2 46 5   HEMATOCRIT, ISTAT %  --   -- 47  --   --   --   --    MCV fL 91 90  --  92   < > 92 93   PLATELETS Thousands/uL 199 214  --  237   < > 201 204   MCH pg 29 0 29 0  --  29 0   < > 28 8 29 3   MCHC g/dL 32 0 32 4  --  31 6   < > 31 4 31 6   RDW % 12 8 12 7  --  13 1   < > 13 4 13 5   MPV fL 8 5* 8 4*  --  8 6*   < > 9 1 9 1   NRBC AUTO /100 WBCs  --  0  --   --   --  0 0    < > = values in this interval not displayed  CMP:  Results from last 7 days   Lab Units 08/06/19  0507 08/05/19  1222 08/05/19  0514 08/04/19 2102 08/03/19  0551 08/02/19  0537   POTASSIUM mmol/L 4 0 3 9 4 2  --    < > 4 9 4 5   CHLORIDE mmol/L 99* 97* 99*  --    < > 105 105   CO2 mmol/L 30 25 25  --    < > 24 21   CO2, I-STAT mmol/L  --   --   --  24  --   --   --    BUN mg/dL 25 25 23  --    < > 31* 19   CREATININE mg/dL 1 28 1 36* 1 15  --    < > 1 23 1 15   GLUCOSE, ISTAT mg/dl  --   --   --  112  --   --   --    CALCIUM mg/dL 9 0 9 2 9 1  --    < > 8 5 8 5   AST U/L  --   --  30  --   --  63* 61*   ALT U/L  --   --  28  --   --  69 98*   ALK PHOS U/L  --   --  52  --   --  59 68   EGFR ml/min/1 73sq m 57 53 65  --    < > 60 65    < > = values in this interval not displayed         BMP:  Results from last 7 days   Lab Units 08/06/19  0507 08/05/19 1222 08/05/19 0514 08/04/19 2102   POTASSIUM mmol/L 4 0 3 9 4 2  --    CHLORIDE mmol/L 99* 97* 99*  --    CO2 mmol/L 30 25 25  --    CO2, I-STAT mmol/L  --   --   --  24   BUN mg/dL 25 25 23  --    CREATININE mg/dL 1 28 1 36* 1 15  --    GLUCOSE, ISTAT mg/dl  --   --   --  112   CALCIUM mg/dL 9 0 9 2 9 1  --          Results from last 7 days   Lab Units 08/05/19  0514   NT-PRO BNP pg/mL 0,037*      Results from last 7 days   Lab Units 08/06/19  0507 08/05/19  1222 08/01/19  0554   MAGNESIUM mg/dL 2 1 1 8 1 9                   Lipid Profile:   No results found for: CHOL  No results found for: HDL  No results found for: LDLCALC  No results found for: TRIG    Cardiac testing:   Results for orders placed during the hospital encounter of 19   Echo complete with contrast if indicated    Narrative [facility] [place], [place] [address]  [phone]    Transthoracic Echocardiogram  2D, M-mode, Doppler, and Color Doppler    Study date:  05-Aug-2019    Patient: Rosa Flores  MR number: [id number]  Account number: [de-identified]  : 1950  Age: 77 years  Gender: Male  Status: Inpatient  Location: Bedside  Height: 64 in  Weight: 149 lb  BP: 87/ 52 mmHg    Indications: Murmur  Diagnoses: R01 1 - Cardiac murmur, unspecified    Sonographer:   [facility] Alexandra Figueroa  Referring Physician:  POLI Rider  Group:  [facility]  Interpreting Physician:  Lupis Fall DO    IMPRESSIONS:  Normal left ventricular size and systolic function with mild concentric hypertrophy  Ejection fraction 55%  Normal right ventricular size with mildly reduced systolic function  Moderately to severely dilated left atrium  Moderately dilated right atrium  Top normal aortic root  S/P surgical mitral valve repair with severe mitral annular calcification and severe calcification of the mitral leaflets with restricted motion and severe mitral stenosis without mitral regurgitation  Cannot rule out bicuspic aortic valve, but normal motion with mild aortic insufficiency  Pulmonic valve is not well visualized  No obvious tricuspid valve pathology  IVC is not well visualized  No evidence of pulmonary hypertension  Compared to prior echocardiogram on 2016, the mitral valve is now severely calcified with severe mitral stenosis (MVA 1 1 cm2, mean gradient 8 5 mmHg), there is mild aortic valve insufficiency, the right ventricular function is mildly  reduced and the aortic root appears less dilated  SUMMARY    LEFT VENTRICLE:  Systolic function was normal by visual assessment  Ejection fraction was estimated to be 55 %  There were no regional wall motion abnormalities    Gaurang Fox thickness was mildly to moderately increased  There was mild concentric hypertrophy  RIGHT VENTRICLE:  Systolic function was mildly reduced  LEFT ATRIUM:  The atrium was moderately to markedly dilated  RIGHT ATRIUM:  The atrium was mildly to moderately dilated  MITRAL VALVE:  There was marked annular calcification  There was marked calcification of the anterior and posterior leaflets  There was markedly reduced leaflet separation  There was severely restricted mobility  Transmitral velocity was increased due to valvular stenosis  There was severe stenosis  Mean transmitral gradient was 8 49 mmHg  Mitral valve area by pressure half-time was 1 1 cmï¾²  AORTIC VALVE:  The valve was not visualized well enough to rule out a bicuspid morphology  Leaflets exhibited mildly increased thickness, mild calcification, and normal cuspal separation  There was mild regurgitation  Regurgitation grade was 1+ on a scale of 0 to 4+  COMPARISONS:  Compared to prior echocardiogram on 12/8/2016, the mitral valve is now severely calcified with severe mitral stenosis (MVA 1 1 cm2, mean gradient 8 5 mmHg), there is mild aortic valve insufficiency, the right ventricular function is mildly  reduced and the aortic root appears less dilated  HISTORY: PRIOR HISTORY: CAD, MV repair, CKD, Elevated BNP, Atrial fibrillation  Risk factors: hypertension and hypercholesterolemia  PROCEDURE: The procedure was performed at the bedside  This was a routine study  The transthoracic approach was used  The study included complete 2D imaging, M-mode, complete spectral Doppler, and color Doppler  The heart rate was 75 bpm,  at the start of the study  Images were obtained from the parasternal, apical, subcostal, and suprasternal notch acoustic windows  Echocardiographic views were limited due to poor acoustic window availability, decreased penetration, and  lung interference  This was a technically difficult study      LEFT VENTRICLE: Size was normal  Systolic function was normal by visual assessment  Ejection fraction was estimated to be 55 %  There were no regional wall motion abnormalities  Wall thickness was mildly to moderately increased  There was  mild concentric hypertrophy  DOPPLER: The study was not technically sufficient to allow evaluation of LV diastolic function  RIGHT VENTRICLE: The size was normal  Systolic function was mildly reduced  LEFT ATRIUM: The atrium was moderately to markedly dilated  RIGHT ATRIUM: The atrium was mildly to moderately dilated  MITRAL VALVE: There was marked annular calcification  There was marked calcification of the anterior and posterior leaflets  There was markedly reduced leaflet separation  There was severely restricted mobility  Prior repair procedures:  surgical repair A annular ring prosthesis was present  DOPPLER: Transmitral velocity was increased due to valvular stenosis  There was severe stenosis  There was no regurgitation  AORTIC VALVE: The valve was not visualized well enough to rule out a bicuspid morphology  Leaflets exhibited mildly increased thickness, mild calcification, and normal cuspal separation  DOPPLER: Transaortic velocity was within the normal  range  There was no evidence for stenosis  There was mild regurgitation  Regurgitation grade was 1+ on a scale of 0 to 4+  TRICUSPID VALVE: The valve structure was normal  There was normal leaflet separation  DOPPLER: The transtricuspid velocity was within the normal range  There was no evidence for stenosis  There was no regurgitation  PULMONIC VALVE: Not well visualized  PERICARDIUM: There was no pericardial effusion  The pericardium was normal in appearance  AORTA: The root exhibited upper limit of normal size  SYSTEMIC VEINS: IVC: Not assessed  HEPATIC VEINS: Not assessed      MEASUREMENT TABLES    DOPPLER MEASUREMENTS  Mitral valve   (Reference normals)  Peak gradient   13 mmHg   (--)  Mean gradient   8 49 mmHg   (--)  Pressure half-time   208 ms   (--)  Valve area, PHT   1 1 cmï¾²   (--)    SYSTEM MEASUREMENT TABLES    2D  %FS: 22 3 %  Ao Diam: 3 5 cm  EDV(Teich): 58 1 ml  EF(Teich): 45 7 %  ESV(Teich): 31 5 ml  IVSd: 1 4 cm  LA Area: 26 cm2  LA Diam: 3 9 cm  LVEDV MOD A4C: 103 6 ml  LVEF MOD A4C: 54 4 %  LVESV MOD A4C: 47 2 ml  LVIDd: 3 7 cm  LVIDs: 2 9 cm  LVLd A4C: 8 7 cm  LVLs A4C: 7 2 cm  LVOT Diam: 2 2 cm  LVPWd: 1 2 cm  RA Area: 18 4 cm2  RVIDd: 3 8 cm  SV MOD A4C: 56 4 ml  SV(Teich): 26 6 ml    CW  AR Dec Obion: 1 1 m/s2  AR Dec Time: 2978 ms  AR PHT: 863 6 ms  AR Vmax: 3 4 m/s  AR maxP 3 mmHg  AV Env  Ti: 239 8 ms  AV VTI: 30 7 cm  AV Vmax: 1 7 m/s  AV Vmean: 1 3 m/s  AV maxP 5 mmHg  AV meanP 1 mmHg  HR: 136 6 BPM  MV VTI: 65 7 cm  MV Vmax: 1 9 m/s  MV Vmean: 1 5 m/s  MV maxP mmHg  MV meanP 5 mmHg    MM  TAPSE: 1 cm    PW  DEBI (VTI): 3 6 cm2  DEBI Vmax: 3 1 cm2  E': 0 m/s  E/E': 54 3  LVOT Env  Ti: 308 3 ms  LVOT VTI: 30 3 cm  LVOT Vmax: 1 4 m/s  LVOT Vmean: 1 m/s  LVOT maxP 8 mmHg  LVOT meanP 3 mmHg  LVSV Dopp: 112 2 ml  MV A Joaquin: 1 6 m/s  MV Dec Obion: 2 1 m/s2  MV DecT: 706 8 ms  MV E Joaquin: 1 5 m/s  MV E/A Ratio: 0 9  MV PHT: 205 ms  MVA (VTI): 1 7 cm2  MVA By PHT: 1 1 cm2    [address] Accredited Echocardiography Laboratory    Prepared and electronically signed by    Donte Thomas DO  Signed 05-Aug-2019 15:23:37         Meds/Allergies   all current active meds have been reviewed  Medications Prior to Admission   Medication    sulfamethoxazole-trimethoprim (BACTRIM) 400-80 mg per tablet    aspirin 81 mg chewable tablet    atorvastatin (LIPITOR) 10 mg tablet    metoprolol tartrate (LOPRESSOR) 25 mg tablet    mycophenolate (MYFORTIC) 360 MG TBEC    Omega-3 Fatty Acids (FISH OIL CONCENTRATE) 300 MG CAPS    predniSONE 5 mg tablet    tacrolimus (PROGRAF) 1 mg capsule    tamsulosin (FLOMAX) 0 4 mg          Counseling / Coordination of Care  Total floor / unit time spent today 15 minutes  Greater than 50% of total time was spent with the patient and / or family counseling and / or coordination of care  ** Please Note: Dragon 360 Dictation voice to text software may have been used in the creation of this document   **

## 2019-08-06 NOTE — RESPIRATORY THERAPY NOTE
Patient placed on bipap at this time due to worsening respiratory distress with noted periods of bradypnea and desaturation   Dr Alessandro Flores and nursing made aware

## 2019-08-06 NOTE — RESPIRATORY THERAPY NOTE
Pt remains on Vapotherm at this time, pt did require additional flow and SpO2 during HS  Pt would begin to desat while asleep  Settings were increased to keep the patient at 88% or above  Pt was returned to his earlier settings of 35L and 40% when his SpO2 recovered  Attempt to wean as tolerated         08/06/19 0434   Non-Invasive Information   Interface HFNC prongs   Non-Invasive Ventilation Mode HFNC   $ Pulse Oximetry Spot Check Charge Completed   Resp Comments patient back to earlier settings   Non-Invasive Settings   FiO2 (%) 40   Flow (lpm) 35   Temperature (Set) 34   Non-Invasive Readings   Heater Temperature (Obs) 34   Skin Intervention Skin intact

## 2019-08-06 NOTE — TELEPHONE ENCOUNTER
Pablo Guillen [birth year], whomever can see, will be new to them, seen by dr Maxwell parekh in hospital  Previously saw dr Denise Rosenthal ask if he sees someone else   If not then 1 month fup -Nanci    PT IS STILL IN THE HOSP

## 2019-08-06 NOTE — SOCIAL WORK
CM spoke to pt, wife Jm Zabala and daughter Kimberly Salas at bedside to discuss dcp  Jm Zabala explains that pt was very active prior to this [facility], and walks extensively  All are in agreement that pt will not need [facility] services on discharge  Wife is a nurses aide and has been able to care for him without difficulty  Pt has an extensive hx that includes 2 kidney transplants   Family is very supportive and can transport to appointments and PT if needed  CM will continue to follow through hospitalization

## 2019-08-06 NOTE — PROGRESS NOTES
Progress Note -Surgery PA  Larae Boas 77 y o  male MRN: [id number]  Unit/Bed#:  Encounter: [id number]      Assessment   POD #4, s/p open cholecystectomy  - a surgical incisions with Steri-Strips in place with appropriate tenderness  No surrounding erythema or drainage  Clean/dry/intact  - normoactive bowel sounds, passing flatus, no BM, tolerating clear liquid diet  Acute hypoxic respiratory insufficiency  - continues on high-flow nasal cannula with goal to wean as tolerated  - stable pleural effusions on chest x-ray  - management per critical care team  CKD status post transplant in 2011  - on immunosuppression, creatinine stable at baseline 1 2  - nephrology following  Severe mitral valve stenosis  - reveal on echocardiogram yesterday  - cardiology consultation pending  Urinary retention  - Manrique catheter placed, urology following    Plan   - patient stable from surgical standpoint  Okay to advance diet as tolerated if permitted for critical care team   Patient may need further intervention for severe mitral valve stenosis as seen on echocardiogram yesterday  - continue with care per critical care team   ______________________________________________________________________  Subjective: The patient complains of some labored breathing with deep inspiration  Few expresses his discourage min over his recent findings on echocardiogram and having have Manrique catheter placed  He is feeling well from the surgical standpoint  Incisional pain is well controlled  He is tolerating clear liquid diet with no nausea or vomiting  He is passing flatus  Denies any fever or chills overnight  Objective:    Vitals:  /62   Pulse 78   Temp 98 3 °F (36 8 °C) (Oral)   Resp 20   Ht 5' 4" (1 626 m)   Wt 68 kg (149 lb 14 6 oz)   SpO2 98%   BMI 25 73 kg/m²     I/Os:  I/O last 3 completed shifts: In: 500 [P O :500]  Out: 50593 [Urine:73532]    No intake/output data recorded      Invasive Devices Peripheral Intravenous Line            Peripheral IV 04/03/17 Right Antecubital 854 days    Peripheral IV 08/05/19 Right Forearm 1 day          Drain            NG/OG/Enteral Tube Orogastric 18 Fr Center mouth 3 days    Urethral Catheter 16 Fr  1 day                Medications:  Current Facility-Administered Medications   Medication Dose Route Frequency    acetaminophen (TYLENOL) tablet 650 mg  650 mg Oral Q6H PRN    atorvastatin (LIPITOR) tablet 10 mg  10 mg Oral Daily With Dinner    famotidine (PEPCID) tablet 20 mg  20 mg Oral Daily    furosemide (LASIX) injection 20 mg  20 mg Intravenous BID (diuretic)    heparin (porcine) subcutaneous injection 5,000 Units  5,000 Units Subcutaneous Q8H [address]    HYDROmorphone (DILAUDID) injection 0 2 mg  0 2 mg Intravenous Q4H PRN    levalbuterol (XOPENEX) inhalation solution 1 25 mg  1 25 mg Nebulization Q6H    metoprolol tartrate (LOPRESSOR) tablet 25 mg  25 mg Oral HS    mycophenolic acid (MYFORTIC) EC tablet 360 mg  360 mg Oral BID    ondansetron (ZOFRAN) injection 4 mg  4 mg Intravenous Q6H PRN    oxyCODONE (ROXICODONE) IR tablet 10 mg  10 mg Oral Q4H PRN    oxyCODONE (ROXICODONE) IR tablet 5 mg  5 mg Oral Q4H PRN    predniSONE tablet 5 mg  5 mg Oral Daily    sodium chloride 0 9 % inhalation solution 3 mL  3 mL Nebulization Q6H    sulfamethoxazole-trimethoprim (BACTRIM) 400-80 mg per tablet 1 tablet  1 tablet Oral Daily    tacrolimus (PROGRAF) capsule 0 5 mg  0 5 mg Oral HS    [START ON 8/7/2019] tacrolimus (PROGRAF) capsule 1 mg  1 mg Oral Daily    tamsulosin (FLOMAX) capsule 0 4 mg  0 4 mg Oral Daily With Dinner       Lab Results and Cultures:   CBC with diff:   Lab Results   Component Value Date    WBC 5 97 08/06/2019    HGB 15 5 08/06/2019    HCT 48 5 08/06/2019    MCV 91 08/06/2019     08/06/2019    MCH 29 0 08/06/2019    MCHC 32 0 08/06/2019    RDW 12 8 08/06/2019    MPV 8 5 (L) 08/06/2019    NRBC 0 08/05/2019       BMP/CMP:  Lab Results Component Value Date     (L) 05/18/2016    K 4 0 08/06/2019    K 4 3 05/18/2016    CL 99 (L) 08/06/2019     05/18/2016    CO2 30 08/06/2019    CO2 24 08/04/2019    ANIONGAP 5 03/13/2015    BUN 25 08/06/2019    BUN 24 05/18/2016    CREATININE 1 28 08/06/2019    CREATININE 1 30 (H) 05/18/2016    GLUCOSE 112 08/04/2019    GLUCOSE 87 03/13/2015    CALCIUM 9 0 08/06/2019    CALCIUM 9 4 05/18/2016    AST 30 08/05/2019    AST 23 03/13/2015    ALT 28 08/05/2019    ALT 23 03/13/2015    ALKPHOS 52 08/05/2019    ALKPHOS 63 03/13/2015    PROT 9 0 (H) 03/13/2015    BILITOT 1 09 (H) 03/13/2015    EGFR 57 08/06/2019       Physical Exam:  General Appearance:    Alert and orientated x 3, cooperative, no acute distress, appears stated age, on HFNC   Lungs:     Coarse breath sounds with diminished breath sounds     Heart:    Regular rate and rhythm   Abdomen:    Normoactive BS, soft, appropriate mild tenderness at right upper quadrant surgical incision, Steri-Strips in place, no drainage, clean/dry/intact, no surrounding erythema, induration, or fluctuance, non rigid, no masses, no palpated organomegaly   Extremities:  Extremities normal, no calf tenderness, no cyanosis or edema   Pulses:   2+ and symmetric all extremities   Skin:   Skin color, texture, turgor normal, no rashes   Neurologic:   CNII-XII intact, normal strength, affect appropriate       Imaging:  Ct Abdomen Pelvis Wo Contrast    Result Date: 7/31/2019  Impression: Distended gallbladder with wall thickening and surrounding fat stranding may indicate cholecystitis  No radiopaque gallstones  Follow-up with right upper quadrant ultrasound  The study was marked in [facility] for immediate notification  Workstation performed: [id number]     Xr Chest Portable    Result Date: 8/5/2019  Impression: New bilateral pleural effusions and subjacent lower lobes opacities, most likely atelectasis and/or aspiration/infection   Workstation performed: [id number]     Xr Chest Pa & Lateral    Result Date: 8/1/2019  Impression: New small right pleural effusion  Stable fullness of the pulmonary vasculature may be related to chronic congestive changes  Workstation performed: [id number]     Xr Abdomen Complete Inc Upright And/or Decubitus    Result Date: 8/1/2019  Impression: Nonspecific bowel gas pattern without evidence to suggest bowel obstruction  Workstation performed: [id number]     Ct Chest Wo Contrast    Result Date: 8/5/2019  Impression: 1  Hypoinflated lungs with small bilateral pleural effusions and adjacent opacities 2  Pneumoperitoneum consistent with recent surgery 3  Cardiomegaly Workstation performed: [id number]     Us Gallbladder    Result Date: 7/31/2019  Impression: Gallbladder sludge with mild to moderate gallbladder distention, gallbladder wall thickening as well as positive sonographic Randall sign, and pericholecystic fluid; findings taken together are suspicious for acute cholecystitis in the appropriate clinical setting  Correlation with the patient's symptoms and laboratory values recommended  Pancreas essentially obscured by overlying bowel gas  Severe right renal atrophy  Other findings as above  Workstation performed: [id number]     Xr Chest Portable Icu    Result Date: 8/5/2019  Impression: Improved aeration of the lungs compared with yesterday  There are persistent small pleural effusions  Persistent pneumoperitoneum reportedly due to prior surgery   Workstation performed: [id number]       VTE Pharmacologic Prophylaxis: Heparin  VTE Mechanical Prophylaxis: sequential compression device    Hallie Zeng PA-C   8/6/2019

## 2019-08-07 ENCOUNTER — APPOINTMENT (INPATIENT)
Dept: RADIOLOGY | Facility: HOSPITAL | Age: 69
DRG: 414 | End: 2019-08-07
Payer: MEDICARE

## 2019-08-07 VITALS
OXYGEN SATURATION: 97 % | DIASTOLIC BLOOD PRESSURE: 54 MMHG | HEIGHT: 64 IN | BODY MASS INDEX: 25.59 KG/M2 | WEIGHT: 149.91 LBS | RESPIRATION RATE: 25 BRPM | SYSTOLIC BLOOD PRESSURE: 95 MMHG | HEART RATE: 70 BPM | TEMPERATURE: 98.7 F

## 2019-08-07 LAB
ANION GAP SERPL CALCULATED.3IONS-SCNC: 7 MMOL/L (ref 4–13)
APTT PPP: 36 SECONDS (ref 23–37)
APTT PPP: 52 SECONDS (ref 23–37)
APTT PPP: 68 SECONDS (ref 23–37)
ATRIAL RATE: 67 BPM
ATRIAL RATE: 84 BPM
BASOPHILS # BLD AUTO: 0.02 THOUSANDS/ΜL (ref 0–0.1)
BASOPHILS NFR BLD AUTO: 0 % (ref 0–1)
BUN SERPL-MCNC: 34 MG/DL (ref 5–25)
CALCIUM SERPL-MCNC: 8.8 MG/DL (ref 8.3–10.1)
CHLORIDE SERPL-SCNC: 99 MMOL/L (ref 100–108)
CO2 SERPL-SCNC: 28 MMOL/L (ref 21–32)
CREAT SERPL-MCNC: 1.65 MG/DL (ref 0.6–1.3)
EOSINOPHIL # BLD AUTO: 0.32 THOUSAND/ΜL (ref 0–0.61)
EOSINOPHIL NFR BLD AUTO: 3 % (ref 0–6)
ERYTHROCYTE [DISTWIDTH] IN BLOOD BY AUTOMATED COUNT: 12.7 % (ref 11.6–15.1)
ERYTHROCYTE [DISTWIDTH] IN BLOOD BY AUTOMATED COUNT: 12.9 % (ref 11.6–15.1)
GFR SERPL CREATININE-BSD FRML MDRD: 42 ML/MIN/1.73SQ M
GLUCOSE SERPL-MCNC: 127 MG/DL (ref 65–140)
HCT VFR BLD AUTO: 43.2 % (ref 36.5–49.3)
HCT VFR BLD AUTO: 47.8 % (ref 36.5–49.3)
HGB BLD-MCNC: 14.1 G/DL (ref 12–17)
HGB BLD-MCNC: 15.6 G/DL (ref 12–17)
IMM GRANULOCYTES # BLD AUTO: 0.18 THOUSAND/UL (ref 0–0.2)
IMM GRANULOCYTES NFR BLD AUTO: 2 % (ref 0–2)
INR PPP: 1.34 (ref 0.84–1.19)
LYMPHOCYTES # BLD AUTO: 1.15 THOUSANDS/ΜL (ref 0.6–4.47)
LYMPHOCYTES NFR BLD AUTO: 10 % (ref 14–44)
MCH RBC QN AUTO: 28.7 PG (ref 26.8–34.3)
MCH RBC QN AUTO: 29.1 PG (ref 26.8–34.3)
MCHC RBC AUTO-ENTMCNC: 32.6 G/DL (ref 31.4–37.4)
MCHC RBC AUTO-ENTMCNC: 32.6 G/DL (ref 31.4–37.4)
MCV RBC AUTO: 88 FL (ref 82–98)
MCV RBC AUTO: 89 FL (ref 82–98)
MONOCYTES # BLD AUTO: 1.12 THOUSAND/ΜL (ref 0.17–1.22)
MONOCYTES NFR BLD AUTO: 10 % (ref 4–12)
NEUTROPHILS # BLD AUTO: 8.57 THOUSANDS/ΜL (ref 1.85–7.62)
NEUTS SEG NFR BLD AUTO: 75 % (ref 43–75)
NRBC BLD AUTO-RTO: 0 /100 WBCS
P AXIS: 19 DEGREES
P AXIS: 36 DEGREES
PLATELET # BLD AUTO: 258 THOUSANDS/UL (ref 149–390)
PLATELET # BLD AUTO: 269 THOUSANDS/UL (ref 149–390)
PMV BLD AUTO: 8.7 FL (ref 8.9–12.7)
PMV BLD AUTO: 9.2 FL (ref 8.9–12.7)
POTASSIUM SERPL-SCNC: 4.1 MMOL/L (ref 3.5–5.3)
PR INTERVAL: 142 MS
PR INTERVAL: 164 MS
PROTHROMBIN TIME: 16.7 SECONDS (ref 11.6–14.5)
QRS AXIS: 102 DEGREES
QRS AXIS: 244 DEGREES
QRSD INTERVAL: 122 MS
QRSD INTERVAL: 140 MS
QT INTERVAL: 434 MS
QT INTERVAL: 476 MS
QTC INTERVAL: 502 MS
QTC INTERVAL: 512 MS
RBC # BLD AUTO: 4.84 MILLION/UL (ref 3.88–5.62)
RBC # BLD AUTO: 5.43 MILLION/UL (ref 3.88–5.62)
SODIUM SERPL-SCNC: 134 MMOL/L (ref 136–145)
T WAVE AXIS: 44 DEGREES
T WAVE AXIS: 55 DEGREES
TACROLIMUS BLD-MCNC: 7.9 NG/ML (ref 2–20)
VENTRICULAR RATE: 67 BPM
VENTRICULAR RATE: 84 BPM
WBC # BLD AUTO: 11.36 THOUSAND/UL (ref 4.31–10.16)
WBC # BLD AUTO: 9.67 THOUSAND/UL (ref 4.31–10.16)

## 2019-08-07 PROCEDURE — 80048 BASIC METABOLIC PNL TOTAL CA: CPT | Performed by: PHYSICIAN ASSISTANT

## 2019-08-07 PROCEDURE — 99232 SBSQ HOSP IP/OBS MODERATE 35: CPT | Performed by: NURSE PRACTITIONER

## 2019-08-07 PROCEDURE — 85025 COMPLETE CBC W/AUTO DIFF WBC: CPT | Performed by: PHYSICIAN ASSISTANT

## 2019-08-07 PROCEDURE — 94660 CPAP INITIATION&MGMT: CPT

## 2019-08-07 PROCEDURE — 93005 ELECTROCARDIOGRAM TRACING: CPT

## 2019-08-07 PROCEDURE — 93010 ELECTROCARDIOGRAM REPORT: CPT | Performed by: INTERNAL MEDICINE

## 2019-08-07 PROCEDURE — NC001 PR NO CHARGE: Performed by: PHYSICIAN ASSISTANT

## 2019-08-07 PROCEDURE — 85027 COMPLETE CBC AUTOMATED: CPT | Performed by: PHYSICIAN ASSISTANT

## 2019-08-07 PROCEDURE — 85730 THROMBOPLASTIN TIME PARTIAL: CPT | Performed by: PHYSICIAN ASSISTANT

## 2019-08-07 PROCEDURE — 99291 CRITICAL CARE FIRST HOUR: CPT | Performed by: STUDENT IN AN ORGANIZED HEALTH CARE EDUCATION/TRAINING PROGRAM

## 2019-08-07 PROCEDURE — 87070 CULTURE OTHR SPECIMN AEROBIC: CPT | Performed by: NURSE PRACTITIONER

## 2019-08-07 PROCEDURE — 85730 THROMBOPLASTIN TIME PARTIAL: CPT | Performed by: ANESTHESIOLOGY

## 2019-08-07 PROCEDURE — 99232 SBSQ HOSP IP/OBS MODERATE 35: CPT | Performed by: INTERNAL MEDICINE

## 2019-08-07 PROCEDURE — 99024 POSTOP FOLLOW-UP VISIT: CPT | Performed by: SURGERY

## 2019-08-07 PROCEDURE — 71045 X-RAY EXAM CHEST 1 VIEW: CPT

## 2019-08-07 PROCEDURE — 94760 N-INVAS EAR/PLS OXIMETRY 1: CPT

## 2019-08-07 PROCEDURE — 85610 PROTHROMBIN TIME: CPT | Performed by: PHYSICIAN ASSISTANT

## 2019-08-07 PROCEDURE — 87205 SMEAR GRAM STAIN: CPT | Performed by: NURSE PRACTITIONER

## 2019-08-07 PROCEDURE — 99292 CRITICAL CARE ADDL 30 MIN: CPT | Performed by: STUDENT IN AN ORGANIZED HEALTH CARE EDUCATION/TRAINING PROGRAM

## 2019-08-07 RX ORDER — AMIODARONE HYDROCHLORIDE 200 MG/1
200 TABLET ORAL 2 TIMES DAILY WITH MEALS
Status: DISCONTINUED | OUTPATIENT
Start: 2019-08-08 | End: 2019-08-07 | Stop reason: HOSPADM

## 2019-08-07 RX ORDER — FUROSEMIDE 10 MG/ML
20 INJECTION INTRAMUSCULAR; INTRAVENOUS ONCE
Status: COMPLETED | OUTPATIENT
Start: 2019-08-07 | End: 2019-08-07

## 2019-08-07 RX ORDER — ACETAMINOPHEN 325 MG/1
650 TABLET ORAL EVERY 6 HOURS PRN
Qty: 30 TABLET | Refills: 0
Start: 2019-08-07 | End: 2019-09-09 | Stop reason: ALTCHOICE

## 2019-08-07 RX ORDER — METOPROLOL TARTRATE 5 MG/5ML
5 INJECTION INTRAVENOUS ONCE
Status: DISCONTINUED | OUTPATIENT
Start: 2019-08-06 | End: 2019-08-07

## 2019-08-07 RX ORDER — LIDOCAINE HYDROCHLORIDE AND EPINEPHRINE 10; 10 MG/ML; UG/ML
1 INJECTION, SOLUTION INFILTRATION; PERINEURAL ONCE
Status: DISCONTINUED | OUTPATIENT
Start: 2019-08-07 | End: 2019-08-07

## 2019-08-07 RX ORDER — AMIODARONE HYDROCHLORIDE 200 MG/1
200 TABLET ORAL 2 TIMES DAILY WITH MEALS
Qty: 30 TABLET | Refills: 0
Start: 2019-08-08 | End: 2020-10-28 | Stop reason: ALTCHOICE

## 2019-08-07 RX ORDER — HEPARIN SODIUM 10000 [USP'U]/100ML
3-30 INJECTION, SOLUTION INTRAVENOUS
Qty: 250 ML | Refills: 0 | Status: SHIPPED | OUTPATIENT
Start: 2019-08-07 | End: 2019-09-09 | Stop reason: ALTCHOICE

## 2019-08-07 RX ORDER — OXYCODONE HYDROCHLORIDE 10 MG/1
10 TABLET ORAL EVERY 4 HOURS PRN
Refills: 0
Start: 2019-08-07 | End: 2019-08-17

## 2019-08-07 RX ORDER — DILTIAZEM HYDROCHLORIDE 5 MG/ML
10 INJECTION INTRAVENOUS ONCE
Status: DISCONTINUED | OUTPATIENT
Start: 2019-08-06 | End: 2019-08-07

## 2019-08-07 RX ORDER — ONDANSETRON 2 MG/ML
4 INJECTION INTRAMUSCULAR; INTRAVENOUS EVERY 6 HOURS PRN
Qty: 2 ML | Refills: 0
Start: 2019-08-07 | End: 2019-09-09 | Stop reason: ALTCHOICE

## 2019-08-07 RX ORDER — MAGNESIUM SULFATE HEPTAHYDRATE 40 MG/ML
2 INJECTION, SOLUTION INTRAVENOUS ONCE
Status: DISCONTINUED | OUTPATIENT
Start: 2019-08-06 | End: 2019-08-07 | Stop reason: HOSPADM

## 2019-08-07 RX ORDER — OXYCODONE HYDROCHLORIDE 5 MG/1
5 TABLET ORAL EVERY 4 HOURS PRN
Qty: 30 TABLET | Refills: 0
Start: 2019-08-07 | End: 2019-08-17

## 2019-08-07 RX ADMIN — FUROSEMIDE 20 MG: 10 INJECTION, SOLUTION INTRAMUSCULAR; INTRAVENOUS at 10:22

## 2019-08-07 RX ADMIN — MYCOPHENOLIC ACID 360 MG: 180 TABLET, DELAYED RELEASE ORAL at 10:23

## 2019-08-07 RX ADMIN — SULFAMETHOXAZOLE AND TRIMETHOPRIM 1 TABLET: 400; 80 TABLET ORAL at 10:24

## 2019-08-07 RX ADMIN — HEPARIN SODIUM AND DEXTROSE 18 UNITS/KG/HR: 10000; 5 INJECTION INTRAVENOUS at 01:23

## 2019-08-07 RX ADMIN — TACROLIMUS 1 MG: 1 CAPSULE ORAL at 10:24

## 2019-08-07 RX ADMIN — PREDNISONE 5 MG: 5 TABLET ORAL at 10:22

## 2019-08-07 RX ADMIN — LIDOCAINE HYDROCHLORIDE AND EPINEPHRINE 1 ML: 10; 10 INJECTION, SOLUTION INFILTRATION; PERINEURAL at 00:00

## 2019-08-07 NOTE — TRANSPORTATION MEDICAL NECESSITY
Section I - General Information    Name of Patient: Arnoldo Cheung                 : 1950    Medicare #: [id number]  Transport Date: 19 (PCS is valid for round trips on this date and for all repetitive trips in the 60-day range as noted below )  Origin: [facility]: [facility]--bed 244/admitting doctor is Dr Adolfo Christine the pt's stay covered under Medicare Part A (PPS/DRG)   [x]     Closest appropriate facility? If no, why is transport to more distant facility required? Yes  If hospice pt, is this transport related to pt's terminal illness? No       Section II - Medical Necessity Questionnaire  Ambulance transportation is medically necessary only if other means of transport are contraindicated or would be potentially harmful to the patient  To meet this requirement, the patient must either be "bed confined" or suffer from a condition such that transport by means other than ambulance is contraindicated by the patient's condition  The following questions must be answered by the medical professional signing below for this form to be valid:    1)  Describe the MEDICAL CONDITION (physical and/or mental) of this patient AT [address] [place] [address] that requires the patient to be transported in an ambulance and why transport by other means is contraindicated by the patient's condition:  Severe mitral valve stenosis with respiratory decompensation/hx of kidney transplant    2) Is the patient "bed confined" as defined below? Yes  To be "be confined" the patient must satisfy all three of the following conditions: (1) unable to get up from bed without Assistance; AND (2) unable to ambulate; AND (3) unable to sit in a chair or wheelchair  3) Can this patient safely be transported by car or wheelchair van (i e , seated during transport without a medical attendant or monitoring)?    No    4) In addition to completing questions 1-3 above, please check any of the following conditions that apply*:   *Note: supporting documentation for any boxes checked must be maintained in the patient's medical records  If hosp-hosp transfer, describe services needed at 2nd facility not available at 1st facility? Medical attendant required   Unable to tolerate seated position for time needed to transport   Cardiac monitoring required en route       Section III - Signature of Physician or Healthcare Professional  I certify that the above information is true and correct based on my evaluation of this patient, and represent that the patient requires transport by ambulance and that other forms of transport are contraindicated  I understand that this information will be used by the Centers for Medicare and Medicaid Services (CMS) to support the determination of medical necessity for ambulance services, and I represent that I have personal knowledge of the patient's condition at time of transport  []  If this box is checked, I also certify that the patient is physically or mentally incapable of signing the ambulance service's claim and that the institution with which I am affiliated has furnished care, services, or assistance to the patient  My signature below is made on behalf of the patient pursuant to 42 CFR §424 36(b)(4)   In accordance with 42 CFR §424 37, the specific reason(s) that the patient is physically or mentally incapable of signing the claim form is as follows: n/a      Signature of Physician* or Healthcare Professional______________________________________________________________  Signature Date 08/07/19 (For scheduled repetitive transports, this form is not valid for transports performed more than 60 days after this date)    Printed Name & Credentials of Physician or Healthcare Professional (MD, DO, RN, etc )_____Dorothea Otoole RN___________________________  *Form must be signed by patient's attending physician for scheduled, repetitive transports   For non-repetitive, unscheduled ambulance transports, if unable to obtain the signature of the attending physician, any of the following may sign (choose appropriate option below)  [] Physician Assistant []  Clinical Nurse Specialist [x]  Registered Nurse  []  Nurse Practitioner  [x] Discharge Planner

## 2019-08-07 NOTE — RESPIRATORY THERAPY NOTE
Pt currently remains on the Vapotherm, pt states he is more comfortably on the HFNC and would like to remain on it at this time, pt understands that if he SpO2 begins to drop or his work of breathing is to increase he will go back on the BiPAP

## 2019-08-07 NOTE — SOCIAL WORK
Pt to be transferred to [facility] for cardiac services not provided at [facility]  Pt has severe mitral valve stenosis and respiratory decompensation  Pt will go to [facility] and accepting physician is Dr Nazanin Vincent  [facility] transport set up for 14:00-[facility] for transport done

## 2019-08-07 NOTE — PROGRESS NOTES
Progress Note -Surgery PA  Larae Boas 77 y o  male MRN: [id number]  Unit/Bed#:  Encounter: [id number]      Assessment   POD #5, s/p open cholecystectomy  - surgical incisions with Steri-Strips in place, mild tenderness, c/d/i  No drainage, induration, erythema  - +BS, +BM last night, tolerating regular diet  - does have mild leukocytosis 11 36 today  Acute hypoxic respiratory insufficiency  - 2/2 mitral valve stenosis with calcifications seen on ECHO, also component of CHF  - Afib last night, now NSR, Hep gtt started; HFNC  - management per critical care team, cardiology  CKD status post transplant (2001/2011)  - on immunosuppression, creatinine elevated today and last night  - nephrology following  Urinary retention  - 2/2 hx BPH and anesthesia  - Manrique catheter placed, urology following    Plan   - stable from surgical standpoint  - continue with care per critical care team and consulted specialties  - steri strips on incisions may be removed early nest week   ______________________________________________________________________  Subjective:   Patient states he is feeling better this morning  He feels like he is breathing more easily and has more energy than he did yesterday  Tolerating diet  No nausea or vomiting  Had bowel movement last night  No blood or melena  No diarrhea  Objective:       Vitals:  /62   Pulse 68   Temp 97 7 °F (36 5 °C) (Oral)   Resp (!) 46   Ht 5' 4" (1 626 m)   Wt 68 kg (149 lb 14 6 oz)   SpO2 99%   BMI 25 73 kg/m²     I/Os:  I/O last 3 completed shifts: In: 933 8 [P O :120; I V :363 8; IV Piggyback:450]  Out: 3190 [Urine:4095]    I/O this shift:   In: 71 8 [I V :71 8]  Out: 125 [Urine:125]    Invasive Devices     Peripheral Intravenous Line            Peripheral IV 08/05/19 Right Forearm 2 days    Peripheral IV 08/06/19 Left Forearm less than 1 day    Peripheral IV 08/06/19 Right Forearm less than 1 day          Line            Hemodialysis AV Fistula 08/07/01 Left 6574 days          Drain            Urethral Catheter 16 Fr  2 days                Medications:  Current Facility-Administered Medications   Medication Dose Route Frequency    acetaminophen (TYLENOL) tablet 650 mg  650 mg Oral Q6H PRN    amiodarone (CORDARONE) 900 mg in dextrose 5 % 500 mL infusion  1 mg/min Intravenous Continuous    atorvastatin (LIPITOR) tablet 10 mg  10 mg Oral Daily With Dinner    furosemide (LASIX) injection 20 mg  20 mg Intravenous Once    heparin (porcine) 25,000 units in 250 mL infusion (premix)  3-30 Units/kg/hr (Order-Specific) Intravenous Titrated    levalbuterol (XOPENEX) inhalation solution 1 25 mg  1 25 mg Nebulization Q6H PRN    magnesium sulfate 2 g/50 mL IVPB (premix) 2 g  2 g Intravenous Once    metoprolol tartrate (LOPRESSOR) tablet 25 mg  25 mg Oral HS    mycophenolic acid (MYFORTIC) EC tablet 360 mg  360 mg Oral BID    ondansetron (ZOFRAN) injection 4 mg  4 mg Intravenous Q6H PRN    oxyCODONE (ROXICODONE) IR tablet 10 mg  10 mg Oral Q4H PRN    oxyCODONE (ROXICODONE) IR tablet 5 mg  5 mg Oral Q4H PRN    phenylephrine (CARLINE-SYNEPHRINE) 50 mg (STANDARD CONCENTRATION) in sodium chloride 0 9% 250 mL   mcg/min Intravenous Titrated    predniSONE tablet 5 mg  5 mg Oral Daily    sodium chloride 0 9 % inhalation solution 3 mL  3 mL Nebulization Q6H PRN    sulfamethoxazole-trimethoprim (BACTRIM) 400-80 mg per tablet 1 tablet  1 tablet Oral Daily    tacrolimus (PROGRAF) capsule 0 5 mg  0 5 mg Oral HS    tacrolimus (PROGRAF) capsule 1 mg  1 mg Oral Daily    tamsulosin (FLOMAX) capsule 0 4 mg  0 4 mg Oral Daily With Dinner                 Lab Results and Cultures:   CBC with diff:   Lab Results   Component Value Date    WBC 11 36 (H) 08/07/2019    HGB 15 6 08/07/2019    HCT 47 8 08/07/2019    MCV 88 08/07/2019     08/07/2019    MCH 28 7 08/07/2019    MCHC 32 6 08/07/2019    RDW 12 9 08/07/2019    MPV 8 7 (L) 08/07/2019    NRBC 0 08/07/2019 BMP/CMP:  Lab Results   Component Value Date     (L) 05/18/2016    K 4 1 08/07/2019    K 4 3 05/18/2016    CL 99 (L) 08/07/2019     05/18/2016    CO2 28 08/07/2019    CO2 24 08/04/2019    ANIONGAP 5 03/13/2015    BUN 34 (H) 08/07/2019    BUN 24 05/18/2016    CREATININE 1 65 (H) 08/07/2019    CREATININE 1 30 (H) 05/18/2016    GLUCOSE 112 08/04/2019    GLUCOSE 87 03/13/2015    CALCIUM 8 8 08/07/2019    CALCIUM 9 4 05/18/2016    AST 30 08/05/2019    AST 23 03/13/2015    ALT 28 08/05/2019    ALT 23 03/13/2015    ALKPHOS 52 08/05/2019    ALKPHOS 63 03/13/2015    PROT 9 0 (H) 03/13/2015    BILITOT 1 09 (H) 03/13/2015    EGFR 42 08/07/2019     Coags:   Lab Results   Component Value Date    PTT 68 (H) 08/07/2019    INR 1 34 (H) 08/07/2019    INR 1 13 02/20/2015        Physical Exam:  General Appearance:    Alert and orientated x 3, cooperative, no acute distress, appears stated age   Lungs:     Coarse throughout with diminished breath sounds but improved from yesterday  Improved effort as well  Heart:    Regular rate and rhythm, S1 and S2 normal, no murmur   Abdomen:    Normoactive BS, soft, very mild tenderness at incision sites, non rigid, no masses, no palpated organomegaly  Wound/Dressing:  C/d/i, no drainage, no erythema, Steri-Strips in place   Extremities:  Extremities normal, no calf tenderness, no cyanosis or edema   Pulses:   2+ and symmetric all extremities   Skin:   Skin color, texture, turgor normal, no rashes no jaundice   Neurologic:   CNII-XII intact, normal strength, affect appropriate       Imaging:  Ct Abdomen Pelvis Wo Contrast    Result Date: 7/31/2019  Impression: Distended gallbladder with wall thickening and surrounding fat stranding may indicate cholecystitis  No radiopaque gallstones  Follow-up with right upper quadrant ultrasound  The study was marked in [facility] for immediate notification   Workstation performed: [id number]     Xr Chest Portable    Result Date: 8/5/2019  Impression: New bilateral pleural effusions and subjacent lower lobes opacities, most likely atelectasis and/or aspiration/infection  Workstation performed: [id number]     Xr Chest Pa & Lateral    Result Date: 8/1/2019  Impression: New small right pleural effusion  Stable fullness of the pulmonary vasculature may be related to chronic congestive changes  Workstation performed: [id number]     Xr Abdomen Complete Inc Upright And/or Decubitus    Result Date: 8/1/2019  Impression: Nonspecific bowel gas pattern without evidence to suggest bowel obstruction  Workstation performed: [id number]     Ct Chest Wo Contrast    Result Date: 8/5/2019  Impression: 1  Hypoinflated lungs with small bilateral pleural effusions and adjacent opacities 2  Pneumoperitoneum consistent with recent surgery 3  Cardiomegaly Workstation performed: [id number]     Us Gallbladder    Result Date: 7/31/2019  Impression: Gallbladder sludge with mild to moderate gallbladder distention, gallbladder wall thickening as well as positive sonographic Randall sign, and pericholecystic fluid; findings taken together are suspicious for acute cholecystitis in the appropriate clinical setting  Correlation with the patient's symptoms and laboratory values recommended  Pancreas essentially obscured by overlying bowel gas  Severe right renal atrophy  Other findings as above  Workstation performed: [id number]     Xr Chest Portable Icu    Result Date: 8/5/2019  Impression: Improved aeration of the lungs compared with yesterday  There are persistent small pleural effusions  Persistent pneumoperitoneum reportedly due to prior surgery   Workstation performed: [id number]       VTE Pharmacologic Prophylaxis: Heparin  VTE Mechanical Prophylaxis: sequential compression device    Angel Nettles PA-C   8/7/2019

## 2019-08-07 NOTE — PROGRESS NOTES
General Cardiology   Progress Note   Larae Boas 77 y o  male MRN: [id number]  Unit/Bed#:  Encounter: [id number]    Assessment/Plan:    1  Acute pulmonary insufficiency with bilateral pleural effusions  - S/P open cholecystectomy on 08/02/2019 and developed respiratory distress requiring high-flow nasal cannula and diuresis  -continue to hold IV Lasix due to increased creatinine  -strict I&Os  -management per critical care    2  History of CAD S/P SHABBIR x1 in 2011  -continue statin and beta-blocker    3  Paroxysmal atrial fibrillation, currently sinus rhythm  -patient had an episode last night with atrial fibrillation with RVR with rates as high as the 180s accompanied by hypotension requiring amiodarone and phenylephrine drips for blood pressure support and rate control  -continue telemetry monitoring  -continue metoprolol tartrate 25 mg daily    4  Hyperlipidemia  -continue statin    5  S/P mitral valve repair in 2006  -current echocardiogram shows severe mitral stenosis, significantly calcified mitral valve leaflets and does not appear suitable for percutaneous balloon valvuloplasty  -ramos/cardiac catheterization eventually needed to further evaluate once patients health status improves    6  Hypertension, currently stable  -episode of hypotension last night  -continue to monitor blood pressures  -continue phenylephrine drip for blood pressure support    7  S/P open cholecystectomy  -management per General surgery team    8  History of CKD S/P kidney transplant in 2011  -continue immunosuppression with mycophenolic acid, prednisone and tacrolimus  -creatinine elevated today, nephrology following    Given patient's complex medical history of kidney transplant, severe mitral valve stenosis and respiratory decompensation as well as acute kidney injury, we will transfer patient to tertiary facility  Patient's cardiologist Dr Tony Monet at [facility] contacted and informed of plan of care      Subjective: Patient seen and examined  No significant events since the last encounter  Patient states he felt good this morning when he got up  REVIEW OF SYSTEMS:  Constitutional:  Denies fever or chills   Eyes:  Denies change in visual acuity   HENT:  Denies nasal congestion or sore throat   Respiratory:  Denies cough, + shortness of breath   Cardiovascular:  Denies chest pain or edema   GI:  Denies abdominal pain, nausea, vomiting, bloody stools or diarrhea   :  Denies dysuria, frequency, difficulty in micturition and nocturia  Musculoskeletal:  Denies back pain or joint pain   Neurologic:  Denies headache, focal weakness or sensory changes   Endocrine:  Denies polyuria or polydipsia   Lymphatic:  Denies swollen glands   Psychiatric:  Denies depression or anxiety     Objective:   Vitals:  Vitals:    08/07/19 0900   BP: 119/62   Pulse: 68   Resp: (!) 46   Temp:    SpO2: 99%       Body mass index is 25 73 kg/m²  Systolic (67OIA), VZH:846 , Min:80 , AID:214     Diastolic (50UMR), RRK:40, Min:53, Max:87      Intake/Output Summary (Last 24 hours) at 8/7/2019 1032  Last data filed at 8/7/2019 0800  Gross per 24 hour   Intake 885 55 ml   Output 1750 ml   Net -864 45 ml     Weight (last 2 days)     None          Telemetry Review: No significant arrhythmias seen on telemetry review  Sinus rhythm in the 70s    PHYSICAL EXAMS:  General:  Patient is not in acute distress, laying in the bed comfortably, awake, alert responding to commands  Head: Normocephalic, Atraumatic  HEENT: White sclera, pink conjunctiva,  PERRLA,pharynx benign  Neck:  Supple, no neck vein distention, carotids+2/+2 no bruits, thyromegaly, adenopathy  Respiratory: clear to P/A  Cardiovascular:  PMI normal, S1-S2 normal, No  Murmurs, thrills, gallops, rubs   Regular rhythm  GI:  Abdomen soft nontender   No hepatosplenomegaly, adenopathy, ascites,or rebound tenderness  Extremities: No edema, normal pulses, no calf tenderness, no joint deformities, no venous disease   Integument:  No skin rashes or ulceration  Lymphatic:  No cervical or inguinal lymphadenopathy  Neurologic:  Patient is awake alert, responding to command, well-oriented to time and place and person moving all extremities      LABORATORY RESULTS:  Results from last 7 days   Lab Units 08/06/19  0507 07/31/19 2016   TROPONIN I ng/mL <0 02 <0 02     CBC with diff: Results from last 7 days   Lab Units 08/07/19  0531 08/07/19  0122 08/06/19  2159 08/05/19  0514   WBC Thousand/uL 11 36* 9 67 8 89   < > 7 59   HEMOGLOBIN g/dL 15 6 14 1 15 9   < > 15 7   HEMATOCRIT % 47 8 43 2 48 3   < > 48 4   MCV fL 88 89 87   < > 90   PLATELETS Thousands/uL 258 269 249   < > 214   MCH pg 28 7 29 1 28 7   < > 29 0   MCHC g/dL 32 6 32 6 32 9   < > 32 4   RDW % 12 9 12 7 12 6   < > 12 7   MPV fL 8 7* 9 2 8 8*   < > 8 4*   NRBC AUTO /100 WBCs 0  --  0  --  0    < > = values in this interval not displayed  CMP:  Results from last 7 days   Lab Units 08/07/19  0531 08/06/19  2159 08/06/19  1942  08/05/19  0514 08/04/19  2102  08/03/19  0551 08/02/19  0537   POTASSIUM mmol/L 4 1 4 4 4 2   < > 4 2  --    < > 4 9 4 5   CHLORIDE mmol/L 99* 98* 99*   < > 99*  --    < > 105 105   CO2 mmol/L 28 27 26   < > 25  --    < > 24 21   CO2, I-STAT mmol/L  --   --   --   --   --  24  --   --   --    BUN mg/dL 34* 36* 34*   < > 23  --    < > 31* 19   CREATININE mg/dL 1 65* 1 87* 1 79*   < > 1 15  --    < > 1 23 1 15   GLUCOSE, ISTAT mg/dl  --   --   --   --   --  112  --   --   --    CALCIUM mg/dL 8 8 8 8 9 1   < > 9 1  --    < > 8 5 8 5   AST U/L  --   --   --   --  30  --   --  63* 61*   ALT U/L  --   --   --   --  28  --   --  69 98*   ALK PHOS U/L  --   --   --   --  52  --   --  59 68   EGFR ml/min/1 73sq m 42 36 38   < > 65  --    < > 60 65    < > = values in this interval not displayed         BMP:  Results from last 7 days   Lab Units 08/07/19  0531 08/06/19  2159 08/06/19  1942  08/04/19  2102   POTASSIUM mmol/L 4 1 4 4 4 2   < >  -- CHLORIDE mmol/L 99* 98* 99*   < >  --    CO2 mmol/L 28 27 26   < >  --    CO2, I-STAT mmol/L  --   --   --   --  24   BUN mg/dL 34* 36* 34*   < >  --    CREATININE mg/dL 1 65* 1 87* 1 79*   < >  --    GLUCOSE, ISTAT mg/dl  --   --   --   --  112   CALCIUM mg/dL 8 8 8 8 9 1   < >  --     < > = values in this interval not displayed  Results from last 7 days   Lab Units 19  0514   NT-PRO BNP pg/mL 2,133*      Results from last 7 days   Lab Units 19  2159 19  0507 19  1222 19  0554   MAGNESIUM mg/dL 2 0 2 1 1 8 1 9             Results from last 7 days   Lab Units 19  0122   INR  1 34*       Lipid Profile:   No results found for: CHOL  No results found for: HDL  No results found for: LDLCALC  No results found for: TRIG    Cardiac testing:   Results for orders placed during the hospital encounter of 19   Echo complete with contrast if indicated    Narrative [address]  [place], [place] [id number] [phone]    Transthoracic Echocardiogram  2D, M-mode, Doppler, and Color Doppler    Study date:  05-Aug-2019    Patient: Tammy Glez  MR number: [id number]  Account number: [de-identified]  : 1950  Age: 77 years  Gender: Male  Status: Inpatient  Location: Bedside  Height: 64 in  Weight: 149 lb  BP: 87/ 52 mmHg    Indications: Murmur  Diagnoses: R01 1 - Cardiac murmur, unspecified    Sonographer:   Medical Hazel Alexandra Figueroa  Referring Physician:  POLI Foy  Group:  [facility]  Interpreting Physician:  Haim Corea DO    IMPRESSIONS:  Normal left ventricular size and systolic function with mild concentric hypertrophy  Ejection fraction 55%  Normal right ventricular size with mildly reduced systolic function  Moderately to severely dilated left atrium  Moderately dilated right atrium  Top normal aortic root    S/P surgical mitral valve repair with severe mitral annular calcification and severe calcification of the mitral leaflets with restricted motion and severe mitral stenosis without mitral regurgitation  Cannot rule out bicuspic aortic valve, but normal motion with mild aortic insufficiency  Pulmonic valve is not well visualized  No obvious tricuspid valve pathology  IVC is not well visualized  No evidence of pulmonary hypertension  Compared to prior echocardiogram on 12/8/2016, the mitral valve is now severely calcified with severe mitral stenosis (MVA 1 1 cm2, mean gradient 8 5 mmHg), there is mild aortic valve insufficiency, the right ventricular function is mildly  reduced and the aortic root appears less dilated  SUMMARY    LEFT VENTRICLE:  Systolic function was normal by visual assessment  Ejection fraction was estimated to be 55 %  There were no regional wall motion abnormalities  Wall thickness was mildly to moderately increased  There was mild concentric hypertrophy  RIGHT VENTRICLE:  Systolic function was mildly reduced  LEFT ATRIUM:  The atrium was moderately to markedly dilated  RIGHT ATRIUM:  The atrium was mildly to moderately dilated  MITRAL VALVE:  There was marked annular calcification  There was marked calcification of the anterior and posterior leaflets  There was markedly reduced leaflet separation  There was severely restricted mobility  Transmitral velocity was increased due to valvular stenosis  There was severe stenosis  Mean transmitral gradient was 8 49 mmHg  Mitral valve area by pressure half-time was 1 1 cmï¾²  AORTIC VALVE:  The valve was not visualized well enough to rule out a bicuspid morphology  Leaflets exhibited mildly increased thickness, mild calcification, and normal cuspal separation  There was mild regurgitation  Regurgitation grade was 1+ on a scale of 0 to 4+      COMPARISONS:  Compared to prior echocardiogram on 12/8/2016, the mitral valve is now severely calcified with severe mitral stenosis (MVA 1 1 cm2, mean gradient 8 5 mmHg), there is mild aortic valve insufficiency, the right ventricular function is mildly  reduced and the aortic root appears less dilated  HISTORY: PRIOR HISTORY: CAD, MV repair, CKD, Elevated BNP, Atrial fibrillation  Risk factors: hypertension and hypercholesterolemia  PROCEDURE: The procedure was performed at the bedside  This was a routine study  The transthoracic approach was used  The study included complete 2D imaging, M-mode, complete spectral Doppler, and color Doppler  The heart rate was 75 bpm,  at the start of the study  Images were obtained from the parasternal, apical, subcostal, and suprasternal notch acoustic windows  Echocardiographic views were limited due to poor acoustic window availability, decreased penetration, and  lung interference  This was a technically difficult study  LEFT VENTRICLE: Size was normal  Systolic function was normal by visual assessment  Ejection fraction was estimated to be 55 %  There were no regional wall motion abnormalities  Wall thickness was mildly to moderately increased  There was  mild concentric hypertrophy  DOPPLER: The study was not technically sufficient to allow evaluation of LV diastolic function  RIGHT VENTRICLE: The size was normal  Systolic function was mildly reduced  LEFT ATRIUM: The atrium was moderately to markedly dilated  RIGHT ATRIUM: The atrium was mildly to moderately dilated  MITRAL VALVE: There was marked annular calcification  There was marked calcification of the anterior and posterior leaflets  There was markedly reduced leaflet separation  There was severely restricted mobility  Prior repair procedures:  surgical repair A annular ring prosthesis was present  DOPPLER: Transmitral velocity was increased due to valvular stenosis  There was severe stenosis  There was no regurgitation  AORTIC VALVE: The valve was not visualized well enough to rule out a bicuspid morphology   Leaflets exhibited mildly increased thickness, mild calcification, and normal cuspal separation  DOPPLER: Transaortic velocity was within the normal  range  There was no evidence for stenosis  There was mild regurgitation  Regurgitation grade was 1+ on a scale of 0 to 4+  TRICUSPID VALVE: The valve structure was normal  There was normal leaflet separation  DOPPLER: The transtricuspid velocity was within the normal range  There was no evidence for stenosis  There was no regurgitation  PULMONIC VALVE: Not well visualized  PERICARDIUM: There was no pericardial effusion  The pericardium was normal in appearance  AORTA: The root exhibited upper limit of normal size  SYSTEMIC VEINS: IVC: Not assessed  HEPATIC VEINS: Not assessed  MEASUREMENT TABLES    DOPPLER MEASUREMENTS  Mitral valve   (Reference normals)  Peak gradient   13 mmHg   (--)  Mean gradient   8 49 mmHg   (--)  Pressure half-time   208 ms   (--)  Valve area, PHT   1 1 cmï¾²   (--)    SYSTEM MEASUREMENT TABLES    2D  %FS: 22 3 %  Ao Diam: 3 5 cm  EDV(Teich): 58 1 ml  EF(Teich): 45 7 %  ESV(Teich): 31 5 ml  IVSd: 1 4 cm  LA Area: 26 cm2  LA Diam: 3 9 cm  LVEDV MOD A4C: 103 6 ml  LVEF MOD A4C: 54 4 %  LVESV MOD A4C: 47 2 ml  LVIDd: 3 7 cm  LVIDs: 2 9 cm  LVLd A4C: 8 7 cm  LVLs A4C: 7 2 cm  LVOT Diam: 2 2 cm  LVPWd: 1 2 cm  RA Area: 18 4 cm2  RVIDd: 3 8 cm  SV MOD A4C: 56 4 ml  SV(Teich): 26 6 ml    CW  AR Dec [place]: 1 1 m/s2  AR Dec Time: 2978 ms  AR PHT: 863 6 ms  AR Vmax: 3 4 m/s  AR maxP 3 mmHg  AV Env  Ti: 239 8 ms  AV VTI: 30 7 cm  AV Vmax: 1 7 m/s  AV Vmean: 1 3 m/s  AV maxP 5 mmHg  AV meanP 1 mmHg  HR: 136 6 BPM  MV VTI: 65 7 cm  MV Vmax: 1 9 m/s  MV Vmean: 1 5 m/s  MV maxP mmHg  MV meanP 5 mmHg    MM  TAPSE: 1 cm    PW  DEBI (VTI): 3 6 cm2  DEBI Vmax: 3 1 cm2  E': 0 m/s  E/E': 54 3  LVOT Env  Ti: 308 3 ms  LVOT VTI: 30 3 cm  LVOT Vmax: 1 4 m/s  LVOT Vmean: 1 m/s  LVOT maxP 8 mmHg  LVOT meanP 3 mmHg  LVSV Dopp: 112 2 ml  MV A Joaquin: 1 6 m/s  MV Dec [place]: 2 1 m/s2  MV DecT: 706 8 ms  MV E Joaquin: 1 5 m/s  MV E/A Ratio: 0 9  MV PHT: 205 ms  MVA (VTI): 1 7 cm2  MVA By PHT: 1 1 cm2    [address] Accredited [facility]    Prepared and electronically signed by    Fabrizio Gutierrez DO  Signed 05-Aug-2019 15:23:37           Meds/Allergies   all current active meds have been reviewed  Medications Prior to Admission   Medication    sulfamethoxazole-trimethoprim (BACTRIM) 400-80 mg per tablet    aspirin 81 mg chewable tablet    atorvastatin (LIPITOR) 10 mg tablet    metoprolol tartrate (LOPRESSOR) 25 mg tablet    mycophenolate (MYFORTIC) 360 MG TBEC    Omega-3 Fatty Acids (FISH OIL CONCENTRATE) 300 MG CAPS    predniSONE 5 mg tablet    tacrolimus (PROGRAF) 1 mg capsule    tamsulosin (FLOMAX) 0 4 mg         amiodarone 1 mg/min Last Rate: 0 5 mg/min (08/07/19 0436)   heparin (porcine) 3-30 Units/kg/hr (Order-Specific) Last Rate: 18 Units/kg/hr (08/07/19 0123)   phenylephine  mcg/min Last Rate: 25 mcg/min (08/07/19 0533)         Counseling / Coordination of Care  Total floor / unit time spent today 15 minutes  Greater than 50% of total time was spent with the patient and / or family counseling and / or coordination of care  ** Please Note: Dragon 360 Dictation voice to text software may have been used in the creation of this document   **

## 2019-08-07 NOTE — DISCHARGE INSTRUCTIONS
Follow up: Following discharge from the hospital call the office in 1-2 days to set up a post operative appointment to be seen in 2 weeks by Dr Jerrica Hadley  [phone]  Call the office if you have increased pain not relieved with pain medicine  Call the office if you have a fever,redness, the wound opens up, you have pus draining from your incision  Laparoscopic Cholecystectomy    WHAT YOU SHOULD KNOW:    Cholecystitis is inflammation of your gallbladder  Your gallbladder stores bile, which helps break down the fat that you eat  It also helps remove certain chemicals from your body  You may have a sudden, severe attack (acute cholecystitis) or several mild attacks (chronic cholecystitis)  Laparoscopic cholecystectomy is surgery to remove your gallbladder  During this surgery, small incisions are made in your abdomen  A small scope and special tools are inserted through these incisions  Your gallbladder is removed from it normal location and taken out of your abdomen  The incisions are closed with sutures and a small amount of glue is applied over top incisions to help reinforce the incisions to optimize healing  Incisions:  - You may remove the gauze dressing from your incisions 48 hours after surgery  Underneath this dressing is a tape like dressing called Steri Strips  Leave the steri strips in place for 5-7 days  They may fall off on their own  This is OK  - You may apply ice to the incisions to help with pain  Avoid heat as this may make the glue tacky   - It is normal to have some bruising, swelling or mild discoloration around the incision  If increasing redness or pain develops, call our office immediately  Do not apply any creams, lotions, or ointments  Bathing:   - You may shower daily with soap and water the day after the procedure  It is OK to GENTLY wash the incision with soap and water then pat dry  DO NOT SCRUB   Do NOT soak incision in a tub, pool, or hot tub for 2 weeks     Diet:   - Resume your normal diet unless specified otherwise  We recommend you slowly advance your diet  Try to start with softer bland foods and gradually advance as tolerated  Be sure to consume plenty of water  Avoid alcohol  Activity/Restrictions:   - The evening following the procedure you should rest as much as possible, sitting, lying or reclining  you should be sure someone remains with you until the next morning  Gradually increase your activity daily  Walking 3-4 times daily is good and stairs are ok  Listen to your body  If you start to get tired or sore then rest    - No strenuous activity or exercise for 3-4 weeks  - No heavy lifting, pushing or pulling    - No driving for 5 days or while taking narcotics for pain  Return or work: You may return to work or other activities as soon as your pain is controlled and you feel comfortable  For many people, this is 5 to 7 days after surgery  If your job requires heavy lifting you will need to be on light duty for 2-3 weeks  Medication:   - If you were given a prescription for Percocet, Norco, or Vicodin for pain be sure to eat prior to taking as these medications as they may cause nausea and vomiting on an empty stomach     - If you do not want to take stronger medications for pain, you may take Tylenol, Aleve OR Ibuprofen  - DO NOT take Tylenol  (acetaminophen) with these medication for a fever or for further pain control as these medications already contain Tylenol in them  Take one or the other  Do not exceed more than 4000 mg of acetaminophen in 24 hours or 3000 mg if you have liver disease  - If you were given an antibiotic take it until it is finished  Diet: Eat low-fat foods for 4 to 6 weeks while your body learns to digest fat without a gallbladder  Slowly increase the amount of fat that you eat  We recommend you slowly advance your diet  Try to start with softer bland foods and gradually advance as tolerated  Be sure to consume plenty of water  Avoid alcohol  Contact your healthcare provider if:   · You have a fever over 101°F (38°C) or chills  · You have pain or nausea that is not relieved by medicine  · You have redness and swelling around your incisions, or blood or pus is leaking             from your incisions  · You are constipated or have diarrhea  · Your skin or eyes are yellow, or your bowel movements are pale  · You have questions or concerns about your surgery, condition, or care  Seek care immediately or call 911 if:   · You cannot stop vomiting  · Your bowel movements are black or bloody  · You have pain in your abdomen and it is swollen or hard  · Your arm or leg feels warm, tender, and painful  It may look swollen and red  · You feel lightheaded, short of breath, and have chest pain  · You cough up blood

## 2019-08-07 NOTE — PROGRESS NOTES
NEPHROLOGY PROGRESS NOTE    Patient: Shwetha Kurtz               Sex: male          DOA: 7/31/2019  7:51 PM   YOB: 1950        Age:  77 y o         LOS:  LOS: 7 days   8/7/2019    REASON FOR THE CONSULTATION:      History of renal transplantation    SUBJECTIVE     Patient seen and examined in the intensive care unit  Continues to require high-flow oxygen  Patient went into atrial fibrillation with rapid ventricular response and was started on amiodarone drip  Lasix was held as blood pressure was low as much as 85 systolic  Noted serum creatinine to be elevated up to 1 8 last night      CURRENT MEDICATIONS       Current Facility-Administered Medications:     acetaminophen (TYLENOL) tablet 650 mg, 650 mg, Oral, Q6H PRN, Avi [place], CRNP, 650 mg at 08/06/19 1336    amiodarone (CORDARONE) 900 mg in dextrose 5 % 500 mL infusion, 1 mg/min, Intravenous, Continuous, Apolonia Trevizo PA-C, Last Rate: 16 7 mL/hr at 08/07/19 0436, 0 5 mg/min at 08/07/19 0436    atorvastatin (LIPITOR) tablet 10 mg, 10 mg, Oral, Daily With POLI Trinh, 10 mg at 08/06/19 1827    furosemide (LASIX) injection 20 mg, 20 mg, Intravenous, Once, Milton Contreras PA-C    heparin (porcine) 25,000 units in 250 mL infusion (premix), 3-30 Units/kg/hr (Order-Specific), Intravenous, Titrated, Apolonia Trevizo PA-C, Last Rate: 11 7 mL/hr at 08/07/19 0123, 18 Units/kg/hr at 08/07/19 0123    levalbuterol (XOPENEX) inhalation solution 1 25 mg, 1 25 mg, Nebulization, Q6H PRN, Jocelyne Moreno MD    magnesium sulfate 2 g/50 mL IVPB (premix) 2 g, 2 g, Intravenous, Once, Jocelyne Moreno MD    metoprolol tartrate (LOPRESSOR) tablet 25 mg, 25 mg, Oral, HS, Yohana Holliday MD    mycophenolic acid (MYFORTIC) EC tablet 360 mg, 360 mg, Oral, BID, Avi [place], CRNP, 360 mg at 08/06/19 1828    ondansetron [facility] injection 4 mg, 4 mg, Intravenous, Q6H PRN, POLI Morse, 4 mg at 08/06/19 1218    oxyCODONE (ROXICODONE) IR tablet 10 mg, 10 mg, Oral, Q4H PRN, Compa Gaytan, CRNP, 10 mg at 08/03/19 0003    oxyCODONE (ROXICODONE) IR tablet 5 mg, 5 mg, Oral, Q4H PRN, Compa Gaytan, CRNP, 5 mg at 08/05/19 2010    phenylephrine (CARLINE-SYNEPHRINE) 50 mg (STANDARD CONCENTRATION) in sodium chloride 0 9% 250 mL,  mcg/min, Intravenous, Titrated, Awilda Richards PA-C, Last Rate: 7 5 mL/hr at 08/07/19 0533, 25 mcg/min at 08/07/19 0533    predniSONE tablet 5 mg, 5 mg, Oral, Daily, SARBJIT WinterNP, 5 mg at 08/06/19 0900    sodium chloride 0 9 % inhalation solution 3 mL, 3 mL, Nebulization, Q6H PRN, Gagan Tobar MD    sulfamethoxazole-trimethoprim (BACTRIM) 400-80 mg per tablet 1 tablet, 1 tablet, Oral, Daily, SARBJIT WinterNP, 1 tablet at 08/06/19 0859    tacrolimus (PROGRAF) capsule 0 5 mg, 0 5 mg, Oral, HS, Arvin Lamb MD, 0 5 mg at 08/06/19 2250    tacrolimus (PROGRAF) capsule 1 mg, 1 mg, Oral, Daily, Arvin Lamb MD    [facility]) capsule 0 4 mg, 0 4 mg, Oral, Daily With POLI Luevano, 0 4 mg at 08/06/19 1827    REVIEW OF SYSTEMS     Review of Systems   Constitutional: Negative  HENT: Negative  Eyes: Negative  Respiratory: Positive for shortness of breath  Cardiovascular: Negative  Gastrointestinal: Negative  Endocrine: Negative  Genitourinary: Negative  Musculoskeletal: Negative  Skin: Negative  Allergic/Immunologic: Negative  Neurological: Negative  Hematological: Negative  All other systems reviewed and are negative  OBJECTIVE     Current Weight: Weight - Scale: 68 kg (149 lb 14 6 oz)  Vitals:    08/07/19 0900   BP: 119/62   Pulse: 68   Resp: (!) 46   Temp:    SpO2: 99%     Body mass index is 25 73 kg/m²      Intake/Output Summary (Last 24 hours) at 8/7/2019 1014  Last data filed at 8/7/2019 0800  Gross per 24 hour   Intake 885 55 ml   Output 1750 ml   Net -864 45 ml       PHYSICAL EXAMINATION     Physical Exam Constitutional: He is oriented to person, place, and time  He appears well-nourished  HENT:   Head: Normocephalic and atraumatic  Eyes: Pupils are equal, round, and reactive to light  Neck: Neck supple  Cardiovascular: Normal rate, regular rhythm and normal heart sounds  Pulmonary/Chest: Effort normal  He has rales  Abdominal: Soft  Bowel sounds are normal    Musculoskeletal: Normal range of motion  Neurological: He is alert and oriented to person, place, and time  Skin: Skin is warm  Psychiatric: He has a normal mood and affect           LAB RESULTS     Results from last 7 days   Lab Units 08/07/19  0531 08/07/19  0122 08/06/19  2159 08/06/19  1942 08/06/19  1148 08/06/19  0507 08/05/19  1222 08/05/19  0514 08/04/19  2102 08/04/19  0520 08/03/19  0551  08/01/19  0554   WBC Thousand/uL 11 36* 9 67 8 89  --   --  5 97  --  7 59  --  8 96 9 47   < > 13 16*   HEMOGLOBIN g/dL 15 6 14 1 15 9  --   --  15 5  --  15 7  --  13 9 14 7   < > 14 7   I STAT HEMOGLOBIN g/dl  --   --   --   --   --   --   --   --  16 0  --   --   --   --    HEMATOCRIT % 47 8 43 2 48 3  --   --  48 5  --  48 4  --  44 0 47 7   < > 46 5   HEMATOCRIT, ISTAT %  --   --   --   --   --   --   --   --  47  --   --   --   --    PLATELETS Thousands/uL 258 269 249  --   --  199  --  214  --  237 240   < > 204   POTASSIUM mmol/L 4 1  --  4 4 4 2 3 9 4 0 3 9 4 2  --  5 0 4 9   < > 4 1   CHLORIDE mmol/L 99*  --  98* 99* 97* 99* 97* 99*  --  106 105   < > 107   CO2 mmol/L 28  --  27 26 25 30 25 25  --  25 24   < > 21   CO2, I-STAT mmol/L  --   --   --   --   --   --   --   --  24  --   --   --   --    BUN mg/dL 34*  --  36* 34* 28* 25 25 23  --  31* 31*   < > 21   CREATININE mg/dL 1 65*  --  1 87* 1 79* 1 42* 1 28 1 36* 1 15  --  1 23 1 23   < > 1 25   EGFR ml/min/1 73sq m 42  --  36 38 50 57 53 65  --  60 60   < > 59   CALCIUM mg/dL 8 8  --  8 8 9 1 9 3 9 0 9 2 9 1  --  8 6 8 5   < > 8 5   MAGNESIUM mg/dL  --   --  2 0  --   --  2 1 1 8  -- --   --   --   --  1 9   PHOSPHORUS mg/dL  --   --  3 9  --   --   --   --   --   --   --   --   --  3 0   GLUCOSE, ISTAT mg/dl  --   --   --   --   --   --   --   --  112  --   --   --   --     < > = values in this interval not displayed  RADIOLOGY RESULTS      Results for orders placed during the hospital encounter of 12/06/16   XR chest portable    Narrative CHEST     INDICATION:  CHF    COMPARISON:  12/12/2016    VIEWS:   AP frontal;  1 image    FINDINGS:      Endotracheal tube, right internal jugular central venous catheter, and nasogastric tube have been removed  Heart shadow is enlarged but stable from prior exam     Pulmonary edema has been improved  Small bilateral pleural effusions unchanged  No pneumothorax  Visualized osseous structures appear within normal limits for the patient's age  Impression 1  Interval removal of lines and tubes  2   Improved pulmonary edema  3   Unchanged small bilateral pleural effusions  Workstation performed: [id number]       Results for orders placed during the hospital encounter of 07/31/19   XR chest pa & lateral    Narrative CHEST     INDICATION:   Pre-op  COMPARISON:  Chest x-ray 6/4/2019    EXAM PERFORMED/VIEWS:  XR CHEST PA & LATERAL      FINDINGS:  Surgical clips overlie the right perihilar region  There has been prior cardiac valve replacement  There is stable moderate cardiomegaly  Right hemidiaphragm is mildly elevated similar to the prior exam   Underlying lucency here is likely related to colonic position  New small right pleural effusion  Stable fullness of the pulmonary vasculature  Osseous structures appear within normal limits for patient age  Impression New small right pleural effusion  Stable fullness of the pulmonary vasculature may be related to chronic congestive changes          Workstation performed: [id number]         ASSESSMENT/PLAN     77years old male with past medical history of CKD status post living unrelated renal transplant in the year 2011 that was preceded by a 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, probable polycystic kidney disease, hypertension who presented to the hospital to undergo laparoscopic cholecystectomy  1  TONJA on CKD status post living unrelated renal transplantation:  Last transplant performed in the year 2011 and had done reasonably well on triple immunosuppression of prednisone, Prograf and Myfortic   -baseline serum creatinine 1 1-1 2  Serum creatinine worsened up to 1 8 overnight before improving to 1 6  Etiology of acute kidney injury that occurred during hospitalization likely hypoperfusion to the kidneys due to over diuresis and secondary to lack of forward flow of blood induced by severe mitral stenosis  -Lasix was held last night, patient was placed on phenylephrine drip and amiodarone drip to improve heart rhythm and rate  2  Shortness of breath:  Etiology of shortness of breath likely severe mitral stenosis revealed on echocardiogram   Differential diagnosis also includes possible pulmonary embolism  May obtain a V/Q scan  3  Immunosuppression:  Remains on Prograf, Myfortic and prednisone   -true Prograf level noted to be 13 and elevated  Prograf dose was decreased to 1 mg in a m  And 0 5 mg in p m     4  Acute cholecystitis:  Postop day 5  Status post Laparoscopic cholecystectomy for acalculous cholecystitis  5  Hypertension:  Blood pressure dropped last night as patient developed rapid atrial fibrillation  Currently normotensive with systolic blood pressure 875       6  Urinary retention:  Now with Manrique catheter placement  Urology evaluation done recommendations noted  6  Hyponatremia:  Noted sodium level of 134  Likely secondary to hypervolemia        Ángela Nava MD  Nephrology  8/7/2019

## 2019-08-07 NOTE — QUICK NOTE
Patient flipped into AFib with rapid ventricular response at approximately 10:00 p m  His heart rate was as high as 180  He denies any chest pain or any respiratory complaints above baseline  Multiple agents were attempted to bring the rate down including 5 mg metoprolol, 2mg magnesium, diltiazem  Systolic pressures were less than 80  He received 150 mg of IV amiodarone  He will be started on amiodarone drip  Phenylephrine drip to support blood pressure  History Ms  At the bedside  I expressed my concern that given his degree of mitral stenosis he may not tolerate atrial fibrillation  They are on board for cardioversion as needed  In the interim will try amiodarone infusion  His immunosuppressive medication dosing will need to be  reviewed in the context of the amiodarone  We will discuss heparinization with surgeons

## 2019-08-07 NOTE — RESPIRATORY THERAPY NOTE
Pt remained on Vapotherm for the entire night  The patient was more comfortable remaining on the HFNC vs switching to the BiPAP overnight  Per Sivakumar Moscoso this was okay         08/07/19 0358   Non-Invasive Information   Interface HFNC prongs   Non-Invasive Ventilation Mode HFNC   $ Pulse Oximetry Spot Check Charge Completed   Non-Invasive Settings   FiO2 (%) 70   Flow (lpm) 40   Temperature (Set) 33   Non-Invasive Readings   Heater Temperature (Obs) 33

## 2019-08-07 NOTE — PROGRESS NOTES
Progress Note - Arnoldo Cheung 77 y o  male MRN: [id number]    Unit/Bed#:  Encounter: [id number]      Assessment:  Arnoldo Cheung is a comorbid 52-year-old male with history of end-stage renal disease status post renal transplantation x2, known to Dr Klever Ramsey for BPH, weaning surgical management as recommended last year, admitted for cholecystitis status post open cholecystectomy, complicated by acute hypoxic respiratory insufficiency, and new onset atrial fibrillation with rapid ventricular rate, requiring continued intensive care management  Patient is on amiodarone infusion recent echo reveals severe mitral stenosis, poor surgical candidate at this time; however diuresis on going  Creatinine continues to trend upward x3 days  Urinary output was greater than 2 6 L yesterday; 825 m L overnight  Manrique patent for clear yellow urine  Plan:  Continue medical stabilization  Maintain Manrique catheter for the time being  Do not remove  Patient is sub optimal to proceed with void trial at this point in time  If there is recommendation for rehab, catheter may be needed at time of discharge to proceed with void trial as outpatient  Will follow peripherally  Subjective:   Denies flank, suprapubic, groin or testicular pain  Objective:     Vitals: Blood pressure 119/62, pulse 68, temperature 97 7 °F (36 5 °C), temperature source Oral, resp  rate (!) 46, height 5' 4" (1 626 m), weight 68 kg (149 lb 14 6 oz), SpO2 99 %  ,Body mass index is 25 73 kg/m²        Intake/Output Summary (Last 24 hours) at 8/7/2019 0958  Last data filed at 8/7/2019 0800  Gross per 24 hour   Intake 885 55 ml   Output 1750 ml   Net -864 45 ml       Physical Exam: General appearance: alert and oriented, in no acute distress, appears older than stated age, cooperative, no distress and pale  Head: Normocephalic, without obvious abnormality, atraumatic  Neck: no adenopathy, no carotid bruit, no JVD, supple, symmetrical, trachea midline and thyroid not enlarged, symmetric, no tenderness/mass/nodules  Lungs: diminished breath sounds  Heart: regularly irregular rhythm  Abdomen: soft, non-tender; bowel sounds normal; no masses,  no organomegaly and Incision--clean dry and intact  Extremities: extremities normal, warm and well-perfused; no cyanosis, clubbing, or edema  Skin: Skin color, texture, turgor normal  No rashes or lesions  Neurologic: Grossly normal  Manrique patent for clear yellow/jacqueline urine     Invasive Devices     Peripheral Intravenous Line            Peripheral IV 08/05/19 Right Forearm 2 days    Peripheral IV 08/06/19 Left Forearm less than 1 day    Peripheral IV 08/06/19 Right Forearm less than 1 day          Line            Hemodialysis AV Fistula 08/07/01 Left 6574 days          Drain            Urethral Catheter 16 Fr  2 days              Lab Results   Component Value Date    WBC 11 36 (H) 08/07/2019    HGB 15 6 08/07/2019    HCT 47 8 08/07/2019    MCV 88 08/07/2019     08/07/2019     Lab Results   Component Value Date    SODIUM 134 (L) 08/07/2019    K 4 1 08/07/2019    CL 99 (L) 08/07/2019    CO2 28 08/07/2019    BUN 34 (H) 08/07/2019    CREATININE 1 65 (H) 08/07/2019    GLUC 127 08/07/2019    CALCIUM 8 8 08/07/2019       Lab, Imaging and other studies: I have personally reviewed pertinent reports

## 2019-08-07 NOTE — RESPIRATORY THERAPY NOTE
RT Ventilator Management Note  Mary Mcadams 77 y o  male MRN: [id number]  Unit/Bed#:  Encounter: [id number]      Daily Screen     No data found in the last 10 encounters  Physical Exam:   Assessment Type: Assess only  General Appearance: Alert, Awake  Respiratory Pattern: Irregular, Spontaneous, Assisted  Chest Assessment: Chest expansion symmetrical  Bilateral Breath Sounds: Clear, Diminished  Cough: None  O2 Device: vapotherm  Subjective Data: awake and alert without kenzie apparen trespiratory distress      Resp Comments: pateint jovanny on HFNC pateint is LOS #7 s/p being admitted for acute cholecytistis progressing to respiratory distress due to poor inspiratory effort and poor cardiac function  pateint awake an dalert without great apparen trespiratory distress settings changed in attemept to wean as tolerated   plan: contineu to wena as tolerated towrds rooma ir

## 2019-08-07 NOTE — TRANSPORTATION MEDICAL NECESSITY
Section I - General Information    Name of Patient: Larae Boas                 : 1950    Medicare #: [id number]  Transport Date: 19 (PCS is valid for round trips on this date and for all repetitive trips in the 60-day range as noted below )  Origin: [facility]: [facility]--bed 244/admitting doctor is Dr Zahida Ambriz the pt's stay covered under Medicare Part A (PPS/DRG)   [x]     Closest appropriate facility? If no, why is transport to more distant facility required? Yes  If hospice pt, is this transport related to pt's terminal illness? No       Section II - Medical Necessity Questionnaire  Ambulance transportation is medically necessary only if other means of transport are contraindicated or would be potentially harmful to the patient  To meet this requirement, the patient must either be "bed confined" or suffer from a condition such that transport by means other than ambulance is contraindicated by the patient's condition  The following questions must be answered by the medical professional signing below for this form to be valid:    1)  Describe the MEDICAL CONDITION (physical and/or mental) of this patient AT [address] [place] [address] that requires the patient to be transported in an ambulance and why transport by other means is contraindicated by the patient's condition:  Severe mitral valve stenosis with respiratory decompensation/hx of kidney transplant    2) Is the patient "bed confined" as defined below? Yes  To be "be confined" the patient must satisfy all three of the following conditions: (1) unable to get up from bed without Assistance; AND (2) unable to ambulate; AND (3) unable to sit in a chair or wheelchair  3) Can this patient safely be transported by car or wheelchair van (i e , seated during transport without a medical attendant or monitoring)?    No    4) In addition to completing questions 1-3 above, please check any of the following conditions that apply*:   *Note: supporting documentation for any boxes checked must be maintained in the patient's medical records  If hosp-hosp transfer, describe services needed at 2nd facility not available at 1st facility? Medical attendant required   Unable to tolerate seated position for time needed to transport   Cardiac monitoring required en route       Section III - Signature of Physician or Healthcare Professional  I certify that the above information is true and correct based on my evaluation of this patient, and represent that the patient requires transport by ambulance and that other forms of transport are contraindicated  I understand that this information will be used by the Centers for Medicare and Medicaid Services (CMS) to support the determination of medical necessity for ambulance services, and I represent that I have personal knowledge of the patient's condition at time of transport  []  If this box is checked, I also certify that the patient is physically or mentally incapable of signing the ambulance service's claim and that the institution with which I am affiliated has furnished care, services, or assistance to the patient  My signature below is made on behalf of the patient pursuant to 42 CFR §424 36(b)(4)   In accordance with 42 CFR §424 37, the specific reason(s) that the patient is physically or mentally incapable of signing the claim form is as follows: n/a      Signature of Physician* or Healthcare Professional______________________________________________________________  Signature Date 08/07/19 (For scheduled repetitive transports, this form is not valid for transports performed more than 60 days after this date)    Printed Name & Credentials of Physician or Healthcare Professional (MD, , RN, etc )_____Dorothea Mohr RN___________________________  *Form must be signed by patient's attending physician for scheduled, repetitive transports   For non-repetitive, unscheduled ambulance transports, if unable to obtain the signature of the attending physician, any of the following may sign (choose appropriate option below)  [] Physician Assistant []  Clinical Nurse Specialist [x]  Registered Nurse  []  Nurse Practitioner  [x] Discharge Planner

## 2019-08-07 NOTE — PROGRESS NOTES
Progress Note - Critical Care   Roxana Masters 77 y o  male MRN: [id number]  Unit/Bed#:  Encounter: [id number]    Assessment:     78 yo F w PMH of CAD, AF, renal transplant, GERD, HTN, HLD who is maintained in the ICU w acute hypoxic respiratory failure s/p open cholecystectomy on 8/2       Principal Problem:    Acute calculous cholecystitis/status post cholecystectomy  Active Problems:    Acute pulmonary insufficiency    Mitral valve stenosis    Urinary retention-postop    H/O kidney transplant    CAD (coronary artery disease)    HLD (hyperlipidemia)    Benign localized prostatic hyperplasia with lower urinary tract symptoms (LUTS)    Pulmonary congestion      Plan:          Neuro:    Acute post operative pain - PRN tylenol, oxy 5/10, dilaudid 0 2mg Q4 for breakthrough     Delirium precautions, sleep hygiene, daily CAM-ICU                 CV:    AF w RVR - went into AF w RVR w HR as high as 180s, asymptomatic, BP dropped to 29Q systolic w metoprolol and diltiazem, started on neosynephrine to support BP and amio bolus and gtt, wean gtt as able, bolus additionally if needed, started on VTE / PE heparin w no initial or re-bolus      Severe mitral stenosis - repeat ECHO reveals calcification of mitral valve leaflets and severe mitral stenosis, distant hx of mitral valve repair, appreciate cardiology recommendations, not a LEIDA or cath candidate right now given critical illness, will need continued cardiology / HF follow up and surgery evaluation, continue diuresis as tolerated                  Lung:    Acute hypoxic respiratory failure - continue vapotherm and wean as tolerated, can use BiPAP overnight PRN as patient is a mouth breather, continue pulmonary toilet, cannot exclude PE w CTA as renal transplant precludes use of IV contrast yet given high risk for PE and progressive hypoxia refractory to diuresis heparin was started after being discussed w on call general surgeon, Dr Raul Valverde       Atelectasis - treat post-operative pain, continue IS and flutter, mobilize, encourage coughing      Pulmonary edema and pleural effusions - continue diuresis as tolerated, received 3 doses of lasix in past 24 hours, f/u AM CXR                  GI:    Acalculous cholecystitis s/p open cholecystectomy, POD 5 - has been passing gas, had first bowel movement overnight, no drains, monitor pain which has been improving     GERD - no listed home medications, d/c pepcid for GI ppx as tolerating regular diet                  FEN:    Fluid - no mIVF    Nutrition - regular diet, tolerating well   Lytes - maintain Mg > 2, K > 4 given AF                  :    Hx of renal transplant (2001 / 2011) - continue home immunosuppressive regimen w myfortic, tacrolimus, prednisone and prophylactic bactrim, appreciate nephrology recs      Urinary retention - daily flomax, notify urology prior to removing marrero, maintain marrero currently given hx of urinary retention and ongoing diuresis, needs 48h void trial to d/c      TONJA - decrease frequency of diuresis today given his increase in Cr and he is overall volume negative for 24h and admission, monitor renal function daily                  ID:    Monitor clinically for s/sx of developing infection      Continue prophylactic bactrim given increased risk of UTI with renal transplant                  Heme:    DVT ppx - SQH, SCDs                  Endo:    Stable - no acute issues   Monitor daily BS on chemistry panel                            Msk/Skin:    Repetitive repositioning and off-loading to prevent skin break down and ulcerative formation                 Disposition:    Continue ICU care       Chief Complaint: "I feel okay"     HPI/24hr events: Patient went into AF w RVR overnight  His BP dropped at this time to the 70s after receiving IV metoprolol and cardizem  He was bolused w amio x 2 and started on a gtt  He was placed on a heparin gtt given risk of PE and inability to obtain a CTA   He was started on neosynephrine gtt to help maintain adequate blood pressure and maintain renal perfusion  He received 3 doses of lasix in the past 24 hours but has had a slight increase in his creatinine  Physical Exam: Physical Exam   Constitutional: He is oriented to person, place, and time  He appears well-developed and well-nourished  No distress  HENT:   Head: Normocephalic and atraumatic  Eyes: Pupils are equal, round, and reactive to light  Neck: Normal range of motion  Neck supple  No tracheal deviation present  Cardiovascular: Normal rate, regular rhythm, normal heart sounds and intact distal pulses  Exam reveals no gallop and no friction rub  No murmur heard  Converted to NSR w HR 60 - 62   Pulmonary/Chest: Effort normal and breath sounds normal  No respiratory distress  He has no wheezes  He has no rales  Improved aeration and BS in bases  No w/r/r  Taking dean / larger breaths w less pain today  On vapotherm at 40L and 70% FiO2   Abdominal: Soft  Bowel sounds are normal  He exhibits no distension and no mass  There is no tenderness  There is no guarding  No tenderness w light palpation, slight discomfort w deep palpation of RUQ    Genitourinary:   Genitourinary Comments: Manrique to gravity w clear uo  UO 50/hr   Musculoskeletal: He exhibits no edema or deformity  Neurological: He is alert and oriented to person, place, and time  Skin: Skin is warm and dry  He is not diaphoretic  No peripheral edema    Psychiatric: He has a normal mood and affect  His behavior is normal    Nursing note and vitals reviewed          Vitals:    19 0100 19 0200 19 0300 19 0400   BP: (!) 80/61 129/80 124/66 103/59   BP Location:    Right arm   Pulse: (!) 110 63 66 62   Resp: 17 (!) 28 14 13   Temp:       TempSrc:       SpO2: 99% 100% 100% 99%   Weight:       Height:                 Temperature:   Temp (24hrs), Av °F (36 7 °C), Min:97 2 °F (36 2 °C), Max:98 3 °F (36 8 °C)    Current: Temperature: 98 1 °F (36 7 °C)    Weights:   IBW: 59 2 kg    Body mass index is 25 73 kg/m²  Weight (last 2 days)     None          Hemodynamic Monitoring:  N/A     Non-Invasive/Invasive Ventilation Settings:  Respiratory    Lab Data (Last 4 hours)    None         O2/Vent Data (Last 4 hours)      08/07 0358          Non-Invasive Ventilation Mode HFNC                 No results found for: PHART, RMS6UEK, PO2ART, VDA7KSF, B8WJBUEO, BEART, SOURCE  SpO2: SpO2: 99 %    Intake and Outputs:  I/O       08/05 0701 - 08/06 0700 08/06 0701 - 08/07 0700    P  O  120 120    I V  (mL/kg)  166 7 (2 5)    IV Piggyback  450    Total Intake(mL/kg) 120 (1 8) 736 7 (10 8)    Urine (mL/kg/hr) 4770 (2 9) 2450 (1 5)    Stool  0    Total Output 4770 2450    Net -2502 -5961 3          Unmeasured Stool Occurrence  1 x        UOP: 50/hour   Nutrition:        Diet Orders   (From admission, onward)             Start     Ordered    08/06/19 1559  Diet Regular; Regular House; Sodium 4 GM (DEANN)  Diet effective now     Question Answer Comment   Diet Type Regular    Regular Regular House    Other Restriction(s): Sodium 4 GM (DEANN)    RD to adjust diet per protocol? Yes        08/06/19 1558    08/05/19 1132  Dietary nutrition supplements  Once     Question Answer Comment   Select Supplement: Ensure Clear Apple    Frequency Breakfast, Lunch, Dinner        08/05/19 1131                  Labs:   Results from last 7 days   Lab Units 08/07/19  0122 08/06/19  2159 08/06/19  0507 08/05/19  0514  08/02/19  0537   WBC Thousand/uL 9 67 8 89 5 97 7 59   < > 9 62   HEMOGLOBIN g/dL 14 1 15 9 15 5 15 7   < > 14 5   I STAT HEMOGLOBIN   --   --   --   --    < >  --    HEMATOCRIT % 43 2 48 3 48 5 48 4   < > 46 2   HEMATOCRIT, ISTAT   --   --   --   --    < >  --    PLATELETS Thousands/uL 269 249 199 214   < > 201   NEUTROS PCT %  --  75  --  79*  --  93*   MONOS PCT %  --  11  --  12  --  2*    < > = values in this interval not displayed        Results from last 7 days   Lab Units 08/06/19 2159 08/06/19  1942 08/06/19  1148  08/05/19  0514 08/04/19  2102  08/03/19  0551 08/02/19  0537   SODIUM mmol/L 133* 132* 131*   < > 132*  --    < > 135* 137   POTASSIUM mmol/L 4 4 4 2 3 9   < > 4 2  --    < > 4 9 4 5   CHLORIDE mmol/L 98* 99* 97*   < > 99*  --    < > 105 105   CO2 mmol/L 27 26 25   < > 25  --    < > 24 21   CO2, I-STAT mmol/L  --   --   --   --   --  24  --   --   --    BUN mg/dL 36* 34* 28*   < > 23  --    < > 31* 19   CREATININE mg/dL 1 87* 1 79* 1 42*   < > 1 15  --    < > 1 23 1 15   CALCIUM mg/dL 8 8 9 1 9 3   < > 9 1  --    < > 8 5 8 5   ALK PHOS U/L  --   --   --   --  52  --   --  59 68   ALT U/L  --   --   --   --  28  --   --  69 98*   AST U/L  --   --   --   --  30  --   --  63* 61*   GLUCOSE, ISTAT mg/dl  --   --   --   --   --  112  --   --   --     < > = values in this interval not displayed  Results from last 7 days   Lab Units 08/06/19 2159 08/06/19  0507 08/05/19  1222   MAGNESIUM mg/dL 2 0 2 1 1 8     Results from last 7 days   Lab Units 08/06/19 2159 08/01/19  0554   PHOSPHORUS mg/dL 3 9 3 0      Results from last 7 days   Lab Units 08/07/19  0122   INR  1 34*   PTT seconds 36     Results from last 7 days   Lab Units 08/06/19 2159   LACTIC ACID mmol/L 1 3     0   Lab Value Date/Time    TROPONINI <0 02 08/06/2019 0507    TROPONINI <0 02 07/31/2019 2016    Burgess Peacock <0 02 04/03/2017 2133       Imaging:   XR chest portable ICU   Final Result      Cardiomegaly with mild pulmonary vascular congestion and small effusions are grossly stable  Pneumoperitoneum is overall improved status post recent surgery  Workstation performed: [id number]         XR chest portable ICU   Final Result      Improved aeration of the lungs compared with yesterday  There are persistent small pleural effusions  Persistent pneumoperitoneum reportedly due to prior surgery              Workstation performed: [id number]         CT chest wo contrast Final Result      1  Hypoinflated lungs with small bilateral pleural effusions and adjacent opacities   2  Pneumoperitoneum consistent with recent surgery   3  Cardiomegaly               Workstation performed: [id number]         XR chest portable   Final Result      New bilateral pleural effusions and subjacent lower lobes opacities, most likely atelectasis and/or aspiration/infection  Workstation performed: [id number]         XR chest pa & lateral   Final Result      New small right pleural effusion  Stable fullness of the pulmonary vasculature may be related to chronic congestive changes  Workstation performed: [id number]         US gallbladder   Final Result      Gallbladder sludge with mild to moderate gallbladder distention, gallbladder wall thickening as well as positive sonographic Randall sign, and pericholecystic fluid; findings taken together are suspicious for acute cholecystitis in the appropriate    clinical setting  Correlation with the patient's symptoms and laboratory values recommended  Pancreas essentially obscured by overlying bowel gas  Severe right renal atrophy  Other findings as above  Workstation performed: [id number]         CT abdomen pelvis wo contrast   Final Result      Distended gallbladder with wall thickening and surrounding fat stranding may indicate cholecystitis  No radiopaque gallstones  Follow-up with right upper quadrant ultrasound  The study was marked in [facility] for immediate notification  Workstation performed: [id number]         XR chest portable    (Results Pending)      I have personally reviewed pertinent reports  and I have personally reviewed pertinent films in PACS    EKG: as per tele NSR no ectopy, HR 60s  Previously AF w RVR w HR as high as 180s  Micro:  Lab Results   Component Value Date    BLOODCX No Growth After 5 Days  07/31/2019    BLOODCX No Growth After 5 Days   07/31/2019    BLOODCX No Growth After 5 Days  04/03/2017    URINECX No Growth <1000 cfu/mL 04/03/2017    URINECX No Growth <1000 cfu/mL 12/05/2016    SPUTUMCULTUR 3+ Growth of Mixed Respiratory Dorota 04/04/2017    SPUTUMCULTUR 3+ Growth of Mixed Respiratory Dorota 12/08/2016    SPUTUMCULTUR 2+ Growth of Mixed Respiratory Dorota 12/06/2016       Allergies:    Allergies   Allergen Reactions    Other        Medications:   Scheduled Meds:    Current Facility-Administered Medications:  acetaminophen 650 mg Oral Q6H PRN [facility], POLI    amiodarone 1 mg/min Intravenous Continuous Cassia Rodo, PA-C Last Rate: 1 mg/min (08/06/19 2230)   atorvastatin 10 mg Oral Daily With POLI Shah    diltiazem 10 mg Intravenous Once Debo Esteban MD    heparin (porcine) 3-30 Units/kg/hr (Order-Specific) Intravenous Titrated Cassia Rodo, PA-C Last Rate: 18 Units/kg/hr (08/07/19 0123)   HYDROmorphone 0 2 mg Intravenous Q4H PRN [facility], POLI    levalbuterol 1 25 mg Nebulization Q6H PRN Debo Esteban MD    magnesium sulfate 2 g Intravenous Once Debo Esteban MD    metoprolol 5 mg Intravenous Once Debo Esteban MD    metoprolol tartrate 25 mg Oral HS [facility], POLI    mycophenolate 360 mg Oral BID [facility], POLI    ondansetron 4 mg Intravenous Q6H PRN [facility], POLI    oxyCODONE 10 mg Oral Q4H PRN [facility], CRNP    oxyCODONE 5 mg Oral Q4H PRN [facility], POLI    phenylephine  mcg/min Intravenous Titrated Cassia Rodo, PA-C Last Rate: 32 5 mcg/min (08/07/19 0405)   predniSONE 5 mg Oral Daily [facility] POLI Romo    sodium chloride 3 mL Nebulization Q6H PRN Debo Esteban MD    sulfamethoxazole-trimethoprim 1 tablet Oral Daily [facility] POLI Romo    tacrolimus 0 5 mg Oral HS Janice Hess MD    tacrolimus 1 mg Oral Daily Janice Hess MD    tamsulosin 0 4 mg Oral Daily With Dinner [facility] POLI Romo      Continuous Infusions:    amiodarone 1 mg/min Last Rate: 1 mg/min (08/06/19 2230)   heparin (porcine) 3-30 Units/kg/hr (Order-Specific) Last Rate: 18 Units/kg/hr (08/07/19 0123)   phenylephine  mcg/min Last Rate: 32 5 mcg/min (08/07/19 0405)     PRN Meds:    acetaminophen 650 mg Q6H PRN   HYDROmorphone 0 2 mg Q4H PRN   levalbuterol 1 25 mg Q6H PRN   ondansetron 4 mg Q6H PRN   oxyCODONE 10 mg Q4H PRN   oxyCODONE 5 mg Q4H PRN   sodium chloride 3 mL Q6H PRN       VTE Pharmacologic Prophylaxis: Sequential compression device (Venodyne)  and Heparin  VTE Mechanical Prophylaxis: sequential compression device    Invasive lines and devices: Invasive Devices     Peripheral Intravenous Line            Peripheral IV 08/05/19 Right Forearm 2 days    Peripheral IV 08/06/19 Left Forearm less than 1 day    Peripheral IV 08/06/19 Right Forearm less than 1 day          Line            Hemodialysis AV Fistula 08/07/01 Left 6574 days          Drain            Urethral Catheter 16 Fr  2 days                   Counseling / Coordination of Care  Total Critical Care time spent 73 minutes excluding procedures, teaching and family updates  Code Status: Level 1 - Full Code     Portions of the record may have been created with voice recognition software  Occasional wrong word or "sound a like" substitutions may have occurred due to the inherent limitations of voice recognition software  Read the chart carefully and recognize, using context, where substitutions have occurred       Jaky Chamorro PA-C

## 2019-08-07 NOTE — PLAN OF CARE
Problem: Nutrition/Hydration-ADULT  Goal: Nutrient/Hydration intake appropriate for improving, restoring or maintaining nutritional needs  Description  Monitor and assess patient's nutrition/hydration status for malnutrition (ex- brittle hair, bruises, dry skin, pale skin and conjunctiva, muscle wasting, smooth red tongue, and disorientation)  Collaborate with interdisciplinary team and initiate plan and interventions as ordered  Monitor patient's weight and dietary intake as ordered or per policy  Determine patient's food preferences and provide high-protein, high-caloric foods as appropriate       INTERVENTIONS:  - Monitor oral intake, urinary output, labs, and treatment plans  - Assess nutrition and hydration status and recommend course of action  - Evaluate amount of meals eaten  - Assist patient with eating if necessary   - Allow adequate time for meals  - Recommend/ encourage appropriate diets, oral nutritional supplements, and vitamin/mineral supplements  - Assess need for intravenous fluids  - Provide specific nutrition/hydration education as appropriate  - Include patient/family/caregiver in decisions related to nutrition   Outcome: Progressing     Problem: PAIN - ADULT  Goal: Verbalizes/displays adequate comfort level or baseline comfort level  Description  Interventions:  - Encourage patient to monitor pain and request assistance  - Assess pain using appropriate pain scale  - Administer analgesics based on type and severity of pain and evaluate response  - Implement non-pharmacological measures as appropriate and evaluate response  - Consider cultural and social influences on pain and pain management  - Notify physician/advanced practitioner if interventions unsuccessful or patient reports new pain  Outcome: Progressing     Problem: INFECTION - ADULT  Goal: Absence or prevention of progression during hospitalization  Description  INTERVENTIONS:  - Assess and monitor for signs and symptoms of infection  - Monitor lab/diagnostic results  - Monitor all insertion sites, i e  indwelling lines, tubes, and drains  - [place] appropriate cooling/warming therapies per order  - Administer medications as ordered  - Instruct and encourage patient and family to use good hand hygiene technique   Outcome: Progressing  Goal: Absence of fever/infection during neutropenic period  Description  INTERVENTIONS:  - Monitor WBC   Outcome: Progressing     Problem: SAFETY ADULT  Goal: Patient will remain free of falls  Description  INTERVENTIONS:  - Assess patient frequently for physical needs  -  Identify cognitive and physical deficits and behaviors that affect risk of falls    -  [place] fall precautions as indicated by assessment   - Educate patient/family on patient safety including physical limitations  - Instruct patient to call for assistance with activity based on assessment  - Modify environment to reduce risk of injury  - Consider OT/PT consult to assist with strengthening/mobility  Outcome: Progressing  Goal: Maintain or return to baseline ADL function  Description  INTERVENTIONS:  -  Assess patient's ability to carry out ADLs; assess patient's baseline for ADL function and identify physical deficits which impact ability to perform ADLs (bathing, care of mouth/teeth, toileting, grooming, dressing, etc )  - Assess/evaluate cause of self-care deficits   - Assess range of motion  - Assess patient's mobility; develop plan if impaired  - Assess patient's need for assistive devices and provide as appropriate  - Encourage maximum independence but intervene and supervise when necessary  ¯ Involve family in performance of ADLs  ¯ Assess for home care needs following discharge   ¯ Request OT consult to assist with ADL evaluation and planning for discharge  ¯ Provide patient education as appropriate  Outcome: Progressing  Goal: Maintain or return mobility status to optimal level  Description  INTERVENTIONS:  - Assess patient's baseline mobility status (ambulation, transfers, stairs, etc )    - Identify cognitive and physical deficits and behaviors that affect mobility  - Identify mobility aids required to assist with transfers and/or ambulation (gait belt, sit-to-stand, lift, walker, cane, etc )  - [place] fall precautions as indicated by assessment  - Record patient progress and toleration of activity level on Mobility SBAR; progress patient to next Phase/Stage  - Instruct patient to call for assistance with activity based on assessment  - Request Rehabilitation consult to assist with strengthening/weightbearing, etc   Outcome: Progressing     Problem: GENITOURINARY - ADULT  Goal: Absence of urinary retention  Description  INTERVENTIONS:  - Assess patients ability to void and empty bladder  - Monitor I/O  - Bladder scan as needed  - Discuss with physician/AP medications to alleviate retention as needed  - Discuss catheterization for long term situations as appropriate  Outcome: Progressing     Problem: Potential for Falls  Goal: Patient will remain free of falls  Description  INTERVENTIONS:  - Assess patient frequently for physical needs  -  Identify cognitive and physical deficits and behaviors that affect risk of falls    -  [place] fall precautions as indicated by assessment   - Educate patient/family on patient safety including physical limitations  - Instruct patient to call for assistance with activity based on assessment  - Modify environment to reduce risk of injury  - Consider OT/PT consult to assist with strengthening/mobility  Outcome: Progressing     Problem: RESPIRATORY - ADULT  Goal: Achieves optimal ventilation and oxygenation  Description  INTERVENTIONS:  - Assess for changes in respiratory status  - Assess for changes in mentation and behavior  - Position to facilitate oxygenation and minimize respiratory effort  - Oxygen administration by appropriate delivery method based on oxygen saturation (per order) or ABGs  - Initiate smoking cessation education as indicated  - Encourage broncho-pulmonary hygiene including cough, deep breathe, Incentive Spirometry  - Assess and instruct to report SOB or any respiratory difficulty  - Respiratory Therapy support as indicated   Outcome: Progressing     Problem: Prexisting or High Potential for Compromised Skin Integrity  Goal: Skin integrity is maintained or improved  Description  INTERVENTIONS:  - Identify patients at risk for skin breakdown  - Assess and monitor skin integrity  - Assess and monitor nutrition and hydration status  - Monitor labs (i e  albumin)  - Assess for incontinence   - Turn and reposition patient  - Assist with mobility/ambulation  - Relieve pressure over bony prominences  - Avoid friction and shearing  - Provide appropriate hygiene as needed including keeping skin clean and dry  - Evaluate need for skin moisturizer/barrier cream  - Collaborate with interdisciplinary team (i e  Nutrition, Rehabilitation, etc )   - Patient/family teaching  Outcome: Progressing

## 2019-08-07 NOTE — DISCHARGE SUMMARY
Discharge Summary - Paresh Co 77 y o  male MRN: [id number]    Unit/Bed#:  Encounter: [id number]    Admission Date:   Admission Orders (From admission, onward)     Ordered        07/31/19 2152  Inpatient Admission (expected length of stay for this patient Order details is greater than two midnights)  Once                     Admitting Diagnosis: Acute cholecystitis [K81 0]  Abnormal laboratory test result [R89 9]  History of renal transplant [Z94 0]    HPI:   Tawana Ribera is a a 77 year gentleman who initially presented to [facility] 7/31/19 for evaluation of abdominal pain x 2 days  Patient has a complex past medical history including ESRD s/p renal transplant x2, chronic immunosuppression, remote history of bowel perforation s/p partial resection, CAD s/p stenting, and mitral stenosis s/p valve replace and subsequent repair  Patient complained on abdomen pain and fullness associated nausea and vomiting  He also endorsed anorexia prompting visit to PCP office  Studies revealed new leukocytosis with left shift  He was there for referred to ED for further medical workup and evaluation  CT abdomen/pelvis was obtained revealing distended gallbladder  Follow-up right upper quadrant ultrasound consistent with findings for acute cholecystitis  Patient was subsequently admitted to General surgery service  Procedures Performed:   Orders Placed This Encounter   Procedures    ED ECG Documentation Only       Summary of Hospital Course:  Given complex past medical history patient was seen and evaluated by both Cardiology/Nephrology service  He obtained clearance and proceeded to OR for open cholecystostomy  Procedure was done under open technique given past medical surgical history  Intraoperative course unremarkable  During the evening hours of post operative day # 2-3 patient developed increased work of breathing and hypoxia requiring initiation high-flow nasal cannula    CT finding suggestive compressive atelectasis and vascular congestion  Echocardiogram was obtained revealing EF 55% with by atrial dilatation and severe mitral stenosis  Patient was cautiously diuresed in setting CKD  In the setting of diuresis kidney function worsened  On the evening 8/7/19 patient developed progressive respiratory distress as result new onset AFib with RVR  Despite multiple doses of metoprolol patient remained in RVR  He received amiodarone 150 mg x2 boluses and was initiated on a drip  He required initiation of Andrew-Synephrine to support hemodynamics  Given complexity of mitral stenosis and AKA on CKD it was felt as though patient would be better served at a tertiary center to complete workup for possible valvular replacement  Patient follows with Dr Layo Muir  at [facility]  He was therefore transferred 8/7/19 to [facility] at family request to follow with established Cardiology group  Significant Findings, Care, Treatment and Services Provided:   7/31: CT abdomen/pelvis w/o contrast: Distended gallbladder with wall thickening and surrounding fat stranding may indicate cholecystitis  No radiopaque gallstones  Follow-up with right upper quadrant ultrasound  7/31: RUQ U/S: Gallbladder sludge with mild to moderate gallbladder distention, gallbladder wall thickening as well as positive sonographic Randall sign, and pericholecystic fluid; findings taken together are suspicious for acute cholecystitis in the appropriate   8/2: Open Cholecystectomy  8/4: CT Chest: Hypoinflated lungs with small bilateral pleural effusions and adjacent opacities  Pneumoperitoneum consistent with recent surgery  Cardiomegaly  8/5: ECHO    Complications:   acute hypoxic respiratory insufficiency  acute on chronic decompensated hear failure      Discharge Diagnosis:     Principal Problem:    Acute calculous cholecystitis/status post cholecystectomy  Active Problems:    Acute pulmonary insufficiency    Acute decompensated heart failure  Mitral valve stenosis    Urinary retention-postop    TONJA on CKD    H/O kidney transplant    CAD (coronary artery disease)    HLD (hyperlipidemia)    Benign localized prostatic hyperplasia with lower urinary tract symptoms (LUTS)    Pulmonary congestion      Resolved Problems  Date Reviewed: 8/6/2019    None          Condition at Discharge: stable     Date, Time and Cause of Death    Preliminary Cause of Death:  Septic shock [facility])         Discharge instructions/Information to patient and family:   See after visit summary for information provided to patient and family  Provisions for Follow-Up Care:  See after visit summary for information related to follow-up care and any pertinent home health orders  PCP: Sergio Garcia MD    Disposition: [facility]    Planned Readmission: Yes [facility]    Discharge Statement   I spent 20  minutes discharging the patient  This time was spent on the day of discharge  I had direct contact with the patient on the day of discharge  Additional documentation is required if more than 30 minutes were spent on discharge  Discharge Medications:  See after visit summary for reconciled discharge medications provided to patient and family

## 2019-08-09 LAB
BACTERIA SPT RESP CULT: ABNORMAL
GRAM STN SPEC: ABNORMAL

## 2019-08-16 NOTE — QUICK NOTE
This note is for billing/clinical documentation  Patient has HF 2/2 mitral stenosis  He has combined systolic and diastolic HF

## 2019-08-20 ENCOUNTER — OFFICE VISIT (OUTPATIENT)
Dept: SURGERY | Facility: CLINIC | Age: 69
End: 2019-08-20

## 2019-08-20 VITALS
TEMPERATURE: 98.8 F | DIASTOLIC BLOOD PRESSURE: 50 MMHG | WEIGHT: 140 LBS | HEART RATE: 70 BPM | BODY MASS INDEX: 23.9 KG/M2 | SYSTOLIC BLOOD PRESSURE: 92 MMHG | HEIGHT: 64 IN

## 2019-08-20 DIAGNOSIS — Z48.89 POSTOPERATIVE VISIT: Primary | ICD-10-CM

## 2019-08-20 LAB
CREAT ?TM UR-SCNC: 78.3 UMOL/L
EXT PROTEIN URINE: 15.4
PROT/CREAT UR: 0.2 MG/G{CREAT}

## 2019-08-20 PROCEDURE — 99024 POSTOP FOLLOW-UP VISIT: CPT | Performed by: SURGERY

## 2019-08-20 RX ORDER — MIDODRINE HYDROCHLORIDE 2.5 MG/1
2.5 TABLET ORAL 2 TIMES DAILY
COMMUNITY
Start: 2019-08-12 | End: 2019-09-11 | Stop reason: ALTCHOICE

## 2019-08-20 RX ORDER — WARFARIN SODIUM 2.5 MG/1
2.5 TABLET ORAL DAILY
COMMUNITY
Start: 2019-08-12 | End: 2020-07-15 | Stop reason: ALTCHOICE

## 2019-08-20 NOTE — PROGRESS NOTES
Post-Op Follow Up- General Surgery   Alessandro Moralez 77 y o  male MRN: [id number]  Unit/Bed#:  Encounter: [id number]    Assessment/Plan     Assessment:  Status post open cholecystectomy for acute cholecystitis, improved  Plan:  Patient is discharged from my care and I will be glad to see him if any problem arises in the future  History of Present Illness     HPI:  Alessandro Moralez is a 77 y o  male who presents to my office accompanied by his wife for 1st postop follow-up after open cholecystectomy for acute cholecystitis  He offers no complaints at this time from the cholecystectomy standpoint  He stated doing well, tolerating diet having regular bowel movement  He denies having any fever, chills, nausea, vomiting, diarrhea, constipation or any other constitutional symptoms        Historical Information   Past Medical History:   Diagnosis Date    Bacterial pneumonia     last assessed: 2/2/2017    Benign neoplasm of skin     Benign prostatic hyperplasia     Cardiac disorder     GERD (gastroesophageal reflux disease)     Gross hematuria     last assessed: 12/5/2016    Hypercholesterolemia     Hypertension     Kidney disease     Kidney transplanted     x 2, 2001 and 2012    Osteoporosis     Pneumonia     last assessed: 10/9/2017    Seborrheic keratosis     Sinusitis     Viral warts      Past Surgical History:   Procedure Laterality Date    AV FISTULA PLACEMENT Right 2001    arteriovenous surgery creation of A-V fistula, right arm radiocephalic-Dr Ryan Hull    AV FISTULA PLACEMENT Left 2011    hemodialysis acess type arteriovenous fistula, left brachiocepalic AVF [id number]  R Sally Watson 74 N/A 8/2/2019    Procedure: CHOLECYSTECTOMY, OPEN;  Surgeon: Georgie Ayon MD;  Location: [facility] OR;  Service: General    COLON SURGERY  2012    CORONARY STENT PLACEMENT  09/15/2011    PTA stenting-[facility]/M Dr Lillette Goodpasture  2006    10/2006-[address] ORGAN      OTHER SURGICAL HISTORY  01/2012    fluids from transplant    OTHER SURGICAL HISTORY Left 07/08/2013    shaving of lesion shoulders, skin left shoulder combined compound and blue nevus    TONSILLECTOMY      [address]ANSPLANTATION RENAL Left [address], [place], [address] TRANSPLANTATION RENAL Right 12/08/2011    [place], [place]     Social History   Social History     Substance and Sexual Activity   Alcohol Use Never    Frequency: Never     Social History     Substance and Sexual Activity   Drug Use No     Social History     Tobacco Use   Smoking Status Never Smoker   Smokeless Tobacco Never Used     Family History: non-contributory    Meds/Allergies   all medications and allergies reviewed     Current Outpatient Medications:     acetaminophen (TYLENOL) 325 mg tablet, Take 2 tablets (650 mg total) by mouth every 6 (six) hours as needed for mild pain, headaches or fever, Disp: 30 tablet, Rfl: 0    amiodarone 200 mg tablet, Take 1 tablet (200 mg total) by mouth 2 (two) times a day with meals, Disp: 30 tablet, Rfl: 0    amiodarone infusion, Infuse 1 mg/min into a venous catheter continuous, Disp: 500 mL, Rfl: 0    aspirin 81 mg chewable tablet, Chew 81 mg daily , Disp: , Rfl:     atorvastatin (LIPITOR) 10 mg tablet, TAKE 1 TABLET BY MOUTH EVERY DAY, Disp: 90 tablet, Rfl: 1    Heparin Sod, Porcine, in D5W (HEPARIN, PORCINE,) 25,000 units in 250 mL, Infuse 195-1,950 Units/hr into a venous catheter titrated, Disp: 250 mL, Rfl: 0    metoprolol tartrate (LOPRESSOR) 25 mg tablet, Take 1 tablet (25 mg total) by mouth daily (Patient taking differently: Take 25 mg by mouth daily at bedtime ), Disp: 90 tablet, Rfl: 3    midodrine (PROAMATINE) 2 5 mg tablet, Take 2 5 mg by mouth 2 (two) times a day, Disp: , Rfl:     mycophenolate (MYFORTIC) 360 MG TBEC, TAKE 1 TABLET (360 MG TOTAL) BY MOUTH 2 (TWO) TIMES A DAY, Disp: 60 tablet, Rfl: 5    Omega-3 Fatty Acids (FISH OIL CONCENTRATE) 300 MG CAPS, Take 1 capsule by mouth daily , Disp: , Rfl:     ondansetron (ZOFRAN) 4 mg/2 mL injection, Infuse 2 mL (4 mg total) into a venous catheter every 6 (six) hours as needed for nausea or vomiting, Disp: 2 mL, Rfl: 0    phenylephrine (CARLINE-SYNEPHRINE) 50 mg (STANDARD CONCENTRATION) in sodium chloride 0 9% 250 mL, Infuse 0 025-0 18 mg/min into a venous catheter titrated, Disp: 500 mL, Rfl: 0    predniSONE 5 mg tablet, Take 1 tablet (5 mg total) by mouth daily, Disp: 90 tablet, Rfl: 0    sulfamethoxazole-trimethoprim (BACTRIM) 400-80 mg per tablet, Take 1 tablet by mouth daily, Disp: , Rfl:     tacrolimus (PROGRAF) 1 mg capsule, Take 1 capsule (1 mg total) by mouth 2 (two) times a day, Disp: 180 capsule, Rfl: 3    tamsulosin (FLOMAX) 0 4 mg, Take 1 capsule (0 4 mg total) by mouth daily, Disp: 90 capsule, Rfl: 1    warfarin (COUMADIN) 2 5 mg tablet, Take 2 5 mg by mouth daily, Disp: , Rfl:   Allergies   Allergen Reactions    Other        Objective     Current Vitals:   Blood Pressure: 92/50 (08/20/19 1336)  Pulse: 70 (08/20/19 1336)  Temperature: 98 8 °F (37 1 °C) (08/20/19 1336)  Temp Source: Oral (08/20/19 1336)  Height: 5' 4" (162 6 cm) (08/20/19 1336)  Weight - Scale: 63 5 kg (140 lb) (08/20/19 1336)      Invasive Devices     Peripheral Intravenous Line            Peripheral IV 08/06/19 Left Forearm 13 days    Peripheral IV 08/07/19 Dorsal (posterior); Left Forearm 13 days          Line            Hemodialysis AV Fistula 08/07/01 Left 6587 days          Drain            Urethral Catheter 16 Fr  15 days                Physical Exam   Abdominal:   Abdomen is soft, nondistended and nontender  Right subcostal incision is well-healed without evidence of infection  Nursing note and vitals reviewed

## 2019-08-21 ENCOUNTER — TELEPHONE (OUTPATIENT)
Dept: NEPHROLOGY | Facility: CLINIC | Age: 69
End: 2019-08-21

## 2019-08-21 NOTE — TELEPHONE ENCOUNTER
Ann called from [facility] lab in ref to Dr Micki Casey patient stating that his Tacrolimus level is 7   I tiger text this message to Dr Colin Pearson,

## 2019-08-23 LAB
CREAT SERPL-MCNC: 1.64 MG/DL (ref 0.6–1.3)
EXT GLUCOSE BLD: 85
EXTERNAL ANION GAP: 8
EXTERNAL BICARBONATE: 24
EXTERNAL BUN: 27
EXTERNAL CALCIUM: 8.9
EXTERNAL CHLORIDE: 102
EXTERNAL EGFR: 42
EXTERNAL POTASSIUM: 5.1
SODIUM SERPL-SCNC: 133 MMOL/L (ref 136–145)

## 2019-08-26 ENCOUNTER — TELEPHONE (OUTPATIENT)
Dept: INTERNAL MEDICINE CLINIC | Facility: CLINIC | Age: 69
End: 2019-08-26

## 2019-08-26 NOTE — TELEPHONE ENCOUNTER
Patient and his wife called this afternoon stating he is not feeling himself and has been "out of it" he said that he did cath himself this morning but is not urinating as much as he should be  He also stated that he has a fever of 101 3, bp was 95/56 and pulse was 75  I advised patient and his wife that he should be seen in ER due to his recent hospitalizations and medical issues  Patient and wife agreed and will be going to have him evaluated at [facility]

## 2019-08-27 ENCOUNTER — TELEPHONE (OUTPATIENT)
Dept: NEPHROLOGY | Facility: CLINIC | Age: 69
End: 2019-08-27

## 2019-08-27 NOTE — TELEPHONE ENCOUNTER
Patient of Dr Yadiel Singer called stating that he cancel his appointment for today because he is in [facility] because of an infection  Patient said that if Dr Yadiel Singer wanted to give him a  call to please call [phone]  Patient said that he would call back to reschedule when he is discharged   Rashmi Hull,

## 2019-09-04 NOTE — TELEPHONE ENCOUNTER
Pt managed by Bhavesh puentes in regards to urolift which he would like to have done asap, he had recently been discharged 09/03/19 from [facility] both he and his wife were on the phone going over his ongoing hospitalizations ,please review his records he is asking for appointment [place] sooner than later,questions please call

## 2019-09-04 NOTE — TELEPHONE ENCOUNTER
Called and spoke to patient  Made him aware that he would need to have in office cysto/trus done to see if he is a candidate for uro lift procedure  Patient was offered next available at [facility] on 10/25 but patient wanted to know if he went to [facility] if he could be seen sooner  Patient scheduled at [facility] on 10/9 for cysto/trus  Patient was also placed on cancellation list incase a sooner appointment opens up

## 2019-09-06 ENCOUNTER — TELEPHONE (OUTPATIENT)
Dept: NEPHROLOGY | Facility: CLINIC | Age: 69
End: 2019-09-06

## 2019-09-06 NOTE — TELEPHONE ENCOUNTER
Patient of Dr Desire Sales called stating that he was in [address] for Gallbladder Surgery, then he was sent down to [facility] in [facility] because patient went into A-Fib  Patient was d/c for [facility] on 8/12/2019, and then on 8/26/2019 he went to [facility] because he was self cathing himself, he was urinating blood and had a fever, he was self cathing for 5 days, the blood cleared up  Patient does have an appointment with his Urologist at [facility]  Patient was d/c on 9/3/2019 from [facility]  Patient wants to know how soon does he need to be seen by Dr Laurie Gaines call patient at [phone] to advise him on appointment   Teo Patton,

## 2019-09-06 NOTE — TELEPHONE ENCOUNTER
Called and spoke to the patient and explained to him that Dr Surya Phan wanted to see him in the office next week  I offer  the patient Monday 9/9/2019 @ 11am  Patient said okay and understood and was okay with the day and time of the appointment   George Finch,

## 2019-09-09 ENCOUNTER — OFFICE VISIT (OUTPATIENT)
Dept: NEPHROLOGY | Facility: CLINIC | Age: 69
End: 2019-09-09
Payer: MEDICARE

## 2019-09-09 VITALS
HEIGHT: 65 IN | DIASTOLIC BLOOD PRESSURE: 70 MMHG | TEMPERATURE: 97 F | WEIGHT: 141.6 LBS | SYSTOLIC BLOOD PRESSURE: 90 MMHG | HEART RATE: 68 BPM | RESPIRATION RATE: 18 BRPM | BODY MASS INDEX: 23.59 KG/M2

## 2019-09-09 DIAGNOSIS — Z94.0 RENAL TRANSPLANT RECIPIENT: Primary | ICD-10-CM

## 2019-09-09 DIAGNOSIS — I25.10 CORONARY ARTERY DISEASE INVOLVING NATIVE CORONARY ARTERY OF NATIVE HEART WITHOUT ANGINA PECTORIS: Chronic | ICD-10-CM

## 2019-09-09 DIAGNOSIS — K81.0 ACUTE CHOLECYSTITIS: ICD-10-CM

## 2019-09-09 DIAGNOSIS — N18.30 STAGE 3 CHRONIC KIDNEY DISEASE (HCC): Chronic | ICD-10-CM

## 2019-09-09 DIAGNOSIS — N40.1 BENIGN LOCALIZED PROSTATIC HYPERPLASIA WITH LOWER URINARY TRACT SYMPTOMS (LUTS): ICD-10-CM

## 2019-09-09 DIAGNOSIS — I48.91 NEW ONSET A-FIB (HCC): Chronic | ICD-10-CM

## 2019-09-09 PROCEDURE — 99214 OFFICE O/P EST MOD 30 MIN: CPT | Performed by: INTERNAL MEDICINE

## 2019-09-09 PROCEDURE — 1124F ACP DISCUSS-NO DSCNMKR DOCD: CPT | Performed by: INTERNAL MEDICINE

## 2019-09-09 NOTE — ASSESSMENT & PLAN NOTE
Patient rate seems to well control  He still on anticoagulation    He is going to monitored by cardiologist

## 2019-09-09 NOTE — ASSESSMENT & PLAN NOTE
He is overall kidney function is stable at creatinine 1 12    I will continue to monitor him closely

## 2019-09-09 NOTE — PROGRESS NOTES
NEPHROLOGY OFFICE FOLLOW UP  Mathew Dexter 77 y o  male MRN: [id number]    Encounter: [id number] 9/9/2019    REASON FOR VISIT: Mathew Dexter is a 77 y o  male who is here on 9/9/2019 for Follow-up; Chronic Kidney Disease; and Kidney Transplant    HPI:    Robert Angeles came in today for follow-up after hospital discharge  He had a prolonged hospital stay in different [place]  His a kidney transplantation who initially was hospitalized at [facility]  for cholecystectomy  In hospital he developed new onset atrial fibrillation he also has mitral stenosis she initially it was thought patient may need mitral valve surgery  As his cardiologist was with [facility] he was transferred to that hospital   He was monitored there  While he was there he developed urinary tension  Requiring Manrique catheter  He was discharged home with advised to self-catheterize  As outpatient he developed hematuria and going back to the hospital which was [facility] very was diagnosed to UTI and got antibiotic  During all throughout this admission his kidney function fluctuate  According the patient last time his creatinine was 1 1 with seems to his baseline    He still has a Manrique catheter for which he is going to seen by urologist for further management  He is feeling weak and tired      REVIEW OF SYSTEMS:    Review of Systems   Constitutional: Negative for activity change and fatigue  HENT: Negative for congestion and ear discharge  Eyes: Negative for photophobia and pain  Respiratory: Negative for apnea, choking and shortness of breath  Cardiovascular: Negative for chest pain and palpitations  Gastrointestinal: Negative for abdominal distention, blood in stool and constipation  Endocrine: Negative for heat intolerance and polyphagia  Genitourinary: Negative for flank pain and urgency  Musculoskeletal: Negative for neck pain and neck stiffness  Skin: Negative for color change and wound  Allergic/Immunologic: Negative for food allergies and immunocompromised state  Neurological: Positive for weakness and light-headedness  Negative for seizures and facial asymmetry  Hematological: Negative for adenopathy  Does not bruise/bleed easily  Psychiatric/Behavioral: Negative for self-injury and suicidal ideas           PAST MEDICAL HISTORY:  Past Medical History:   Diagnosis Date    Atrial fibrillation ([facility] [address] )     Bacterial pneumonia     last assessed: 2/2/2017    Benign neoplasm of skin     Benign prostatic hyperplasia     Cardiac disorder     Coronary artery disease     GERD (gastroesophageal reflux disease)     Gross hematuria     last assessed: 12/5/2016    Hypercholesterolemia     Hypertension     Kidney disease     Kidney transplanted     x 2, 2001 and 2012    Osteoporosis     Pneumonia     last assessed: 10/9/2017    Seborrheic keratosis     Sinusitis     Viral warts        PAST SURGICAL HISTORY:  Past Surgical History:   Procedure Laterality Date    AV FISTULA PLACEMENT Right 2001    arteriovenous surgery creation of A-V fistula, right arm radiocephalic-Dr Neil Gates    AV FISTULA PLACEMENT Left 2011    hemodialysis acess type arteriovenous fistula, left brachiocepalic AVF [id number]  R Fam[address]lia Walter 74 N/A 8/2/2019    Procedure: CHOLECYSTECTOMY, OPEN;  Surgeon: Juan A Luu MD;  Location: [facility] OR;  Service: General    COLON SURGERY  2012    CORONARY STENT PLACEMENT  09/15/2011    PTA stenting-[facility]/M Dr Papo Chester  2006    [facility] OTHER SURGICAL HISTORY  01/2012    fluids from transplant    OTHER SURGICAL HISTORY Left 07/08/2013    shaving of lesion shoulders, skin left shoulder combined compound and blue nevus    TONSILLECTOMY      TRANSPLANTATION RENAL Left [place], [facility] [address] TRANSPLANTATION RENAL Right 12/08/2011    [place], [place]       SOCIAL HISTORY:  Social History     Substance and Sexual Activity   Alcohol Use Never    Frequency: Never     Social History     Substance and Sexual Activity   Drug Use No     Social History     Tobacco Use   Smoking Status Never Smoker   Smokeless Tobacco Never Used       FAMILY HISTORY:  Family History   Problem Relation Age of Onset    Polycystic kidney disease Father     Kidney disease Father        MEDICATIONS:    Current Outpatient Medications:     amiodarone 200 mg tablet, Take 1 tablet (200 mg total) by mouth 2 (two) times a day with meals, Disp: 30 tablet, Rfl: 0    aspirin 81 mg chewable tablet, Chew 81 mg daily , Disp: , Rfl:     atorvastatin (LIPITOR) 10 mg tablet, TAKE 1 TABLET BY MOUTH EVERY DAY, Disp: 90 tablet, Rfl: 1    midodrine (PROAMATINE) 2 5 mg tablet, Take 2 5 mg by mouth 2 (two) times a day, Disp: , Rfl:     mycophenolate (MYFORTIC) 360 MG TBEC, TAKE 1 TABLET (360 MG TOTAL) BY MOUTH 2 (TWO) TIMES A DAY, Disp: 60 tablet, Rfl: 5    Omega-3 Fatty Acids (FISH OIL CONCENTRATE) 300 MG CAPS, Take 1 capsule by mouth daily , Disp: , Rfl:     predniSONE 5 mg tablet, Take 1 tablet (5 mg total) by mouth daily, Disp: 90 tablet, Rfl: 0    sulfamethoxazole-trimethoprim (BACTRIM) 400-80 mg per tablet, Take 1 tablet by mouth daily, Disp: , Rfl:     tacrolimus (PROGRAF) 1 mg capsule, Take 1 capsule (1 mg total) by mouth 2 (two) times a day, Disp: 180 capsule, Rfl: 3    tamsulosin (FLOMAX) 0 4 mg, Take 1 capsule (0 4 mg total) by mouth daily, Disp: 90 capsule, Rfl: 1    warfarin (COUMADIN) 2 5 mg tablet, Take 2 5 mg by mouth daily, Disp: , Rfl:     PHYSICAL EXAM:  Vitals:    09/09/19 1121   BP: 90/70   BP Location: Right arm   Patient Position: Sitting   Pulse: 68   Resp: 18   Temp: (!) 97 °F (36 1 °C)   TempSrc: Temporal   Weight: 64 2 kg (141 lb 9 6 oz)   Height: 5' 4 5" (1 638 m)     Body mass index is 23 93 kg/m²  Physical Exam   Constitutional: He is oriented to person, place, and time   He appears well-developed  No distress  HENT:   Head: Normocephalic  Mouth/Throat: Oropharynx is clear and moist    Eyes: Conjunctivae are normal  No scleral icterus  Neck: Neck supple  No JVD present  Cardiovascular: Normal rate and normal heart sounds  Pulmonary/Chest: Effort normal  He has no wheezes  Abdominal: Soft  There is no tenderness  Musculoskeletal: Normal range of motion  He exhibits no edema  Neurological: He is alert and oriented to person, place, and time  Skin: Skin is warm  No rash noted  Psychiatric: He has a normal mood and affect  His behavior is normal        LAB RESULTS:  Results for orders placed or performed in visit on 08/23/19   Comprehensive metabolic panel   Result Value Ref Range    Sodium 133 (A) 136 - 145 mmol/L    POTASSIUM 5 1     CHLORIDE 102     BICARB 24     ANION GAP 8     BUN 27     Creatinine 1 64 (A) 0 60 - 1 30 mg/dL    Glucose 85     EXTERNAL CALCIUM 8 9     EXTERNAL EGFR 42        ASSESSMENT and PLAN:      Renal transplant recipient  According to the patient is kidneys functioning at 1 12 creatinine level with seems to his baseline  I will continue to monitor him closely in view of acuteness of with multiple condition  Advised to continue hydration and avoidance of any nephrotoxic medicine    New onset a-fib [facility])  Patient rate seems to well control  He still on anticoagulation  He is going to monitored by cardiologist    CAD (coronary artery disease)  Stable and asymptomatic    CKD (chronic kidney disease)  He is overall kidney function is stable at creatinine 1 12  I will continue to monitor him closely      Everything discussed at length with him and his wife  I will recheck his blood test and I will see him back in 6 weeks        Portions of the record may have been created with voice recognition software  Occasional wrong word or "sound a like" substitutions may have occurred due to the inherent limitations of voice recognition software   Read the chart carefully and recognize, using context, where substitutions have occurred  If you have any questions, please contact the dictating provider

## 2019-09-09 NOTE — ASSESSMENT & PLAN NOTE
According to the patient is kidneys functioning at 1 12 creatinine level with seems to his baseline  I will continue to monitor him closely in view of acuteness of with multiple condition    Advised to continue hydration and avoidance of any nephrotoxic medicine

## 2019-09-23 DIAGNOSIS — Z94.0 RENAL TRANSPLANT RECIPIENT: ICD-10-CM

## 2019-09-23 RX ORDER — PREDNISONE 1 MG/1
5 TABLET ORAL DAILY
Qty: 90 TABLET | Refills: 3 | Status: SHIPPED | OUTPATIENT
Start: 2019-09-23 | End: 2020-01-03

## 2019-09-23 NOTE — TELEPHONE ENCOUNTER
Pharmacist from [facility] in [place] called stating that patient of Dr Ramon Andrews called stating that patient needs a refill for his Prednisone 5mg 1 a day with 80 qu called into [facility] in [employer] @ [phone]  If any questions please call patient @ [phone]   Christ Granger,

## 2019-09-24 LAB
EXT GLUCOSE BLD: 106
EXTERNAL ANION GAP: 9
EXTERNAL BUN: 36
EXTERNAL CALCIUM: 8.6
EXTERNAL CHLORIDE: 102
EXTERNAL CO2: 24
EXTERNAL CREATININE: 1.73
EXTERNAL EGFR: 40
EXTERNAL POTASSIUM: 5
EXTERNAL SODIUM: 135

## 2019-10-01 ENCOUNTER — OFFICE VISIT (OUTPATIENT)
Dept: INTERNAL MEDICINE CLINIC | Facility: CLINIC | Age: 69
End: 2019-10-01
Payer: MEDICARE

## 2019-10-01 VITALS
OXYGEN SATURATION: 98 % | HEART RATE: 78 BPM | BODY MASS INDEX: 23.49 KG/M2 | DIASTOLIC BLOOD PRESSURE: 68 MMHG | WEIGHT: 141 LBS | RESPIRATION RATE: 18 BRPM | SYSTOLIC BLOOD PRESSURE: 106 MMHG | HEIGHT: 65 IN

## 2019-10-01 DIAGNOSIS — Z01.818 PRE-OP EXAMINATION: Primary | ICD-10-CM

## 2019-10-01 DIAGNOSIS — Z97.8 INDWELLING FOLEY CATHETER PRESENT: ICD-10-CM

## 2019-10-01 DIAGNOSIS — Z94.0 H/O KIDNEY TRANSPLANT: Chronic | ICD-10-CM

## 2019-10-01 DIAGNOSIS — N40.1 BENIGN LOCALIZED PROSTATIC HYPERPLASIA WITH LOWER URINARY TRACT SYMPTOMS (LUTS): ICD-10-CM

## 2019-10-01 PROBLEM — K81.0 ACUTE CHOLECYSTITIS: Status: RESOLVED | Noted: 2019-07-31 | Resolved: 2019-10-01

## 2019-10-01 PROBLEM — J01.00 ACUTE NON-RECURRENT MAXILLARY SINUSITIS: Status: RESOLVED | Noted: 2017-05-23 | Resolved: 2019-10-01

## 2019-10-01 PROCEDURE — 99214 OFFICE O/P EST MOD 30 MIN: CPT | Performed by: INTERNAL MEDICINE

## 2019-10-01 NOTE — PROGRESS NOTES
Assessment/Plan:     Patient is considered cleared for the planned procedure with no further workup indicated  He has already been cleared by his cardiologist and infectious disease  He has already stopped his Coumadin and aspirin  Quality Measures:       No follow-ups on file  No problem-specific Assessment & Plan notes found for this encounter  Diagnoses and all orders for this visit:    Pre-op examination    Benign localized prostatic hyperplasia with lower urinary tract symptoms (LUTS)    Indwelling Manrique catheter present    H/O kidney transplant          Subjective:      Patient ID: Vernita Goodpasture is a 77 y o  male  Patient comes in today with his wife for follow-up  Since his last visit, he has been in the hospital several times  His original abdominal pain was from his gallbladder and he had that removed  At some point during the hospitalizations, he developed rapid AFib  That was treated by Cardiology  He also developed an infection that required IV antibiotics, even for several more days after discharge  Because of BPH he developed urinary retention and apparently failed a voiding trial   He has an indwelling Manrique catheter in and is scheduled for a TURP with Urology  He has already been cleared by Cardiology and Infectious Disease  He is a kidney transplant recipient and is on lifelong immunosuppressive therapy        ALLERGIES:  Allergies   Allergen Reactions    Other        CURRENT MEDICATIONS:    Current Outpatient Medications:     amiodarone 200 mg tablet, Take 1 tablet (200 mg total) by mouth 2 (two) times a day with meals, Disp: 30 tablet, Rfl: 0    atorvastatin (LIPITOR) 10 mg tablet, TAKE 1 TABLET BY MOUTH EVERY DAY, Disp: 90 tablet, Rfl: 1    mycophenolate (MYFORTIC) 360 MG TBEC, TAKE 1 TABLET (360 MG TOTAL) BY MOUTH 2 (TWO) TIMES A DAY, Disp: 60 tablet, Rfl: 5    predniSONE 5 mg tablet, Take 1 tablet (5 mg total) by mouth daily, Disp: 90 tablet, Rfl: 3   sulfamethoxazole-trimethoprim (BACTRIM) 400-80 mg per tablet, Take 1 tablet by mouth daily, Disp: , Rfl:     tacrolimus (PROGRAF) 1 mg capsule, Take 1 capsule (1 mg total) by mouth 2 (two) times a day, Disp: 180 capsule, Rfl: 3    tamsulosin (FLOMAX) 0 4 mg, Take 1 capsule (0 4 mg total) by mouth daily, Disp: 90 capsule, Rfl: 1    warfarin (COUMADIN) 2 5 mg tablet, Take 2 5 mg by mouth daily, Disp: , Rfl:     aspirin 81 mg chewable tablet, Chew 81 mg daily , Disp: , Rfl:     Omega-3 Fatty Acids (FISH OIL CONCENTRATE) 300 MG CAPS, Take 1 capsule by mouth daily , Disp: , Rfl:     ACTIVE PROBLEM LIST:  Patient Active Problem List   Diagnosis    Hypoalbuminemia    CAD (coronary artery disease)    H/O kidney transplant    [facility] onset [facility]fib ([facility]    HLD (hyperlipidemia)    S/P mitral valve repair    Polycystic kidney disease    CKD (chronic kidney disease)    Essential hypertension    Elevated brain natriuretic peptide (BNP) level    Immunosuppression ([place] [address] )    Renal transplant recipient    H/O recurrent pneumonia    Multiple nevi    Benign localized prostatic hyperplasia with lower urinary tract symptoms (LUTS)    Incomplete bladder emptying    Acute calculous cholecystitis/status post cholecystectomy    Urinary retention-postop    Acute pulmonary insufficiency    Mitral valve stenosis    Pulmonary congestion       PAST MEDICAL HISTORY:  Past Medical History:   Diagnosis Date    Atrial fibrillation ([facility] )     Bacterial pneumonia     last assessed: 2/2/2017    Benign neoplasm of skin     Benign prostatic hyperplasia     Cardiac disorder     Coronary artery disease     GERD (gastroesophageal reflux disease)     Gross hematuria     last assessed: 12/5/2016    Hypercholesterolemia     Hypertension     Kidney disease     Kidney transplanted     x 2, 2001 and 2012    Osteoporosis     Pneumonia     last assessed: 10/9/2017    Seborrheic keratosis     Sinusitis     Viral warts        PAST SURGICAL HISTORY:  Past Surgical History:   Procedure Laterality Date    AV FISTULA PLACEMENT Right 2001    arteriovenous surgery creation of A-V fistula, right arm radiocephalic-Dr Patrick Floor    AV FISTULA PLACEMENT Left 2011    hemodialysis acess type arteriovenous fistula, left brachiocepalic AVF [id number]  R Sally Watson 74 N/A 8/2/2019    Procedure: CHOLECYSTECTOMY, OPEN;  Surgeon: Maura Velazco MD;  Location: [facility];  Service: General    COLON SURGERY  2012    CORONARY STENT PLACEMENT  09/15/2011    PTA stenting-[facility]/M Dr Holguin Post  2006    10/[facility]   [place] OTHER SURGICAL HISTORY  01/2012    fluids from transplant    OTHER SURGICAL HISTORY Left 07/08/2013    shaving of lesion shoulders, skin left shoulder combined compound and blue nevus    TONSILLECTOMY      TRANSPLANTATION RENAL Left Pineda Higgins, [facility] [address] TRANSPLANTATION RENAL Right 12/08/2011    Pineda Moon, [place]       FAMILY HISTORY:  Family History   Problem Relation Age of Onset    Polycystic kidney disease Father     Kidney disease Father        SOCIAL HISTORY:  Social History     Socioeconomic History    Marital status: /Civil Union     Spouse name: Not on file    Number of children: 3    Years of education: Not on file    Highest education level: Not on file   Occupational History    Occupation: retired     Comment: , not employed   Social Needs    Financial resource strain: Not on file    Food insecurity:     Worry: Not on file     Inability: Not on file   drop.io needs:     Medical: Not on file     Non-medical: Not on file   Tobacco Use    Smoking status: Never Smoker    Smokeless tobacco: Never Used   Substance and Sexual Activity    Alcohol use: Never     Frequency: Never    Drug use: No    Sexual activity: Yes     Partners: Female     Comment: denied: history of high risk sexual behavior   Lifestyle    Physical activity:     Days per week: Not on file     Minutes per session: Not on file    Stress: Not on file   Relationships    Social connections:     Talks on phone: Not on file     Gets together: Not on file     Attends Gnosticism service: Not on file     Active member of club or organization: Not on file     Attends meetings of clubs or organizations: Not on file     Relationship status: Not on file    Intimate partner violence:     Fear of current or ex partner: Not on file     Emotionally abused: Not on file     Physically abused: Not on file     Forced sexual activity: Not on file   Other Topics Concern    Not on file   Social History Narrative    Active advance directive-yes    Exercises occasionally       Review of Systems   Respiratory: Negative for shortness of breath  Cardiovascular: Negative for chest pain  Gastrointestinal: Negative for abdominal pain  Objective:  Vitals:    10/01/19 1457   BP: 106/68   BP Location: Left arm   Patient Position: Sitting   Cuff Size: Adult   Pulse: 78   Resp: 18   SpO2: 98%   Weight: 64 kg (141 lb)   Height: 5' 4 5" (1 638 m)     Body mass index is 23 83 kg/m²  Physical Exam   Constitutional: He is oriented to person, place, and time  He appears well-developed and well-nourished  Cardiovascular: Normal rate, regular rhythm and normal heart sounds  Pulmonary/Chest: Effort normal and breath sounds normal    Abdominal: Soft  There is no tenderness  Musculoskeletal: He exhibits no edema  Neurological: He is alert and oriented to person, place, and time  Nursing note and vitals reviewed          RESULTS:    Recent Results (from the past 1008 hour(s))   Basic metabolic panel    Collection Time: 09/18/19 12:00 AM   Result Value Ref Range    SODIUM 135     POTASSIUM 5 0     CHLORIDE 102     CO2 24     BUN 36     CREATININE 1 73     Glucose 106     EXTERNAL CALCIUM 8 6     ANION GAP 9     EXTERNAL EGFR 40        This note was created with voice recognition software  Phonic, grammatical and spelling errors may be present within the note as a result

## 2019-10-02 ENCOUNTER — TELEPHONE (OUTPATIENT)
Dept: UROLOGY | Facility: AMBULATORY SURGERY CENTER | Age: 69
End: 2019-10-02

## 2019-10-02 NOTE — TELEPHONE ENCOUNTER
Per patient he does not wish to reschedule  Patient is aware that he can call the office back to reschedule if he wishes

## 2019-10-04 ENCOUNTER — TELEPHONE (OUTPATIENT)
Dept: NEPHROLOGY | Facility: CLINIC | Age: 69
End: 2019-10-04

## 2019-10-04 NOTE — TELEPHONE ENCOUNTER
Patient of Dr Emre Ambrose called stating that he received a phone call from [facility] Nurse asking what medication he needs to hold for surgery on Monday  Patient is having Prostate TURP done  Please all patient [phone] to advise him on his medication that he needs to hold   Christal Gonzales,

## 2019-10-04 NOTE — TELEPHONE ENCOUNTER
Discussed with the patient    He is already off blood thinner and no other medicine need to be stopped

## 2019-10-08 ENCOUNTER — TELEPHONE (OUTPATIENT)
Dept: NEPHROLOGY | Facility: CLINIC | Age: 69
End: 2019-10-08

## 2019-10-08 NOTE — TELEPHONE ENCOUNTER
Patient of Dr Radhames Momin called stating that he just had Prostate Surgery done on Monday 10/7/2019 with Dr Radha Burris (Urologist) with [facility] in [facility]  Dr Radha Burris gave patient Oxybutynin ClER 5mg 1 a day for 20 days, and also Phenazopyridi-ne 200mg tab 3x's a day as needed for Bladder Spasm  The patient wants to know if this is okay to take with his Kidney issue  Please call patient back at [phone] to advise him about these medications   Manuel Mcknight,

## 2019-10-11 ENCOUNTER — APPOINTMENT (OUTPATIENT)
Dept: LAB | Facility: HOSPITAL | Age: 69
End: 2019-10-11
Attending: INTERNAL MEDICINE
Payer: MEDICARE

## 2019-10-11 DIAGNOSIS — N18.30 STAGE 3 CHRONIC KIDNEY DISEASE (HCC): ICD-10-CM

## 2019-10-11 DIAGNOSIS — Z94.0 RENAL TRANSPLANT RECIPIENT: ICD-10-CM

## 2019-10-11 LAB
ANION GAP SERPL CALCULATED.3IONS-SCNC: 5 MMOL/L (ref 4–13)
BACTERIA UR QL AUTO: ABNORMAL /HPF
BASOPHILS # BLD AUTO: 0.04 THOUSANDS/ΜL (ref 0–0.1)
BASOPHILS NFR BLD AUTO: 1 % (ref 0–1)
BILIRUB UR QL STRIP: NEGATIVE
BUN SERPL-MCNC: 24 MG/DL (ref 5–25)
CALCIUM SERPL-MCNC: 9.3 MG/DL (ref 8.3–10.1)
CHLORIDE SERPL-SCNC: 103 MMOL/L (ref 100–108)
CLARITY UR: ABNORMAL
CO2 SERPL-SCNC: 28 MMOL/L (ref 21–32)
COLOR UR: YELLOW
CREAT SERPL-MCNC: 1.53 MG/DL (ref 0.6–1.3)
CREAT UR-MCNC: 53 MG/DL
EOSINOPHIL # BLD AUTO: 0.2 THOUSAND/ΜL (ref 0–0.61)
EOSINOPHIL NFR BLD AUTO: 3 % (ref 0–6)
ERYTHROCYTE [DISTWIDTH] IN BLOOD BY AUTOMATED COUNT: 14.6 % (ref 11.6–15.1)
GFR SERPL CREATININE-BSD FRML MDRD: 46 ML/MIN/1.73SQ M
GLUCOSE P FAST SERPL-MCNC: 88 MG/DL (ref 65–99)
GLUCOSE UR STRIP-MCNC: NEGATIVE MG/DL
HCT VFR BLD AUTO: 46.3 % (ref 36.5–49.3)
HGB BLD-MCNC: 14.4 G/DL (ref 12–17)
HGB UR QL STRIP.AUTO: ABNORMAL
IMM GRANULOCYTES # BLD AUTO: 0.08 THOUSAND/UL (ref 0–0.2)
IMM GRANULOCYTES NFR BLD AUTO: 1 % (ref 0–2)
KETONES UR STRIP-MCNC: NEGATIVE MG/DL
LEUKOCYTE ESTERASE UR QL STRIP: ABNORMAL
LYMPHOCYTES # BLD AUTO: 1.61 THOUSANDS/ΜL (ref 0.6–4.47)
LYMPHOCYTES NFR BLD AUTO: 24 % (ref 14–44)
MCH RBC QN AUTO: 28.1 PG (ref 26.8–34.3)
MCHC RBC AUTO-ENTMCNC: 31.1 G/DL (ref 31.4–37.4)
MCV RBC AUTO: 90 FL (ref 82–98)
MONOCYTES # BLD AUTO: 0.79 THOUSAND/ΜL (ref 0.17–1.22)
MONOCYTES NFR BLD AUTO: 12 % (ref 4–12)
NEUTROPHILS # BLD AUTO: 4.05 THOUSANDS/ΜL (ref 1.85–7.62)
NEUTS SEG NFR BLD AUTO: 59 % (ref 43–75)
NITRITE UR QL STRIP: NEGATIVE
NON-SQ EPI CELLS URNS QL MICRO: ABNORMAL /HPF
NRBC BLD AUTO-RTO: 0 /100 WBCS
PH UR STRIP.AUTO: 5.5 [PH]
PLATELET # BLD AUTO: 244 THOUSANDS/UL (ref 149–390)
PMV BLD AUTO: 8.6 FL (ref 8.9–12.7)
POTASSIUM SERPL-SCNC: 4.4 MMOL/L (ref 3.5–5.3)
PROT UR STRIP-MCNC: ABNORMAL MG/DL
PROT UR-MCNC: 43 MG/DL
PROT/CREAT UR: 0.81 MG/G{CREAT} (ref 0–0.1)
PTH-INTACT SERPL-MCNC: 53.1 PG/ML (ref 18.4–80.1)
RBC # BLD AUTO: 5.12 MILLION/UL (ref 3.88–5.62)
RBC #/AREA URNS AUTO: ABNORMAL /HPF
SODIUM SERPL-SCNC: 136 MMOL/L (ref 136–145)
SP GR UR STRIP.AUTO: 1.01 (ref 1–1.03)
UROBILINOGEN UR QL STRIP.AUTO: 0.2 E.U./DL
WBC # BLD AUTO: 6.77 THOUSAND/UL (ref 4.31–10.16)
WBC #/AREA URNS AUTO: ABNORMAL /HPF

## 2019-10-11 PROCEDURE — 85025 COMPLETE CBC W/AUTO DIFF WBC: CPT

## 2019-10-11 PROCEDURE — 83970 ASSAY OF PARATHORMONE: CPT

## 2019-10-11 PROCEDURE — 36415 COLL VENOUS BLD VENIPUNCTURE: CPT

## 2019-10-11 PROCEDURE — 84156 ASSAY OF PROTEIN URINE: CPT

## 2019-10-11 PROCEDURE — 81001 URINALYSIS AUTO W/SCOPE: CPT

## 2019-10-11 PROCEDURE — 82570 ASSAY OF URINE CREATININE: CPT

## 2019-10-11 PROCEDURE — 80197 ASSAY OF TACROLIMUS: CPT

## 2019-10-11 PROCEDURE — 80048 BASIC METABOLIC PNL TOTAL CA: CPT

## 2019-10-12 LAB — TACROLIMUS BLD-MCNC: 8.1 NG/ML (ref 2–20)

## 2019-10-15 ENCOUNTER — OFFICE VISIT (OUTPATIENT)
Dept: NEPHROLOGY | Facility: CLINIC | Age: 69
End: 2019-10-15
Payer: MEDICARE

## 2019-10-15 VITALS
DIASTOLIC BLOOD PRESSURE: 70 MMHG | RESPIRATION RATE: 16 BRPM | HEIGHT: 63 IN | WEIGHT: 142.8 LBS | SYSTOLIC BLOOD PRESSURE: 100 MMHG | TEMPERATURE: 97.1 F | HEART RATE: 68 BPM | BODY MASS INDEX: 25.3 KG/M2

## 2019-10-15 DIAGNOSIS — Z94.0 H/O KIDNEY TRANSPLANT: Primary | Chronic | ICD-10-CM

## 2019-10-15 DIAGNOSIS — N18.30 STAGE 3 CHRONIC KIDNEY DISEASE (HCC): Chronic | ICD-10-CM

## 2019-10-15 DIAGNOSIS — I10 ESSENTIAL HYPERTENSION: Chronic | ICD-10-CM

## 2019-10-15 PROCEDURE — 99213 OFFICE O/P EST LOW 20 MIN: CPT | Performed by: INTERNAL MEDICINE

## 2019-10-15 RX ORDER — MIDODRINE HYDROCHLORIDE 10 MG/1
10 TABLET ORAL DAILY
COMMUNITY
End: 2021-01-04 | Stop reason: SDUPTHER

## 2019-10-15 NOTE — ASSESSMENT & PLAN NOTE
His kidney function seems to be stable along with normal tacrolimus level  He still being monitored by transplant nephrologist who he will be seeing in 3 months

## 2019-10-15 NOTE — ASSESSMENT & PLAN NOTE
His creatinine stable at 1 3  Will continue to monitor it    Advised hydration and avoidance of any nephrotoxic medicine

## 2019-10-15 NOTE — PATIENT INSTRUCTIONS
Chronic Kidney Disease   AMBULATORY CARE:   Chronic kidney disease (CKD)  is the gradual and permanent loss of kidney function  Normally, the kidneys remove fluid, chemicals, and waste from your blood  These wastes are turned into urine by your kidneys  CKD may worsen over time and lead to kidney failure  Common symptoms include the following:   · Changes in how often you need to urinate    · Swelling in your arms, legs, or feet    · Shortness of breath    · Fatigue or weakness    · Bad or bitter taste in your mouth    · Nausea, vomiting, or loss of appetite  Seek care immediately if:   · You are confused and very drowsy  · You have a seizure  · You have shortness of breath  Contact your healthcare provider if:   · You suddenly gain or lose more weight than your healthcare provider has told you is okay  · You have itchy skin or a rash  · You urinate more or less than you normally do  · You have blood in your urine  · You have nausea and repeated vomiting  · You have fatigue or muscle weakness  · You have hiccups that will not stop  · You have questions or concerns about your condition or care  Treatment for CKD:  Medicines may be given to decrease blood pressure and get rid of extra fluid  You may also receive medicine to manage health conditions that may occur with CKD  Dialysis is a treatment to remove chemicals and waste from your blood when your kidneys can no longer do this  Surgery may be needed to create an arteriovenous fistula (AVF) in your arm or insert a catheter into your abdomen so that you can receive dialysis  A kidney transplant may be done if your CKD becomes severe  Manage CKD:   · Maintain a healthy weight  Ask your healthcare provider how much you should weigh  Ask him to help you create a weight loss plan if you are overweight  · Exercise 30 to 60 minutes a day, 4 to 7 times a week, or as directed  Ask about the best exercise plan for you   Regular exercise can help you manage CKD, high blood pressure, and diabetes  · Follow your healthcare provider's advice about what to eat and drink  He may tell you to eat food low in sodium (salt), potassium, phosphorus, or protein  You may need to see a dietitian if you need help planning meals  Ask how much liquid to drink each day and which liquids are best for you  · Limit alcohol  Ask how much alcohol is safe for you to drink  A drink of alcohol is 12 ounces of beer, 5 ounces of wine, or 1½ ounces of liquor  · Do not smoke  Nicotine and other chemicals in cigarettes and cigars can cause lung and kidney damage  Ask your healthcare provider for information if you currently smoke and need help to quit  E-cigarettes or smokeless tobacco still contain nicotine  Talk to your healthcare provider before you use these products  · Ask your healthcare provider if you need vaccines  Infections such as pneumonia, influenza, and hepatitis can be more harmful or more likely to occur in a person who has CKD  Vaccines reduce your risk of infection with these viruses  Follow up with your healthcare provider as directed:  Write down your questions so you remember to ask them during your visits  © 2017 [address] Information is for End User's use only and may not be sold, redistributed or otherwise used for commercial purposes  All illustrations and images included in CareNotes® are the copyrighted property of [employer]  or Russell Curry  The above information is an  only  It is not intended as medical advice for individual conditions or treatments  Talk to your doctor, nurse or pharmacist before following any medical regimen to see if it is safe and effective for you

## 2019-10-15 NOTE — PROGRESS NOTES
NEPHROLOGY OFFICE FOLLOW UP  Marysol Zacarias 77 y o  male MRN: [id number]    Encounter: [id number] 10/15/2019    REASON FOR VISIT: Marysol Zacarias is a 77 y o  male who is here on 10/15/2019 for Follow-up and Kidney Transplant    HPI:    Carol Melgar came in today for follow-up of kidney transplant  Since I saw him last he had TURP done  Manrique catheter is out  He is feeling much better  Denies any complaint  No chest pain no palpitation or shortness of breath  In between he was hospitalized with urosepsis which is under control also      REVIEW OF SYSTEMS:    Review of Systems   Constitutional: Negative for activity change and fatigue  HENT: Negative for congestion and ear discharge  Eyes: Negative for photophobia and pain  Respiratory: Negative for apnea and choking  Cardiovascular: Negative for chest pain and palpitations  Gastrointestinal: Negative for abdominal distention and blood in stool  Endocrine: Negative for heat intolerance and polyphagia  Genitourinary: Negative for flank pain and urgency  Musculoskeletal: Negative for neck pain and neck stiffness  Skin: Negative for color change and wound  Allergic/Immunologic: Negative for food allergies and immunocompromised state  Neurological: Negative for seizures and facial asymmetry  Hematological: Negative for adenopathy  Does not bruise/bleed easily  Psychiatric/Behavioral: Negative for self-injury and suicidal ideas           PAST MEDICAL HISTORY:  Past Medical History:   Diagnosis Date    Atrial fibrillation ([facility] [address] )     Bacterial pneumonia     last assessed: 2/2/2017    Benign neoplasm of skin     Benign prostatic hyperplasia     Cardiac disorder     Coronary artery disease     GERD (gastroesophageal reflux disease)     Gross hematuria     last assessed: 12/5/2016    Hypercholesterolemia     Hypertension     Kidney disease     Kidney transplanted     x 2, 2001 and 2012    Osteoporosis     Pneumonia     last assessed: 10/9/2017    Seborrheic keratosis     Sinusitis     Viral warts        PAST SURGICAL HISTORY:  Past Surgical History:   Procedure Laterality Date    AV FISTULA PLACEMENT Right 2001    arteriovenous surgery creation of A-V fistula, right arm radiocephalic-Dr May Burn    AV FISTULA PLACEMENT Left 2011    hemodialysis acess type arteriovenous fistula, left brachiocepalic AVF [id number]  R Sally Watson 74 N/A 8/2/2019    Procedure: CHOLECYSTECTOMY, OPEN;  Surgeon: Adrian Chu MD;  Location: [facility];  Service: General    COLON SURGERY  2012    CORONARY STENT PLACEMENT  09/15/2011    PTA stenting-[facility]/M Dr Telly Mittal  2006    [facility] SURGICAL [facility]IST[facility]  01/2012    fluids from transplant    OTHER SURGICAL HISTORY Left 07/08/2013    shaving of lesion shoulders, skin left shoulder combined compound and blue nevus    TONSILLECTOMY      TRANSPLANTATION RENAL Left Allen Pineda Morris, [facility] [address] TRANSPLANTATION RENAL Right 12/08/2011    Pineda Muhammad, [place]       SOCIAL HISTORY:  Social History     Substance and Sexual Activity   Alcohol Use Never    Frequency: Never     Social History     Substance and Sexual Activity   Drug Use No     Social History     Tobacco Use   Smoking Status Never Smoker   Smokeless Tobacco Never Used       FAMILY HISTORY:  Family History   Problem Relation Age of Onset    Polycystic kidney disease Father     Kidney disease Father        MEDICATIONS:    Current Outpatient Medications:     amiodarone 200 mg tablet, Take 1 tablet (200 mg total) by mouth 2 (two) times a day with meals, Disp: 30 tablet, Rfl: 0    aspirin 81 mg chewable tablet, Chew 81 mg daily , Disp: , Rfl:     atorvastatin (LIPITOR) 10 mg tablet, TAKE 1 TABLET BY MOUTH EVERY DAY, Disp: 90 tablet, Rfl: 1    midodrine (PROAMATINE) 10 MG tablet, Take 10 mg by mouth daily, Disp: , Rfl:     mycophenolate (MYFORTIC) 360 MG TBEC, TAKE 1 TABLET (360 MG TOTAL) BY MOUTH 2 (TWO) TIMES A DAY, Disp: 60 tablet, Rfl: 5    Omega-3 Fatty Acids (FISH OIL CONCENTRATE) 300 MG CAPS, Take 1 capsule by mouth daily , Disp: , Rfl:     predniSONE 5 mg tablet, Take 1 tablet (5 mg total) by mouth daily, Disp: 90 tablet, Rfl: 3    sulfamethoxazole-trimethoprim (BACTRIM) 400-80 mg per tablet, Take 1 tablet by mouth daily, Disp: , Rfl:     tacrolimus (PROGRAF) 1 mg capsule, Take 1 capsule (1 mg total) by mouth 2 (two) times a day, Disp: 180 capsule, Rfl: 3    tamsulosin (FLOMAX) 0 4 mg, Take 1 capsule (0 4 mg total) by mouth daily, Disp: 90 capsule, Rfl: 1    warfarin (COUMADIN) 2 5 mg tablet, Take 2 5 mg by mouth daily, Disp: , Rfl:     PHYSICAL EXAM:  Vitals:    10/15/19 1110   BP: 100/70   BP Location: Right arm   Patient Position: Sitting   Pulse: 68   Resp: 16   Temp: (!) 97 1 °F (36 2 °C)   TempSrc: Tympanic   Weight: 64 8 kg (142 lb 12 8 oz)   Height: 5' 3" (1 6 m)     Body mass index is 25 3 kg/m²  Physical Exam   Constitutional: He is oriented to person, place, and time  He appears well-developed  No distress  HENT:   Head: Normocephalic  Mouth/Throat: Oropharynx is clear and moist    Eyes: Conjunctivae are normal  No scleral icterus  Neck: Normal range of motion  Neck supple  No JVD present  Cardiovascular: Normal rate, regular rhythm and normal heart sounds  Pulmonary/Chest: Effort normal and breath sounds normal  He has no wheezes  Abdominal: Soft  Bowel sounds are normal  There is no tenderness  Musculoskeletal: Normal range of motion  He exhibits no edema  Neurological: He is alert and oriented to person, place, and time  Skin: Skin is warm  No rash noted  Psychiatric: He has a normal mood and affect   His behavior is normal        LAB RESULTS:  Results for orders placed or performed in visit on 76/12/22   Basic metabolic panel   Result Value Ref Range    Sodium 136 136 - 145 mmol/L    Potassium 4 4 3 5 - 5 3 mmol/L    Chloride 103 100 - 108 mmol/L    CO2 28 21 - 32 mmol/L    ANION GAP 5 4 - 13 mmol/L    BUN 24 5 - 25 mg/dL    Creatinine 1 53 (H) 0 60 - 1 30 mg/dL    Glucose, Fasting 88 65 - 99 mg/dL    Calcium 9 3 8 3 - 10 1 mg/dL    eGFR 46 ml/min/1 73sq m   CBC and differential   Result Value Ref Range    WBC 6 77 4 31 - 10 16 Thousand/uL    RBC 5 12 3 88 - 5 62 Million/uL    Hemoglobin 14 4 12 0 - 17 0 g/dL    Hematocrit 46 3 36 5 - 49 3 %    MCV 90 82 - 98 fL    MCH 28 1 26 8 - 34 3 pg    MCHC 31 1 (L) 31 4 - 37 4 g/dL    RDW 14 6 11 6 - 15 1 %    MPV 8 6 (L) 8 9 - 12 7 fL    Platelets 534 203 - 209 Thousands/uL    nRBC 0 /100 WBCs    Neutrophils Relative 59 43 - 75 %    Immat GRANS % 1 0 - 2 %    Lymphocytes Relative 24 14 - 44 %    Monocytes Relative 12 4 - 12 %    Eosinophils Relative 3 0 - 6 %    Basophils Relative 1 0 - 1 %    Neutrophils Absolute 4 05 1 85 - 7 62 Thousands/µL    Immature Grans Absolute 0 08 0 00 - 0 20 Thousand/uL    Lymphocytes Absolute 1 61 0 60 - 4 47 Thousands/µL    Monocytes Absolute 0 79 0 17 - 1 22 Thousand/µL    Eosinophils Absolute 0 20 0 00 - 0 61 Thousand/µL    Basophils Absolute 0 04 0 00 - 0 10 Thousands/µL   Protein / creatinine ratio, urine   Result Value Ref Range    Creatinine, Ur 53 0 mg/dL    Protein Urine Random 43 mg/dL    Prot/Creat Ratio, Ur 0 81 (H) 0 00 - 0 10   PTH, intact   Result Value Ref Range    PTH 53 1 18 4 - 80 1 pg/mL   Urinalysis with reflex to microscopic   Result Value Ref Range    Color, UA Yellow     Clarity, UA Slightly Cloudy     Specific [place], UA 1 010 1 003 - 1 030    pH, UA 5 5 4 5, 5 0, 5 5, 6 0, 6 5, 7 0, 7 5, 8 0    Leukocytes, UA Small (A) Negative    Nitrite, UA Negative Negative    Protein, UA 30 (1+) (A) Negative mg/dl    Glucose, UA Negative Negative mg/dl    Ketones, UA Negative Negative mg/dl    Urobilinogen, UA 0 2 0 2, 1 0 E U /dl E U /dl    Bilirubin, UA Negative Negative    Blood, UA Large (A) Negative   Tacrolimus level   Result Value Ref Range    TACROLIMUS 8 1 2 0 - 20 0 ng/mL   Urine Microscopic   Result Value Ref Range    RBC, UA 20-30 (A) None Seen, 0-5 /hpf    WBC, UA 4-10 (A) None Seen, 0-5, 5-55, 5-65 /hpf    Epithelial Cells None Seen None Seen, Occasional /hpf    Bacteria, UA Occasional None Seen, Occasional /hpf       ASSESSMENT and PLAN:      H/O kidney transplant  His kidney function seems to be stable along with normal tacrolimus level  He still being monitored by transplant nephrologist who he will be seeing in 3 months  CKD (chronic kidney disease)  His creatinine stable at 1 3  Will continue to monitor it  Advised hydration and avoidance of any nephrotoxic medicine    Essential hypertension  Blood pressure is actually running low and require midodrine    I will see him back in 6 months  He will be seen by transplant nephrologist in 3 months  I will get blood test urine test before next visit  Portions of the record may have been created with voice recognition software  Occasional wrong word or "sound a like" substitutions may have occurred due to the inherent limitations of voice recognition software  Read the chart carefully and recognize, using context, where substitutions have occurred  If you have any questions, please contact the dictating provider

## 2019-11-06 ENCOUNTER — TELEPHONE (OUTPATIENT)
Dept: NEPHROLOGY | Facility: CLINIC | Age: 69
End: 2019-11-06

## 2019-11-06 DIAGNOSIS — Z94.0 RENAL TRANSPLANT, STATUS POST: Primary | ICD-10-CM

## 2019-11-06 RX ORDER — SULFAMETHOXAZOLE AND TRIMETHOPRIM 400; 80 MG/1; MG/1
1 TABLET ORAL DAILY
Qty: 30 TABLET | Refills: 3 | Status: SHIPPED | OUTPATIENT
Start: 2019-11-06 | End: 2020-03-09

## 2019-11-06 NOTE — TELEPHONE ENCOUNTER
Carol Melgar a patient of Dr Rick Bentley called and said if he should still keep taking the midodrine 2 5 mg; take 2 5 mg twice a day even if his blood pressure is normal at night? Carol Melgar also said he needs a refill for sulfamethoxazole-trimethoprim 400 mg -80 mg; take 1 tablet daily; Carol Melgar is out of this medication send to [facility] in [place]     Raynette Age mr

## 2019-11-06 NOTE — TELEPHONE ENCOUNTER
Called Jeanette Lemon and let him know that his Bactrim was ordered and to stop the midodrine    All questions answered at this time

## 2019-11-07 ENCOUNTER — TELEPHONE (OUTPATIENT)
Dept: NEPHROLOGY | Facility: CLINIC | Age: 69
End: 2019-11-07

## 2019-11-07 NOTE — TELEPHONE ENCOUNTER
Patient of Dr Donald Drew called stating that he received a call from they [facility] that his Coumadin level is at 3 9  Patient was told to hold his Wafarin for today and them on Friday he is to take 1/2 a dose of the Warfarin, and on Saturday he is to take 2 5 of his Warfarin and then on Sunday 1 25 of the [address]  Patient is to then have more blood work on Monday at [place] [facility] in [facility] to see what his levels are  Patient is also taking Bactrin and has been told by the Pharmacist that this can cause bleeding and patient has been taking this medication for 8 years and patient believes that it was giving to him by Dr Donald Drew  Patient also states that for the past 3 days when he urinates he has been bleeding, patient has no pain with urination  Patient had Prostate surgery done (Turp) 1 month ago and was told that he may have some bleeding for about 3 months  If any questions please call patient at [phone]   Grace Bateman,

## 2019-11-20 ENCOUNTER — CLINICAL SUPPORT (OUTPATIENT)
Dept: INTERNAL MEDICINE CLINIC | Facility: CLINIC | Age: 69
End: 2019-11-20
Payer: MEDICARE

## 2019-11-20 DIAGNOSIS — Z23 NEED FOR VACCINATION: Primary | ICD-10-CM

## 2019-11-20 PROCEDURE — 90662 IIV NO PRSV INCREASED AG IM: CPT

## 2019-11-20 PROCEDURE — G0008 ADMIN INFLUENZA VIRUS VAC: HCPCS

## 2019-12-06 ENCOUNTER — OFFICE VISIT (OUTPATIENT)
Dept: PULMONOLOGY | Facility: CLINIC | Age: 69
End: 2019-12-06
Payer: MEDICARE

## 2019-12-06 VITALS
HEART RATE: 63 BPM | DIASTOLIC BLOOD PRESSURE: 58 MMHG | HEIGHT: 65 IN | RESPIRATION RATE: 12 BRPM | TEMPERATURE: 97.8 F | WEIGHT: 139 LBS | SYSTOLIC BLOOD PRESSURE: 98 MMHG | BODY MASS INDEX: 23.16 KG/M2 | OXYGEN SATURATION: 98 %

## 2019-12-06 DIAGNOSIS — Z87.01 H/O RECURRENT PNEUMONIA: ICD-10-CM

## 2019-12-06 DIAGNOSIS — D84.9 IMMUNOSUPPRESSION (HCC): Primary | ICD-10-CM

## 2019-12-06 PROCEDURE — 99213 OFFICE O/P EST LOW 20 MIN: CPT | Performed by: PHYSICIAN ASSISTANT

## 2019-12-11 ENCOUNTER — TELEPHONE (OUTPATIENT)
Dept: NEPHROLOGY | Facility: CLINIC | Age: 69
End: 2019-12-11

## 2019-12-11 DIAGNOSIS — Z94.0 KIDNEY TRANSPLANTED: ICD-10-CM

## 2019-12-11 RX ORDER — TACROLIMUS 1 MG/1
1 CAPSULE ORAL 2 TIMES DAILY
Qty: 180 CAPSULE | Refills: 3 | Status: SHIPPED | OUTPATIENT
Start: 2019-12-11 | End: 2020-02-25

## 2019-12-11 NOTE — TELEPHONE ENCOUNTER
Called and spoke to the patient and told the patient that per Dr Clayton Jacinto that his Rx was done  Patient understood and was okay with it   Soto Zhong,

## 2019-12-11 NOTE — TELEPHONE ENCOUNTER
Patient of Dr Edy Bowden called stating that he needs a refill for his Tacrolimus (Prograft) 1 mg 2 x's a day with 61 qu  Please call [facility] @ [phone]  If any questions please call patient @ [phone]   Joao Emanuel,

## 2019-12-16 ENCOUNTER — APPOINTMENT (OUTPATIENT)
Dept: LAB | Facility: HOSPITAL | Age: 69
End: 2019-12-16
Payer: MEDICARE

## 2019-12-16 ENCOUNTER — HOSPITAL ENCOUNTER (OUTPATIENT)
Dept: RADIOLOGY | Facility: HOSPITAL | Age: 69
Discharge: HOME | End: 2019-12-16
Payer: MEDICARE

## 2019-12-16 ENCOUNTER — TRANSCRIBE ORDERS (OUTPATIENT)
Dept: ADMINISTRATIVE | Facility: HOSPITAL | Age: 69
End: 2019-12-16

## 2019-12-16 ENCOUNTER — OFFICE VISIT (OUTPATIENT)
Dept: INTERNAL MEDICINE CLINIC | Facility: CLINIC | Age: 69
End: 2019-12-16
Payer: MEDICARE

## 2019-12-16 VITALS
SYSTOLIC BLOOD PRESSURE: 126 MMHG | BODY MASS INDEX: 23.32 KG/M2 | TEMPERATURE: 98.1 F | HEART RATE: 68 BPM | DIASTOLIC BLOOD PRESSURE: 70 MMHG | WEIGHT: 140 LBS | OXYGEN SATURATION: 98 % | HEIGHT: 65 IN

## 2019-12-16 DIAGNOSIS — I48.91 NEW ONSET A-FIB (HCC): Chronic | ICD-10-CM

## 2019-12-16 DIAGNOSIS — G62.0: ICD-10-CM

## 2019-12-16 DIAGNOSIS — R05.9 COUGH: Primary | ICD-10-CM
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
## 2019-12-16 DIAGNOSIS — N18.30 STAGE 3 CHRONIC KIDNEY DISEASE (HCC): Chronic | ICD-10-CM

## 2019-12-16 DIAGNOSIS — T45.1X5A: Primary | ICD-10-CM

## 2019-12-16 DIAGNOSIS — G62.0: Primary | ICD-10-CM

## 2019-12-16 DIAGNOSIS — R05.9 COUGH: ICD-10-CM

## 2019-12-16 DIAGNOSIS — T45.1X5A: ICD-10-CM

## 2019-12-16 DIAGNOSIS — R68.83 CHILLS: ICD-10-CM

## 2019-12-16 LAB
ALBUMIN SERPL BCP-MCNC: 3.2 G/DL (ref 3.5–5)
ALP SERPL-CCNC: 75 U/L (ref 46–116)
ALT SERPL W P-5'-P-CCNC: 33 U/L (ref 12–78)
ANION GAP SERPL CALCULATED.3IONS-SCNC: 7 MMOL/L (ref 4–13)
AST SERPL W P-5'-P-CCNC: 19 U/L (ref 5–45)
BACTERIA UR QL AUTO: ABNORMAL /HPF
BASOPHILS # BLD AUTO: 0.03 THOUSANDS/ΜL (ref 0–0.1)
BASOPHILS NFR BLD AUTO: 0 % (ref 0–1)
BILIRUB SERPL-MCNC: 0.8 MG/DL (ref 0.2–1)
BILIRUB UR QL STRIP: NEGATIVE
BUN SERPL-MCNC: 24 MG/DL (ref 5–25)
CALCIUM SERPL-MCNC: 8.2 MG/DL (ref 8.3–10.1)
CHLORIDE SERPL-SCNC: 104 MMOL/L (ref 100–108)
CLARITY UR: CLEAR
CO2 SERPL-SCNC: 26 MMOL/L (ref 21–32)
COLOR UR: YELLOW
CREAT SERPL-MCNC: 1.43 MG/DL (ref 0.6–1.3)
EOSINOPHIL # BLD AUTO: 0.06 THOUSAND/ΜL (ref 0–0.61)
EOSINOPHIL NFR BLD AUTO: 1 % (ref 0–6)
ERYTHROCYTE [DISTWIDTH] IN BLOOD BY AUTOMATED COUNT: 14.6 % (ref 11.6–15.1)
GFR SERPL CREATININE-BSD FRML MDRD: 50 ML/MIN/1.73SQ M
GLUCOSE P FAST SERPL-MCNC: 117 MG/DL (ref 65–99)
GLUCOSE UR STRIP-MCNC: NEGATIVE MG/DL
HCT VFR BLD AUTO: 50 % (ref 36.5–49.3)
HGB BLD-MCNC: 14.8 G/DL (ref 12–17)
HGB UR QL STRIP.AUTO: ABNORMAL
IMM GRANULOCYTES # BLD AUTO: 0.07 THOUSAND/UL (ref 0–0.2)
IMM GRANULOCYTES NFR BLD AUTO: 1 % (ref 0–2)
KETONES UR STRIP-MCNC: NEGATIVE MG/DL
LDH SERPL-CCNC: 175 U/L (ref 81–234)
LEUKOCYTE ESTERASE UR QL STRIP: ABNORMAL
LYMPHOCYTES # BLD AUTO: 0.72 THOUSANDS/ΜL (ref 0.6–4.47)
LYMPHOCYTES NFR BLD AUTO: 10 % (ref 14–44)
MAGNESIUM SERPL-MCNC: 1.9 MG/DL (ref 1.6–2.6)
MCH RBC QN AUTO: 26.1 PG (ref 26.8–34.3)
MCHC RBC AUTO-ENTMCNC: 29.6 G/DL (ref 31.4–37.4)
MCV RBC AUTO: 88 FL (ref 82–98)
MONOCYTES # BLD AUTO: 0.54 THOUSAND/ΜL (ref 0.17–1.22)
MONOCYTES NFR BLD AUTO: 8 % (ref 4–12)
NEUTROPHILS # BLD AUTO: 5.74 THOUSANDS/ΜL (ref 1.85–7.62)
NEUTS SEG NFR BLD AUTO: 80 % (ref 43–75)
NITRITE UR QL STRIP: NEGATIVE
NON-SQ EPI CELLS URNS QL MICRO: ABNORMAL /HPF
NRBC BLD AUTO-RTO: 0 /100 WBCS
OTHER STN SPEC: ABNORMAL
PH UR STRIP.AUTO: 6 [PH]
PHOSPHATE SERPL-MCNC: 2.9 MG/DL (ref 2.3–4.1)
PLATELET # BLD AUTO: 246 THOUSANDS/UL (ref 149–390)
PMV BLD AUTO: 8.7 FL (ref 8.9–12.7)
POTASSIUM SERPL-SCNC: 4.5 MMOL/L (ref 3.5–5.3)
PROT SERPL-MCNC: 7.9 G/DL (ref 6.4–8.2)
PROT UR STRIP-MCNC: NEGATIVE MG/DL
RBC # BLD AUTO: 5.68 MILLION/UL (ref 3.88–5.62)
RBC #/AREA URNS AUTO: ABNORMAL /HPF
SODIUM SERPL-SCNC: 137 MMOL/L (ref 136–145)
SP GR UR STRIP.AUTO: 1.01 (ref 1–1.03)
URATE SERPL-MCNC: 4.5 MG/DL (ref 4.2–8)
UROBILINOGEN UR QL STRIP.AUTO: 0.2 E.U./DL
WBC # BLD AUTO: 7.16 THOUSAND/UL (ref 4.31–10.16)
WBC #/AREA URNS AUTO: ABNORMAL /HPF

## 2019-12-16 PROCEDURE — 83735 ASSAY OF MAGNESIUM: CPT

## 2019-12-16 PROCEDURE — 85025 COMPLETE CBC W/AUTO DIFF WBC: CPT

## 2019-12-16 PROCEDURE — 84550 ASSAY OF BLOOD/URIC ACID: CPT

## 2019-12-16 PROCEDURE — 99213 OFFICE O/P EST LOW 20 MIN: CPT | Performed by: PHYSICIAN ASSISTANT

## 2019-12-16 PROCEDURE — 80197 ASSAY OF TACROLIMUS: CPT

## 2019-12-16 PROCEDURE — 36415 COLL VENOUS BLD VENIPUNCTURE: CPT

## 2019-12-16 PROCEDURE — 81001 URINALYSIS AUTO W/SCOPE: CPT | Performed by: INTERNAL MEDICINE

## 2019-12-16 PROCEDURE — 83615 LACTATE (LD) (LDH) ENZYME: CPT

## 2019-12-16 PROCEDURE — 84100 ASSAY OF PHOSPHORUS: CPT

## 2019-12-16 PROCEDURE — 71046 X-RAY EXAM CHEST 2 VIEWS: CPT

## 2019-12-16 PROCEDURE — 80053 COMPREHEN METABOLIC PANEL: CPT

## 2019-12-16 RX ORDER — CEFUROXIME AXETIL 250 MG/1
250 TABLET ORAL EVERY 12 HOURS SCHEDULED
Qty: 20 TABLET | Refills: 0 | Status: SHIPPED | OUTPATIENT
Start: 2019-12-16 | End: 2019-12-26

## 2019-12-16 NOTE — PATIENT INSTRUCTIONS
Patient will have a chest x-ray done  He will also do CBC and CMP  We will review the results when available  Prescription given for Ceftin 250 mg twice daily only use if x-ray and labs are positive

## 2019-12-16 NOTE — PROGRESS NOTES
Assessment/Plan:   Patient Instructions   Patient will have a chest x-ray done  He will also do CBC and CMP  We will review the results when available  Prescription given for Ceftin 250 mg twice daily only use if x-ray and labs are positive   '   Quality Measures:       No follow-ups on file  Diagnoses and all orders for this visit:    Cough  -     XR chest pa & lateral; Future  -     CBC and differential; Future  -     Comprehensive metabolic panel; Future  -     cefuroxime (CEFTIN) 250 mg tablet; Take 1 tablet (250 mg total) by mouth every 12 (twelve) hours for 10 days    Chills  -     XR chest pa & lateral; Future  -     cefuroxime (CEFTIN) 250 mg tablet; Take 1 tablet (250 mg total) by mouth every 12 (twelve) hours for 10 days    Dog scratch  Comments:  L hand    Stage 3 chronic kidney disease ([facility] [address] )    New onset a-fib ([facility] [address] )          Subjective:      Patient ID: Enmanuel Merino is a 77 y o  male  2-3 days history of runny nose and now a tight cough that is unproductive  Patient states he has felt some chills but no rigors  Patient present with his wife who is concerned because he has had previous pneumonia and several recent health concerns after removal of his gallbladder  Patient had been around ill grandchildren recently  He states he actually feels as if he is better today  No documented fever  Patient also reports that his neighbor's dog jumped up on him and scratch his left hand  He has an abrasion on his left hand        ALLERGIES:  Allergies   Allergen Reactions    Other        CURRENT MEDICATIONS:    Current Outpatient Medications:     amiodarone 200 mg tablet, Take 1 tablet (200 mg total) by mouth 2 (two) times a day with meals, Disp: 30 tablet, Rfl: 0    aspirin 81 mg chewable tablet, Chew 81 mg daily , Disp: , Rfl:     atorvastatin (LIPITOR) 10 mg tablet, TAKE 1 TABLET BY MOUTH EVERY DAY, Disp: 90 tablet, Rfl: 1    mycophenolate (MYFORTIC) 360 MG TBEC, TAKE 1 TABLET (360 MG TOTAL) BY MOUTH 2 (TWO) TIMES A DAY, Disp: 60 tablet, Rfl: 5    Omega-3 Fatty Acids (FISH OIL CONCENTRATE) 300 MG CAPS, Take 1 capsule by mouth daily , Disp: , Rfl:     predniSONE 5 mg tablet, Take 1 tablet (5 mg total) by mouth daily, Disp: 90 tablet, Rfl: 3    tacrolimus (PROGRAF) 1 mg capsule, Take 1 capsule (1 mg total) by mouth 2 (two) times a day, Disp: 180 capsule, Rfl: 3    tamsulosin (FLOMAX) 0 4 mg, Take 1 capsule (0 4 mg total) by mouth daily, Disp: 90 capsule, Rfl: 1    warfarin (COUMADIN) 2 5 mg tablet, Take 2 5 mg by mouth daily, Disp: , Rfl:     cefuroxime (CEFTIN) 250 mg tablet, Take 1 tablet (250 mg total) by mouth every 12 (twelve) hours for 10 days, Disp: 20 tablet, Rfl: 0    midodrine (PROAMATINE) 10 MG tablet, Take 10 mg by mouth daily, Disp: , Rfl:     ACTIVE PROBLEM LIST:  Patient Active Problem List   Diagnosis    Hypoalbuminemia    CAD (coronary artery disease)    H/O kidney transplant    New onset a-fib ([facility] [address] )    HLD (hyperlipidemia)    S/P mitral valve repair    Polycystic kidney disease    CKD (chronic kidney disease)    Essential hypertension    Elevated brain natriuretic peptide (BNP) level    Immunosuppression (HCC)    Renal transplant recipient    H/O recurrent pneumonia    Multiple nevi    Benign localized prostatic hyperplasia with lower urinary tract symptoms (LUTS)    Incomplete bladder emptying    Acute calculous cholecystitis/status post cholecystectomy    Urinary retention-postop    Acute pulmonary insufficiency    Mitral valve stenosis    Pulmonary congestion       PAST MEDICAL HISTORY:  Past Medical History:   Diagnosis Date    Atrial fibrillation (HCC)     Bacterial pneumonia     last assessed: 2/2/2017    Benign neoplasm of skin     Benign prostatic hyperplasia     Cardiac disorder     Coronary artery disease     GERD (gastroesophageal reflux disease)     Gross hematuria     last assessed: 12/5/2016    Hypercholesterolemia     Hypertension     Kidney disease     Kidney transplanted     x 2, 2001 and 2012    Osteoporosis     Pneumonia     last assessed: 10/9/2017    Seborrheic keratosis     Sinusitis     Viral warts        PAST SURGICAL HISTORY:  Past Surgical History:   Procedure Laterality Date    AV FISTULA PLACEMENT Right 2001    arteriovenous surgery creation of A-V fistula, right arm radiocephalic-Dr May Burn    AV FISTULA PLACEMENT Left 2011    hemodialysis acess type arteriovenous fistula, left brachiocepalic AVF 4751-FP  R Sally Watson 74 N/A 8/2/2019    Procedure: CHOLECYSTECTOMY, OPEN;  Surgeon: Adrian Chu MD;  Location: [facility];  Service: General    COLON SURGERY  2012    CORONARY STENT PLACEMENT  09/15/2011    PTA stenting-[facility]H/M Dr Telly Mittal  2006    10/2006-[facility]   [place] OTHER SURGICAL HISTORY  01/2012    fluids from transplant    OTHER SURGICAL HISTORY Left 07/08/2013    shaving of lesion shoulders, skin left shoulder combined compound and blue nevus    TONSILLECTOMY      TRANSPLANTATION RENAL Left Pineda Higgins, [address] TRANSPLANTATION RENAL Right 12/08/2011    Pineda Moon, [facility]       FAMILY HISTORY:  Family History   Problem Relation Age of Onset    Polycystic kidney disease Father     Kidney disease Father        SOCIAL HISTORY:  Social History     Socioeconomic History    Marital status: /Civil Union     Spouse name: Not on file    Number of children: 3    Years of education: Not on file    Highest education level: Not on file   Occupational History    Occupation: retired     Comment: , not employed   Social Needs    Financial resource strain: Not on file    Food insecurity:     Worry: Not on file     Inability: Not on file   Fashiontrot needs:     Medical: Not on file     Non-medical: Not on file   Tobacco Use    Smoking status: Never Smoker    Smokeless tobacco: Never Used Substance and Sexual Activity    Alcohol use: Never     Frequency: Never    Drug use: No    Sexual activity: Yes     Partners: Female     Comment: denied: history of high risk sexual behavior   Lifestyle    Physical activity:     Days per week: Not on file     Minutes per session: Not on file    Stress: Not on file   Relationships    Social connections:     Talks on phone: Not on file     Gets together: Not on file     Attends [employer] service: Not on file     Active member of club or organization: Not on file     Attends meetings of clubs or organizations: Not on file     Relationship status: Not on file    Intimate partner violence:     Fear of current or ex partner: Not on file     Emotionally abused: Not on file     Physically abused: Not on file     Forced sexual activity: Not on file   Other Topics Concern    Not on file   Social History Narrative    Active advance directive-yes    Exercises occasionally       Review of Systems   Constitutional: Positive for chills and fatigue  Negative for activity change and fever  HENT: Positive for postnasal drip, rhinorrhea and sneezing  Negative for congestion, sore throat and trouble swallowing  Eyes: Negative for discharge  Respiratory: Positive for cough  Negative for chest tightness and shortness of breath  Cardiovascular: Negative for chest pain, palpitations and leg swelling  Gastrointestinal: Negative for abdominal pain, diarrhea, nausea and vomiting  Genitourinary: Negative for difficulty urinating  Musculoskeletal: Negative for arthralgias and myalgias  Skin: Negative for rash  Allergic/Immunologic: Negative for immunocompromised state  Neurological: Negative for dizziness, syncope, weakness, light-headedness and headaches  Hematological: Negative for adenopathy  Does not bruise/bleed easily  Psychiatric/Behavioral: Negative for dysphoric mood, sleep disturbance and suicidal ideas  The patient is not nervous/anxious  Objective:  Vitals:    12/16/19 1415   BP: 126/70   BP Location: Left arm   Patient Position: Sitting   Cuff Size: Adult   Pulse: 68   Temp: 98 1 °F (36 7 °C)   TempSrc: Oral   SpO2: 98%   Weight: 63 5 kg (140 lb)   Height: 5' 5" (1 651 m)     Body mass index is 23 3 kg/m²  Physical Exam   Constitutional: He is oriented to person, place, and time  He appears well-developed and well-nourished  No distress  HENT:   HEENT-unremarkable with exception of TMs not visualized due to cerumen in external auditory canals   Neck: Neck supple  No JVD present  Cardiovascular: Normal rate and normal heart sounds  An irregularly irregular rhythm present  Pulmonary/Chest: Effort normal and breath sounds normal  No respiratory distress  He has no wheezes  He has no rales  He exhibits no tenderness  Musculoskeletal: He exhibits no edema  Lymphadenopathy:     He has no cervical adenopathy  Neurological: He is alert and oriented to person, place, and time  Skin: Skin is warm and dry  No rash noted  1 cm linear abrasion 1st web space left hand dorsal surface, no signs of infection   Psychiatric: He has a normal mood and affect  His behavior is normal    Nursing note and vitals reviewed  RESULTS:    No results found for this or any previous visit (from the past 1008 hour(s))  This note was created with voice recognition software  Phonic, grammatical and spelling errors may be present within the note as a result

## 2019-12-17 LAB — TACROLIMUS BLD-MCNC: 9.2 NG/ML (ref 2–20)

## 2019-12-27 DIAGNOSIS — N40.0 BENIGN PROSTATIC HYPERPLASIA WITHOUT LOWER URINARY TRACT SYMPTOMS: ICD-10-CM

## 2019-12-30 RX ORDER — TAMSULOSIN HYDROCHLORIDE 0.4 MG/1
CAPSULE ORAL
Qty: 90 CAPSULE | Refills: 1 | Status: SHIPPED | OUTPATIENT
Start: 2019-12-30 | End: 2020-06-22

## 2020-01-03 DIAGNOSIS — Z94.0 RENAL TRANSPLANT RECIPIENT: ICD-10-CM

## 2020-01-03 RX ORDER — PREDNISONE 1 MG/1
TABLET ORAL
Qty: 90 TABLET | Refills: 0 | Status: SHIPPED | OUTPATIENT
Start: 2020-01-03 | End: 2020-06-22

## 2020-01-09 DIAGNOSIS — E78.2 MIXED HYPERLIPIDEMIA: ICD-10-CM

## 2020-01-09 RX ORDER — ATORVASTATIN CALCIUM 10 MG/1
TABLET, FILM COATED ORAL
Qty: 90 TABLET | Refills: 1 | Status: SHIPPED | OUTPATIENT
Start: 2020-01-09 | End: 2020-07-01

## 2020-02-05 DIAGNOSIS — Z94.0 KIDNEY TRANSPLANTED: ICD-10-CM

## 2020-02-05 RX ORDER — MYCOPHENOLIC ACID 360 MG/1
360 TABLET, DELAYED RELEASE ORAL 2 TIMES DAILY
Qty: 60 TABLET | Refills: 0 | Status: SHIPPED | OUTPATIENT
Start: 2020-02-05 | End: 2020-03-10

## 2020-02-14 ENCOUNTER — TELEPHONE (OUTPATIENT)
Dept: NEPHROLOGY | Facility: CLINIC | Age: 70
End: 2020-02-14

## 2020-02-14 NOTE — TELEPHONE ENCOUNTER
I called and spoke to the patient about rescheduling his appointment for Wednesday 4/15/2020 6 month follow up to Thursday 4/23/2020 because of Dr Iris Talamantes on hospital call/rounding   Araseli Phillips,

## 2020-02-25 DIAGNOSIS — Z94.0 KIDNEY TRANSPLANTED: ICD-10-CM

## 2020-02-25 RX ORDER — TACROLIMUS 1 MG/1
1 CAPSULE ORAL 2 TIMES DAILY
Qty: 180 CAPSULE | Refills: 4 | Status: SHIPPED | OUTPATIENT
Start: 2020-02-25 | End: 2020-06-29 | Stop reason: SDUPTHER

## 2020-03-06 DIAGNOSIS — Z94.0 KIDNEY TRANSPLANTED: ICD-10-CM

## 2020-03-06 DIAGNOSIS — Z94.0 RENAL TRANSPLANT, STATUS POST: ICD-10-CM

## 2020-03-09 RX ORDER — SULFAMETHOXAZOLE AND TRIMETHOPRIM 400; 80 MG/1; MG/1
TABLET ORAL
Qty: 30 TABLET | Refills: 3 | Status: SHIPPED | OUTPATIENT
Start: 2020-03-09 | End: 2020-07-01

## 2020-03-10 RX ORDER — MYCOPHENOLIC ACID 360 MG/1
360 TABLET, DELAYED RELEASE ORAL 2 TIMES DAILY
Qty: 60 TABLET | Refills: 0 | Status: SHIPPED | OUTPATIENT
Start: 2020-03-10 | End: 2020-04-02

## 2020-04-02 DIAGNOSIS — Z94.0 KIDNEY TRANSPLANTED: ICD-10-CM

## 2020-04-02 RX ORDER — MYCOPHENOLIC ACID 360 MG/1
360 TABLET, DELAYED RELEASE ORAL 2 TIMES DAILY
Qty: 60 TABLET | Refills: 0 | Status: SHIPPED | OUTPATIENT
Start: 2020-04-02 | End: 2020-04-25 | Stop reason: SDUPTHER

## 2020-04-24 DIAGNOSIS — Z94.0 KIDNEY TRANSPLANTED: ICD-10-CM

## 2020-04-25 RX ORDER — MYCOPHENOLIC ACID 360 MG/1
360 TABLET, DELAYED RELEASE ORAL 2 TIMES DAILY
Qty: 180 TABLET | Refills: 1 | Status: SHIPPED | OUTPATIENT
Start: 2020-04-25 | End: 2020-11-06

## 2020-04-30 ENCOUNTER — TELEPHONE (OUTPATIENT)
Dept: NEPHROLOGY | Facility: CLINIC | Age: 70
End: 2020-04-30

## 2020-04-30 DIAGNOSIS — N18.30 STAGE 3 CHRONIC KIDNEY DISEASE (HCC): Primary | ICD-10-CM

## 2020-05-05 ENCOUNTER — TELEPHONE (OUTPATIENT)
Dept: NEPHROLOGY | Facility: CLINIC | Age: 70
End: 2020-05-05

## 2020-05-05 ENCOUNTER — TELEMEDICINE (OUTPATIENT)
Dept: NEPHROLOGY | Facility: CLINIC | Age: 70
End: 2020-05-05
Payer: MEDICARE

## 2020-05-05 VITALS
DIASTOLIC BLOOD PRESSURE: 64 MMHG | HEART RATE: 62 BPM | RESPIRATION RATE: 16 BRPM | HEIGHT: 64 IN | SYSTOLIC BLOOD PRESSURE: 108 MMHG | WEIGHT: 137 LBS | BODY MASS INDEX: 23.39 KG/M2

## 2020-05-05 DIAGNOSIS — Z94.0 H/O KIDNEY TRANSPLANT: Primary | Chronic | ICD-10-CM

## 2020-05-05 DIAGNOSIS — N40.1 BENIGN LOCALIZED PROSTATIC HYPERPLASIA WITH LOWER URINARY TRACT SYMPTOMS (LUTS): ICD-10-CM

## 2020-05-05 DIAGNOSIS — N18.30 STAGE 3 CHRONIC KIDNEY DISEASE (HCC): Chronic | ICD-10-CM

## 2020-05-05 DIAGNOSIS — I25.10 CORONARY ARTERY DISEASE INVOLVING NATIVE CORONARY ARTERY OF NATIVE HEART WITHOUT ANGINA PECTORIS: Chronic | ICD-10-CM

## 2020-05-05 DIAGNOSIS — I10 ESSENTIAL HYPERTENSION: Chronic | ICD-10-CM

## 2020-05-05 DIAGNOSIS — I48.91 NEW ONSET A-FIB (HCC): Chronic | ICD-10-CM

## 2020-05-05 PROCEDURE — 99214 OFFICE O/P EST MOD 30 MIN: CPT | Performed by: INTERNAL MEDICINE

## 2020-05-06 ENCOUNTER — TELEPHONE (OUTPATIENT)
Dept: NEPHROLOGY | Facility: CLINIC | Age: 70
End: 2020-05-06

## 2020-05-12 ENCOUNTER — TELEPHONE (OUTPATIENT)
Dept: OTHER | Facility: OTHER | Age: 70
End: 2020-05-12

## 2020-05-14 ENCOUNTER — TELEPHONE (OUTPATIENT)
Dept: NEPHROLOGY | Facility: CLINIC | Age: 70
End: 2020-05-14

## 2020-06-21 DIAGNOSIS — Z94.0 RENAL TRANSPLANT RECIPIENT: ICD-10-CM

## 2020-06-22 DIAGNOSIS — N40.0 BENIGN PROSTATIC HYPERPLASIA WITHOUT LOWER URINARY TRACT SYMPTOMS: ICD-10-CM

## 2020-06-22 RX ORDER — TAMSULOSIN HYDROCHLORIDE 0.4 MG/1
CAPSULE ORAL
Qty: 90 CAPSULE | Refills: 1 | Status: SHIPPED | OUTPATIENT
Start: 2020-06-22

## 2020-06-22 RX ORDER — PREDNISONE 1 MG/1
TABLET ORAL
Qty: 90 TABLET | Refills: 1 | Status: SHIPPED | OUTPATIENT
Start: 2020-06-22 | End: 2020-12-18

## 2020-06-27 DIAGNOSIS — Z94.0 KIDNEY TRANSPLANTED: ICD-10-CM

## 2020-06-27 RX ORDER — TACROLIMUS 1 MG/1
1 CAPSULE ORAL 2 TIMES DAILY
Qty: 180 CAPSULE | Refills: 0 | Status: CANCELLED | OUTPATIENT
Start: 2020-06-27

## 2020-06-27 NOTE — TELEPHONE ENCOUNTER
Pt's pharmacy does not have medication Tacrolimis in stock  Pt is requesting for a new med refill to be send over to [facility] in [facility]       Please call pt to confirm

## 2020-06-29 DIAGNOSIS — Z94.0 KIDNEY TRANSPLANTED: ICD-10-CM

## 2020-06-29 RX ORDER — TACROLIMUS 1 MG/1
1 CAPSULE ORAL 2 TIMES DAILY
Qty: 180 CAPSULE | Refills: 4 | Status: SHIPPED | OUTPATIENT
Start: 2020-06-29 | End: 2020-07-28 | Stop reason: SDUPTHER

## 2020-07-01 DIAGNOSIS — E78.2 MIXED HYPERLIPIDEMIA: ICD-10-CM

## 2020-07-01 DIAGNOSIS — Z94.0 RENAL TRANSPLANT, STATUS POST: ICD-10-CM

## 2020-07-01 RX ORDER — ATORVASTATIN CALCIUM 10 MG/1
TABLET, FILM COATED ORAL
Qty: 90 TABLET | Refills: 1 | Status: SHIPPED | OUTPATIENT
Start: 2020-07-01 | End: 2020-12-11

## 2020-07-01 RX ORDER — SULFAMETHOXAZOLE AND TRIMETHOPRIM 400; 80 MG/1; MG/1
TABLET ORAL
Qty: 90 TABLET | Refills: 1 | Status: SHIPPED | OUTPATIENT
Start: 2020-07-01 | End: 2020-12-18

## 2020-07-06 LAB
CREAT ?TM UR-SCNC: 94.2 UMOL/L
EXT PROTEIN URINE: 14.8
PROT/CREAT UR: 0.16 MG/G{CREAT}

## 2020-07-08 ENCOUNTER — DOCUMENTATION (OUTPATIENT)
Dept: NEPHROLOGY | Facility: CLINIC | Age: 70
End: 2020-07-08

## 2020-07-08 ENCOUNTER — TELEPHONE (OUTPATIENT)
Dept: NEPHROLOGY | Facility: CLINIC | Age: 70
End: 2020-07-08

## 2020-07-08 LAB
EXT DIFF-ABS BASOPHILS: 0
EXT DIFF-ABS EOSINOPHILS: 0.1
EXT DIFF-ABS LYMPHOCYTES: 1.5
EXT DIFF-ABS MONOCYTES: 0.7
EXT DIFF-ABS NEUTROPHILS: 3.6
EXT GLUCOSE BLD: 83
EXTERNAL ALBUMIN: 3.5
EXTERNAL ALK PHOS: 67
EXTERNAL ALT: 19
EXTERNAL ANION GAP: 7
EXTERNAL AST: 18
EXTERNAL BICARBONATE: 25
EXTERNAL BUN: 28
EXTERNAL CALCIUM: 9.1
EXTERNAL CHLORIDE: 107
EXTERNAL CREATININE: 1.31
EXTERNAL EGFR: 55
EXTERNAL HEMATOCRIT: 50 %
EXTERNAL HEMOGLOBIN: 16.5 G/DL
EXTERNAL MCV: 90
EXTERNAL PLATELET COUNT: 203 K/ΜL
EXTERNAL POTASSIUM: 4.6
EXTERNAL RBC: 5.51
EXTERNAL RDW: 14.6
EXTERNAL SODIUM: 139
EXTERNAL T.BILIRUBIN: 1.4
EXTERNAL TOTAL PROTEIN: 7.9
EXTERNAL WBC: 5.9

## 2020-07-08 NOTE — TELEPHONE ENCOUNTER
Ina ([address]) from [facility] called stating that the patient has a normal level of Tacrolimus at 6  If any questions please call Ina @ ([address]) @ [phone]   Denise Dover,

## 2020-07-13 ENCOUNTER — TELEPHONE (OUTPATIENT)
Dept: NEPHROLOGY | Facility: CLINIC | Age: 70
End: 2020-07-13

## 2020-07-15 ENCOUNTER — TELEMEDICINE (OUTPATIENT)
Dept: NEPHROLOGY | Facility: CLINIC | Age: 70
End: 2020-07-15
Payer: MEDICARE

## 2020-07-15 ENCOUNTER — TELEPHONE (OUTPATIENT)
Dept: NEPHROLOGY | Facility: CLINIC | Age: 70
End: 2020-07-15

## 2020-07-15 VITALS
SYSTOLIC BLOOD PRESSURE: 102 MMHG | DIASTOLIC BLOOD PRESSURE: 65 MMHG | HEART RATE: 71 BPM | RESPIRATION RATE: 16 BRPM | TEMPERATURE: 97.3 F | HEIGHT: 64 IN | BODY MASS INDEX: 22.88 KG/M2 | WEIGHT: 134 LBS

## 2020-07-15 DIAGNOSIS — I25.10 CORONARY ARTERY DISEASE INVOLVING NATIVE CORONARY ARTERY OF NATIVE HEART WITHOUT ANGINA PECTORIS: Chronic | ICD-10-CM

## 2020-07-15 DIAGNOSIS — I10 ESSENTIAL HYPERTENSION: Chronic | ICD-10-CM

## 2020-07-15 DIAGNOSIS — Z94.0 RENAL TRANSPLANT RECIPIENT: ICD-10-CM

## 2020-07-15 DIAGNOSIS — I48.91 NEW ONSET A-FIB (HCC): Primary | Chronic | ICD-10-CM

## 2020-07-15 DIAGNOSIS — N18.30 STAGE 3 CHRONIC KIDNEY DISEASE (HCC): Chronic | ICD-10-CM

## 2020-07-15 PROCEDURE — 1036F TOBACCO NON-USER: CPT | Performed by: INTERNAL MEDICINE

## 2020-07-15 PROCEDURE — 1160F RVW MEDS BY RX/DR IN RCRD: CPT | Performed by: INTERNAL MEDICINE

## 2020-07-15 PROCEDURE — 3008F BODY MASS INDEX DOCD: CPT | Performed by: INTERNAL MEDICINE

## 2020-07-15 PROCEDURE — 4040F PNEUMOC VAC/ADMIN/RCVD: CPT | Performed by: INTERNAL MEDICINE

## 2020-07-15 PROCEDURE — 99214 OFFICE O/P EST MOD 30 MIN: CPT | Performed by: INTERNAL MEDICINE

## 2020-07-15 RX ORDER — APIXABAN 5 MG/1
2.5 TABLET, FILM COATED ORAL 2 TIMES DAILY
COMMUNITY
Start: 2020-07-11 | End: 2021-01-04 | Stop reason: SDUPTHER

## 2020-07-15 NOTE — TELEPHONE ENCOUNTER
I called and spoke to the patient and schedule his follow up appointment  I also explained that I was mailing out his lab orders, summary report and appointment card  The patient understood and was okay with it   Douglas Quinones,

## 2020-07-15 NOTE — PROGRESS NOTES
Virtual Regular Visit      Assessment/Plan:    Problem List Items Addressed This Visit        Cardiovascular and Mediastinum    CAD (coronary artery disease) (Chronic)    New onset a-fib (HCC) - Primary (Chronic)    Essential hypertension (Chronic)       Genitourinary    CKD (chronic kidney disease) (Chronic)    Relevant Orders    Basic metabolic panel    CBC and differential    Phosphorus    Protein / creatinine ratio, urine    PTH, intact    UA (URINE) with reflex to Scope    Vitamin D 25 hydroxy    Tacrolimus level       Other    Renal transplant recipient    Relevant Orders    Basic metabolic panel    CBC and differential    Phosphorus    Protein / creatinine ratio, urine    PTH, intact    UA (URINE) with reflex to Scope    Vitamin D 25 hydroxy    Tacrolimus level               Reason for visit is   Chief Complaint   Patient presents with    Chronic Kidney Disease    Follow-up    Virtual Regular Visit        Encounter provider Britany Nolan MD    Provider located at [address] [facility] [address]  [address] [place] [id number] [phone]      Recent Visits  Date Type Provider Dept   07/13/20 Telephone Britany Nolan MD [address] [place]   07/08/20 Telephone Britany Nolan MD [facility] [address] recent visits within past 7 days and meeting all other requirements     Today's Visits  Date Type Provider Dept   07/15/20 Trini Tejada MD [address] today's visits and meeting all other requirements     Future Appointments  No visits were found meeting these conditions  Showing future appointments within next 150 days and meeting all other requirements        The patient was identified by name and date of birth  Ubaldo Adrian was informed that this is a telemedicine visit and that the visit is being conducted through [facility] and patient was informed that this is a secure, HIPAA-compliant platform   He agrees to proceed     My office door was closed  No one else was in the room  He acknowledged consent and understanding of privacy and security of the video platform  The patient has agreed to participate and understands they can discontinue the visit at any time  Patient is aware this is a billable service  Subjective  Brooklyn Coleman is a 72 y o  male with CKD and renal transplant   36-year-old gentleman with CKD who was on dialysis per had a transplant and G-tube working well for a while  In between he was hospitalized with sepsis and also atrial fibrillation  In between he had recurrent urinary tension requiring self-catheterize shin  He also hematuria in between  He was taken off the Coumadin now he is on Eliquis    He is stable at this point denies any complaint    He is quite active walking and biking  No other acute complaint    He does have atrial fibrillation but rate is well control  Also coronary artery disease but pain free and being monitored by cardiologist    He does have problem hypotension and on midodrine    Denies any dizziness or weakness       Past Medical History:   Diagnosis Date    Atrial fibrillation ([facility] [address] )     Bacterial pneumonia     last assessed: 2/2/2017    Benign neoplasm of skin     Benign prostatic hyperplasia     Cardiac disorder     Chronic kidney disease     Coronary artery disease     GERD (gastroesophageal reflux disease)     Gross hematuria     last assessed: 12/5/2016    Hypercholesterolemia     Hypertension     Kidney disease     Kidney transplanted     x 2, 2001 and 2012    Osteoporosis     Pneumonia     last assessed: 10/9/2017    Seborrheic keratosis     Sinusitis     Viral warts        Past Surgical History:   Procedure Laterality Date    AV FISTULA PLACEMENT Right 2001    arteriovenous surgery creation of A-V fistula, right arm radiocephalic-Dr Laurita Hay    AV FISTULA PLACEMENT Left 2011    hemodialysis acess type arteriovenous fistula, left brachiocepalic AVF [id number]  R Sally Watson 73 N/A 8/2/2019    Procedure: CHOLECYSTECTOMY, OPEN;  Surgeon: Haylie Fam MD;  Location: [facility];  Service: General    COLON SURGERY  2012    CORONARY STENT PLACEMENT  09/15/2011    PTA stenting-LVH/M Dr Isac Elliott  2006    10/[facility]   [place] OTHER SURGICAL HISTORY  01/2012    fluids from transplant    OTHER SURGICAL HISTORY Left 07/08/2013    shaving of lesion shoulders, skin left shoulder combined compound and blue nevus    TONSILLECTOMY      TRANSPLANTATION RENAL Left Allen Pineda Yen, [facility]    TRANSPLANTATION RENAL Right 12/08/2011    Pineda Moon, [facility]       Current Outpatient Medications   Medication Sig Dispense Refill    aspirin 81 mg chewable tablet Chew 81 mg daily       atorvastatin (LIPITOR) 10 mg tablet TAKE 1 TABLET BY MOUTH EVERY DAY 90 tablet 1    ELIQUIS 5 MG       midodrine (PROAMATINE) 10 MG tablet Take 10 mg by mouth daily      mycophenolate (MYFORTIC) 360 MG TBEC TAKE 1 TABLET (360 MG TOTAL) BY MOUTH 2 (TWO) TIMES A  tablet 1    Omega-3 Fatty Acids (FISH OIL CONCENTRATE) 300 MG CAPS Take 1 capsule by mouth daily       predniSONE 5 mg tablet TAKE 1 TABLET BY MOUTH EVERY DAY 90 tablet 1    sulfamethoxazole-trimethoprim (BACTRIM) 400-80 mg per tablet TAKE 1 TABLET BY MOUTH EVERY DAY 90 tablet 1    tacrolimus (PROGRAF) 1 mg capsule Take 1 capsule (1 mg total) by mouth 2 (two) times a day 180 capsule 4    tamsulosin (FLOMAX) 0 4 mg TAKE 1 CAPSULE DAILY 90 capsule 1    amiodarone 200 mg tablet Take 1 tablet (200 mg total) by mouth 2 (two) times a day with meals (Patient not taking: Reported on 5/5/2020) 30 tablet 0     No current facility-administered medications for this visit  Allergies   Allergen Reactions    Other        Review of Systems   Constitutional: Negative for activity change and fatigue     HENT: Negative for congestion and ear discharge  Eyes: Negative for photophobia and pain  Respiratory: Negative for apnea and choking  Cardiovascular: Negative for chest pain and palpitations  Gastrointestinal: Negative for abdominal distention and blood in stool  Endocrine: Negative for heat intolerance and polyphagia  Genitourinary: Negative for flank pain and urgency  Musculoskeletal: Negative for neck pain and neck stiffness  Skin: Negative for color change and wound  Allergic/Immunologic: Negative for food allergies and immunocompromised state  Neurological: Negative for seizures and facial asymmetry  Hematological: Negative for adenopathy  Does not bruise/bleed easily  Psychiatric/Behavioral: Negative for self-injury and suicidal ideas  Video Exam    Vitals:    07/15/20 1106   BP: 102/65   BP Location: Right arm   Patient Position: Sitting   Cuff Size: Standard   Pulse: 71   Resp: 16   Temp: (!) 97 3 °F (36 3 °C)   TempSrc: Tympanic   Weight: 60 8 kg (134 lb)   Height: 5' 3 5" (1 613 m)     Physical exam was done with help of video  Physical Exam   Constitutional: He is oriented to person, place, and time  He appears well-developed  No distress  HENT:   Head: Normocephalic  Eyes: Conjunctivae are normal  No scleral icterus  Neck: Neck supple  No JVD present  Pulmonary/Chest: Effort normal  No respiratory distress  Abdominal: Soft  There is no tenderness  Musculoskeletal: Normal range of motion  He exhibits no edema  Neurological: He is alert and oriented to person, place, and time  Skin: Skin is warm  No rash noted  Psychiatric: He has a normal mood and affect  His behavior is normal       Assessment and plan:    1  Renal transplant:  Creatinine stable at 1 4  Tacrolimus level also within acceptable range  Advised to continue what is doing    CKD stage 3:  Stable with GFR of about fifty-five  Advised hydration and avoidance of nephrotoxic medicine    3   Atrial fibrillation:  Recently well controlled    4  Hypotension:  On midodrine blood pressure seems to be stable    5  Coronary artery disease:  Stable at this point    6  Bone and mineral disorder:  PTH and phosphorus along with vitamin-D are within acceptable range    Discussed all the lab work with him at length  I will see him back in my office in 3 months  Will get blood and urine test before that visit  I spent 20 minutes with patient today in which greater than 50% of the time was spent in counseling/coordination of care regarding CKD and renal transplant      VIRTUAL VISIT DISCLAIMER    Jadon [place] acknowledges that he has consented to an online visit or consultation  He understands that the online visit is based solely on information provided by him, and that, in the absence of a face-to-face physical evaluation by the physician, the diagnosis he receives is both limited and provisional in terms of accuracy and completeness  This is not intended to replace a full medical face-to-face evaluation by the physician  Jadon [place] understands and accepts these terms

## 2020-07-18 ENCOUNTER — NURSE TRIAGE (OUTPATIENT)
Dept: OTHER | Facility: OTHER | Age: 70
End: 2020-07-18

## 2020-07-18 ENCOUNTER — HOSPITAL ENCOUNTER (EMERGENCY)
Facility: HOSPITAL | Age: 70
Discharge: HOME | End: 2020-07-18
Attending: EMERGENCY MEDICINE | Admitting: EMERGENCY MEDICINE
Payer: MEDICARE

## 2020-07-18 VITALS
WEIGHT: 134 LBS | RESPIRATION RATE: 20 BRPM | OXYGEN SATURATION: 96 % | HEIGHT: 64 IN | DIASTOLIC BLOOD PRESSURE: 69 MMHG | HEART RATE: 69 BPM | SYSTOLIC BLOOD PRESSURE: 144 MMHG | BODY MASS INDEX: 22.88 KG/M2 | TEMPERATURE: 98.4 F

## 2020-07-18 DIAGNOSIS — S61.411A LACERATION OF RIGHT HAND: Primary | ICD-10-CM

## 2020-07-18 PROCEDURE — 99282 EMERGENCY DEPT VISIT SF MDM: CPT | Performed by: PHYSICIAN ASSISTANT

## 2020-07-18 PROCEDURE — 99282 EMERGENCY DEPT VISIT SF MDM: CPT

## 2020-07-18 RX ORDER — CEPHALEXIN 500 MG/1
500 CAPSULE ORAL EVERY 6 HOURS SCHEDULED
Qty: 27 CAPSULE | Refills: 0 | Status: SHIPPED | OUTPATIENT
Start: 2020-07-18 | End: 2020-07-25

## 2020-07-18 RX ORDER — CEPHALEXIN 250 MG/1
500 CAPSULE ORAL ONCE
Status: COMPLETED | OUTPATIENT
Start: 2020-07-18 | End: 2020-07-18

## 2020-07-18 RX ADMIN — CEPHALEXIN 500 MG: 250 CAPSULE ORAL at 17:42
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
## 2020-07-18 NOTE — DISCHARGE INSTRUCTIONS
Take antibiotics as prescribed  Apply neosporin 2x daily  Follow-up with your family doctor in 3 days for a wound check  Return to ER with any fevers, redness, drainage, or worsening symptoms
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
## 2020-07-27 ENCOUNTER — TELEPHONE (OUTPATIENT)
Dept: NEPHROLOGY | Facility: CLINIC | Age: 70
End: 2020-07-27

## 2020-07-27 NOTE — TELEPHONE ENCOUNTER
Kera Pharmacist called from [facility] stating that the patient needs a new Rx for his Tacrolimus (Prograf) 1mg twice daily w/60 qu, because the patient had it filled at another Pharmacy, but since the patient has Medicare part B it can not be transferred, so that is why a new Rx needs to be called in  Please call [facility] @ [phone]   If any questions please call patient @ [phone]

## 2020-07-28 DIAGNOSIS — Z94.0 KIDNEY TRANSPLANTED: ICD-10-CM

## 2020-07-28 RX ORDER — TACROLIMUS 1 MG/1
1 CAPSULE ORAL 2 TIMES DAILY
Qty: 180 CAPSULE | Refills: 4 | Status: SHIPPED | OUTPATIENT
Start: 2020-07-28 | End: 2021-01-04 | Stop reason: SDUPTHER

## 2020-08-31 ENCOUNTER — TELEPHONE (OUTPATIENT)
Dept: NEPHROLOGY | Facility: CLINIC | Age: 70
End: 2020-08-31

## 2020-09-18 ENCOUNTER — TELEPHONE (OUTPATIENT)
Dept: NEPHROLOGY | Facility: CLINIC | Age: 70
End: 2020-09-18

## 2020-09-18 NOTE — TELEPHONE ENCOUNTER
Sasha Kennedy (Customer Service Application Reviewer)  from the Chronic Renal Disease program called stating that the  ICD-9 code on the patient applicaton form is wrong  Dr Alvarenga Hence put  ICD-9 294 0, but the ICD-9 code needs to be ICD-9 - I12 9 is the correct ICD-9 code for the form  Please fax form to Attn: Jose R Duncan [phone]  Form is on Dr Alvarenga Hence christen Galicia,

## 2020-09-21 ENCOUNTER — TELEPHONE (OUTPATIENT)
Dept: NEPHROLOGY | Facility: CLINIC | Age: 70
End: 2020-09-21

## 2020-09-21 NOTE — TELEPHONE ENCOUNTER
Dr Isidro Cavazos did make the correction on the Physician Application Form and the form was faxed back to [facility] @ [phone] and the form was received and also faxed to the patient chart   Jad Pittman,

## 2020-10-15 LAB
CREAT ?TM UR-SCNC: 33 UMOL/L
EXT PROTEIN URINE: 8.2
PROT/CREAT UR: 0.25 MG/G{CREAT}

## 2020-10-19 ENCOUNTER — TELEPHONE (OUTPATIENT)
Dept: NEPHROLOGY | Facility: CLINIC | Age: 70
End: 2020-10-19

## 2020-10-28 ENCOUNTER — TELEPHONE (OUTPATIENT)
Dept: NEPHROLOGY | Facility: CLINIC | Age: 70
End: 2020-10-28

## 2020-10-28 ENCOUNTER — OFFICE VISIT (OUTPATIENT)
Dept: NEPHROLOGY | Facility: CLINIC | Age: 70
End: 2020-10-28
Payer: MEDICARE

## 2020-10-28 VITALS
HEIGHT: 65 IN | TEMPERATURE: 95 F | HEART RATE: 78 BPM | DIASTOLIC BLOOD PRESSURE: 60 MMHG | WEIGHT: 139.6 LBS | SYSTOLIC BLOOD PRESSURE: 100 MMHG | RESPIRATION RATE: 16 BRPM | BODY MASS INDEX: 23.26 KG/M2

## 2020-10-28 DIAGNOSIS — I48.91 NEW ONSET A-FIB (HCC): Chronic | ICD-10-CM

## 2020-10-28 DIAGNOSIS — N18.31 STAGE 3A CHRONIC KIDNEY DISEASE (HCC): Chronic | ICD-10-CM

## 2020-10-28 DIAGNOSIS — Z94.0 RENAL TRANSPLANT RECIPIENT: Primary | ICD-10-CM

## 2020-10-28 DIAGNOSIS — I25.10 CORONARY ARTERY DISEASE INVOLVING NATIVE CORONARY ARTERY OF NATIVE HEART WITHOUT ANGINA PECTORIS: Chronic | ICD-10-CM

## 2020-10-28 DIAGNOSIS — I10 ESSENTIAL HYPERTENSION: Chronic | ICD-10-CM

## 2020-10-28 PROCEDURE — 99214 OFFICE O/P EST MOD 30 MIN: CPT | Performed by: INTERNAL MEDICINE

## 2020-11-06 DIAGNOSIS — Z94.0 KIDNEY TRANSPLANTED: ICD-10-CM

## 2020-11-06 RX ORDER — MYCOPHENOLIC ACID 360 MG/1
TABLET, DELAYED RELEASE ORAL
Qty: 60 TABLET | Refills: 5 | Status: SHIPPED | OUTPATIENT
Start: 2020-11-06 | End: 2021-01-04 | Stop reason: SDUPTHER

## 2020-12-11 DIAGNOSIS — E78.2 MIXED HYPERLIPIDEMIA: ICD-10-CM

## 2020-12-11 RX ORDER — ATORVASTATIN CALCIUM 10 MG/1
TABLET, FILM COATED ORAL
Qty: 90 TABLET | Refills: 1 | Status: SHIPPED | OUTPATIENT
Start: 2020-12-11 | End: 2021-09-15

## 2020-12-18 DIAGNOSIS — Z94.0 RENAL TRANSPLANT RECIPIENT: ICD-10-CM

## 2020-12-18 DIAGNOSIS — Z94.0 RENAL TRANSPLANT, STATUS POST: ICD-10-CM

## 2020-12-18 RX ORDER — PREDNISONE 1 MG/1
TABLET ORAL
Qty: 90 TABLET | Refills: 1 | Status: SHIPPED | OUTPATIENT
Start: 2020-12-18 | End: 2021-09-14

## 2020-12-18 RX ORDER — SULFAMETHOXAZOLE AND TRIMETHOPRIM 400; 80 MG/1; MG/1
TABLET ORAL
Qty: 90 TABLET | Refills: 1 | Status: SHIPPED | OUTPATIENT
Start: 2020-12-18 | End: 2021-03-18

## 2021-01-04 ENCOUNTER — TELEPHONE (OUTPATIENT)
Dept: NEPHROLOGY | Facility: CLINIC | Age: 71
End: 2021-01-04

## 2021-01-04 DIAGNOSIS — I48.91 ATRIAL FIBRILLATION, UNSPECIFIED TYPE (HCC): ICD-10-CM

## 2021-01-04 DIAGNOSIS — I95.9 HYPOTENSION, UNSPECIFIED HYPOTENSION TYPE: Primary | ICD-10-CM

## 2021-01-04 DIAGNOSIS — Z94.0 KIDNEY TRANSPLANTED: ICD-10-CM

## 2021-01-04 RX ORDER — APIXABAN 5 MG/1
5 TABLET, FILM COATED ORAL 2 TIMES DAILY
Qty: 90 TABLET | Refills: 0 | Status: SHIPPED | OUTPATIENT
Start: 2021-01-04 | End: 2021-02-19

## 2021-01-04 RX ORDER — MIDODRINE HYDROCHLORIDE 10 MG/1
10 TABLET ORAL DAILY
Qty: 90 TABLET | Refills: 0 | Status: SHIPPED | OUTPATIENT
Start: 2021-01-04 | End: 2021-07-19 | Stop reason: SDUPTHER

## 2021-01-04 RX ORDER — MYCOPHENOLIC ACID 360 MG/1
360 TABLET, DELAYED RELEASE ORAL 2 TIMES DAILY
Qty: 60 TABLET | Refills: 5 | Status: SHIPPED | OUTPATIENT
Start: 2021-01-04 | End: 2021-01-13 | Stop reason: SDUPTHER

## 2021-01-04 RX ORDER — TACROLIMUS 1 MG/1
1 CAPSULE ORAL 2 TIMES DAILY
Qty: 180 CAPSULE | Refills: 4 | Status: SHIPPED | OUTPATIENT
Start: 2021-01-04

## 2021-01-04 RX ORDER — MIDODRINE HYDROCHLORIDE 2.5 MG/1
2.5 TABLET ORAL DAILY PRN
COMMUNITY
Start: 2020-12-11

## 2021-01-04 NOTE — TELEPHONE ENCOUNTER
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 that he moved to [facility]"" and [facility] needs new Rx for is Mycophenolate 360mg twice a day and also the Tacrolimus 1mg twice a day because they are covered by the Medicare Part D, and they can not be transferred from one Pharmacy to another  Please send these 2 refills to [facility] [address], [facility], [address]  @ [phone]  Patient also needs refills for his   Eliquis 5mg twice a day w/90 supply   Midodrine HCL 2 5mg once a day w/90 supply  [facility] @ [phone]  If any questions please call patient @ [phone]   Dorota Lowry,

## 2021-01-04 NOTE — TELEPHONE ENCOUNTER
Pt called and left message asking to call him back regarding med refill   Called pt back and left a message to call us back at [phone]

## 2021-01-13 DIAGNOSIS — Z94.0 KIDNEY TRANSPLANTED: ICD-10-CM

## 2021-01-13 RX ORDER — MYCOPHENOLIC ACID 360 MG/1
360 TABLET, DELAYED RELEASE ORAL 2 TIMES DAILY
Qty: 60 TABLET | Refills: 5 | Status: SHIPPED | OUTPATIENT
Start: 2021-01-13 | End: 2021-02-08

## 2021-01-13 NOTE — TELEPHONE ENCOUNTER
Pt called to refill Mycophenolate 360 mg 2 times a day and send it to [facility] in [place]

## 2021-01-13 NOTE — TELEPHONE ENCOUNTER
Called pt and informed him that Mycophenolate 360 mg was [facility] [place]  Pt understood and was ok with it

## 2021-01-22 ENCOUNTER — TELEPHONE (OUTPATIENT)
Dept: NEPHROLOGY | Facility: CLINIC | Age: 71
End: 2021-01-22

## 2021-01-22 NOTE — TELEPHONE ENCOUNTER
Called the pt and inform him that from a neph stand point we are recommending the vaccine  Also inform pt that neither vaccine had the live virus   Pt verbally understood

## 2021-01-22 NOTE — TELEPHONE ENCOUNTER
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

## 2021-01-28 ENCOUNTER — LAB (OUTPATIENT)
Dept: LAB | Facility: HOSPITAL | Age: 71
End: 2021-01-28
Attending: INTERNAL MEDICINE
Payer: MEDICARE

## 2021-01-28 DIAGNOSIS — Z94.0 H/O KIDNEY TRANSPLANT: Chronic | ICD-10-CM

## 2021-01-28 DIAGNOSIS — N18.30 STAGE 3 CHRONIC KIDNEY DISEASE (HCC): Chronic | ICD-10-CM

## 2021-01-28 DIAGNOSIS — Z94.0 RENAL TRANSPLANT RECIPIENT: ICD-10-CM

## 2021-01-28 DIAGNOSIS — I10 ESSENTIAL HYPERTENSION: Chronic | ICD-10-CM

## 2021-01-28 LAB
ANION GAP SERPL CALCULATED.3IONS-SCNC: 8 MMOL/L (ref 4–13)
BACTERIA UR QL AUTO: ABNORMAL /HPF
BASOPHILS # BLD AUTO: 0.04 THOUSANDS/ΜL (ref 0–0.1)
BASOPHILS NFR BLD AUTO: 1 % (ref 0–1)
BILIRUB UR QL STRIP: NEGATIVE
BUN SERPL-MCNC: 18 MG/DL (ref 5–25)
CALCIUM SERPL-MCNC: 9.2 MG/DL (ref 8.3–10.1)
CHLORIDE SERPL-SCNC: 102 MMOL/L (ref 100–108)
CLARITY UR: CLEAR
CO2 SERPL-SCNC: 28 MMOL/L (ref 21–32)
COLOR UR: YELLOW
CREAT SERPL-MCNC: 1.15 MG/DL (ref 0.6–1.3)
CREAT UR-MCNC: 73 MG/DL
EOSINOPHIL # BLD AUTO: 0.25 THOUSAND/ΜL (ref 0–0.61)
EOSINOPHIL NFR BLD AUTO: 4 % (ref 0–6)
ERYTHROCYTE [DISTWIDTH] IN BLOOD BY AUTOMATED COUNT: 13.6 % (ref 11.6–15.1)
GFR SERPL CREATININE-BSD FRML MDRD: 65 ML/MIN/1.73SQ M
GLUCOSE P FAST SERPL-MCNC: 79 MG/DL (ref 65–99)
GLUCOSE UR STRIP-MCNC: NEGATIVE MG/DL
HCT VFR BLD AUTO: 51.1 % (ref 36.5–49.3)
HGB BLD-MCNC: 16.2 G/DL (ref 12–17)
HGB UR QL STRIP.AUTO: ABNORMAL
IMM GRANULOCYTES # BLD AUTO: 0.07 THOUSAND/UL (ref 0–0.2)
IMM GRANULOCYTES NFR BLD AUTO: 1 % (ref 0–2)
KETONES UR STRIP-MCNC: NEGATIVE MG/DL
LEUKOCYTE ESTERASE UR QL STRIP: NEGATIVE
LYMPHOCYTES # BLD AUTO: 1.55 THOUSANDS/ΜL (ref 0.6–4.47)
LYMPHOCYTES NFR BLD AUTO: 24 % (ref 14–44)
MCH RBC QN AUTO: 29 PG (ref 26.8–34.3)
MCHC RBC AUTO-ENTMCNC: 31.7 G/DL (ref 31.4–37.4)
MCV RBC AUTO: 92 FL (ref 82–98)
MONOCYTES # BLD AUTO: 0.64 THOUSAND/ΜL (ref 0.17–1.22)
MONOCYTES NFR BLD AUTO: 10 % (ref 4–12)
NEUTROPHILS # BLD AUTO: 3.95 THOUSANDS/ΜL (ref 1.85–7.62)
NEUTS SEG NFR BLD AUTO: 60 % (ref 43–75)
NITRITE UR QL STRIP: NEGATIVE
NON-SQ EPI CELLS URNS QL MICRO: ABNORMAL /HPF
NRBC BLD AUTO-RTO: 0 /100 WBCS
PH UR STRIP.AUTO: 6.5 [PH]
PHOSPHATE SERPL-MCNC: 3.3 MG/DL (ref 2.3–4.1)
PLATELET # BLD AUTO: 220 THOUSANDS/UL (ref 149–390)
PMV BLD AUTO: 9 FL (ref 8.9–12.7)
POTASSIUM SERPL-SCNC: 4.1 MMOL/L (ref 3.5–5.3)
PROT UR STRIP-MCNC: NEGATIVE MG/DL
PROT UR-MCNC: 11 MG/DL
PROT/CREAT UR: 0.15 MG/G{CREAT} (ref 0–0.1)
PTH-INTACT SERPL-MCNC: 54.4 PG/ML (ref 18.4–80.1)
RBC # BLD AUTO: 5.58 MILLION/UL (ref 3.88–5.62)
RBC #/AREA URNS AUTO: ABNORMAL /HPF
SODIUM SERPL-SCNC: 138 MMOL/L (ref 136–145)
SP GR UR STRIP.AUTO: 1.01 (ref 1–1.03)
TACROLIMUS BLD-MCNC: 6.1 NG/ML (ref 2–20)
UROBILINOGEN UR QL STRIP.AUTO: 0.2 E.U./DL
WBC # BLD AUTO: 6.5 THOUSAND/UL (ref 4.31–10.16)
WBC #/AREA URNS AUTO: ABNORMAL /HPF

## 2021-01-28 PROCEDURE — 85025 COMPLETE CBC W/AUTO DIFF WBC: CPT

## 2021-01-28 PROCEDURE — 83970 ASSAY OF PARATHORMONE: CPT

## 2021-01-28 PROCEDURE — 81001 URINALYSIS AUTO W/SCOPE: CPT

## 2021-01-28 PROCEDURE — 84156 ASSAY OF PROTEIN URINE: CPT

## 2021-01-28 PROCEDURE — 84100 ASSAY OF PHOSPHORUS: CPT

## 2021-01-28 PROCEDURE — 80197 ASSAY OF TACROLIMUS: CPT

## 2021-01-28 PROCEDURE — 36415 COLL VENOUS BLD VENIPUNCTURE: CPT

## 2021-01-28 PROCEDURE — 80048 BASIC METABOLIC PNL TOTAL CA: CPT

## 2021-01-28 PROCEDURE — 82570 ASSAY OF URINE CREATININE: CPT

## 2021-02-05 DIAGNOSIS — Z94.0 KIDNEY TRANSPLANTED: ICD-10-CM

## 2021-02-08 RX ORDER — MYCOPHENOLIC ACID 360 MG/1
360 TABLET, DELAYED RELEASE ORAL 2 TIMES DAILY
Qty: 180 TABLET | Refills: 2 | Status: SHIPPED | OUTPATIENT
Start: 2021-02-08 | End: 2021-10-07

## 2021-02-17 ENCOUNTER — OFFICE VISIT (OUTPATIENT)
Dept: INTERNAL MEDICINE CLINIC | Facility: CLINIC | Age: 71
End: 2021-02-17
Payer: MEDICARE

## 2021-02-17 VITALS
RESPIRATION RATE: 16 BRPM | SYSTOLIC BLOOD PRESSURE: 100 MMHG | OXYGEN SATURATION: 98 % | HEIGHT: 65 IN | HEART RATE: 86 BPM | TEMPERATURE: 98.5 F | BODY MASS INDEX: 22.66 KG/M2 | WEIGHT: 136 LBS | DIASTOLIC BLOOD PRESSURE: 58 MMHG

## 2021-02-17 DIAGNOSIS — Z94.0 H/O KIDNEY TRANSPLANT: Chronic | ICD-10-CM

## 2021-02-17 DIAGNOSIS — I25.10 CORONARY ARTERY DISEASE INVOLVING NATIVE CORONARY ARTERY OF NATIVE HEART WITHOUT ANGINA PECTORIS: Primary | Chronic | ICD-10-CM

## 2021-02-17 DIAGNOSIS — I48.91 NEW ONSET A-FIB (HCC): Chronic | ICD-10-CM

## 2021-02-17 DIAGNOSIS — D84.9 IMMUNOSUPPRESSION (HCC): ICD-10-CM

## 2021-02-17 DIAGNOSIS — K40.90 NON-RECURRENT UNILATERAL INGUINAL HERNIA WITHOUT OBSTRUCTION OR GANGRENE: ICD-10-CM

## 2021-02-17 PROCEDURE — 99214 OFFICE O/P EST MOD 30 MIN: CPT | Performed by: INTERNAL MEDICINE

## 2021-02-17 PROCEDURE — G0439 PPPS, SUBSEQ VISIT: HCPCS | Performed by: INTERNAL MEDICINE

## 2021-02-17 PROCEDURE — 1123F ACP DISCUSS/DSCN MKR DOCD: CPT | Performed by: INTERNAL MEDICINE

## 2021-02-17 NOTE — PATIENT INSTRUCTIONS
Medicare Preventive Visit Patient Instructions  Thank you for completing your Welcome to Medicare Visit or Medicare Annual Wellness Visit today  Your next wellness visit will be due in one year (2/17/2022)  The screening/preventive services that you may require over the next 5-10 years are detailed below  Some tests may not apply to you based off risk factors and/or age  Screening tests ordered at today's visit but not completed yet may show as past due  Also, please note that scanned in results may not display below  Preventive Screenings:  Service Recommendations Previous Testing/Comments   Colorectal Cancer Screening  · Colonoscopy    · Fecal Occult Blood Test (FOBT)/Fecal Immunochemical Test (FIT)  · Fecal DNA/Cologuard Test  · Flexible Sigmoidoscopy Age: 54-65 years old   Colonoscopy: every 10 years (May be performed more frequently if at higher risk)  OR  FOBT/FIT: every 1 year  OR  Cologuard: every 3 years  OR  Sigmoidoscopy: every 5 years  Screening may be recommended earlier than age 48 if at higher risk for colorectal cancer  Also, an individualized decision between you and your healthcare provider will decide whether screening between the ages of 74-80 would be appropriate   Colonoscopy: 12/12/2010  FOBT/FIT: Not on file  Cologuard: Not on file  Sigmoidoscopy: Not on file         Prostate Cancer Screening Individualized decision between patient and health care provider in men between ages of 53-78   Medicare will cover every 12 months beginning on the day after your 50th birthday PSA: No results in last 5 years          Hepatitis C Screening Once for adults born between 80 and 1965  More frequently in patients at high risk for Hepatitis C Hep C Antibody: Not on file       Diabetes Screening 1-2 times per year if you're at risk for diabetes or have pre-diabetes Fasting glucose: 79 mg/dL   A1C: No results in last 5 years    Screening Current   Cholesterol Screening Once every 5 years if you don't have a lipid disorder  May order more often based on risk factors  Lipid panel: Not on file    Screening Not Indicated  History Lipid Disorder      Other Preventive Screenings Covered by Medicare:  1  Abdominal Aortic Aneurysm (AAA) Screening: covered once if your at risk  You're considered to be at risk if you have a family history of AAA or a male between the age of 73-68 who smoking at least 100 cigarettes in your lifetime  2  Lung Cancer Screening: covers low dose CT scan once per year if you meet all of the following conditions: (1) Age 50-69; (2) No signs or symptoms of lung cancer; (3) Current smoker or have quit smoking within the last 15 years; (4) You have a tobacco smoking history of at least 30 pack years (packs per day x number of years you smoked); (5) You get a written order from a healthcare provider  3  Glaucoma Screening: covered annually if you're considered high risk: (1) You have diabetes OR (2) Family history of glaucoma OR (3)  aged 48 and older OR (3)  American aged 72 and older  3  Osteoporosis Screening: covered every 2 years if you meet one of the following conditions: (1) Have a vertebral abnormality; (2) On glucocorticoid therapy for more than 3 months; (3) Have primary hyperparathyroidism; (4) On osteoporosis medications and need to assess response to drug therapy  5  HIV Screening: covered annually if you're between the age of 12-76  Also covered annually if you are younger than 13 and older than 72 with risk factors for HIV infection  For pregnant patients, it is covered up to 3 times per pregnancy      Immunizations:  Immunization Recommendations   Influenza Vaccine Annual influenza vaccination during flu season is recommended for all persons aged >= 6 months who do not have contraindications   Pneumococcal Vaccine (Prevnar and Pneumovax)  * Prevnar = PCV13  * Pneumovax = PPSV23 Adults 25-60 years old: 1-3 doses may be recommended based on certain risk factors  Adults 72 years old: Prevnar (PCV13) vaccine recommended followed by Pneumovax (PPSV23) vaccine  If already received PPSV23 since turning 65, then PCV13 recommended at least one year after PPSV23 dose  Hepatitis B Vaccine 3 dose series if at intermediate or high risk (ex: diabetes, end stage renal disease, liver disease)   Tetanus (Td) Vaccine - COST NOT COVERED BY MEDICARE PART B Following completion of primary series, a booster dose should be given every 10 years to maintain immunity against tetanus  Td may also be given as tetanus wound prophylaxis  Tdap Vaccine - COST NOT COVERED BY MEDICARE PART B Recommended at least once for all adults  For pregnant patients, recommended with each pregnancy  Shingles Vaccine (Shingrix) - COST NOT COVERED BY MEDICARE PART B  2 shot series recommended in those aged 48 and above     Health Maintenance Due:      Topic Date Due    Hepatitis C Screening  1950    Colorectal Cancer Screening  12/12/2020     Immunizations Due:      Topic Date Due    DTaP,Tdap,and Td Vaccines (1 - Tdap) 03/05/1972    Pneumococcal Vaccine: 65+ Years (2 of 2 - PPSV23) 10/28/2019    Influenza Vaccine (1) 09/01/2020     Advance Directives   What are advance directives? Advance directives are legal documents that state your wishes and plans for medical care  These plans are made ahead of time in case you lose your ability to make decisions for yourself  Advance directives can apply to any medical decision, such as the treatments you want, and if you want to donate organs  What are the types of advance directives? There are many types of advance directives, and each state has rules about how to use them  You may choose a combination of any of the following:  · Living will: This is a written record of the treatment you want  You can also choose which treatments you do not want, which to limit, and which to stop at a certain time   This includes surgery, medicine, IV fluid, and tube feedings  · Durable power of  for healthcare [facility]): This is a written record that states who you want to make healthcare choices for you when you are unable to make them for yourself  This person, called a proxy, is usually a family member or a friend  You may choose more than 1 proxy  · Do not resuscitate (DNR) order:  A DNR order is used in case your heart stops beating or you stop breathing  It is a request not to have certain forms of treatment, such as CPR  A DNR order may be included in other types of advance directives  · Medical directive: This covers the care that you want if you are in a coma, near death, or unable to make decisions for yourself  You can list the treatments you want for each condition  Treatment may include pain medicine, surgery, blood transfusions, dialysis, IV or tube feedings, and a ventilator (breathing machine)  · Values history: This document has questions about your views, beliefs, and how you feel and think about life  This information can help others choose the care that you would choose  Why are advance directives important? An advance directive helps you control your care  Although spoken wishes may be used, it is better to have your wishes written down  Spoken wishes can be misunderstood, or not followed  Treatments may be given even if you do not want them  An advance directive may make it easier for your family to make difficult choices about your care  © Copyright [employer] 2018 Information is for End User's use only and may not be sold, redistributed or otherwise used for commercial purposes   All illustrations and images included in CareNotes® are the copyrighted property of A D A M , [employer]  [facility] [employer]

## 2021-02-17 NOTE — PROGRESS NOTES
Assessment and Plan:     Problem List Items Addressed This Visit        Cardiovascular and Mediastinum    CAD (coronary artery disease) - Primary (Chronic)    Relevant Orders    Lipid panel    New onset a-fib ([facility] ) (Chronic)       Other    H/O kidney transplant (Chronic)    Immunosuppression ([place] [address] )           Preventive health issues were discussed with patient, and age appropriate screening tests were ordered as noted in patient's After Visit Summary  Personalized health advice and appropriate referrals for health education or preventive services given if needed, as noted in patient's After Visit Summary  History of Present Illness:     Patient presents for Welcome to Medicare visit  Patient Care Team:  Carly Schuster MD as PCP - MD Asad Win MD Reyna [facility] MD Dominic Jeff MD as Consulting Physician (Nephrology)     Review of Systems:     Review of Systems   Respiratory: Negative for shortness of breath  Cardiovascular: Negative for chest pain  Gastrointestinal: Negative for abdominal pain        Problem List:     Patient Active Problem List   Diagnosis    Hypoalbuminemia    CAD (coronary artery disease)    H/O kidney transplant    New onset a-fib ([place] [address] )    HLD (hyperlipidemia)    S/P mitral valve repair    Polycystic kidney disease    CKD (chronic kidney disease)    Essential hypertension    Elevated brain natriuretic peptide (BNP) level    Immunosuppression ([place] [address] )    H/O recurrent pneumonia    Multiple nevi    Benign localized prostatic hyperplasia with lower urinary tract symptoms (LUTS)    Incomplete bladder emptying    Acute calculous cholecystitis/status post cholecystectomy    Urinary retention-postop    Acute pulmonary insufficiency    Mitral valve stenosis    Pulmonary congestion      Past Medical and Surgical History:     Past Medical History:   Diagnosis Date    Atrial fibrillation (HCC)     Bacterial pneumonia     last assessed: 2/2/2017    Benign neoplasm of skin     Benign prostatic hyperplasia     Cardiac disorder     Chronic kidney disease     Coronary artery disease     GERD (gastroesophageal reflux disease)     Gross hematuria     last assessed: 12/5/2016    Hypercholesterolemia     Hypertension     Kidney disease     Kidney transplanted     x 2, 2001 and 2012    Osteoporosis     Pneumonia     last assessed: 10/9/2017    Seborrheic keratosis     Sinusitis     Viral warts      Past Surgical History:   Procedure Laterality Date    AV FISTULA PLACEMENT Right 2001    arteriovenous surgery creation of A-V fistula, right arm radiocephalic-Dr Jack Rasmussen    AV FISTULA PLACEMENT Left 2011    hemodialysis acess type arteriovenous fistula, left brachiocepalic AVF [id number]  R Sally Walter 74 N/A 8/2/2019    Procedure: CHOLECYSTECTOMY, OPEN;  Surgeon: Sherita Nino MD;  Location: [facility];  Service: General    COLON SURGERY  2012    CORONARY STENT PLACEMENT  09/15/2011    PTA stenting-LVH/M Dr Ben Montoya  2006    [facility] OTHER SURGICAL H[facility]  01/2012    fluids from transplant    OTHER SURGICAL HISTORY Left 07/08/2013    shaving of lesion shoulders, skin left shoulder combined compound and blue nevus    TONSILLECTOMY      TRANSPLANTATION RENAL Left Pineda Higgins, [address] TRANSPLANTATION RENAL Right 12/08/2011    Pineda De Leon, [facility]      Family History:     Family History   Problem Relation Age of Onset    Polycystic kidney disease Father     Kidney disease Father       Social History:        Social History     Socioeconomic History    Marital status: /Civil Union     Spouse name: None    Number of children: 4    Years of education: None    Highest education level: None   Occupational History    Occupation: retired     Comment: , not employed   Social Needs    Financial resource strain: None    Food insecurity     Worry: None     Inability: None    Transportation needs     Medical: None     Non-medical: None   Tobacco Use    Smoking status: Never Smoker    Smokeless tobacco: Never Used   Substance and Sexual Activity    Alcohol use: Never     Frequency: Never    Drug use: No    Sexual activity: Yes     Partners: Female     Comment: denied: history of high risk sexual behavior   Lifestyle    Physical activity     Days per week: None     Minutes per session: None    Stress: None   Relationships    Social connections     Talks on phone: None     Gets together: None     Attends [employer] service: None     Active member of club or organization: None     Attends meetings of clubs or organizations: None     Relationship status: None    Intimate partner violence     Fear of current or ex partner: None     Emotionally abused: None     Physically abused: None     Forced sexual activity: None   Other Topics Concern    None   Social History Narrative    Active advance directive-yes    Exercises occasionally      Medications and Allergies:     Current Outpatient Medications   Medication Sig Dispense Refill    aspirin 81 mg chewable tablet Chew 81 mg daily       atorvastatin (LIPITOR) 10 mg tablet TAKE 1 TABLET BY MOUTH EVERY DAY 90 tablet 1    Eliquis 5 MG Take 1 tablet (5 mg total) by mouth 2 (two) times a day 90 tablet 0    midodrine (PROAMATINE) 10 MG tablet Take 1 tablet (10 mg total) by mouth daily 90 tablet 0    midodrine (PROAMATINE) 2 5 mg tablet Take 2 5 mg by mouth daily as needed      mycophenolate (MYFORTIC) 360 MG TBEC TAKE 1 TABLET (360 MG TOTAL) BY MOUTH 2 (TWO) TIMES A  tablet 2    Omega-3 Fatty Acids (FISH OIL CONCENTRATE) 300 MG CAPS Take 1 capsule by mouth daily       predniSONE 5 mg tablet TAKE 1 TABLET BY MOUTH EVERY DAY 90 tablet 1    sulfamethoxazole-trimethoprim (BACTRIM) 400-80 mg per tablet TAKE 1 TABLET BY MOUTH EVERY DAY 90 tablet 1    tacrolimus (PROGRAF) 1 mg capsule Take 1 capsule (1 mg total) by mouth 2 (two) times a day 180 capsule 4    tamsulosin (FLOMAX) 0 4 mg TAKE 1 CAPSULE DAILY 90 capsule 1     No current facility-administered medications for this visit  Allergies   Allergen Reactions    Other      seasonal      Immunizations:     Immunization History   Administered Date(s) Administered    INFLUENZA 11/22/2011, 10/05/2015, 10/27/2016, 10/05/2017    Influenza Quadrivalent, 6-35 Months IM 10/05/2015    Influenza Split High Dose Preservative Free IM 10/27/2016    Influenza, high dose seasonal 0 7 mL 11/20/2019    Influenza, recombinant, quadrivalent,injectable, preservative free 11/13/2018, 11/13/2018    Influenza, seasonal, injectable 11/22/2011, 10/28/2014, 10/05/2015, 10/05/2017    Pneumococcal Conjugate 13-Valent 10/09/2017    Pneumococcal Polysaccharide PPV23 10/28/2014    TD (adult) Preservative Free 06/19/2018      Health Maintenance:         Topic Date Due    Hepatitis C Screening  1950    Colorectal Cancer Screening  12/12/2020         Topic Date Due    DTaP,Tdap,and Td Vaccines (1 - Tdap) 03/05/1972    Pneumococcal Vaccine: 65+ Years (2 of 2 - PPSV23) 10/28/2019    Influenza Vaccine (1) 09/01/2020      Medicare Screening Tests and Risk Assessments:     Ruchi Nguyen is here for his Subsequent Wellness visit  Health Risk Assessment:   Patient rates overall health as good  Patient feels that their physical health rating is same  Eyesight was rated as same  Hearing was rated as same  Patient feels that their emotional and mental health rating is same  Pain experienced in the last 7 days has been none  Patient states that he has experienced no weight loss or gain in last 6 months  Depression Screening:   PHQ-2 Score: 0  PHQ-9 Score: 0      Fall Risk Screening:    In the past year, patient has experienced: no history of falling in past year      Home Safety:  Patient does not have trouble with stairs inside or outside of their home  Patient has working smoke alarms and has no working carbon monoxide detector  Home safety hazards include: none  Nutrition:   Current diet is Regular  Medications:   Patient is currently taking over-the-counter supplements  OTC medications include: see medication list  Patient is able to manage medications  Activities of Daily Living (ADLs)/Instrumental Activities of Daily Living (IADLs):   Walk and transfer into and out of bed and chair?: Yes  Dress and groom yourself?: Yes    Bathe or shower yourself?: Yes    Feed yourself? Yes  Do your laundry/housekeeping?: Yes  Manage your money, pay your bills and track your expenses?: Yes  Make your own meals?: Yes    Do your own shopping?: Yes    Previous Hospitalizations:   Any hospitalizations or ED visits within the last 12 months?: No      Advance Care Planning:   Living will: Yes    Durable POA for healthcare:  Yes    Advanced directive: Yes    Advanced directive counseling given: Yes      Cognitive Screening:   Provider or family/friend/caregiver concerned regarding cognition?: No    PREVENTIVE SCREENINGS      Cardiovascular Screening:    General: Screening Not Indicated and History Lipid Disorder      Diabetes Screening:     General: Screening Current      Colorectal Cancer Screening:     General: Risks and Benefits Discussed      Prostate Cancer Screening:    General: Risks and Benefits Discussed      Osteoporosis Screening:    General: Screening Not Indicated      Abdominal Aortic Aneurysm (AAA) Screening:    Risk factors include: age between 73-67 yo        General: Screening Not Indicated      Lung Cancer Screening:     General: Screening Not Indicated      Hepatitis C Screening:    General: Screening Current    No exam data present     Physical Exam:     /58 (BP Location: Right arm, Patient Position: Sitting, Cuff Size: Standard)   Pulse 86   Temp 98 5 °F (36 9 °C) (Tympanic)   Resp 16   Ht 5' 5" (1 651 m)   Wt 61 7 kg (136 lb)   SpO2 98%   BMI 22 63 kg/m²     Physical Exam  Vitals signs and nursing note reviewed  Neurological:      Mental Status: He is alert and oriented to person, place, and time  Psychiatric:         Behavior: Behavior normal          Thought Content:  Thought content normal          Judgment: Judgment normal           Wayne Pascual MD

## 2021-02-17 NOTE — PROGRESS NOTES
Assessment/Plan:     Chronic problems appear stable  Continue with his specialists  Ordered cholesterol  He will go for colonoscopy when he feels comfortable going out again because of the pandemic  Quality Measures:       No follow-ups on file  No problem-specific Assessment & Plan notes found for this encounter  Diagnoses and all orders for this visit:    Coronary artery disease involving native coronary artery of native heart without angina pectoris  -     Lipid panel; Future    H/O kidney transplant    Immunosuppression ([facility] )    New onset a-fib ([facility] )          Subjective:      Patient ID: Tawny Marrufo is a 72 y o  male  Patient comes in today for routine follow-up and Medicare wellness  They have moved up to [place] and because of this and the pandemic, have not been here for a bit  He has recently caught up with his specialists  Cardiology ordered a monitor because of some episodes he was having  He is waiting for those results  Kidney transplant is doing fine  His wife has been keeping a close eye on him because of the pandemic and his immunosuppression  They really go nowhere at this point  Minimal exposure to family  His right inguinal hernia is a little bigger but still asymptomatic  He is very reluctant to consider surgical repair, again, because of the pandemic        ALLERGIES:  Allergies   Allergen Reactions    Other      seasonal       CURRENT MEDICATIONS:    Current Outpatient Medications:     aspirin 81 mg chewable tablet, Chew 81 mg daily , Disp: , Rfl:     atorvastatin (LIPITOR) 10 mg tablet, TAKE 1 TABLET BY MOUTH EVERY DAY, Disp: 90 tablet, Rfl: 1    Eliquis 5 MG, Take 1 tablet (5 mg total) by mouth 2 (two) times a day, Disp: 90 tablet, Rfl: 0    midodrine (PROAMATINE) 10 MG tablet, Take 1 tablet (10 mg total) by mouth daily, Disp: 90 tablet, Rfl: 0    midodrine (PROAMATINE) 2 5 mg tablet, Take 2 5 mg by mouth daily as needed, Disp: , Rfl:    mycophenolate (MYFORTIC) 360 MG TBEC, TAKE 1 TABLET (360 MG TOTAL) BY MOUTH 2 (TWO) TIMES A DAY, Disp: 180 tablet, Rfl: 2    Omega-3 Fatty Acids (FISH OIL CONCENTRATE) 300 MG CAPS, Take 1 capsule by mouth daily , Disp: , Rfl:     predniSONE 5 mg tablet, TAKE 1 TABLET BY MOUTH EVERY DAY, Disp: 90 tablet, Rfl: 1    sulfamethoxazole-trimethoprim (BACTRIM) 400-80 mg per tablet, TAKE 1 TABLET BY MOUTH EVERY DAY, Disp: 90 tablet, Rfl: 1    tacrolimus (PROGRAF) 1 mg capsule, Take 1 capsule (1 mg total) by mouth 2 (two) times a day, Disp: 180 capsule, Rfl: 4    tamsulosin (FLOMAX) 0 4 mg, TAKE 1 CAPSULE DAILY, Disp: 90 capsule, Rfl: 1    ACTIVE PROBLEM LIST:  Patient Active Problem List   Diagnosis    Hypoalbuminemia    CAD (coronary artery disease)    H/O kidney transplant    New onset a-fib ([address] )    HLD (hyperlipidemia)    S/P mitral valve repair    Polycystic kidney disease    CKD (chronic kidney disease)    Essential hypertension    Elevated brain natriuretic peptide (BNP) level    Immunosuppression (HCC)    H/O recurrent pneumonia    Multiple nevi    Benign localized prostatic hyperplasia with lower urinary tract symptoms (LUTS)    Incomplete bladder emptying    Acute calculous cholecystitis/status post cholecystectomy    Urinary retention-postop    Acute pulmonary insufficiency    Mitral valve stenosis    Pulmonary congestion       PAST MEDICAL HISTORY:  Past Medical History:   Diagnosis Date    Atrial fibrillation (HCC)     Bacterial pneumonia     last assessed: 2/2/2017    Benign neoplasm of skin     Benign prostatic hyperplasia     Cardiac disorder     Chronic kidney disease     Coronary artery disease     GERD (gastroesophageal reflux disease)     Gross hematuria     last assessed: 12/5/2016    Hypercholesterolemia     Hypertension     Kidney disease     Kidney transplanted     x 2, 2001 and 2012    Osteoporosis     Pneumonia     last assessed: 10/9/2017    Seborrheic keratosis     Sinusitis     Viral warts        PAST SURGICAL HISTORY:  Past Surgical History:   Procedure Laterality Date    AV FISTULA PLACEMENT Right 2001    arteriovenous surgery creation of A-V fistula, right arm radiocephalic-Dr Maggie Murphy    AV FISTULA PLACEMENT Left 2011    hemodialysis acess type arteriovenous fistula, left brachiocepalic AVF [id number]  R Sally Watson 74 N/A 8/2/2019    Procedure: CHOLECYSTECTOMY, OPEN;  Surgeon: Carlos A Kiser MD;  Location: [facility];  Service: General    COLON SURGERY  2012    CORONARY STENT PLACEMENT  09/15/2011    PTA stenting-LVH/M Dr Jennifer Richardson  2006    [facility] OTHER SURGICAL HIST[facility]  01/2012    fluids from transplant    OTHER SURGICAL HISTORY Left 07/08/2013    shaving of lesion shoulders, skin left shoulder combined compound and blue nevus    TONSILLECTOMY      TRANSPLANTATION RENAL Left Pineda Higgins, [address] TRANSPLANTATION RENAL Right 12/08/2011    Pineda Moon, [address] [facility]       FAMILY HISTORY:  Family History   Problem Relation Age of Onset    Polycystic kidney disease Father     Kidney disease Father        SOCIAL HISTORY:  Social History     Socioeconomic History    Marital status: /Civil Union     Spouse name: Not on file    Number of children: 3    Years of education: Not on file    Highest education level: Not on file   Occupational History    Occupation: retired     Comment: , not employed   Social Needs    Financial resource strain: Not on file    Food insecurity     Worry: Not on file     Inability: Not on file   [employer] needs     Medical: Not on file     Non-medical: Not on file   Tobacco Use    Smoking status: Never Smoker    Smokeless tobacco: Never Used   Substance and Sexual Activity    Alcohol use: Never     Frequency: Never    Drug use: No    Sexual activity: Yes     Partners: Female     Comment: denied: history of high risk sexual behavior   Lifestyle    Physical activity     Days per week: Not on file     Minutes per session: Not on file    Stress: Not on file   Relationships    Social connections     Talks on phone: Not on file     Gets together: Not on file     Attends [employer] service: Not on file     Active member of club or organization: Not on file     Attends meetings of clubs or organizations: Not on file     Relationship status: Not on file    Intimate partner violence     Fear of current or ex partner: Not on file     Emotionally abused: Not on file     Physically abused: Not on file     Forced sexual activity: Not on file   Other Topics Concern    Not on file   Social History Narrative    Active advance directive-yes    Exercises occasionally       Review of Systems   Respiratory: Negative for shortness of breath  Cardiovascular: Negative for chest pain  Gastrointestinal: Negative for abdominal pain  Objective:  Vitals:    02/17/21 1441   BP: 100/58   BP Location: Right arm   Patient Position: Sitting   Cuff Size: Standard   Pulse: 86   Resp: 16   Temp: 98 5 °F (36 9 °C)   TempSrc: Tympanic   SpO2: 98%   Weight: 61 7 kg (136 lb)   Height: 5' 5" (1 651 m)     Body mass index is 22 63 kg/m²  Physical Exam  Vitals signs and nursing note reviewed  Constitutional:       Appearance: He is well-developed  Cardiovascular:      Rate and Rhythm: Normal rate and regular rhythm  Heart sounds: Normal heart sounds  Pulmonary:      Effort: Pulmonary effort is normal       Breath sounds: Normal breath sounds  Abdominal:      Palpations: Abdomen is soft  Tenderness: There is no abdominal tenderness  Neurological:      Mental Status: He is alert and oriented to person, place, and time             RESULTS:    Recent Results (from the past 1008 hour(s))   Tacrolimus level    Collection Time: 01/28/21  8:37 AM   Result Value Ref Range    TACROLIMUS 6 1 2 0 - 20 0 ng/mL   Basic metabolic panel    Collection Time: 01/28/21  8:37 AM   Result Value Ref Range    Sodium 138 136 - 145 mmol/L    Potassium 4 1 3 5 - 5 3 mmol/L    Chloride 102 100 - 108 mmol/L    CO2 28 21 - 32 mmol/L    ANION GAP 8 4 - 13 mmol/L    BUN 18 5 - 25 mg/dL    Creatinine 1 15 0 60 - 1 30 mg/dL    Glucose, Fasting 79 65 - 99 mg/dL    Calcium 9 2 8 3 - 10 1 mg/dL    eGFR 65 ml/min/1 73sq m   CBC and differential    Collection Time: 01/28/21  8:37 AM   Result Value Ref Range    WBC 6 50 4 31 - 10 16 Thousand/uL    RBC 5 58 3 88 - 5 62 Million/uL    Hemoglobin 16 2 12 0 - 17 0 g/dL    Hematocrit 51 1 (H) 36 5 - 49 3 %    MCV 92 82 - 98 fL    MCH 29 0 26 8 - 34 3 pg    MCHC 31 7 31 4 - 37 4 g/dL    RDW 13 6 11 6 - 15 1 %    MPV 9 0 8 9 - 12 7 fL    Platelets 187 315 - 686 Thousands/uL    nRBC 0 /100 WBCs    Neutrophils Relative 60 43 - 75 %    Immat GRANS % 1 0 - 2 %    Lymphocytes Relative 24 14 - 44 %    Monocytes Relative 10 4 - 12 %    Eosinophils Relative 4 0 - 6 %    Basophils Relative 1 0 - 1 %    Neutrophils Absolute 3 95 1 85 - 7 62 Thousands/µL    Immature Grans Absolute 0 07 0 00 - 0 20 Thousand/uL    Lymphocytes Absolute 1 55 0 60 - 4 47 Thousands/µL    Monocytes Absolute 0 64 0 17 - 1 22 Thousand/µL    Eosinophils Absolute 0 25 0 00 - 0 61 Thousand/µL    Basophils Absolute 0 04 0 00 - 0 10 Thousands/µL   Phosphorus    Collection Time: 01/28/21  8:37 AM   Result Value Ref Range    Phosphorus 3 3 2 3 - 4 1 mg/dL   PTH, intact    Collection Time: 01/28/21  8:37 AM   Result Value Ref Range    PTH 54 4 18 4 - 80 1 pg/mL   Protein / creatinine ratio, urine    Collection Time: 01/28/21  8:58 AM   Result Value Ref Range    Creatinine, Ur 73 0 mg/dL    Protein Urine Random 11 mg/dL    Prot/Creat Ratio, Ur 0 15 (H) 0 00 - 0 10   Urinalysis with microscopic    Collection Time: 01/28/21  8:58 AM   Result Value Ref Range    Clarity, UA Clear     Color, UA Yellow     Specific [place], UA 1 010 1 003 - 1 030    pH, UA 6 5 4 5, 5  0, 5 5, 6 0, 6 5, 7 0, 7 5, 8 0    Glucose, UA Negative Negative mg/dl    Ketones, UA Negative Negative mg/dl    Blood, UA Trace-lysed (A) Negative    Protein, UA Negative Negative mg/dl    Nitrite, UA Negative Negative    Bilirubin, UA Negative Negative    Urobilinogen, UA 0 2 0 2, 1 0 E U /dl E U /dl    Leukocytes, UA Negative Negative    WBC, UA None Seen None Seen, 2-4 /hpf    RBC, UA None Seen None Seen, 2-4 /hpf    Bacteria, UA None Seen None Seen, Occasional /hpf    Epithelial Cells None Seen None Seen, Occasional /hpf       This note was created with voice recognition software  Phonic, grammatical and spelling errors may be present within the note as a result

## 2021-02-19 DIAGNOSIS — I48.91 ATRIAL FIBRILLATION, UNSPECIFIED TYPE (HCC): ICD-10-CM

## 2021-02-19 RX ORDER — APIXABAN 5 MG/1
TABLET, FILM COATED ORAL
Qty: 90 TABLET | Refills: 0 | Status: SHIPPED | OUTPATIENT
Start: 2021-02-19

## 2021-03-16 ENCOUNTER — IMMUNIZATIONS (OUTPATIENT)
Dept: FAMILY MEDICINE CLINIC | Facility: HOSPITAL | Age: 71
End: 2021-03-16

## 2021-03-16 DIAGNOSIS — Z23 ENCOUNTER FOR IMMUNIZATION: Primary | ICD-10-CM

## 2021-03-16 PROCEDURE — 0001A SARS-COV-2 / COVID-19 MRNA VACCINE (PFIZER-BIONTECH) 30 MCG: CPT

## 2021-03-16 PROCEDURE — 91300 SARS-COV-2 / COVID-19 MRNA VACCINE (PFIZER-BIONTECH) 30 MCG: CPT

## 2021-04-08 ENCOUNTER — IMMUNIZATIONS (OUTPATIENT)
Dept: FAMILY MEDICINE CLINIC | Facility: HOSPITAL | Age: 71
End: 2021-04-08

## 2021-04-08 DIAGNOSIS — Z23 ENCOUNTER FOR IMMUNIZATION: Primary | ICD-10-CM

## 2021-04-08 PROCEDURE — 91300 SARS-COV-2 / COVID-19 MRNA VACCINE (PFIZER-BIONTECH) 30 MCG: CPT

## 2021-04-08 PROCEDURE — 0002A SARS-COV-2 / COVID-19 MRNA VACCINE (PFIZER-BIONTECH) 30 MCG: CPT

## 2021-04-21 ENCOUNTER — TELEPHONE (OUTPATIENT)
Dept: NEPHROLOGY | Facility: CLINIC | Age: 71
End: 2021-04-21

## 2021-04-23 ENCOUNTER — TELEPHONE (OUTPATIENT)
Dept: INTERNAL MEDICINE CLINIC | Facility: CLINIC | Age: 71
End: 2021-04-23

## 2021-04-23 NOTE — TELEPHONE ENCOUNTER
Pt and wife has questions on some cardiology things relating to his med hx        He was seen 3 weeks ago and just needs to talk to a nurse

## 2021-04-27 ENCOUNTER — TELEPHONE (OUTPATIENT)
Dept: NEPHROLOGY | Facility: CLINIC | Age: 71
End: 2021-04-27

## 2021-04-27 LAB
APPEARANCE UR: CLEAR
BACTERIA URNS QL MICRO: NORMAL
BASOPHILS # BLD AUTO: 0 X10E3/UL (ref 0–0.2)
BASOPHILS NFR BLD AUTO: 1 %
BILIRUB UR QL STRIP: NEGATIVE
BUN SERPL-MCNC: 23 MG/DL (ref 8–27)
BUN/CREAT SERPL: 19 (ref 10–24)
CALCIUM SERPL-MCNC: 9.4 MG/DL (ref 8.6–10.2)
CHLORIDE SERPL-SCNC: 100 MMOL/L (ref 96–106)
CHOLEST SERPL-MCNC: 140 MG/DL (ref 100–199)
CO2 SERPL-SCNC: 22 MMOL/L (ref 20–29)
COLOR UR: YELLOW
CREAT SERPL-MCNC: 1.2 MG/DL (ref 0.76–1.27)
CREAT UR-MCNC: 98.6 MG/DL
EOSINOPHIL # BLD AUTO: 0.3 X10E3/UL (ref 0–0.4)
EOSINOPHIL NFR BLD AUTO: 5 %
EPI CELLS #/AREA URNS HPF: NORMAL /HPF (ref 0–10)
ERYTHROCYTE [DISTWIDTH] IN BLOOD BY AUTOMATED COUNT: 12 % (ref 11.6–15.4)
GLUCOSE SERPL-MCNC: 90 MG/DL (ref 65–99)
GLUCOSE UR QL: NEGATIVE
HCT VFR BLD AUTO: 49 % (ref 37.5–51)
HDLC SERPL-MCNC: 63 MG/DL
HGB BLD-MCNC: 16.6 G/DL (ref 13–17.7)
HGB UR QL STRIP: NEGATIVE
IMM GRANULOCYTES # BLD: 0.1 X10E3/UL (ref 0–0.1)
IMM GRANULOCYTES NFR BLD: 1 %
KETONES UR QL STRIP: NEGATIVE
LDLC SERPL CALC-MCNC: 65 MG/DL (ref 0–99)
LEUKOCYTE ESTERASE UR QL STRIP: NEGATIVE
LYMPHOCYTES # BLD AUTO: 1.7 X10E3/UL (ref 0.7–3.1)
LYMPHOCYTES NFR BLD AUTO: 28 %
MCH RBC QN AUTO: 29.8 PG (ref 26.6–33)
MCHC RBC AUTO-ENTMCNC: 33.9 G/DL (ref 31.5–35.7)
MCV RBC AUTO: 88 FL (ref 79–97)
MICRO URNS: NORMAL
MICRO URNS: NORMAL
MONOCYTES # BLD AUTO: 0.7 X10E3/UL (ref 0.1–0.9)
MONOCYTES NFR BLD AUTO: 11 %
NEUTROPHILS # BLD AUTO: 3.4 X10E3/UL (ref 1.4–7)
NEUTROPHILS NFR BLD AUTO: 54 %
NITRITE UR QL STRIP: NEGATIVE
PH UR STRIP: 6.5 [PH] (ref 5–7.5)
PHOSPHATE SERPL-MCNC: 3.3 MG/DL (ref 2.8–4.1)
PLATELET # BLD AUTO: 238 X10E3/UL (ref 150–450)
POTASSIUM SERPL-SCNC: 5.2 MMOL/L (ref 3.5–5.2)
PROT UR QL STRIP: NEGATIVE
PROT UR-MCNC: 11.2 MG/DL
PROT/CREAT UR: 114 MG/G CREAT (ref 0–200)
PTH-INTACT SERPL-MCNC: 46 PG/ML (ref 15–65)
RBC # BLD AUTO: 5.57 X10E6/UL (ref 4.14–5.8)
RBC #/AREA URNS HPF: NORMAL /HPF (ref 0–2)
SL AMB EGFR AFRICAN AMERICAN: 70 ML/MIN/1.73
SL AMB EGFR NON AFRICAN AMERICAN: 61 ML/MIN/1.73
SL AMB VLDL CHOLESTEROL CALC: 12 MG/DL (ref 5–40)
SODIUM SERPL-SCNC: 137 MMOL/L (ref 134–144)
SP GR UR: 1.02 (ref 1–1.03)
TACROLIMUS BLD LC/MS/MS-MCNC: 4.6 NG/ML (ref 2–20)
TRIGL SERPL-MCNC: 59 MG/DL (ref 0–149)
UROBILINOGEN UR STRIP-ACNC: 0.2 MG/DL (ref 0.2–1)
WBC # BLD AUTO: 6.1 X10E3/UL (ref 3.4–10.8)
WBC #/AREA URNS HPF: NORMAL /HPF (ref 0–5)

## 2021-04-27 NOTE — TELEPHONE ENCOUNTER
Appointment was confirmed and went over registration details with patient for his virtual appointment   Milon March,

## 2021-04-28 ENCOUNTER — TELEPHONE (OUTPATIENT)
Dept: NEPHROLOGY | Facility: CLINIC | Age: 71
End: 2021-04-28

## 2021-04-28 ENCOUNTER — TELEMEDICINE (OUTPATIENT)
Dept: NEPHROLOGY | Facility: CLINIC | Age: 71
End: 2021-04-28
Payer: MEDICARE

## 2021-04-28 VITALS
SYSTOLIC BLOOD PRESSURE: 102 MMHG | HEIGHT: 64 IN | HEART RATE: 67 BPM | TEMPERATURE: 97.8 F | RESPIRATION RATE: 16 BRPM | WEIGHT: 136.2 LBS | BODY MASS INDEX: 23.25 KG/M2 | DIASTOLIC BLOOD PRESSURE: 61 MMHG

## 2021-04-28 DIAGNOSIS — I25.10 CORONARY ARTERY DISEASE INVOLVING NATIVE CORONARY ARTERY OF NATIVE HEART WITHOUT ANGINA PECTORIS: Chronic | ICD-10-CM

## 2021-04-28 DIAGNOSIS — I10 ESSENTIAL HYPERTENSION: Chronic | ICD-10-CM

## 2021-04-28 DIAGNOSIS — Z94.0 H/O KIDNEY TRANSPLANT: Chronic | ICD-10-CM

## 2021-04-28 DIAGNOSIS — N18.31 STAGE 3A CHRONIC KIDNEY DISEASE (HCC): Primary | Chronic | ICD-10-CM

## 2021-04-28 PROCEDURE — 99214 OFFICE O/P EST MOD 30 MIN: CPT | Performed by: INTERNAL MEDICINE

## 2021-04-28 RX ORDER — SULFAMETHOXAZOLE AND TRIMETHOPRIM 400; 80 MG/1; MG/1
1 TABLET ORAL DAILY
COMMUNITY
End: 2021-10-07

## 2021-04-28 NOTE — PROGRESS NOTES
Virtual Regular Visit      Assessment/Plan:    Problem List Items Addressed This Visit        Cardiovascular and Mediastinum    CAD (coronary artery disease) (Chronic)    Essential hypertension (Chronic)       Genitourinary    CKD (chronic kidney disease) - Primary (Chronic)       Other    H/O kidney transplant (Chronic)               Reason for visit is   Chief Complaint   Patient presents with    Kidney Transplant    Follow-up    Virtual Regular Visit        Encounter provider Ramon Rios MD    Provider located at [address] [address] [facility] [address] [facility] [address]  [address] [address] [facility] [address] [address]  [phone]      Recent Visits  Date Type Provider Dept   04/27/21 Telephone Ramon Rios MD [address]   04/21/21 Telephone Mary Lou Rose MA [facility] [place]   Showing recent visits within past 7 days and meeting all other requirements     Today's Visits  Date Type Provider Dept   04/28/21 Telephone Mary Lou Rose MA [facility] [place]   04/28/21 Sury Pratt MD [facility] [address] today's visits and meeting all other requirements     Future Appointments  No visits were found meeting these conditions  Showing future appointments within next 150 days and meeting all other requirements        The patient was identified by name and date of birth  Brooklyn Coleman was informed that this is a telemedicine visit and that the visit is being conducted through [facility] and patient was informed that this is a secure, HIPAA-compliant platform  He agrees to proceed     My office door was closed  No one else was in the room  He acknowledged consent and understanding of privacy and security of the video platform  The patient has agreed to participate and understands they can discontinue the visit at any time  Patient is aware this is a billable service  Subjective  Brooklyn Coleman is a 80 y o  male  CKD and kidney transplant   Patient kidney transplant who has CKD  Overall doing quite well    Patient also polycythemia and get phlebotomy regularly and being monitored by hematologist     Patient also atrial fibrillation and coronary artery disease and does see cardiologist regularly and will be getting nuclear stress test     Denies any complaint     No chest pain no palpitation or shortness of breath     Denies any leg swelling       Past Medical History:   Diagnosis Date    Atrial fibrillation ([address] )     Bacterial pneumonia     last assessed: 2/2/2017    Benign neoplasm of skin     Benign prostatic hyperplasia     Cardiac disorder     Chronic kidney disease     Coronary artery disease     GERD (gastroesophageal reflux disease)     Gross hematuria     last assessed: 12/5/2016    Hypercholesterolemia     Hypertension     Kidney disease     Kidney transplanted     x 2, 2001 and 2012    Osteoporosis     Pneumonia     last assessed: 10/9/2017    Seborrheic keratosis     Sinusitis     Viral warts        Past Surgical History:   Procedure Laterality Date    AV FISTULA PLACEMENT Right 2001    arteriovenous surgery creation of A-V fistula, right arm radiocephalic-Dr Michaelle Ortega    AV FISTULA PLACEMENT Left 2011    hemodialysis acess type arteriovenous fistula, left brachiocepalic AVF [id number]  R Sally Watson 74 N/A 8/2/2019    Procedure: CHOLECYSTECTOMY, OPEN;  Surgeon: Yunier Douglass MD;  Location: [facility];  Service: General    COLON SURGERY  2012    CORONARY STENT PLACEMENT  09/15/2011    PTA stenting-[facility]/M Dr Hurst Starch  2006    10/2006-[facility]   [place] OTHER SURGICAL HISTORY  01/2012    fluids from transplant    OTHER SURGICAL HISTORY Left 07/08/2013    shaving of lesion shoulders, skin left shoulder combined compound and blue nevus    TONSILLECTOMY      TRANSPLANTATION RENAL Left Pineda Kuhn K, [facility]    TRANSPLANTATION RENAL Right 12/08/2011 Pineda Muhammad, [place]       Current Outpatient Medications   Medication Sig Dispense Refill    aspirin 81 mg chewable tablet Chew 81 mg daily       atorvastatin (LIPITOR) 10 mg tablet TAKE 1 TABLET BY MOUTH EVERY DAY 90 tablet 1    Eliquis 5 MG TAKE 1 TABLET BY MOUTH TWICE A DAY 90 tablet 0    midodrine (PROAMATINE) 2 5 mg tablet Take 2 5 mg by mouth daily as needed      mycophenolate (MYFORTIC) 360 MG TBEC TAKE 1 TABLET (360 MG TOTAL) BY MOUTH 2 (TWO) TIMES A  tablet 2    Omega-3 Fatty Acids (FISH OIL CONCENTRATE) 300 MG CAPS Take 1 capsule by mouth daily       predniSONE 5 mg tablet TAKE 1 TABLET BY MOUTH EVERY DAY 90 tablet 1    sulfamethoxazole-trimethoprim (BACTRIM) 400-80 mg per tablet Take 1 tablet by mouth daily      tacrolimus (PROGRAF) 1 mg capsule Take 1 capsule (1 mg total) by mouth 2 (two) times a day 180 capsule 4    tamsulosin (FLOMAX) 0 4 mg TAKE 1 CAPSULE DAILY 90 capsule 1    midodrine (PROAMATINE) 10 MG tablet Take 1 tablet (10 mg total) by mouth daily (Patient not taking: Reported on 4/28/2021) 90 tablet 0     No current facility-administered medications for this visit  Allergies   Allergen Reactions    Other      seasonal       Review of Systems   Constitutional: Negative for activity change and fatigue  HENT: Negative for congestion and ear discharge  Eyes: Negative for photophobia and pain  Respiratory: Negative for apnea and choking  Cardiovascular: Negative for chest pain and palpitations  Gastrointestinal: Negative for abdominal distention and blood in stool  Endocrine: Negative for heat intolerance and polyphagia  Genitourinary: Negative for flank pain and urgency  Musculoskeletal: Negative for neck pain and neck stiffness  Skin: Negative for color change and wound  Allergic/Immunologic: Negative for food allergies and immunocompromised state  Neurological: Negative for seizures and facial asymmetry     Hematological: Negative for adenopathy  Does not bruise/bleed easily  Psychiatric/Behavioral: Negative for self-injury and suicidal ideas  Video Exam    Vitals:    04/28/21 0853   BP: 102/61   BP Location: Right arm   Patient Position: Sitting   Cuff Size: Standard   Pulse: 67   Resp: 16   Temp: 97 8 °F (36 6 °C)   TempSrc: Temporal   Weight: 61 8 kg (136 lb 3 2 oz)   Height: 5' 4" (1 626 m)      physical exam was done with help of video  Physical Exam  Constitutional:       General: He is not in acute distress  Appearance: Normal appearance  He is well-developed  HENT:      Head: Normocephalic and atraumatic  Eyes:      General: No scleral icterus  Conjunctiva/sclera: Conjunctivae normal    Neck:      Musculoskeletal: Neck supple  Vascular: No JVD  Cardiovascular:      Rate and Rhythm: Normal rate  Heart sounds: Normal heart sounds  Pulmonary:      Effort: Pulmonary effort is normal       Breath sounds: Normal breath sounds  No wheezing  Abdominal:      Palpations: Abdomen is soft  Tenderness: There is no abdominal tenderness  Musculoskeletal: Normal range of motion  Skin:     General: Skin is warm  Findings: No rash  Neurological:      General: No focal deficit present  Mental Status: He is alert and oriented to person, place, and time  Psychiatric:         Behavior: Behavior normal        Assessment and plan:    1  CKD stage 3: Stable at this point     2  Renal transplant: Kidney function is stable along with therapeutic tacrolimus level  Advised to continue what is doing    3  Polycythemia: Patient does get regular phlebotomy as per protocol and being monitored by hematologist    4  Coronary artery disease:  Overall seems to doing stable but will be getting nuclear stress test as recommended by cardiologist    5  Bone and mineral disorder:  PTH and phosphorus along with vitamin-D level are within acceptable range     I will see him back in 3-4 months    Will get blood and urine test before that visit    I spent 25 minutes with patient today in which greater than 50% of the time was spent in counseling/coordination of care regarding  CKD      VIRTUAL VISIT DISCLAIMER    Vandana Bernard acknowledges that he has consented to an online visit or consultation  He understands that the online visit is based solely on information provided by him, and that, in the absence of a face-to-face physical evaluation by the physician, the diagnosis he receives is both limited and provisional in terms of accuracy and completeness  This is not intended to replace a full medical face-to-face evaluation by the physician  Vandana Bernard understands and accepts these terms

## 2021-04-28 NOTE — TELEPHONE ENCOUNTER
I called and spoke to the patient and schedule his follow up appointment  I also explained that I was mailing out his lab orders, appointment card, the patient stated that I did not have to mail out his summary report, because he will get it from his [address] [place] [address]  The patient understood and was okay with it   Loli Srivastava,

## 2021-05-24 ENCOUNTER — TELEPHONE (OUTPATIENT)
Dept: NEPHROLOGY | Facility: CLINIC | Age: 71
End: 2021-05-24

## 2021-07-19 ENCOUNTER — OFFICE VISIT (OUTPATIENT)
Dept: INTERNAL MEDICINE CLINIC | Facility: CLINIC | Age: 71
End: 2021-07-19
Payer: MEDICARE

## 2021-07-19 VITALS
RESPIRATION RATE: 16 BRPM | HEART RATE: 81 BPM | OXYGEN SATURATION: 97 % | DIASTOLIC BLOOD PRESSURE: 60 MMHG | TEMPERATURE: 98 F | SYSTOLIC BLOOD PRESSURE: 104 MMHG | WEIGHT: 137 LBS | BODY MASS INDEX: 23.39 KG/M2 | HEIGHT: 64 IN

## 2021-07-19 DIAGNOSIS — F41.9 ANXIETY: ICD-10-CM

## 2021-07-19 DIAGNOSIS — R41.0 CONFUSION: ICD-10-CM

## 2021-07-19 DIAGNOSIS — R41.3 MEMORY LOSS: Primary | ICD-10-CM

## 2021-07-19 PROCEDURE — 99214 OFFICE O/P EST MOD 30 MIN: CPT | Performed by: INTERNAL MEDICINE

## 2021-07-19 NOTE — PROGRESS NOTES
Assessment/Plan:      Unclear etiology but anxiety may be the problem  Ordered labs but also suggested a trial of an SSRI based on what he was describing  However, his wife does not want him on any more medicine  Will have him seen by Neurology to evaluate for dementia  Quality Measures:       No follow-ups on file  No problem-specific Assessment & Plan notes found for this encounter  Diagnoses and all orders for this visit:    Memory loss  -     Ambulatory referral to Neurology; Future  -     CBC and differential; Future  -     Comprehensive metabolic panel; Future  -     Vitamin B12; Future    Anxiety    Confusion  -     Urinalysis with microscopic; Future  -     Urine culture; Future          Subjective:      Patient ID: Mai Velasquez is a 80 y o  male  Patient comes in today with his wife because she has noticed he is having outbursts of anger, mainly towards her  She feels this has been going on since at least father's Day but when she discussed it with her daughter, her daughter thought she had noticed the agitation  They moved up [place] to be closer to her daughter but they are living in an apartment after living in their home down here for over 30 years  They both admit he hates the apartment  They were looking to move out but with home price is going up, they feel they are going to need to stay put for now  This is not making him happy  His wife notes confusion at times  She is worried about dementia because his mother had dementia  She was also concerned because she thought he was urinating last   He has had no fever  No respiratory symptoms  He complains of no urinary symptoms        ALLERGIES:  Allergies   Allergen Reactions    Other      seasonal       CURRENT MEDICATIONS:    Current Outpatient Medications:     aspirin 81 mg chewable tablet, Chew 81 mg daily , Disp: , Rfl:     atorvastatin (LIPITOR) 10 mg tablet, TAKE 1 TABLET BY MOUTH EVERY DAY, Disp: 90 tablet, Rfl: 1    Eliquis 5 MG, TAKE 1 TABLET BY MOUTH TWICE A DAY, Disp: 90 tablet, Rfl: 0    midodrine (PROAMATINE) 2 5 mg tablet, Take 2 5 mg by mouth daily as needed, Disp: , Rfl:     mycophenolate (MYFORTIC) 360 MG TBEC, TAKE 1 TABLET (360 MG TOTAL) BY MOUTH 2 (TWO) TIMES A DAY, Disp: 180 tablet, Rfl: 2    Omega-3 Fatty Acids (FISH OIL CONCENTRATE) 300 MG CAPS, Take 1 capsule by mouth daily , Disp: , Rfl:     predniSONE 5 mg tablet, TAKE 1 TABLET BY MOUTH EVERY DAY, Disp: 90 tablet, Rfl: 1    sulfamethoxazole-trimethoprim (BACTRIM) 400-80 mg per tablet, Take 1 tablet by mouth daily, Disp: , Rfl:     tacrolimus (PROGRAF) 1 mg capsule, Take 1 capsule (1 mg total) by mouth 2 (two) times a day, Disp: 180 capsule, Rfl: 4    tamsulosin (FLOMAX) 0 4 mg, TAKE 1 CAPSULE DAILY, Disp: 90 capsule, Rfl: 1    ACTIVE PROBLEM LIST:  Patient Active Problem List   Diagnosis    Hypoalbuminemia    CAD (coronary artery disease)    H/O kidney transplant    New onset a-[address] [address]    HLD (hyperlipidemia)    S/P mitral valve repair    Polycystic kidney disease    CKD (chronic kidney disease)    Essential hypertension    Elevated brain natriuretic peptide (BNP) level    Immunosuppression (HCC)    H/O recurrent pneumonia    Multiple nevi    Benign localized prostatic hyperplasia with lower urinary tract symptoms (LUTS)    Incomplete bladder emptying    Acute calculous cholecystitis/status post cholecystectomy    Urinary retention-postop    Acute pulmonary insufficiency    Mitral valve stenosis    Pulmonary congestion    Non-recurrent unilateral inguinal hernia without obstruction or gangrene       PAST MEDICAL HISTORY:  Past Medical History:   Diagnosis Date    Atrial fibrillation (HCC)     Bacterial pneumonia     last assessed: 2/2/2017    Benign neoplasm of skin     Benign prostatic hyperplasia     Cardiac disorder     Chronic kidney disease     Coronary artery disease     GERD (gastroesophageal reflux disease)  Gross hematuria     last assessed: 12/5/2016    Hypercholesterolemia     Hypertension     Kidney disease     Kidney transplanted     x 2, 2001 and 2012    Osteoporosis     Pneumonia     last assessed: 10/9/2017    Seborrheic keratosis     Sinusitis     Viral warts        PAST SURGICAL HISTORY:  Past Surgical History:   Procedure Laterality Date    AV FISTULA PLACEMENT Right 2001    arteriovenous surgery creation of A-V fistula, right arm radiocephalic-Dr Claritza Manzano    AV FISTULA PLACEMENT Left 2011    hemodialysis acess type arteriovenous fistula, left brachiocepalic AVF [id number]  R Sally Watson 74 N/A 8/2/2019    Procedure: CHOLECYSTECTOMY, OPEN;  Surgeon: Lakeshia Cabrera MD;  Location: [facility];  Service: General    COLON SURGERY  2012    CORONARY STENT PLACEMENT  09/15/2011    PTA stenting-LVH/M Dr Deborah Jones  2006    [facility]   [place] OTHER SURGICAL HISTORY  01/2012    fluids from transplant    OTHER SURGICAL HISTORY Left 07/08/2013    shaving of lesion shoulders, skin left shoulder combined compound and blue nevus    TONSILLECTOMY      TRANSPLANTATION RENAL Left Pineda Higgins, [facility] [address] TRANSPLANTATION RENAL Right 12/08/2011    Pineda Moon, [place]       FAMILY HISTORY:  Family History   Problem Relation Age of Onset    Polycystic kidney disease Father     Kidney disease Father        SOCIAL HISTORY:  Social History     Socioeconomic History    Marital status: /Civil Union     Spouse name: Not on file    Number of children: 3    Years of education: Not on file    Highest education level: Not on file   Occupational History    Occupation: retired     Comment: , not employed   Tobacco Use    Smoking status: Never Smoker    Smokeless tobacco: Never Used   Substance and Sexual Activity    Alcohol use: Never    Drug use: No    Sexual activity: Yes     Partners: Female     Comment: denied: history of high risk sexual behavior   Other Topics Concern    Not on file   Social History Narrative    Active advance directive-yes    Exercises occasionally     Social Determinants of Health     Financial Resource Strain:     Difficulty of Paying Living Expenses:    Food Insecurity:     Worried About Running Out of Food in the Last Year:     Ran Out of Food in the Last Year:    Transportation Needs:     Lack of Transportation (Medical):  Lack of Transportation (Non-Medical):    Physical Activity:     Days of Exercise per Week:     Minutes of Exercise per Session:    Stress:     Feeling of Stress :    Social Connections:     Frequency of Communication with Friends and Family:     Frequency of Social Gatherings with Friends and Family:     Attends Mandaen Services:     Active Member of Clubs or Organizations:     Attends Club or Organization Meetings:     Marital Status:    Intimate Partner Violence:     Fear of Current or Ex-Partner:     Emotionally Abused:     Physically Abused:     Sexually Abused:        Review of Systems   Constitutional: Negative for fever  Respiratory: Negative for shortness of breath  Cardiovascular: Negative for chest pain  Gastrointestinal: Negative for abdominal pain  Objective:  Vitals:    07/19/21 1602   BP: 104/60   BP Location: Left arm   Patient Position: Sitting   Cuff Size: Standard   Pulse: 81   Resp: 16   Temp: 98 °F (36 7 °C)   TempSrc: Tympanic   SpO2: 97%   Weight: 62 1 kg (137 lb)   Height: 5' 4" (1 626 m)     Body mass index is 23 52 kg/m²  Physical Exam  Vitals and nursing note reviewed  Constitutional:       Appearance: He is well-developed  Cardiovascular:      Rate and Rhythm: Normal rate and regular rhythm  Heart sounds: Normal heart sounds  Pulmonary:      Effort: Pulmonary effort is normal       Breath sounds: Normal breath sounds  Abdominal:      Palpations: Abdomen is soft  Tenderness:  There is no abdominal tenderness  Neurological:      Mental Status: He is alert  Mental status is at baseline  RESULTS:    No results found for this or any previous visit (from the past 1008 hour(s))  This note was created with voice recognition software  Phonic, grammatical and spelling errors may be present within the note as a result

## 2021-07-26 LAB
ALBUMIN SERPL-MCNC: 3.7 G/DL (ref 3.8–4.8)
ALBUMIN/GLOB SERPL: 1 {RATIO} (ref 1.2–2.2)
ALP SERPL-CCNC: 65 IU/L (ref 48–121)
ALT SERPL-CCNC: 11 IU/L (ref 0–44)
APPEARANCE UR: CLEAR
AST SERPL-CCNC: 18 IU/L (ref 0–40)
BACTERIA UR CULT: NORMAL
BACTERIA URNS QL MICRO: NORMAL
BASOPHILS # BLD AUTO: 0.1 X10E3/UL (ref 0–0.2)
BASOPHILS NFR BLD AUTO: 1 %
BILIRUB SERPL-MCNC: 1.1 MG/DL (ref 0–1.2)
BILIRUB UR QL STRIP: NEGATIVE
BUN SERPL-MCNC: 21 MG/DL (ref 8–27)
BUN/CREAT SERPL: 19 (ref 10–24)
CALCIUM SERPL-MCNC: 9 MG/DL (ref 8.6–10.2)
CASTS URNS QL MICRO: NORMAL /LPF
CHLORIDE SERPL-SCNC: 103 MMOL/L (ref 96–106)
CO2 SERPL-SCNC: 22 MMOL/L (ref 20–29)
COLOR UR: YELLOW
CREAT SERPL-MCNC: 1.11 MG/DL (ref 0.76–1.27)
EOSINOPHIL # BLD AUTO: 0.3 X10E3/UL (ref 0–0.4)
EOSINOPHIL NFR BLD AUTO: 4 %
EPI CELLS #/AREA URNS HPF: NORMAL /HPF (ref 0–10)
ERYTHROCYTE [DISTWIDTH] IN BLOOD BY AUTOMATED COUNT: 13 % (ref 11.6–15.4)
GLOBULIN SER-MCNC: 3.6 G/DL (ref 1.5–4.5)
GLUCOSE SERPL-MCNC: 91 MG/DL (ref 65–99)
GLUCOSE UR QL: NEGATIVE
HCT VFR BLD AUTO: 50.3 % (ref 37.5–51)
HGB BLD-MCNC: 16.7 G/DL (ref 13–17.7)
HGB UR QL STRIP: NEGATIVE
IMM GRANULOCYTES # BLD: 0.1 X10E3/UL (ref 0–0.1)
IMM GRANULOCYTES NFR BLD: 1 %
KETONES UR QL STRIP: NEGATIVE
LEUKOCYTE ESTERASE UR QL STRIP: NEGATIVE
LYMPHOCYTES # BLD AUTO: 2.1 X10E3/UL (ref 0.7–3.1)
LYMPHOCYTES NFR BLD AUTO: 26 %
Lab: NORMAL
MCH RBC QN AUTO: 28.9 PG (ref 26.6–33)
MCHC RBC AUTO-ENTMCNC: 33.2 G/DL (ref 31.5–35.7)
MCV RBC AUTO: 87 FL (ref 79–97)
MICRO URNS: NORMAL
MICRO URNS: NORMAL
MONOCYTES # BLD AUTO: 0.8 X10E3/UL (ref 0.1–0.9)
MONOCYTES NFR BLD AUTO: 10 %
NEUTROPHILS # BLD AUTO: 4.7 X10E3/UL (ref 1.4–7)
NEUTROPHILS NFR BLD AUTO: 58 %
NITRITE UR QL STRIP: NEGATIVE
PH UR STRIP: 7 [PH] (ref 5–7.5)
PLATELET # BLD AUTO: 239 X10E3/UL (ref 150–450)
POTASSIUM SERPL-SCNC: 4.5 MMOL/L (ref 3.5–5.2)
PROT SERPL-MCNC: 7.3 G/DL (ref 6–8.5)
PROT UR QL STRIP: NEGATIVE
RBC # BLD AUTO: 5.78 X10E6/UL (ref 4.14–5.8)
RBC #/AREA URNS HPF: NORMAL /HPF (ref 0–2)
SL AMB EGFR AFRICAN AMERICAN: 77 ML/MIN/1.73
SL AMB EGFR NON AFRICAN AMERICAN: 67 ML/MIN/1.73
SODIUM SERPL-SCNC: 135 MMOL/L (ref 134–144)
SP GR UR: 1.02 (ref 1–1.03)
UROBILINOGEN UR STRIP-ACNC: 0.2 MG/DL (ref 0.2–1)
VIT B12 SERPL-MCNC: 318 PG/ML (ref 232–1245)
WBC # BLD AUTO: 7.9 X10E3/UL (ref 3.4–10.8)
WBC #/AREA URNS HPF: NORMAL /HPF (ref 0–5)

## 2021-08-18 ENCOUNTER — OFFICE VISIT (OUTPATIENT)
Dept: INTERNAL MEDICINE CLINIC | Facility: CLINIC | Age: 71
End: 2021-08-18
Payer: MEDICARE

## 2021-08-18 VITALS
OXYGEN SATURATION: 98 % | RESPIRATION RATE: 16 BRPM | BODY MASS INDEX: 23.05 KG/M2 | WEIGHT: 135 LBS | HEIGHT: 64 IN | TEMPERATURE: 98.6 F | DIASTOLIC BLOOD PRESSURE: 74 MMHG | SYSTOLIC BLOOD PRESSURE: 120 MMHG | HEART RATE: 74 BPM

## 2021-08-18 DIAGNOSIS — I10 ESSENTIAL HYPERTENSION: Primary | Chronic | ICD-10-CM

## 2021-08-18 DIAGNOSIS — Z94.0 H/O KIDNEY TRANSPLANT: Chronic | ICD-10-CM

## 2021-08-18 DIAGNOSIS — I25.10 CORONARY ARTERY DISEASE INVOLVING NATIVE CORONARY ARTERY OF NATIVE HEART WITHOUT ANGINA PECTORIS: Chronic | ICD-10-CM

## 2021-08-18 DIAGNOSIS — R41.3 MEMORY LOSS: ICD-10-CM

## 2021-08-18 PROCEDURE — 99214 OFFICE O/P EST MOD 30 MIN: CPT | Performed by: INTERNAL MEDICINE

## 2021-08-18 NOTE — PROGRESS NOTES
Assessment/Plan:       Chronic problems appear stable  Continue follow-up with his specialist   Follow-up with Neurology as planned  If neurology workup is unrevealing, consider a course of antidepressant / antianxiety medicines such as Lexapro  His screenings were negative for those conditions but there is a lot of overlap  Quality Measures:       Return in about 6 months (around 2/18/2022)  No problem-specific Assessment & Plan notes found for this encounter  Diagnoses and all orders for this visit:    Essential hypertension    Coronary artery disease involving native coronary artery of native heart without angina pectoris    H/O kidney transplant    Memory loss          Subjective:      Patient ID: Mariaa Elam is a 80 y o  male  Patient comes in today for routine follow-up  His blood pressure remains controlled  His heart disease is stable  He continues follow-up with his transplant specialist   He is aware that he qualifies for the 3rd COVID injection because of his immunosuppressants  His blood work for the memory loss was unrevealing  He does have an appointment with Neurology next month  His wife wanted him to see Neurology before consideration of starting any medicine  He still is upset living in the apartment they are living in  But his wife states that he seems okay with the rest of the family  His anger and outbursts appear only directed at her        ALLERGIES:  Allergies   Allergen Reactions    Other      seasonal       CURRENT MEDICATIONS:    Current Outpatient Medications:     aspirin 81 mg chewable tablet, Chew 81 mg daily , Disp: , Rfl:     atorvastatin (LIPITOR) 10 mg tablet, TAKE 1 TABLET BY MOUTH EVERY DAY, Disp: 90 tablet, Rfl: 1    Eliquis 5 MG, TAKE 1 TABLET BY MOUTH TWICE A DAY, Disp: 90 tablet, Rfl: 0    midodrine (PROAMATINE) 2 5 mg tablet, Take 2 5 mg by mouth daily as needed, Disp: , Rfl:     mycophenolate (MYFORTIC) 360 MG TBEC, TAKE 1 TABLET (360 MG TOTAL) BY MOUTH 2 (TWO) TIMES A DAY, Disp: 180 tablet, Rfl: 2    Omega-3 Fatty Acids (FISH OIL CONCENTRATE) 300 MG CAPS, Take 1 capsule by mouth daily , Disp: , Rfl:     predniSONE 5 mg tablet, TAKE 1 TABLET BY MOUTH EVERY DAY, Disp: 90 tablet, Rfl: 1    sulfamethoxazole-trimethoprim (BACTRIM) 400-80 mg per tablet, Take 1 tablet by mouth daily, Disp: , Rfl:     tacrolimus (PROGRAF) 1 mg capsule, Take 1 capsule (1 mg total) by mouth 2 (two) times a day, Disp: 180 capsule, Rfl: 4    tamsulosin (FLOMAX) 0 4 mg, TAKE 1 CAPSULE DAILY, Disp: 90 capsule, Rfl: 1    ACTIVE PROBLEM LIST:  Patient Active Problem List   Diagnosis    Hypoalbuminemia    CAD (coronary artery disease)    H/O kidney transplant    New onset a-fib ([facility] )    HLD (hyperlipidemia)    S/P mitral valve repair    Polycystic kidney disease    CKD (chronic kidney disease)    Essential hypertension    Elevated brain natriuretic peptide (BNP) level    Immunosuppression ([facility])    H/O recurrent pneumonia    Multiple nevi    Benign localized prostatic hyperplasia with lower urinary tract symptoms (LUTS)    Incomplete bladder emptying    Acute calculous cholecystitis/status post cholecystectomy    Urinary retention-postop    Acute pulmonary insufficiency    Mitral valve stenosis    Pulmonary congestion    Non-recurrent unilateral inguinal hernia without obstruction or gangrene       PAST MEDICAL HISTORY:  Past Medical History:   Diagnosis Date    [facility]     Bacterial pneumonia     last assessed: 2/2/2017    Benign neoplasm of skin     Benign prostatic hyperplasia     Cardiac disorder     Chronic kidney disease     Coronary artery disease     GERD (gastroesophageal reflux disease)     Gross hematuria     last assessed: 12/5/2016    Hypercholesterolemia     Hypertension     Kidney disease     Kidney transplanted     x 2, 2001 and 2012    Osteoporosis     Pneumonia     last assessed: 10/9/2017    Seborrheic keratosis     Sinusitis     Viral warts        PAST SURGICAL HISTORY:  Past Surgical History:   Procedure Laterality Date    AV FISTULA PLACEMENT Right 2001    arteriovenous surgery creation of A-V fistula, right arm radiocephalic-Dr Ryan Hull    AV FISTULA PLACEMENT Left 2011    hemodialysis acess type arteriovenous fistula, left brachiocepalic AVF [id number]-CM  R Sally Watson 74 N/A 8/2/2019    Procedure: CHOLECYSTECTOMY, OPEN;  Surgeon: Georgie Ayon MD;  Location: [facility];  Service: General    COLON SURGERY  2012    CORONARY STENT PLACEMENT  09/15/2011    PTA stenting-LVH/M Dr Lillette Goodpasture  2006    [facility]   [place] OTHER SURGICAL HISTORY  01/2012    fluids from transplant    OTHER SURGICAL HISTORY Left 07/08/2013    shaving of lesion shoulders, skin left shoulder combined compound and blue nevus    TONSILLECTOMY      TRANSPLANTATION RENAL Left Pineda Higgins, [facility] [address] TRANSPLANTATION RENAL Right 12/08/2011    Pineda Moon, [place]       FAMILY HISTORY:  Family History   Problem Relation Age of Onset    Polycystic kidney disease Father     Kidney disease Father        SOCIAL HISTORY:  Social History     Socioeconomic History    Marital status: /Civil Union     Spouse name: Not on file    Number of children: 3    Years of education: Not on file    Highest education level: Not on file   Occupational History    Occupation: retired     Comment: , not employed   Tobacco Use    Smoking status: Never Smoker    Smokeless tobacco: Never Used   Substance and Sexual Activity    Alcohol use: Never    Drug use: No    Sexual activity: Yes     Partners: Female     Comment: denied: history of high risk sexual behavior   Other Topics Concern    Not on file   Social History Narrative    Active advance directive-yes    Exercises occasionally     Social Determinants of Health     Financial Resource Strain:  Difficulty of Paying Living Expenses:    Food Insecurity:     Worried About Running Out of Food in the Last Year:     Ran Out of Food in the Last Year:    Transportation Needs:     Lack of Transportation (Medical):  Lack of Transportation (Non-Medical):    Physical Activity:     Days of Exercise per Week:     Minutes of Exercise per Session:    Stress:     Feeling of Stress :    Social Connections:     Frequency of Communication with Friends and Family:     Frequency of Social Gatherings with Friends and Family:     Attends Christianity Services:     Active Member of Clubs or Organizations:     Attends Club or Organization Meetings:     Marital Status:    Intimate Partner Violence:     Fear of Current or Ex-Partner:     Emotionally Abused:     Physically Abused:     Sexually Abused:        Review of Systems   Respiratory: Negative for shortness of breath  Cardiovascular: Negative for chest pain  Gastrointestinal: Negative for abdominal pain  Objective:  Vitals:    08/18/21 1030   BP: 120/74   Pulse: 74   Resp: 16   Temp: 98 6 °F (37 °C)   SpO2: 98%   Weight: 61 2 kg (135 lb)   Height: 5' 4" (1 626 m)     Body mass index is 23 17 kg/m²  Physical Exam  Vitals and nursing note reviewed  Constitutional:       Appearance: He is well-developed  Cardiovascular:      Rate and Rhythm: Normal rate and regular rhythm  Heart sounds: Normal heart sounds  Pulmonary:      Effort: Pulmonary effort is normal       Breath sounds: Normal breath sounds  Abdominal:      Palpations: Abdomen is soft  Tenderness: There is no abdominal tenderness  Neurological:      Mental Status: He is alert and oriented to person, place, and time             RESULTS:    Recent Results (from the past 1008 hour(s))   CBC and differential    Collection Time: 07/24/21  8:18 AM   Result Value Ref Range    White Blood Cell Count 7 9 3 4 - 10 8 x10E3/uL    Red Blood Cell Count 5 78 4 14 - 5 80 x10E6/uL Hemoglobin 16 7 13 0 - 17 7 g/dL    HCT 50 3 37 5 - 51 0 %    MCV 87 79 - 97 fL    MCH 28 9 26 6 - 33 0 pg    MCHC 33 2 31 5 - 35 7 g/dL    RDW 13 0 11 6 - 15 4 %    Platelet Count 946 674 - 450 x10E3/uL    Neutrophils 58 Not Estab  %    Lymphocytes 26 Not Estab  %    Monocytes 10 Not Estab  %    Eosinophils 4 Not Estab  %    Basophils PCT 1 Not Estab  %    Neutrophils (Absolute) 4 7 1 4 - 7 0 x10E3/uL    Lymphocytes (Absolute) 2 1 0 7 - 3 1 x10E3/uL    Monocytes (Absolute) 0 8 0 1 - 0 9 x10E3/uL    Eosinophils (Absolute) 0 3 0 0 - 0 4 x10E3/uL    Basophils ABS 0 1 0 0 - 0 2 x10E3/uL    Immature Granulocytes 1 Not Estab  %    Immature Granulocytes (Absolute) 0 1 0 0 - 0 1 x10E3/uL   Comprehensive metabolic panel    Collection Time: 07/24/21  8:18 AM   Result Value Ref Range    Glucose, Random 91 65 - 99 mg/dL    BUN 21 8 - 27 mg/dL    Creatinine 1 11 0 76 - 1 27 mg/dL    eGFR Non  67 >59 mL/min/1 73    eGFR  77 >59 mL/min/1 73    SL AMB BUN/CREATININE RATIO 19 10 - 24    Sodium 135 134 - 144 mmol/L    Potassium 4 5 3 5 - 5 2 mmol/L    Chloride 103 96 - 106 mmol/L    CO2 22 20 - 29 mmol/L    CALCIUM 9 0 8 6 - 10 2 mg/dL    Protein, Total 7 3 6 0 - 8 5 g/dL    Albumin 3 7 (L) 3 8 - 4 8 g/dL    Globulin, Total 3 6 1 5 - 4 5 g/dL    Albumin/Globulin Ratio 1 0 (L) 1 2 - 2 2    TOTAL BILIRUBIN 1 1 0 0 - 1 2 mg/dL    Alk Phos Isoenzymes 65 48 - 121 IU/L    AST 18 0 - 40 IU/L    ALT 11 0 - 44 IU/L   Urinalysis with microscopic    Collection Time: 07/24/21  8:18 AM   Result Value Ref Range    Specific Gravity 1 016 1 005 - 1 030    Ph 7 0 5 0 - 7 5    Color UA Yellow Yellow    Urine Appearance Clear Clear    Leukocyte Esterase Negative Negative    Protein Negative Negative/Trace    Glucose, 24 HR Urine Negative Negative    Ketone, Urine Negative Negative    Blood, Urine Negative Negative    Bilirubin, Urine Negative Negative    Urobilinogen Urine 0 2 0 2 - 1 0 mg/dL    SL AMB NITRITES URINE, QUAL  Negative Negative    Microscopic Examination Comment     Microscopic Examination See below:    Microscopic Examination    Collection Time: 07/24/21  8:18 AM   Result Value Ref Range    SL AMB WBC, URINE None seen 0 - 5 /hpf    RBC, Urine None seen 0 - 2 /hpf    Epithelial Cells (non renal) None seen 0 - 10 /hpf    Casts None seen None seen /lpf    Bacteria, Urine None seen None seen/Few   Vitamin B12    Collection Time: 07/24/21  8:18 AM   Result Value Ref Range    Vitamin B-12 318 232 - 1,245 pg/mL   Urine culture    Collection Time: 07/24/21  8:18 AM    UR   Result Value Ref Range    Urine Culture Result Final report    Result    Collection Time: 07/24/21  8:18 AM   Result Value Ref Range    Result 1 Comment        This note was created with voice recognition software  Phonic, grammatical and spelling errors may be present within the note as a result

## 2021-08-30 ENCOUNTER — TELEPHONE (OUTPATIENT)
Dept: NEPHROLOGY | Facility: CLINIC | Age: 71
End: 2021-08-30

## 2021-08-30 NOTE — TELEPHONE ENCOUNTER
Patient of Dr  Mellisa Noon called stated that she would like to know if the patient should wait to get the COVID Booster shot, because the patient had 50oz of blood taking out because of his Phlebotomy treatment on 8/18/2021, because the patient has to much hemoglobin  Patient wife is concern because of the patient having less blood in his body then normal for the COVID Booster Shot to be at it's most effective  Please call patient wife Michelle Rosen @ [phone] to advise her about the [employer]   Carmen De Anda,

## 2021-08-31 NOTE — TELEPHONE ENCOUNTER
Called Avani at [phone] and informed her that pt should have covid booster shot and hemoglobin levels should not have much effect on it, per Dr Whitlock Appl understood and was ok with it

## 2021-08-31 NOTE — TELEPHONE ENCOUNTER
He should get COVID booster shot and hemoglobin levels should not have much effect on it    Please let her know

## 2021-09-11 ENCOUNTER — TELEPHONE (OUTPATIENT)
Dept: OTHER | Facility: OTHER | Age: 71
End: 2021-09-11

## 2021-09-11 NOTE — TELEPHONE ENCOUNTER
Dr Sammi Mejía from [place] [address]  Patient was brought in overnight for NSTEMI  Planning to do a possible cath  I advised that we do not provider consults on the weekend  The doctor said it is not a consult but rather needed to speak with you as he is a transplant patient  p# [phone]  Paged Dr Banks Settler via

## 2021-09-14 DIAGNOSIS — Z94.0 RENAL TRANSPLANT RECIPIENT: ICD-10-CM

## 2021-09-14 RX ORDER — PREDNISONE 1 MG/1
TABLET ORAL
Qty: 90 TABLET | Refills: 1 | Status: SHIPPED | OUTPATIENT
Start: 2021-09-14 | End: 2022-02-21

## 2021-09-15 DIAGNOSIS — E78.2 MIXED HYPERLIPIDEMIA: ICD-10-CM

## 2021-09-15 RX ORDER — ATORVASTATIN CALCIUM 10 MG/1
TABLET, FILM COATED ORAL
Qty: 90 TABLET | Refills: 1 | Status: SHIPPED | OUTPATIENT
Start: 2021-09-15

## 2021-09-17 ENCOUNTER — TELEPHONE (OUTPATIENT)
Dept: NEPHROLOGY | Facility: CLINIC | Age: 71
End: 2021-09-17

## 2021-09-17 NOTE — TELEPHONE ENCOUNTER
Called pt to remind him to do labs prior to 09/24 appt  Spoke with pt's wife, she stated that pt is in the hospital and will be home tomorrow  Pt had heart surgery and is doing well, she said  Appt was changed to video visit via [employer]  If anything will change she will call to reschedule

## 2021-09-23 ENCOUNTER — TELEPHONE (OUTPATIENT)
Dept: NEPHROLOGY | Facility: CLINIC | Age: 71
End: 2021-09-23

## 2021-09-23 NOTE — TELEPHONE ENCOUNTER
Appointment was confirmed and went over registration details with patient wife Lauro Char Pacheco,

## 2021-09-23 NOTE — TELEPHONE ENCOUNTER
I called [facility] @ [phone] (Automated System)  to check eligible for the patient and as of 9/23/2021 the patient has current active coverage as of 9/23/2021  [employer] is secondary to Yasir Bermudez,

## 2021-09-24 ENCOUNTER — TELEPHONE (OUTPATIENT)
Dept: NEPHROLOGY | Facility: CLINIC | Age: 71
End: 2021-09-24

## 2021-09-24 ENCOUNTER — TELEMEDICINE (OUTPATIENT)
Dept: NEPHROLOGY | Facility: CLINIC | Age: 71
End: 2021-09-24
Payer: MEDICARE

## 2021-09-24 VITALS — RESPIRATION RATE: 16 BRPM | BODY MASS INDEX: 22.53 KG/M2 | WEIGHT: 132 LBS | HEIGHT: 64 IN

## 2021-09-24 DIAGNOSIS — Z94.0 H/O KIDNEY TRANSPLANT: Primary | Chronic | ICD-10-CM

## 2021-09-24 DIAGNOSIS — I25.10 CORONARY ARTERY DISEASE INVOLVING NATIVE CORONARY ARTERY OF NATIVE HEART WITHOUT ANGINA PECTORIS: Chronic | ICD-10-CM

## 2021-09-24 PROCEDURE — 99442 PR PHYS/QHP TELEPHONE EVALUATION 11-20 MIN: CPT | Performed by: INTERNAL MEDICINE

## 2021-09-24 RX ORDER — METOPROLOL TARTRATE 50 MG/1
50 TABLET, FILM COATED ORAL 2 TIMES DAILY
COMMUNITY
Start: 2021-09-19

## 2021-09-24 RX ORDER — AMIODARONE HYDROCHLORIDE 200 MG/1
200 TABLET ORAL 2 TIMES DAILY
COMMUNITY
Start: 2021-09-19

## 2021-09-24 RX ORDER — PANTOPRAZOLE SODIUM 40 MG/1
40 TABLET, DELAYED RELEASE ORAL DAILY
COMMUNITY
Start: 2021-09-19

## 2021-09-24 RX ORDER — FAMOTIDINE 20 MG/1
20 TABLET, FILM COATED ORAL 2 TIMES DAILY
COMMUNITY

## 2021-09-24 RX ORDER — CLOPIDOGREL BISULFATE 75 MG/1
75 TABLET ORAL DAILY
COMMUNITY
Start: 2021-09-19

## 2021-09-24 NOTE — PROGRESS NOTES
Virtual Brief Visit    Verification of patient location:    Patient is located in the following state in which I hold an active license [place]      Assessment/Plan:    Problem List Items Addressed This Visit        Cardiovascular and Mediastinum    CAD (coronary artery disease) (Chronic)    Relevant Medications    clopidogrel (PLAVIX) 75 mg tablet    metoprolol tartrate (LOPRESSOR) 50 mg tablet       Other    H/O kidney transplant - Primary (Chronic)                Reason for visit is   Chief Complaint   Patient presents with    Kidney Transplant    Follow-up    Virtual Brief Visit        Encounter provider Celestino Tay MD    Provider located at [phone] [place] [address]  [phone] [address]  [phone]    Recent Visits  Date Type Provider Dept   09/23/21 Telephone Celestino Tay MD [address]   09/23/21 Telephone Celestino Tay MD [address]   09/17/21 Telephone Alexa Padilla MA [facility] [place]   Showing recent visits within past 7 days and meeting all other requirements  Today's Visits  Date Type Provider Dept   09/24/21 Cal Arriaga MD [facility] [address] today's visits and meeting all other requirements  Future Appointments  No visits were found meeting these conditions  Showing future appointments within next 150 days and meeting all other requirements       After connecting through Telephone, the patient was identified by name and date of birth  Colleen Eaton was informed that this is a telemedicine visit and that the visit is being conducted through Telephone  My office door was closed  No one else was in the room  He acknowledged consent and understanding of privacy and security of the platform  The patient has agreed to participate and understands he can discontinue the visit at any time  Patient is aware this is a billable service       Subjective    Colleen Eaton is a 80 y o  male with renal transplant    Patient known to with renal transplant who just moved [facility]    I try to do video visit with him somehow his phone was not working send them doing audio visit     Since I saw him last he was in hospital with acute MI requiring bypass surgery with stenting    He still feeling very weak and rest just got discharged     No chest pain no palpitation     He is feeling fatigue       Past Medical History:   Diagnosis Date    Atrial fibrillation ([address] )     Bacterial pneumonia     last assessed: 2/2/2017    Benign neoplasm of skin     Benign prostatic hyperplasia     Cardiac disorder     Chronic kidney disease     Coronary artery disease     GERD (gastroesophageal reflux disease)     Gross hematuria     last assessed: 12/5/2016    Hypercholesterolemia     Hypertension     Kidney disease     Kidney transplanted     x 2, 2001 and 2012    Osteoporosis     Pneumonia     last assessed: 10/9/2017    Seborrheic keratosis     Sinusitis     Viral warts        Past Surgical History:   Procedure Laterality Date    AV FISTULA PLACEMENT Right 2001    arteriovenous surgery creation of A-V fistula, right arm radiocephalic-Dr Yuliana Isaac    AV FISTULA PLACEMENT Left 2011    hemodialysis acess type arteriovenous fistula, left brachiocepalic AVF [id number]-AP  R Sally Watson 73 N/A 8/2/2019    Procedure: CHOLECYSTECTOMY, OPEN;  Surgeon: Aj Hernandez MD;  [facility];  Service: General    COLON SURGERY  2012    CORONARY STENT PLACEMENT  09/15/2011    PTA stenting-LVH/M Dr Cassie Aldrich  2006    [facility]   [place] OTHER SURGICAL HISTORY  01/2012    fluids from transplant    OTHER SURGICAL HISTORY Left 07/08/2013    shaving of lesion shoulders, skin left shoulder combined compound and blue nevus    TONSILLECTOMY      TRANSPLANTATION RENAL Left Pineda Higgins, [place]    TRANSPLANTATION RENAL Right 12/08/2011    Pined[place] Moon, [place]       Current Outpatient Medications   Medication Sig Dispense Refill    amiodarone 200 mg tablet Take 200 mg by mouth 2 (two) times a day      aspirin 81 mg chewable tablet Chew 81 mg daily       atorvastatin (LIPITOR) 10 mg tablet TAKE 1 TABLET BY MOUTH EVERY DAY (Patient taking differently: 40 mg ) 90 tablet 1    clopidogrel (PLAVIX) 75 mg tablet Take 75 mg by mouth daily      Eliquis 5 MG TAKE 1 TABLET BY MOUTH TWICE A DAY (Patient taking differently: 2 5 mg ) 90 tablet 0    famotidine (PEPCID) 20 mg tablet Take 20 mg by mouth 2 (two) times a day      metoprolol tartrate (LOPRESSOR) 50 mg tablet Take 50 mg by mouth 2 (two) times a day      mycophenolate (MYFORTIC) 360 MG TBEC TAKE 1 TABLET (360 MG TOTAL) BY MOUTH 2 (TWO) TIMES A  tablet 2    Omega-3 Fatty Acids (FISH OIL CONCENTRATE) 300 MG CAPS Take 1 capsule by mouth daily       pantoprazole (PROTONIX) 40 mg tablet Take 40 mg by mouth daily      predniSONE 5 mg tablet TAKE 1 TABLET BY MOUTH EVERY DAY 90 tablet 1    sulfamethoxazole-trimethoprim (BACTRIM) 400-80 mg per tablet Take 1 tablet by mouth daily      tacrolimus (PROGRAF) 1 mg capsule Take 1 capsule (1 mg total) by mouth 2 (two) times a day 180 capsule 4    tamsulosin (FLOMAX) 0 4 mg TAKE 1 CAPSULE DAILY 90 capsule 1    midodrine (PROAMATINE) 2 5 mg tablet Take 2 5 mg by mouth daily as needed (Patient not taking: Reported on 9/24/2021)       No current facility-administered medications for this visit  Allergies   Allergen Reactions    Other      seasonal       Review of Systems   Constitutional: Positive for fatigue  Negative for activity change  HENT: Negative for congestion and ear discharge  Eyes: Negative for photophobia and pain  Respiratory: Positive for shortness of breath  Negative for apnea, choking and chest tightness  Cardiovascular: Negative for chest pain and palpitations     Gastrointestinal: Negative for abdominal distention and blood in stool  Endocrine: Negative for heat intolerance and polyphagia  Genitourinary: Negative for flank pain and urgency  Musculoskeletal: Negative for neck pain and neck stiffness  Skin: Negative for color change and wound  Allergic/Immunologic: Negative for food allergies and immunocompromised state  Neurological: Negative for seizures and facial asymmetry  Hematological: Negative for adenopathy  Does not bruise/bleed easily  Psychiatric/Behavioral: Negative for self-injury and suicidal ideas  Vitals:    09/24/21 1027   Resp: 16   Weight: 59 9 kg (132 lb)   Height: 5' 4" (1 626 m)   Assessment and plan:    1  Renal transplant:  Kidney function seems stable at creatinine 1 0    2  Coronary artery disease: Status post open heart surgery which stenting as he had a bypass before    3  Cardiomyopathy: According to wife his EF is about 25 percent     4  Diabetes type 2: Seems to be reasonably well control     I discussed everything the patient including blood test   As they live in [place] and they have multiple medical problem I advised him to find local nephrologist for further care and I will see him back on p r n  basis      I spent 20 minutes with patient today in which greater than 50% of the time was spent in counseling/coordination of care regarding renal transplant    VIRTUAL VISIT [address] [place] [facility] [place] verbally agrees to participate in [employer]  Pt is aware that [employer] could be limited without vital signs or the ability to perform a full hands-on physical [place] understands he or the provider may request at any time to terminate the video visit and request the patient to seek care or treatment in person

## 2021-09-24 NOTE — TELEPHONE ENCOUNTER
As per Dr Norman Berman the patient does not need a follow up appointment with Dr Norman Berman, because the patient was advised per Dr Norman Berman for the patient to find a local Nephrologist closest to where the patient lives, and no blood work was ordered for the patient    Graham Pacheco,

## 2021-10-07 DIAGNOSIS — Z94.0 KIDNEY TRANSPLANTED: ICD-10-CM

## 2021-10-07 DIAGNOSIS — Z94.0 RENAL TRANSPLANT RECIPIENT: Primary | ICD-10-CM

## 2021-10-07 RX ORDER — SULFAMETHOXAZOLE AND TRIMETHOPRIM 400; 80 MG/1; MG/1
TABLET ORAL
Qty: 87 TABLET | Refills: 1 | Status: SHIPPED | OUTPATIENT
Start: 2021-10-07 | End: 2022-01-05

## 2021-10-07 RX ORDER — MYCOPHENOLIC ACID 360 MG/1
TABLET, DELAYED RELEASE ORAL
Qty: 180 TABLET | Refills: 3 | Status: SHIPPED | OUTPATIENT
Start: 2021-10-07

## 2021-11-16 ENCOUNTER — TELEPHONE (OUTPATIENT)
Dept: INTERNAL MEDICINE CLINIC | Facility: CLINIC | Age: 71
End: 2021-11-16
